# Patient Record
Sex: FEMALE | Race: BLACK OR AFRICAN AMERICAN | NOT HISPANIC OR LATINO | URBAN - METROPOLITAN AREA
[De-identification: names, ages, dates, MRNs, and addresses within clinical notes are randomized per-mention and may not be internally consistent; named-entity substitution may affect disease eponyms.]

---

## 2019-12-05 ENCOUNTER — INPATIENT (INPATIENT)
Facility: HOSPITAL | Age: 69
LOS: 27 days | Discharge: NURSING, REHAB OR LONG-TERM CARE | DRG: 981 | End: 2020-01-02
Attending: HOSPITALIST | Admitting: INTERNAL MEDICINE
Payer: COMMERCIAL

## 2019-12-05 VITALS
RESPIRATION RATE: 26 BRPM | WEIGHT: 139.99 LBS | HEIGHT: 66 IN | HEART RATE: 105 BPM | TEMPERATURE: 98 F | DIASTOLIC BLOOD PRESSURE: 51 MMHG | OXYGEN SATURATION: 94 % | SYSTOLIC BLOOD PRESSURE: 78 MMHG

## 2019-12-05 DIAGNOSIS — J44.9 CHRONIC OBSTRUCTIVE PULMONARY DISEASE, UNSPECIFIED: ICD-10-CM

## 2019-12-05 DIAGNOSIS — Z96.643 PRESENCE OF ARTIFICIAL HIP JOINT, BILATERAL: Chronic | ICD-10-CM

## 2019-12-05 LAB
ALBUMIN SERPL ELPH-MCNC: 2.7 G/DL — LOW (ref 3.3–5.2)
ALP SERPL-CCNC: 92 U/L — SIGNIFICANT CHANGE UP (ref 40–120)
ALT FLD-CCNC: 5 U/L — SIGNIFICANT CHANGE UP
ANION GAP SERPL CALC-SCNC: 14 MMOL/L — SIGNIFICANT CHANGE UP (ref 5–17)
APTT BLD: 30 SEC — SIGNIFICANT CHANGE UP (ref 27.5–36.3)
AST SERPL-CCNC: 12 U/L — SIGNIFICANT CHANGE UP
BASOPHILS # BLD AUTO: 0.04 K/UL — SIGNIFICANT CHANGE UP (ref 0–0.2)
BASOPHILS NFR BLD AUTO: 0.3 % — SIGNIFICANT CHANGE UP (ref 0–2)
BILIRUB SERPL-MCNC: 0.5 MG/DL — SIGNIFICANT CHANGE UP (ref 0.4–2)
BUN SERPL-MCNC: 7 MG/DL — LOW (ref 8–20)
CALCIUM SERPL-MCNC: 8.7 MG/DL — SIGNIFICANT CHANGE UP (ref 8.6–10.2)
CHLORIDE SERPL-SCNC: 93 MMOL/L — LOW (ref 98–107)
CO2 SERPL-SCNC: 28 MMOL/L — SIGNIFICANT CHANGE UP (ref 22–29)
CREAT SERPL-MCNC: 0.49 MG/DL — LOW (ref 0.5–1.3)
EOSINOPHIL # BLD AUTO: 0.02 K/UL — SIGNIFICANT CHANGE UP (ref 0–0.5)
EOSINOPHIL NFR BLD AUTO: 0.2 % — SIGNIFICANT CHANGE UP (ref 0–6)
FERRITIN SERPL-MCNC: 411 NG/ML — HIGH (ref 15–150)
GAS PNL BLDV: SIGNIFICANT CHANGE UP
GLUCOSE SERPL-MCNC: 99 MG/DL — SIGNIFICANT CHANGE UP (ref 70–115)
HCO3 BLDV-SCNC: 28 MMOL/L — SIGNIFICANT CHANGE UP (ref 21–29)
HCT VFR BLD CALC: 33.4 % — LOW (ref 34.5–45)
HGB BLD-MCNC: 10.8 G/DL — LOW (ref 11.5–15.5)
IMM GRANULOCYTES NFR BLD AUTO: 0.4 % — SIGNIFICANT CHANGE UP (ref 0–1.5)
INR BLD: 1.21 RATIO — HIGH (ref 0.88–1.16)
IRON SATN MFR SERPL: 16 UG/DL — LOW (ref 37–145)
LYMPHOCYTES # BLD AUTO: 1.79 K/UL — SIGNIFICANT CHANGE UP (ref 1–3.3)
LYMPHOCYTES # BLD AUTO: 13.7 % — SIGNIFICANT CHANGE UP (ref 13–44)
MCHC RBC-ENTMCNC: 29.6 PG — SIGNIFICANT CHANGE UP (ref 27–34)
MCHC RBC-ENTMCNC: 32.3 GM/DL — SIGNIFICANT CHANGE UP (ref 32–36)
MCV RBC AUTO: 91.5 FL — SIGNIFICANT CHANGE UP (ref 80–100)
MONOCYTES # BLD AUTO: 0.64 K/UL — SIGNIFICANT CHANGE UP (ref 0–0.9)
MONOCYTES NFR BLD AUTO: 4.9 % — SIGNIFICANT CHANGE UP (ref 2–14)
NEUTROPHILS # BLD AUTO: 10.53 K/UL — HIGH (ref 1.8–7.4)
NEUTROPHILS NFR BLD AUTO: 80.5 % — HIGH (ref 43–77)
NT-PROBNP SERPL-SCNC: 4165 PG/ML — HIGH (ref 0–300)
PCO2 BLDV: 59 MMHG — HIGH (ref 35–50)
PH BLDV: 7.34 — SIGNIFICANT CHANGE UP (ref 7.32–7.43)
PLATELET # BLD AUTO: 534 K/UL — HIGH (ref 150–400)
PO2 BLDV: 234 MMHG — HIGH (ref 25–45)
POTASSIUM SERPL-MCNC: 4.4 MMOL/L — SIGNIFICANT CHANGE UP (ref 3.5–5.3)
POTASSIUM SERPL-SCNC: 4.4 MMOL/L — SIGNIFICANT CHANGE UP (ref 3.5–5.3)
PROT SERPL-MCNC: 8 G/DL — SIGNIFICANT CHANGE UP (ref 6.6–8.7)
PROTHROM AB SERPL-ACNC: 14 SEC — HIGH (ref 10–12.9)
RBC # BLD: 3.65 M/UL — LOW (ref 3.8–5.2)
RBC # FLD: 13.6 % — SIGNIFICANT CHANGE UP (ref 10.3–14.5)
SAO2 % BLDV: 99 % — SIGNIFICANT CHANGE UP
SODIUM SERPL-SCNC: 135 MMOL/L — SIGNIFICANT CHANGE UP (ref 135–145)
TROPONIN T SERPL-MCNC: <0.01 NG/ML — SIGNIFICANT CHANGE UP (ref 0–0.06)
TROPONIN T SERPL-MCNC: <0.01 NG/ML — SIGNIFICANT CHANGE UP (ref 0–0.06)
VIT B12 SERPL-MCNC: 451 PG/ML — SIGNIFICANT CHANGE UP (ref 232–1245)
WBC # BLD: 13.07 K/UL — HIGH (ref 3.8–10.5)
WBC # FLD AUTO: 13.07 K/UL — HIGH (ref 3.8–10.5)

## 2019-12-05 PROCEDURE — 71250 CT THORAX DX C-: CPT | Mod: 26

## 2019-12-05 PROCEDURE — 93010 ELECTROCARDIOGRAM REPORT: CPT

## 2019-12-05 PROCEDURE — 99223 1ST HOSP IP/OBS HIGH 75: CPT

## 2019-12-05 PROCEDURE — 99291 CRITICAL CARE FIRST HOUR: CPT

## 2019-12-05 PROCEDURE — 93306 TTE W/DOPPLER COMPLETE: CPT | Mod: 26

## 2019-12-05 PROCEDURE — 71046 X-RAY EXAM CHEST 2 VIEWS: CPT | Mod: 26

## 2019-12-05 RX ORDER — FOLIC ACID 0.8 MG
1 TABLET ORAL DAILY
Refills: 0 | Status: DISCONTINUED | OUTPATIENT
Start: 2019-12-05 | End: 2019-12-12

## 2019-12-05 RX ORDER — IPRATROPIUM BROMIDE 0.2 MG/ML
0 SOLUTION, NON-ORAL INHALATION
Qty: 0 | Refills: 0 | DISCHARGE

## 2019-12-05 RX ORDER — AZITHROMYCIN 500 MG/1
500 TABLET, FILM COATED ORAL ONCE
Refills: 0 | Status: COMPLETED | OUTPATIENT
Start: 2019-12-05 | End: 2019-12-05

## 2019-12-05 RX ORDER — IPRATROPIUM/ALBUTEROL SULFATE 18-103MCG
3 AEROSOL WITH ADAPTER (GRAM) INHALATION ONCE
Refills: 0 | Status: COMPLETED | OUTPATIENT
Start: 2019-12-05 | End: 2019-12-05

## 2019-12-05 RX ORDER — AZITHROMYCIN 500 MG/1
TABLET, FILM COATED ORAL
Refills: 0 | Status: DISCONTINUED | OUTPATIENT
Start: 2019-12-05 | End: 2019-12-08

## 2019-12-05 RX ORDER — ALBUTEROL 90 UG/1
0 AEROSOL, METERED ORAL
Qty: 0 | Refills: 0 | DISCHARGE

## 2019-12-05 RX ORDER — AZITHROMYCIN 500 MG/1
500 TABLET, FILM COATED ORAL EVERY 24 HOURS
Refills: 0 | Status: DISCONTINUED | OUTPATIENT
Start: 2019-12-06 | End: 2019-12-08

## 2019-12-05 RX ORDER — ENOXAPARIN SODIUM 100 MG/ML
40 INJECTION SUBCUTANEOUS DAILY
Refills: 0 | Status: DISCONTINUED | OUTPATIENT
Start: 2019-12-05 | End: 2019-12-06

## 2019-12-05 RX ORDER — IPRATROPIUM/ALBUTEROL SULFATE 18-103MCG
3 AEROSOL WITH ADAPTER (GRAM) INHALATION EVERY 6 HOURS
Refills: 0 | Status: DISCONTINUED | OUTPATIENT
Start: 2019-12-05 | End: 2019-12-07

## 2019-12-05 RX ADMIN — Medication 3 MILLILITER(S): at 11:31

## 2019-12-05 RX ADMIN — Medication 40 MILLIGRAM(S): at 11:30

## 2019-12-05 RX ADMIN — Medication 40 MILLIGRAM(S): at 18:50

## 2019-12-05 RX ADMIN — AZITHROMYCIN 255 MILLIGRAM(S): 500 TABLET, FILM COATED ORAL at 18:51

## 2019-12-05 RX ADMIN — Medication 3 MILLILITER(S): at 20:09

## 2019-12-05 NOTE — ED PROVIDER NOTE - PHYSICAL EXAMINATION
+tachypneic, tripoding, unable to speak in full sentences, lungs diffusely diminished despite good respiratory effort, pt appears cachetic with excessive loose skin

## 2019-12-05 NOTE — ED ADULT TRIAGE NOTE - CHIEF COMPLAINT QUOTE
Patient arrived to ED today with c/o trouble breathing, weakness, SOB, not eating at home.  Patient with hx of COPD.

## 2019-12-05 NOTE — H&P ADULT - HISTORY OF PRESENT ILLNESS
68 yo female with history of COPD who presents with 1 month of shortness of breath worsened over last 2 weeks associated with >30 lb weight loss in the last 6 months, decreased appetite. Denies chest pain, leg pain/swelling. Denies N/V, abdominal pain, diarrhea, constipation. Patient does admit to being depressed in mood but denies any suicidal ideation.     Patient and  at bedside note she has not been taking any of her prescribed medications. Patient was hypoxic in the ED and was given nebulizer and IV steroids. She is now 91% on room air. Patient had CT chest done that showed multiple lung nodules bilaterally. ED ordered abdominal CT.    Her pulmonologist is in [place] as well as her PCP. She states she had a CT done a year prior for an abnormal chest xray and there were no abnormal findings on the CT chest (I called patient's PCP Dr. Mario Rios who read the report as RML infiltrate/atelectasis with emphysematous changes but no mention of nodules or mediastinal lymphadenopathy).

## 2019-12-05 NOTE — ED PROVIDER NOTE - OBJECTIVE STATEMENT
69 y/ F with PMH COPD, active smoker presents to ED for c/o SOB worsening x 1 month. Patient should be using O2 at home as well as CPAP at night but family reports she is  noncompliant. Patient did not take nebulizer or inhalers for dyspnea. Was last seen by medical provider over a year ago as per son. Patient denies fever, chills, chest pain, abdominal pain, nausea, vomiting, diarrhea. Reports over 50 lb weight loss in one year, states some intentional by not eating junk food. Reports decreased appetite.     PCP: Dr Mario Sheppard  Pulmonary: Dr Chandra

## 2019-12-05 NOTE — H&P ADULT - NSHPLABSRESULTS_GEN_ALL_CORE
10.8   13.07 )-----------( 534      ( 05 Dec 2019 11:40 )             33.4     12-05    135  |  93<L>  |  7.0<L>  ----------------------------<  99  4.4   |  28.0  |  0.49<L>    Ca    8.7      05 Dec 2019 11:40    TPro  8.0  /  Alb  2.7<L>  /  TBili  0.5  /  DBili  x   /  AST  12  /  ALT  5   /  AlkPhos  92  12-05    < from: CT Chest No Cont (12.05.19 @ 13:28) >    FINDINGS:    LUNGS AND AIRWAYS: Secretions in the trachea. Moderate emphysematous   changes. Scattered bilateral pulmonary nodules measuring up to 1.4 cm in   the right lower lobe (series 3 image 117).  Opacity at the anterior   inferior aspect of the right middle lobe, likely atelectasis or scarring.   There are small nodules associated with the right middle lobe opacity,   likely infectious or inflammatory.    PLEURA: No pleural effusion.    MEDIASTINUM AND MAYANK: No lymphadenopathy. Small hiatal hernia.    VESSELS: Thoracic aorta normal in caliber with mild calcified plaque.   Coronary artery calcifications.    HEART: Heart size is normal. No pericardial effusion. Blood pool lower in   attenuation than the myocardium consistent with anemia.    CHEST WALL AND LOWER NECK: Within normal limits.    VISUALIZED UPPER ABDOMEN: Patient appears to be status post   cholecystectomy. Calcifications in the eunice hepatis and the medial   aspect of the right hepatic lobe, likely calcified lymph nodes.    BONES: No aggressive osseous lesion.    IMPRESSION:     Scattered bilateral pulmonary nodules measuring up 1.4 cm in the right   lower lobe; metastatic disease cannot be excluded; either a CT   abdomen/pelvis with oral and intravenous contrast or PET/CT is   recommended for further evaluation.    Right middle lobe opacity, likely atelectasis or scarring with small   associated nodules, likely infectious/inflammatory of the small airways.    < end of copied text >

## 2019-12-05 NOTE — H&P ADULT - NSHPREVIEWOFSYSTEMS_GEN_ALL_CORE
REVIEW OF SYSTEMS  General: +weakness, malaise; +weight loss  Skin/Breast: no new rash  Ophthalmologic: no change in vision  ENMT: no dysphagia, throat pain or change in hearing  Respiratory and Thorax: +difficulty breathing; no chest pain  Cardiovascular: no palpitations or PND, orthopnea  Gastrointestinal: no abdominal pain, normal bowel movement, no dark stools; +decreased appetite  Genitourinary: no difficulty urinating, no burning urination  Musculoskeletal: no myalgia/arthrlagia  Neurological: no numbness, change in gait  Psychiatric: +depression,   Hematology/Lymphatics: denies easy bruising or bleeding  Endocrine: no polyuria, polydipsia

## 2019-12-05 NOTE — ED ADULT NURSE NOTE - NSIMPLEMENTINTERV_GEN_ALL_ED
Implemented All Universal Safety Interventions:  [place] to call system. Call bell, personal items and telephone within reach. Instruct patient to call for assistance. Room bathroom lighting operational. Non-slip footwear when patient is off stretcher. Physically safe environment: no spills, clutter or unnecessary equipment. Stretcher in lowest position, wheels locked, appropriate side rails in place.

## 2019-12-05 NOTE — H&P ADULT - ASSESSMENT
68 yo female with history of COPD who presents with 1 month of shortness of breath worsened over last 2 weeks associated with >30 lb weight loss in the last 6 months, decreased appetite. Patient being admitted now with hypoxemic respiratory failure 2/2 COPD exacerbation with possible metastatic lung disease. CT abdomen is pending.    AHRF - 2/2 COPD exacerbation  - patient on anoro-ellipta 62.5/25 dose at home BID; ventolin as needed  - she is taking neither of these  - IV azithromycin 500mg x3 days  - methyprednisolone 40mg BID  - duoneb q6    Lung Nodules  - CT abdomen  - pulmonary consult    anemia with thrombocytosis  - check FOBT and iron studies  - may be GI source    elevated BNP - no clinical signs of HF  - EKG mostly flattened t-waves and inversions in inferior leads and V3/V4  - will add troponin  - will check echo    slightly elevated INR - given poor diet may reflect some degree of malnutrition    protein calorie malnutrition  - supplement meals  - nutrition consult    Depressed mood  - no suicidal ideation  - patient agrees to psychiatry consult - consult placed    DVT - lovenox sq

## 2019-12-05 NOTE — ED ADULT NURSE NOTE - OBJECTIVE STATEMENT
Assumed pt care at 1100.  Pt a&ox4 c/o increasing SOB, as well as a 50lb weight loss over the last year.  Pt is noncompliant, states she is supposed to be on 3L O2 NC at home and supposed to use bipap but does not, pt has not taken meds or seen doctor in over a year, pt states "I felt fine so I didn't use anything or go to doc" denies any c/o pain at this time, will continue to monitor

## 2019-12-05 NOTE — ED PROVIDER NOTE - CLINICAL SUMMARY MEDICAL DECISION MAKING FREE TEXT BOX
68 y/o noncompliant COPDpateint with worsening SOb x 1 month, dneies chest pain, also with excesive weight loss, loss of appetits, concern for pulmonary pathology, plan = will obtain EKG, BNP and troponin to assess for cardiac etiology, CXR and CT chest to evaluate for pneumonia, vs COPD, vs malignancy,

## 2019-12-05 NOTE — H&P ADULT - NSHPPHYSICALEXAM_GEN_ALL_CORE
PHYSICAL EXAM:  General: in no acute distress; slouched in her stretcher to one side; uncomfortable appearing; thin appearing with lots of loose skin  Eyes: PERRLA, EOMI; conjunctiva and sclera clear  Head: Normocephalic; atraumatic  Neck: Supple; non tender; no masses  Respiratory: tight sounding respirations; no audible wheeze; no rhonchi/rales  Cardiovascular: Regular rate and rhythm. S1 and S2 Normal; No murmurs, gallops or rubs  Gastrointestinal: Soft non-tender non-distended; Normal bowel sounds  Extremities: Normal range of motion, No clubbing, cyanosis or edema  Vascular: Peripheral pulses palpable 2+ bilaterally  Neurological: Alert and oriented x4  Skin: Warm and dry. No acute rash; lots of loose skin indicating weight loss  Musculoskeletal: Normal gait, tone, without deformities  Psychiatric: Cooperative and appropriate

## 2019-12-05 NOTE — ED PROVIDER NOTE - PROGRESS NOTE DETAILS
Minimal improvement in lung sounds after neb treatment, lungs remains diffusely diminished but pt does report improvement in breathing, Spo2 9% on 2 L NC. Awating CT AJM: concern for ca on ct. will obtain ct a/p and head to look for primary. signed out to hospitalist for admission. pt stable. pt and family updated

## 2019-12-06 DIAGNOSIS — F06.31 MOOD DISORDER DUE TO KNOWN PHYSIOLOGICAL CONDITION WITH DEPRESSIVE FEATURES: ICD-10-CM

## 2019-12-06 LAB
ALBUMIN SERPL ELPH-MCNC: 2.5 G/DL — LOW (ref 3.3–5.2)
ALP SERPL-CCNC: 88 U/L — SIGNIFICANT CHANGE UP (ref 40–120)
ALT FLD-CCNC: <5 U/L — SIGNIFICANT CHANGE UP
ANION GAP SERPL CALC-SCNC: 13 MMOL/L — SIGNIFICANT CHANGE UP (ref 5–17)
AST SERPL-CCNC: 6 U/L — SIGNIFICANT CHANGE UP
BILIRUB SERPL-MCNC: 0.3 MG/DL — LOW (ref 0.4–2)
BUN SERPL-MCNC: 13 MG/DL — SIGNIFICANT CHANGE UP (ref 8–20)
CALCIUM SERPL-MCNC: 8.9 MG/DL — SIGNIFICANT CHANGE UP (ref 8.6–10.2)
CHLORIDE SERPL-SCNC: 96 MMOL/L — LOW (ref 98–107)
CO2 SERPL-SCNC: 25 MMOL/L — SIGNIFICANT CHANGE UP (ref 22–29)
CREAT SERPL-MCNC: 0.68 MG/DL — SIGNIFICANT CHANGE UP (ref 0.5–1.3)
GLUCOSE SERPL-MCNC: 148 MG/DL — HIGH (ref 70–115)
HCT VFR BLD CALC: 29.1 % — LOW (ref 34.5–45)
HCV AB S/CO SERPL IA: 0.15 S/CO — SIGNIFICANT CHANGE UP (ref 0–0.99)
HCV AB SERPL-IMP: SIGNIFICANT CHANGE UP
HGB BLD-MCNC: 9.2 G/DL — LOW (ref 11.5–15.5)
MCHC RBC-ENTMCNC: 29.4 PG — SIGNIFICANT CHANGE UP (ref 27–34)
MCHC RBC-ENTMCNC: 31.6 GM/DL — LOW (ref 32–36)
MCV RBC AUTO: 93 FL — SIGNIFICANT CHANGE UP (ref 80–100)
PLATELET # BLD AUTO: 428 K/UL — HIGH (ref 150–400)
POTASSIUM SERPL-MCNC: 3.7 MMOL/L — SIGNIFICANT CHANGE UP (ref 3.5–5.3)
POTASSIUM SERPL-SCNC: 3.7 MMOL/L — SIGNIFICANT CHANGE UP (ref 3.5–5.3)
PROT SERPL-MCNC: 7.3 G/DL — SIGNIFICANT CHANGE UP (ref 6.6–8.7)
RBC # BLD: 3.13 M/UL — LOW (ref 3.8–5.2)
RBC # FLD: 13.7 % — SIGNIFICANT CHANGE UP (ref 10.3–14.5)
SODIUM SERPL-SCNC: 134 MMOL/L — LOW (ref 135–145)
WBC # BLD: 7.82 K/UL — SIGNIFICANT CHANGE UP (ref 3.8–10.5)
WBC # FLD AUTO: 7.82 K/UL — SIGNIFICANT CHANGE UP (ref 3.8–10.5)

## 2019-12-06 PROCEDURE — 99233 SBSQ HOSP IP/OBS HIGH 50: CPT

## 2019-12-06 PROCEDURE — 99223 1ST HOSP IP/OBS HIGH 75: CPT

## 2019-12-06 PROCEDURE — 90792 PSYCH DIAG EVAL W/MED SRVCS: CPT

## 2019-12-06 PROCEDURE — 99284 EMERGENCY DEPT VISIT MOD MDM: CPT

## 2019-12-06 PROCEDURE — 74177 CT ABD & PELVIS W/CONTRAST: CPT | Mod: 26

## 2019-12-06 RX ORDER — SODIUM CHLORIDE 9 MG/ML
1000 INJECTION, SOLUTION INTRAVENOUS
Refills: 0 | Status: COMPLETED | OUTPATIENT
Start: 2019-12-06 | End: 2019-12-06

## 2019-12-06 RX ORDER — ENOXAPARIN SODIUM 100 MG/ML
60 INJECTION SUBCUTANEOUS EVERY 12 HOURS
Refills: 0 | Status: DISCONTINUED | OUTPATIENT
Start: 2019-12-06 | End: 2019-12-10

## 2019-12-06 RX ADMIN — Medication 3 MILLILITER(S): at 10:10

## 2019-12-06 RX ADMIN — ENOXAPARIN SODIUM 40 MILLIGRAM(S): 100 INJECTION SUBCUTANEOUS at 08:01

## 2019-12-06 RX ADMIN — Medication 40 MILLIGRAM(S): at 06:01

## 2019-12-06 RX ADMIN — ENOXAPARIN SODIUM 60 MILLIGRAM(S): 100 INJECTION SUBCUTANEOUS at 19:02

## 2019-12-06 RX ADMIN — AZITHROMYCIN 255 MILLIGRAM(S): 500 TABLET, FILM COATED ORAL at 14:02

## 2019-12-06 RX ADMIN — Medication 3 MILLILITER(S): at 21:35

## 2019-12-06 RX ADMIN — Medication 1 MILLIGRAM(S): at 08:01

## 2019-12-06 RX ADMIN — SODIUM CHLORIDE 75 MILLILITER(S): 9 INJECTION, SOLUTION INTRAVENOUS at 19:06

## 2019-12-06 NOTE — CONSULT NOTE ADULT - ASSESSMENT
Assessment:   68 y/o female with PMHx of smoking (20 pack year history), COPD, Anemia who presents with 1 month of shortness of breath worsened over last 2 weeks associated with >30 lb weight loss in the last 6 months associated with decreased appetite. Patient states notices worsening breathing when waking up in the morning and decreased tolerance when walking up stairs in house. Patient states she is on home oxygen at 2L NC but states noncompliance to medications or home oxygen as she "Does not like taking it unless she needs it. "As per patient PCP CT done last year shows emphysematous changes but no mention of nodules or lymphadenopathy. CT chest in ER shows multiple scattered nodules measuring up to 1.4 cm in right lower lobe.      Plan:   Pulmonary Nodules:   -Follow up outpatient in office for possible biopsy by wedge resection   -Please call for re-evaluation if condition changes.        Case discussed with Dr. Cota

## 2019-12-06 NOTE — PROGRESS NOTE ADULT - ASSESSMENT
68 yo female with history of COPD who presents with 1 month of shortness of breath worsened over last 2 weeks associated with >30 lb weight loss in the last 6 months, decreased appetite. Patient being admitted now with hypoxemic respiratory failure 2/2 COPD exacerbation with possible metastatic lung disease. CT abdomen is pending.    AHRF - 2/2 COPD exacerbation  - patient on anoro-ellipta 62.5/25 dose at home BID; ventolin as needed  - she is taking neither of these  - IV azithromycin 500mg x3 days  - methyprednisolone 40mg BID - can change to PO likely today  - duoneb q6    Lung Nodules  - CT abdomen completed  - will consult GI regarding rectal wall changes  - pulmonary consulted - recs appreciated  - will place oncology consult  - CT surgery consulted however radiologist doesn't feel there are any attainable LN in the chest for biopsy - will cancel consult    anemia with thrombocytosis  - check FOBT and iron studies  - likely GI source    elevated BNP - no clinical signs of HF  - EKG mostly flattened t-waves and inversions in inferior leads and V3/V4  - troponins negative  - will check echo    slightly elevated INR - given poor diet may reflect some degree of malnutrition    protein calorie malnutrition  - supplement meals  - nutrition consult    Depressed mood  - no suicidal ideation  - patient agrees to psychiatry consult - consult placed    DVT - lovenox sq 68 yo female with history of COPD who presents with 1 month of shortness of breath worsened over last 2 weeks associated with >30 lb weight loss in the last 6 months, decreased appetite. Patient being admitted now with hypoxemic respiratory failure 2/2 COPD exacerbation with possible metastatic lung disease. CT abdomen is pending.    AHRF - 2/2 COPD exacerbation  - patient on anoro-ellipta 62.5/25 dose at home BID; ventolin as needed  - she is taking neither of these  - IV azithromycin 500mg x3 days  - methyprednisolone 40mg BID - can change to PO likely today  - duoneb q6    Lung Nodules  - CT abdomen completed  - will consult colorectal surgery regarding rectal wall changes  - pulmonary consulted - recs appreciated  - CT surgery consulted however radiologist doesn't feel there are any attainable LN in the chest for biopsy - will cancel consult    anemia with thrombocytosis  - check FOBT and iron studies  - likely GI source    elevated BNP - no clinical signs of HF  - EKG mostly flattened t-waves and inversions in inferior leads and V3/V4  - troponins negative  - will check echo    slightly elevated INR - given poor diet may reflect some degree of malnutrition    protein calorie malnutrition  - supplement meals  - nutrition consult    Depressed mood  - no suicidal ideation  - patient agrees to psychiatry consult - consult placed    DVT - lovenox sq

## 2019-12-06 NOTE — CONSULT NOTE ADULT - SUBJECTIVE AND OBJECTIVE BOX
HPI:  70 yo female with PMHx of smoking (20 pack year history), COPD, Anemia who presents with 1 month of shortness of breath worsened over last 2 weeks associated with >30 lb weight loss in the last 6 months, patient attributes to decreased appetite. Patient states notices worsening breathing when waking up in the morning and decreased tolerance when walking up stairs in house. Patient states she is on home oxygen at 2L NCNC but states noncompliance to medications or home oxygen as she "Does not like taking it unless she needs it" Patient found on CT Chest in ER to have multiple scattered pulmonary nodules largest measuring 1.4cm in right lower lobe.    of note from ED: Her pulmonologist is in [place] as well as her PCP. She states she had a CT done a year prior for an abnormal chest xray and there were no abnormal findings on the CT chest (called patient's PCP Dr. Mario Rios who read the report as RML infiltrate/atelectasis with emphysematous changes but no mention of nodules or mediastinal lymphadenopathy). (05 Dec 2019 14:31)    Patient denies productive cough, hemoptysis, orthopnea, pain with inspiration, recent illness. Patient sates 1 night sweat per week for the last month, denies daytime fevers.       PAST MEDICAL & SURGICAL HISTORY:  Anemia  COPD (chronic obstructive pulmonary disease)  H/O bilateral hip replacements      REVIEW OF SYSTEMS    General:+ >30 pound Weight change, + Fatigue denies HA/Dizzy	    Skin/Breast: No Rashes/ Lesions/ Masses    Respiratory and Thorax: No Cough/ Wheezing/ SOB/ Hemoptysis/ Increased Sputum production  	  Cardiovascular: No Chest pain/ Palpitations/ Diaphoresis	    Gastrointestinal: + Appetite changes (states decreased appetite and eats mostly vegetables) No Nausea/ Vomiting/ Constipation/     Musculoskeletal: No Pain/ Weakness/ Claudication	    Allergic/Immunologic:  Intolerance/ Recent illnesses    MEDICATIONS  (STANDING):  albuterol/ipratropium for Nebulization 3 milliLiter(s) Nebulizer every 6 hours  azithromycin  IVPB      azithromycin  IVPB 500 milliGRAM(s) IV Intermittent every 24 hours  enoxaparin Injectable 60 milliGRAM(s) SubCutaneous every 12 hours  folic acid 1 milliGRAM(s) Oral daily  lactated ringers. 1000 milliLiter(s) (75 mL/Hr) IV Continuous <Continuous>  predniSONE   Tablet 60 milliGRAM(s) Oral daily    Allergies  No Known Allergies    SOCIAL HISTORY:   Lives home with sister and brother, states 8 stairs into house and 8 stairs to second floor  Tobacco: 20 pack year smoking history, quit 18 years ago   Alcohol: 1 beer per month  Denies illicit drug use     FAMILY HISTORY:  Mother diabetes, denies significant cancer history    Vital Signs Last 24 Hrs  T(C): 36.7 (06 Dec 2019 15:53), Max: 36.7 (06 Dec 2019 09:49)  T(F): 98 (06 Dec 2019 15:53), Max: 98.1 (06 Dec 2019 09:49)  HR: 95 (06 Dec 2019 15:53) (75 - 95)  BP: 94/64 (06 Dec 2019 15:53) (94/58 - 103/55)  BP(mean): --  RR: 16 (06 Dec 2019 15:53) (16 - 18)  SpO2: 98% (06 Dec 2019 15:53) (90% - 98%)     General: WN/WD NAD  Neurology: Awake, nonfocal, TREADWELL x 4  Eyes: Scleras clear, PERRLA/ EOMI, Gross vision intact  ENT:Gross hearing intact, grossly patent pharynx, no stridor  Neck: Neck supple, trachea midline, No JVD,   Respiratory: CTA B/L, No wheezing, rales, rhonchi  CV: RRR, S1S2, no murmurs, rubs or gallops  Abdominal: Soft, NT, ND +BS,   Extremities: No edema, + peripheral pulses  Psych: Oriented x 3, normal affect    LABS:                        9.2    7.82  )-----------( 428      ( 06 Dec 2019 06:35 )             29.1     12-06    134<L>  |  96<L>  |  13.0  ----------------------------<  148<H>  3.7   |  25.0  |  0.68    Ca    8.9      06 Dec 2019 06:35    TPro  7.3  /  Alb  2.5<L>  /  TBili  0.3<L>  /  DBili  x   /  AST  6   /  ALT  <5  /  AlkPhos  88  12-06    PT/INR - ( 05 Dec 2019 11:40 )   PT: 14.0 sec;   INR: 1.21 ratio         PTT - ( 05 Dec 2019 11:40 )  PTT:30.0 sec      RADIOLOGY & ADDITIONAL STUDIES:    Chest Xray:   < from: Xray Chest 2 Views PA/Lat (12.05.19 @ 11:42) >    PROCEDURE DATE:  12/05/2019      INTERPRETATION:  XR CHEST PA AND LATERAL    History: Shortness of breath. Weight loss.    Technique: PA and lateral views of the chest were obtained.    Comparison: CT chest of the same day.    Findings:    Lungs/Pleura:  Pulmonary nodule in the left lower lung measuring 1 cm;   most of the pulmonary nodules on the chest CT of the same day are not   well seen on x-ray.    Heart/Mediastinum:  The heart is normal in size.    Bones:  Dextroconvex thoracolumbar scoliosis.    Other: Surgical clips in the upper abdomen on the lateral view.    Impression:    Left lower lobe pulmonary nodule measuring 1 cm; most of the pulmonary   nodules on the chest CT the same day are not well seen on x-ray; see   chest CT report.    ALEXIA COTTO   This document has been electronically signed. Dec  5 2019  2:57PM    < end of copied text >      Chest CT:   < from: CT Chest No Cont (12.05.19 @ 13:28) >  PROCEDURE DATE:  12/05/2019      INTERPRETATION:  CLINICAL INFORMATION: Smoking. Weight loss. Shortness of   breath    COMPARISON: Chest x-ray of the same day    PROCEDURE:   CT of the Chest was performed without intravenous contrast.  Sagittal and coronal reformats were performed.      FINDINGS:    LUNGS AND AIRWAYS: Secretions in the trachea. Moderate emphysematous   changes. Scattered bilateral pulmonary nodules measuring up to 1.4 cm in   the right lower lobe (series 3 image 117).  Opacity at the anterior   inferior aspect of the right middle lobe, likely atelectasis or scarring.   There are small nodules associated with the right middle lobe opacity,   likely infectious or inflammatory.    PLEURA: No pleural effusion.    MEDIASTINUM AND MAYANK: No lymphadenopathy. Small hiatal hernia.    VESSELS: Thoracic aorta normal in caliber with mild calcified plaque.   Coronary artery calcifications.    HEART: Heart size is normal. No pericardial effusion. Blood pool lower in   attenuation than the myocardium consistent with anemia.    CHEST WALL AND LOWER NECK: Within normal limits.    VISUALIZED UPPER ABDOMEN: Patient appears to be status post   cholecystectomy. Calcifications in the eunice hepatis and the medial   aspect of the right hepatic lobe, likely calcified lymph nodes.    BONES: No aggressive osseous lesion.    IMPRESSION:     Scattered bilateral pulmonary nodules measuring up 1.4 cm in the right   lower lobe; metastatic disease cannot be excluded; either a CT   abdomen/pelvis with oral and intravenous contrast or PET/CT is   recommended for further evaluation.    Right middle lobe opacity, likely atelectasis or scarring with small   associated nodules, likely infectious/inflammatory of the small airways.    The findings were discussed with Dr. Freeman on 12/5/2019 2:09 PM    ALEXIA COTTO   This document has been electronically signed. Dec  5 2019  2:11PM    < end of copied text >

## 2019-12-06 NOTE — BEHAVIORAL HEALTH ASSESSMENT NOTE - SUMMARY
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  Pt endorsing depression but denies SI and no anhedonia.  Pt has decreased enery and her self care is diminished.  Discussed starting an SSI but Pt declined saying she was worried about driving on medication and was educated meds would not effect safety.  Pt advised to return to ED if SI or if symptoms worsen and report to PCP if she changes her mind about meds.  Would recommend Lexapro 5 mg if Pt Is agreeable.  Please do not start without her consent.  Signing off.  Call for questions or as needed.

## 2019-12-06 NOTE — BEHAVIORAL HEALTH ASSESSMENT NOTE - SUICIDE PROTECTIVE FACTORS
Has future plans/Responsibility to family and others/Supportive social network of family or friends/Identifies reasons for living

## 2019-12-06 NOTE — CONSULT NOTE ADULT - ASSESSMENT
69yoF c SOB found to have an incidental finding of rectal thickening, currently asymptomatic  - Recommend GI consult for colonoscopy as etiology of CT scan findings are unknown  - No acute surgical interventions at this time  - Care per primary team 69yoF c SOB found to have an incidental finding of rectal thickening, currently asymptomatic  - Recommend GI consult for colonoscopy as etiology of CT scan findings are unknown

## 2019-12-06 NOTE — BEHAVIORAL HEALTH ASSESSMENT NOTE - NSBHCHARTREVIEWINVESTIGATE_PSY_A_CORE FT
Ventricular Rate 94 BPM    Atrial Rate 94 BPM    P-R Interval 146 ms    QRS Duration 88 ms    Q-T Interval 358 ms    QTC Calculation(Bezet) 447 ms    P Axis 72 degrees    R Axis 25 degrees    T Axis -76 degrees    Diagnosis Line *** Poor data quality, interpretation may be adversely affected  Normal sinus rhythm  T wave abnormality, consider inferior ischemia  T wave abnormality, consider anterior ischemia  Abnormal ECG    Confirmed by Rishi Lee ([id number]) on 12/5/2019 4:09:56 PM

## 2019-12-06 NOTE — BEHAVIORAL HEALTH ASSESSMENT NOTE - NSBHCHARTREVIEWVS_PSY_A_CORE FT
ICU Vital Signs Last 24 Hrs  T(C): 36.5 (06 Dec 2019 11:37), Max: 36.7 (06 Dec 2019 09:49)  T(F): 97.7 (06 Dec 2019 11:37), Max: 98.1 (06 Dec 2019 09:49)  HR: 92 (06 Dec 2019 11:37) (75 - 92)  BP: 94/61 (06 Dec 2019 11:37) (94/58 - 109/61)  BP(mean): --  ABP: --  ABP(mean): --  RR: 16 (06 Dec 2019 11:37) (16 - 20)  SpO2: 96% (06 Dec 2019 11:37) (90% - 98%)

## 2019-12-06 NOTE — CONSULT NOTE ADULT - ASSESSMENT
AECOPD cleared   Pulmonary nodules most likely metastatic    Plan:  change to po prednisone  agree with abd Ct  consider t surg consult for subcarinal node BX  Please call pulmonary with any issues or re-eval over the weekend. Will f/u on Monday unless called

## 2019-12-06 NOTE — BEHAVIORAL HEALTH ASSESSMENT NOTE - NSBHREFERDETAILS_PSY_A_CORE_FT
following is copy paste from h/p:  History of Present Illness:  Reason for Admission: acute hypoxic respiratory failure, multiple lung nodules  History of Present Illness:   68 yo female with history of COPD who presents with 1 month of shortness of breath worsened over last 2 weeks associated with >30 lb weight loss in the last 6 months, decreased appetite. Denies chest pain, leg pain/swelling. Denies N/V, abdominal pain, diarrhea, constipation. Patient does admit to being depressed in mood but denies any suicidal ideation.     Patient and  at bedside note she has not been taking any of her prescribed medications. Patient was hypoxic in the ED and was given nebulizer and IV steroids. She is now 91% on room air. Patient had CT chest done that showed multiple lung nodules bilaterally. ED ordered abdominal CT.

## 2019-12-06 NOTE — CONSULT NOTE ADULT - SUBJECTIVE AND OBJECTIVE BOX
69yoF that p/w worsening respiratory symptoms. CRS consulted for incidental finding of thickening of the rectum of unknwon etiology. Pt recently diagnosed with suspicious pulmonary nodules and weight loss. Pt denies changes in bowel habits, blood per rectum or obstruction. Pt currently has no active or previous GI complaints.    PMH: COPD

## 2019-12-06 NOTE — CONSULT NOTE ADULT - SUBJECTIVE AND OBJECTIVE BOX
PULMONARY CONSULT NOTE      KERRY OTOOLE-[id number]    Patient is a 69y old  Female who presents with a chief complaint of acute hypoxic respiratory failure, multiple lung nodules (05 Dec 2019 14:31)      HISTORY OF PRESENT ILLNESS:  68 yo female with history of COPD who presents with 1 month of shortness of breath worsened over last 2 weeks associated with >30 lb weight loss in the last 6 months, decreased appetite. Denies chest pain, leg pain/swelling. Denies N/V, abdominal pain, diarrhea, constipation. Patient does admit to being depressed in mood but denies any suicidal ideation.     Patient and  at bedside noted she has not been taking any of her prescribed medications. Patient was hypoxic in the ED and was given nebulizer and IV steroids. She is now 91% on room air. Patient had CT chest done that showed multiple lung nodules bilaterally. ED ordered abdominal CT.    Her pulmonologist is in [place] as well as her PCP. She states she had a CT done a year prior for an abnormal chest xray and there were no abnormal findings on the CT chest (I called patient's PCP Dr. Mario Rios who read the report as RML infiltrate/atelectasis with emphysematous changes but no mention of nodules or mediastinal lymphadenopathy).    Feeling much better this am with resp status at baseline    MEDICATIONS  (STANDING):  albuterol/ipratropium for Nebulization 3 milliLiter(s) Nebulizer every 6 hours  azithromycin  IVPB      azithromycin  IVPB 500 milliGRAM(s) IV Intermittent every 24 hours  enoxaparin Injectable 40 milliGRAM(s) SubCutaneous daily  folic acid 1 milliGRAM(s) Oral daily  methylPREDNISolone sodium succinate Injectable 40 milliGRAM(s) IV Push every 12 hours      MEDICATIONS  (PRN):      Allergies    No Known Allergies    Intolerances        PAST MEDICAL & SURGICAL HISTORY:  Anemia  COPD (chronic obstructive pulmonary disease)  H/O bilateral hip replacements      FAMILY HISTORY:      SOCIAL HISTORY  Smoking History:     REVIEW OF SYSTEMS:    CONSTITUTIONAL:  No fevers, chills, sweats    HEENT:  Eyes:  No diplopia or blurred vision. ENT:  No earache, sore throat or runny nose. sinus headache or postnasl drip    CARDIOVASCULAR:  No pressure, squeezing, tightness, or heaviness about the chest; no palpitations, leg swelling, orthopnea or PND    RESPIRATORY: above    GASTROINTESTINAL:  No abdominal pain, nausea, vomiting or diarrhea.    GENITOURINARY:  No dysuria, frequency or urgency.    NEUROLOGIC:  No paresthesias, fasciculations, seizures or weakness.    PSYCHIATRIC:  No disorder of thought or mood.    Vital Signs Last 24 Hrs  T(C): 36.7 (06 Dec 2019 09:49), Max: 37 (05 Dec 2019 11:29)  T(F): 98.1 (06 Dec 2019 09:49), Max: 98.6 (05 Dec 2019 11:29)  HR: 84 (06 Dec 2019 10:11) (75 - 86)  BP: 95/59 (06 Dec 2019 09:49) (94/58 - 109/61)  BP(mean): --  RR: 16 (06 Dec 2019 09:49) (16 - 20)  SpO2: 98% (06 Dec 2019 10:11) (90% - 98%)    PHYSICAL EXAMINATION:    GENERAL: The patient is a well-developed, well-nourished _____in no apparent distress.     HEENT: Head is normocephalic and atraumatic. Extraocular muscles are intact. Mucous membranes are moist.     NECK: Supple.     LUNGS: Clear to auscultation without wheezing, rales, or rhonchi. Respirations unlabored    HEART: Regular rate and rhythm without murmur.    ABDOMEN: Soft, nontender, and nondistended.  No hepatosplenomegaly is noted.    EXTREMITIES: Without any cyanosis, clubbing, rash, lesions or edema.    NEUROLOGIC: Grossly intact.      LABS:                        9.2    7.82  )-----------( 428      ( 06 Dec 2019 06:35 )             29.1     12-06    134<L>  |  96<L>  |  13.0  ----------------------------<  148<H>  3.7   |  25.0  |  0.68    Ca    8.9      06 Dec 2019 06:35    TPro  7.3  /  Alb  2.5<L>  /  TBili  0.3<L>  /  DBili  x   /  AST  6   /  ALT  <5  /  AlkPhos  88  12-06    PT/INR - ( 05 Dec 2019 11:40 )   PT: 14.0 sec;   INR: 1.21 ratio         PTT - ( 05 Dec 2019 11:40 )  PTT:30.0 sec      CARDIAC MARKERS ( 05 Dec 2019 19:40 )  x     / <0.01 ng/mL / x     / x     / x      CARDIAC MARKERS ( 05 Dec 2019 11:40 )  x     / <0.01 ng/mL / x     / x     / x            Serum Pro-Brain Natriuretic Peptide: 4165 pg/mL (12-05-19 @ 11:40)          MICROBIOLOGY:    RADIOLOGY & ADDITIONAL STUDIES:  < from: CT Chest No Cont (12.05.19 @ 13:28) >   EXAM:  CT CHEST                          PROCEDURE DATE:  12/05/2019          INTERPRETATION:  CLINICAL INFORMATION: Smoking. Weight loss. Shortness of   breath    COMPARISON: Chest x-ray of the same day    PROCEDURE:   CT of the Chest was performed without intravenous contrast.  Sagittal and coronal reformats were performed.      FINDINGS:    LUNGS AND AIRWAYS: Secretions in the trachea. Moderate emphysematous   changes. Scattered bilateral pulmonary nodules measuring up to 1.4 cm in   the right lower lobe (series 3 image 117).  Opacity at the anterior   inferior aspect of the right middle lobe, likely atelectasis or scarring.   There are small nodules associated with the right middle lobe opacity,   likely infectious or inflammatory.    PLEURA: No pleural effusion.    MEDIASTINUM AND MAYANK: No lymphadenopathy. Small hiatal hernia.    VESSELS: Thoracic aorta normal in caliber with mild calcified plaque.   Coronary artery calcifications.    HEART: Heart size is normal. No pericardial effusion. Blood pool lower in   attenuation than the myocardium consistent with anemia.    CHEST WALL AND LOWER NECK: Within normal limits.    VISUALIZED UPPER ABDOMEN: Patient appears to be status post   cholecystectomy. Calcifications in the eunice hepatis and the medial   aspect of the right hepatic lobe, likely calcified lymph nodes.    BONES: No aggressive osseous lesion.    IMPRESSION:     Scattered bilateral pulmonary nodules measuring up 1.4 cm in the right   lower lobe; metastatic disease cannot be excluded; either a CT   abdomen/pelvis with oral and intravenous contrast or PET/CT is   recommended for further evaluation.    Right middle lobe opacity, likely atelectasis or scarring with small   associated nodules, likely infectious/inflammatory of the small airways.    The findings were discussed with Dr. Freeman on 12/5/2019 2:09 PM                ALEXIA COTTO   This document has been electronically signed. Dec  5 2019  2:11PM    < end of copied text >  All films reviewed on PACS

## 2019-12-06 NOTE — CONSULT NOTE ADULT - NEGATIVE GASTROINTESTINAL SYMPTOMS
no nausea/no change in bowel habits/no vomiting/no diarrhea/no constipation/no abdominal pain/no melena/no hematochezia

## 2019-12-06 NOTE — BEHAVIORAL HEALTH ASSESSMENT NOTE - HPI (INCLUDE ILLNESS QUALITY, SEVERITY, DURATION, TIMING, CONTEXT, MODIFYING FACTORS, ASSOCIATED SIGNS AND SYMPTOMS)
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  Pt reports some depression with a mood of 5 out of 10.  Pt denies sleep changes, but has lost weight due to changing her diet from meat based to vegetarian because meat is "difficult to digest".  Pt denies anhedonia but has decreased energy from past.  Denies SI/HI and no h/o same and states life is "to short" to end it myself.  Collateral, Reddy Mejia reports he thinks she has "given up" secondary to breathing issues, but denies safety concerns and does not think she is suicidal.  Pt is well related with depression, denies SI/HI, good eye contact, smiles spontaneously, no evidence of a thought disorder, psychotic disorder, mood disorder, although Pt has current depressed mood in context of worry/stressors.

## 2019-12-06 NOTE — PROGRESS NOTE ADULT - SUBJECTIVE AND OBJECTIVE BOX
CC: acute hypoxic respiratory failure, multiple lung nodules (06 Dec 2019 11:05)    INTERVAL HPI/OVERNIGHT EVENTS:  no acute events overnight  discussed with pulmonology - obtained CT abdomen - shows some rectal changes including free air  no subcarinal LN seen on CT chest  patient without complaints feels better compared to yesterday asking to go home soon.    Vital Signs Last 24 Hrs  T(C): 36.5 (06 Dec 2019 11:37), Max: 36.7 (06 Dec 2019 09:49)  T(F): 97.7 (06 Dec 2019 11:37), Max: 98.1 (06 Dec 2019 09:49)  HR: 92 (06 Dec 2019 11:37) (75 - 92)  BP: 94/61 (06 Dec 2019 11:37) (94/58 - 109/61)  BP(mean): --  RR: 16 (06 Dec 2019 11:37) (16 - 20)  SpO2: 96% (06 Dec 2019 11:37) (90% - 98%)    PHYSICAL EXAM:  General: in no acute distress  Eyes: PERRLA, EOMI; conjunctiva and sclera clear  Head: Normocephalic; atraumatic  ENMT: No nasal discharge; airway clear  Respiratory: No wheezes, rales or rhonchi  Cardiovascular: Regular rate and rhythm. S1 and S2 Normal; No murmurs, gallops or rubs  Gastrointestinal: Soft non-tender non-distended; Normal bowel sounds  Extremities: Normal range of motion, No clubbing, cyanosis or edema  Neurological: Alert and oriented x4  Psychiatric: Cooperative and appropriate    I&O's Detail    CARDIAC MARKERS ( 05 Dec 2019 19:40 )  x     / <0.01 ng/mL / x     / x     / x      CARDIAC MARKERS ( 05 Dec 2019 11:40 )  x     / <0.01 ng/mL / x     / x     / x                            9.2    7.82  )-----------( 428      ( 06 Dec 2019 06:35 )             29.1     06 Dec 2019 06:35    134    |  96     |  13.0   ----------------------------<  148    3.7     |  25.0   |  0.68     Ca    8.9        06 Dec 2019 06:35    TPro  7.3    /  Alb  2.5    /  TBili  0.3    /  DBili  x      /  AST  6      /  ALT  <5     /  AlkPhos  88     06 Dec 2019 06:35    PT/INR - ( 05 Dec 2019 11:40 )   PT: 14.0 sec;   INR: 1.21 ratio       PTT - ( 05 Dec 2019 11:40 )  PTT:30.0 sec    LIVER FUNCTIONS - ( 06 Dec 2019 06:35 )  Alb: 2.5 g/dL / Pro: 7.3 g/dL / ALK PHOS: 88 U/L / ALT: <5 U/L / AST: 6 U/L / GGT: x           MEDICATIONS  (STANDING):  albuterol/ipratropium for Nebulization 3 milliLiter(s) Nebulizer every 6 hours  azithromycin  IVPB      azithromycin  IVPB 500 milliGRAM(s) IV Intermittent every 24 hours  enoxaparin Injectable 40 milliGRAM(s) SubCutaneous daily  folic acid 1 milliGRAM(s) Oral daily  predniSONE   Tablet 60 milliGRAM(s) Oral daily    MEDICATIONS  (PRN):    RADIOLOGY & ADDITIONAL TESTS:

## 2019-12-06 NOTE — BEHAVIORAL HEALTH ASSESSMENT NOTE - NSBHCHARTREVIEWLAB_PSY_A_CORE FT
Comprehensive Metabolic Panel in AM (12.06.19 @ 06:35)    Sodium, Serum: 134 mmol/L    Potassium, Serum: 3.7 mmol/L    Chloride, Serum: 96 mmol/L    Carbon Dioxide, Serum: 25.0 mmol/L    Anion Gap, Serum: 13 mmol/L    Blood Urea Nitrogen, Serum: 13.0 mg/dL    Creatinine, Serum: 0.68 mg/dL    Glucose, Serum: 148 mg/dL    Calcium, Total Serum: 8.9 mg/dL    Protein Total, Serum: 7.3 g/dL    Albumin, Serum: 2.5 g/dL    Bilirubin Total, Serum: 0.3 mg/dL    Alkaline Phosphatase, Serum: 88 U/L    Aspartate Aminotransferase (AST/SGOT): 6 U/L    Alanine Aminotransferase (ALT/SGPT): <5: TEST REPEATED. U/L    eGFR if Non : 90: Interpretative comment  The units for eGFR are mL/min/1.73M2 (normalized body surface area). The  eGFR is calculated from a serum creatinine using the CKD-EPI equation.  Other variables required for calculation are race, age and sex. Among  patients with chronic kidney disease (CKD), the eGFR is useful in  determining the stage of disease according to KDOQI CKD classification.  All eGFR results are reported numerically with the following  interpretation.          GFR                    With                 Without     (ml/min/1.73 m2)    Kidney Damage       Kidney Damage        >= 90                    Stage 1                     Normal        60-89                    Stage 2                     Decreased GFR        30-59     Stage 3                     Stage 3        15-29                    Stage 4                     Stage 4        < 15                      Stage 5                     Stage 5  Each stage of CKD assumes that the associated GFR level has been in  effect for at least 3 months. Determination of stages one and two (with  eGFR > 59 ml/min/m2) requires estimation of kidney damage for at least 3  months as defined by structural or functional abnormalities.  Limitations: All estimates of GFR will be less accurate for patients at  extremes of muscle mass (including but not limited to frail elderly,  critically ill, or cancer patients), those with unusual diets, and those  with conditions associated with reduced secretion or extrarenal  elimination of creatinine. The eGFR equation is not recommended for use  in patients with unstable creatinine levels. mL/min/1.73M2    eGFR if African American: 104 mL/min/1.73M2

## 2019-12-07 DIAGNOSIS — R91.8 OTHER NONSPECIFIC ABNORMAL FINDING OF LUNG FIELD: ICD-10-CM

## 2019-12-07 PROCEDURE — 99233 SBSQ HOSP IP/OBS HIGH 50: CPT

## 2019-12-07 PROCEDURE — 99223 1ST HOSP IP/OBS HIGH 75: CPT

## 2019-12-07 PROCEDURE — 99232 SBSQ HOSP IP/OBS MODERATE 35: CPT

## 2019-12-07 RX ORDER — TIOTROPIUM BROMIDE 18 UG/1
1 CAPSULE ORAL; RESPIRATORY (INHALATION) DAILY
Refills: 0 | Status: DISCONTINUED | OUTPATIENT
Start: 2019-12-07 | End: 2019-12-09

## 2019-12-07 RX ORDER — IPRATROPIUM/ALBUTEROL SULFATE 18-103MCG
3 AEROSOL WITH ADAPTER (GRAM) INHALATION EVERY 6 HOURS
Refills: 0 | Status: DISCONTINUED | OUTPATIENT
Start: 2019-12-07 | End: 2019-12-12

## 2019-12-07 RX ADMIN — Medication 60 MILLIGRAM(S): at 06:24

## 2019-12-07 RX ADMIN — Medication 1 MILLIGRAM(S): at 16:26

## 2019-12-07 RX ADMIN — Medication 3 MILLILITER(S): at 04:04

## 2019-12-07 RX ADMIN — ENOXAPARIN SODIUM 60 MILLIGRAM(S): 100 INJECTION SUBCUTANEOUS at 06:24

## 2019-12-07 RX ADMIN — ENOXAPARIN SODIUM 60 MILLIGRAM(S): 100 INJECTION SUBCUTANEOUS at 17:50

## 2019-12-07 RX ADMIN — AZITHROMYCIN 255 MILLIGRAM(S): 500 TABLET, FILM COATED ORAL at 16:25

## 2019-12-07 NOTE — CHART NOTE - NSCHARTNOTEFT_GEN_A_CORE
Upon Nutritional Assessment by the Registered Dietitian your patient was determined to meet criteria / has evidence of the following diagnosis/diagnoses:          [x] Severe Protein Calorie Malnutrition    Findings as based on:  •  Comprehensive nutrition assessment and consultation  •  Calorie counts (nutrient intake analysis)  •  Food acceptance and intake status from observations by staff  •  Follow up  •  Patient education  •  Intervention secondary to interdisciplinary rounds  •   concerns    Pt presents at high nutrition risk secondary to malnutrition (severe chronic) related to insufficient protein-energy intake in setting of poss colon CA with mets, persistent lack of appetite as evidenced by unintentional 30lb (17.64%) wt loss x 6 month, severe muscle wasting (temples), meeting <75% EER> 1mo.     Treatment:    The following diet has been recommended:  1) Add low Na to diet rx   2) Consider appetite stimulant  3) Continue Ensure Enlive BID   4) Monitor weights daily  5) Obtain/provide food preferences daily to inc PO     PROVIDER Section:     By signing this assessment you are acknowledging and agree with the diagnosis/diagnoses assigned by the Registered Dietitian    Comments:

## 2019-12-07 NOTE — DIETITIAN INITIAL EVALUATION ADULT. - ETIOLOGY
related to insufficient protein-energy intake in setting of poss colon CA with mets, persistent lack of appetite

## 2019-12-07 NOTE — PROGRESS NOTE ADULT - ASSESSMENT
71yo F with PMh of COPD, anemia, former smoker, found to have lung nodules, now newly found rectal mass. Awaiting further imaging. CTA ordered to r/o PE.       Discussed with Dr. Cota.     Slime MENARD  Thoracic Surgery   #[phone]

## 2019-12-07 NOTE — CONSULT NOTE ADULT - SUBJECTIVE AND OBJECTIVE BOX
HISTORY OF PRESENT ILLNESS:  This is a 69y old Female with a past medical history significant for COPD who presents with 1 month of shortness of breath worsened over last 2 weeks associated with >30 lb weight loss in the last 6 months, decreased appetite. She was admitted with hypoxemic respiratory failure due to COPD exacerbation and found to have metastatic lung disease. CT abdomen was done showed rectal wall thickening with posterior air pocket? perforation.  Colorectal surgery was consulted and pelvic MRI was recommended. She had an echocardiogram with RV strain and there is concern for PE. She is awaiting a CTA.     REVIEW OF SYSTEMS:  Constitutional:  No unintentional weight loss, fevers, chills or night sweats	  Eyes: No eye pain, redness, discharge, or proptosis  ENMT: No sore throat, ear pain, mouth sores, or swollen glands in the neck  Respiratory: No dyspnea, cough or wheezing  Cardiovascular: No chest pain, dyspnea on exertion, or orthopnea  Gastrointestinal:	Please see HPI  Genitourinary: No dysuria or hematuria  Neurological:	 No changes in sleep/wake cycle, convulsions, confusion, dizziness or lightheadedness  Psychiatric: No changes in personality or emotional problems   Hematology: No easy bruising   Endocrine: No hot or cold flashes or deepening of voice	  All other review of systems were completed and were otherwise negative save what is reported in the HPI.    PAST MEDICAL/SURGICAL HISTORY:  Anemia  COPD (chronic obstructive pulmonary disease)  H/O bilateral hip replacements    SOCIAL HISTORY:  - ILLICIT DRUG USE: Denies    FAMILY HISTORY:  No known history of gastrointestinal or liver disease;      HOME MEDICATIONS:  Anoro Ellipta 62.5 mcg-25 mcg/inh inhalation powder: 1 puff(s) inhaled once a day (05 Dec 2019 16:38)  Ventolin HFA 90 mcg/inh inhalation aerosol: 2 puff(s) inhaled 4 times a day (05 Dec 2019 16:38)    INPATIENT MEDICATIONS:  MEDICATIONS  (STANDING):  azithromycin  IVPB      azithromycin  IVPB 500 milliGRAM(s) IV Intermittent every 24 hours  enoxaparin Injectable 60 milliGRAM(s) SubCutaneous every 12 hours  folic acid 1 milliGRAM(s) Oral daily  predniSONE   Tablet 60 milliGRAM(s) Oral daily  tiotropium 18 MICROgram(s) Capsule 1 Capsule(s) Inhalation daily    MEDICATIONS  (PRN):  albuterol/ipratropium for Nebulization 3 milliLiter(s) Nebulizer every 6 hours PRN Shortness of Breath and/or Wheezing    ALLERGIES:  No Known Allergies    VITAL SIGNS LAST 24 HOURS:  T(C): 36.3 (07 Dec 2019 16:30), Max: 36.6 (06 Dec 2019 20:26)  T(F): 97.4 (07 Dec 2019 16:30), Max: 97.8 (06 Dec 2019 20:26)  HR: 92 (07 Dec 2019 16:30) (80 - 96)  BP: 111/67 (07 Dec 2019 16:30) (92/56 - 111/67)  BP(mean): --  RR: 19 (07 Dec 2019 16:30) (16 - 20)  SpO2: 97% (07 Dec 2019 16:30) (95% - 99%)      12-07-19 @ 07:01  -  12-07-19 @ 17:43  --------------------------------------------------------  IN: 724 mL / OUT: 0 mL / NET: 724 mL        12-07-19 @ 07:01  -  12-07-19 @ 17:43  --------------------------------------------------------  IN: 724 mL / OUT: 0 mL / NET: 724 mL      PHYSICAL EXAM:  Constitutional: Well-developed, well-nourished, in no apparent distress  Eyes: Sclerae anicteric, conjunctivae normal  ENMT: Mucus membranes moist, no oropharyngeal thrush noted  Neck: No thyroid nodules appreciated, no significant cervical or supraclavicular lymphadenopathy  Respiratory: Breathing nonlabored; clear to auscultation  Cardiovascular: Regular rate and rhythm  Gastrointestinal: Soft, nontender, nondistended, normoactive bowel sounds; no hepatosplenomegaly appreciated; no rebound tenderness or involuntary guarding  Extremities: No clubbing, cyanosis or edema  Neurological: Alert and oriented to person, place and time; no asterixis  Skin: No jaundice  Lymph Nodes: No significant lymphadenopathy  Musculoskeletal: No significant peripheral atrophy  Psychiatric: Affect and mood appropriate      LABS:                        9.2    7.82  )-----------( 428      ( 06 Dec 2019 06:35 )             29.1       12-06    134<L>  |  96<L>  |  13.0  ----------------------------<  148<H>  3.7   |  25.0  |  0.68    Ca    8.9      06 Dec 2019 06:35    TPro  7.3  /  Alb  2.5<L>  /  TBili  0.3<L>  /  DBili  x   /  AST  6   /  ALT  <5  /  AlkPhos  88  12-06    LIVER FUNCTIONS - ( 06 Dec 2019 06:35 )  Alb: 2.5 g/dL / Pro: 7.3 g/dL / ALK PHOS: 88 U/L / ALT: <5 U/L / AST: 6 U/L / GGT: x                 IMAGING:  I personally reviewed the XXXX, and I agree with the radiologist's interpretation. HISTORY OF PRESENT ILLNESS:  This is a 69y old Female with a past medical history significant for COPD who presents with 1 month of shortness of breath worsened over last 2 weeks associated with >30 lb weight loss in the last 6 months, decreased appetite. She was admitted with hypoxemic respiratory failure due to COPD exacerbation and found to have metastatic lung disease. CT abdomen was done showed rectal wall thickening with posterior air pocket? perforation.  Colorectal surgery was consulted and pelvic MRI was recommended. She had an echocardiogram with RV strain and there is concern for PE. She is awaiting a CTA. She denies abdominal pain, rectal bleeding or pain with defecation. She has a bowel movement every other day. She states over the last 2 years she has lost 100 lbs. She had a colonoscopy in [facility] last year.     REVIEW OF SYSTEMS:  Constitutional:  No unintentional weight loss, fevers, chills or night sweats	  Eyes: No eye pain, redness, discharge, or proptosis  ENMT: No sore throat, ear pain, mouth sores, or swollen glands in the neck  Respiratory: No dyspnea, cough or wheezing  Cardiovascular: No chest pain, dyspnea on exertion, or orthopnea  Gastrointestinal:	Please see HPI  Genitourinary: No dysuria or hematuria  Neurological:	 No changes in sleep/wake cycle, convulsions, confusion, dizziness or lightheadedness  Psychiatric: No changes in personality or emotional problems   Hematology: No easy bruising   Endocrine: No hot or cold flashes or deepening of voice	  All other review of systems were completed and were otherwise negative save what is reported in the HPI.    PAST MEDICAL/SURGICAL HISTORY:  Anemia  COPD (chronic obstructive pulmonary disease)  H/O bilateral hip replacements    SOCIAL HISTORY:  - ILLICIT DRUG USE: Denies    FAMILY HISTORY:  No known history of gastrointestinal or liver disease;      HOME MEDICATIONS:  Anoro Ellipta 62.5 mcg-25 mcg/inh inhalation powder: 1 puff(s) inhaled once a day (05 Dec 2019 16:38)  Ventolin HFA 90 mcg/inh inhalation aerosol: 2 puff(s) inhaled 4 times a day (05 Dec 2019 16:38)    INPATIENT MEDICATIONS:  MEDICATIONS  (STANDING):  azithromycin  IVPB      azithromycin  IVPB 500 milliGRAM(s) IV Intermittent every 24 hours  enoxaparin Injectable 60 milliGRAM(s) SubCutaneous every 12 hours  folic acid 1 milliGRAM(s) Oral daily  predniSONE   Tablet 60 milliGRAM(s) Oral daily  tiotropium 18 MICROgram(s) Capsule 1 Capsule(s) Inhalation daily    MEDICATIONS  (PRN):  albuterol/ipratropium for Nebulization 3 milliLiter(s) Nebulizer every 6 hours PRN Shortness of Breath and/or Wheezing    ALLERGIES:  No Known Allergies    VITAL SIGNS LAST 24 HOURS:  T(C): 36.3 (07 Dec 2019 16:30), Max: 36.6 (06 Dec 2019 20:26)  T(F): 97.4 (07 Dec 2019 16:30), Max: 97.8 (06 Dec 2019 20:26)  HR: 92 (07 Dec 2019 16:30) (80 - 96)  BP: 111/67 (07 Dec 2019 16:30) (92/56 - 111/67)  BP(mean): --  RR: 19 (07 Dec 2019 16:30) (16 - 20)  SpO2: 97% (07 Dec 2019 16:30) (95% - 99%)      12-07-19 @ 07:01  -  12-07-19 @ 17:43  --------------------------------------------------------  IN: 724 mL / OUT: 0 mL / NET: 724 mL        12-07-19 @ 07:01  -  12-07-19 @ 17:43  --------------------------------------------------------  IN: 724 mL / OUT: 0 mL / NET: 724 mL      PHYSICAL EXAM:  Constitutional: Well-developed, well-nourished, in no apparent distress  Eyes: Sclerae anicteric, conjunctivae normal  ENMT: Mucus membranes moist, no oropharyngeal thrush noted  Neck: No thyroid nodules appreciated, no significant cervical or supraclavicular lymphadenopathy  Respiratory: Breathing nonlabored; clear to auscultation  Cardiovascular: Regular rate and rhythm  Gastrointestinal: Soft, nontender, nondistended, normoactive bowel sounds  Extremities: No clubbing, cyanosis or edema  Neurological: Alert and oriented to person, place and time; no asterixis  Skin: No jaundice  Lymph Nodes: No significant lymphadenopathy  Musculoskeletal: No significant peripheral atrophy  Psychiatric: Affect and mood appropriate      LABS:                        9.2    7.82  )-----------( 428      ( 06 Dec 2019 06:35 )             29.1       12-06    134<L>  |  96<L>  |  13.0  ----------------------------<  148<H>  3.7   |  25.0  |  0.68    Ca    8.9      06 Dec 2019 06:35    TPro  7.3  /  Alb  2.5<L>  /  TBili  0.3<L>  /  DBili  x   /  AST  6   /  ALT  <5  /  AlkPhos  88  12-06    LIVER FUNCTIONS - ( 06 Dec 2019 06:35 )  Alb: 2.5 g/dL / Pro: 7.3 g/dL / ALK PHOS: 88 U/L / ALT: <5 U/L / AST: 6 U/L / GGT: x             IMAGING:  < from: CT Abdomen and Pelvis w/ IV Cont (12.06.19 @ 11:43) >  NDINGS:    LOWER CHEST: Bilateral pulmonary nodules again seen.    LIVER: Within normal limits. Calcifications in the eunice hepatis, likely   calcified lymph nodes.  BILE DUCTS: Normal caliber.  GALLBLADDER: Cholecystectomy.  SPLEEN: Within normal limits.  PANCREAS: Within normal limits.  ADRENALS: Within normal limits.  KIDNEYS/URETERS: Within normal limits.    BLADDER: Within normal limits.  REPRODUCTIVE ORGANS: Uterus within normal limits for size.    BOWEL: Distended stomach. Small bowel loops normal in caliber. Moderate   amount retained fecal material in the colon. Appendix is normal. There is   a collection of fluid and air at the posterior and right aspect of the   distal rectum and anal canal measuring 5.0 x 3.5 cm (series 4 image 55).   There is additional collection of air and fluid to the left of the anus   measuring 4.1 x 1.0 cm (series 3 image 133) which likely communicates   with the larger collection. An additional thin amount of fluid with a   droplet of air is seen to the left of the anal canal (series 2 image 134)   and droplet of air anteriorly which is also likely extraluminal and   communicates with the larger collection. Evaluation of the distal rectum   and anal canal is limited secondary to streak artifact from bilateral hip   arthroplasties, however there is wall thickening involving the anal canal.  PERITONEUM: Small amount of free fluid in the pelvis. Presacral edema.  VESSELS: Abdominal aorta normal in caliber with mild calcified plaque.  RETROPERITONEUM/LYMPH NODES: No lymphadenopathy.    ABDOMINAL WALL: Withinnormal limits.  BONES: Bilateral hip arthroplasties. There is lucency in the left iliac   bone consistent with osteolysis with broad areas of cortical disruption.   Lucencies are also seen within the femur, likely related to osteolysis.   There is irregularity and sclerosis at the anterior aspect of the left   acetabulum, likely a stress reaction. Multiple small lucent lesions in   the iliac bones more superiorly (for example series 3 image 97). High   attenuation structure at the anterior aspectof the left thigh, possibly   cement; clinical correlation is recommended.    IMPRESSION:     Collection of fluid and air at the posterior aspect of the distal rectum   and anal canal as described above.    Additional foci of fluid and air adjacent to the anus with the largest on   the left which likely communicate with the larger collection.    Wall thickening involving the anus; differential includes infectious or   inflammatory etiologies or an underlying mass.    Osteolysis in the left hip.    Multiple small lucent lesions in the iliac bones; myeloma cannot be   excluded.    Bilateral indeterminate pulmonary nodules again seen at the lung bases.    Comparison is recommended with a prior study if available for the   pulmonary nodules and lucent lesions in the iliac bones.    The findings were discussed with Dr. Kay on 12/6/2019 12:53 PM      < end of copied text >  .

## 2019-12-07 NOTE — DIETITIAN INITIAL EVALUATION ADULT. - OTHER INFO
70yo female admitted for hypoxemic resp failure 2/2 COPD exacerbation, 70yo female admitted for hypoxemic resp failure 2/2 COPD exacerbation, reported 30lb weight loss x 6 months 2/2 decreased appetite. Aware Pt with multiple lung nodules, CT showed rectal wall thickening-possible colon CA with possible mets to lung. Pt reports that she used to work in  and food was readily available. Pt has not worked for some time and as a result has lost weight. Son at bedside reports Pt will eat small portions if food is provided for her but won't go out of her way to eat. Pt likes Ensure Enlive, completing >75% of supplements. Additional food preferences obtain, briefly discussed option for meal prep to have food available.

## 2019-12-07 NOTE — PATIENT PROFILE ADULT - BILL PAYMENT
Problem: Pain:  Goal: Pain level will decrease  Description  Pain level will decrease  Outcome: Ongoing  Goal: Control of acute pain  Description  Control of acute pain  Outcome: Ongoing  Goal: Control of chronic pain  Description  Control of chronic pain  Outcome: Ongoing     Problem: Falls - Risk of:  Goal: Will remain free from falls  Description  Will remain free from falls  Outcome: Ongoing  Goal: Absence of physical injury  Description  Absence of physical injury  Outcome: Ongoing     Problem: SAFETY  Goal: Free from accidental physical injury  Outcome: Ongoing  Goal: Free from intentional harm  Outcome: Ongoing     Problem: DAILY CARE  Goal: Daily care needs are met  Outcome: Ongoing     Problem: PAIN  Goal: Patient's pain/discomfort is manageable  Outcome: Ongoing     Problem: SKIN INTEGRITY  Goal: Skin integrity is maintained or improved  Outcome: Ongoing     Problem: KNOWLEDGE DEFICIT  Goal: Patient/S.O. demonstrates understanding of disease process, treatment plan, medications, and discharge instructions.   Outcome: Ongoing     Problem: DISCHARGE BARRIERS  Goal: Patient's continuum of care needs are met  Outcome: Ongoing     Problem: Tissue Perfusion - Renal, Altered:  Goal: Electrolytes within specified parameters  Description  Electrolytes within specified parameters  Outcome: Ongoing  Goal: Urine creatinine clearance will be within specified parameters  Description  Urine creatinine clearance will be within specified parameters  Outcome: Ongoing  Goal: Serum creatinine will be within specified parameters  Description  Serum creatinine will be within specified parameters  Outcome: Ongoing  Goal: Ability to achieve a balanced intake and output will improve  Description  Ability to achieve a balanced intake and output will improve  Outcome: Ongoing     Problem: OXYGENATION/RESPIRATORY FUNCTION  Goal: Patient will maintain patent airway  Outcome: Ongoing  Goal: Patient will achieve/maintain normal no

## 2019-12-07 NOTE — PROGRESS NOTE ADULT - ASSESSMENT
A/P - Distal rectal mass, presumed cancer, likely perforated and metastatic    CEA ordered.  GI consult placed, discussed findings with Dr. Sequeira.  MRI pelvis pending for staging purchases.  CTA pending to r/o PE, already receiving therapeutic lovenox.  Findings and workup discussed with patient.

## 2019-12-07 NOTE — PROGRESS NOTE ADULT - ASSESSMENT
70 yo female with history of COPD who presents with 1 month of shortness of breath worsened over last 2 weeks associated with >30 lb weight loss in the last 6 months, decreased appetite. Patient being admitted now with hypoxemic respiratory failure 2/2 COPD exacerbation with possible metastatic lung disease.    Pulmonology consulted and CT abdomen was obtained - CT showed rectal wall thickening with adjacent air-pockets; colorectal surgery was consulted and MRI was recommended. May have colon cancer with mets to the lung. GI consult was recommended by colorectal surgeon.    Echo was ordered for elevated BNP - she was shown to have severe right sided ventricular strain. Started empirically on treatment for PE and CTA was ordered (delayed due to having received contrast with the CT abdomen on the same day).     Lung Nodules; likely metastatic disease  - CT abdomen completed  - will consult colorectal surgery regarding rectal wall changes  - pulmonary consulted - recs appreciated  - CT surgery consulted however radiologist doesn't feel there are any attainable LN in the chest for biopsy - will cancel consult    COPD exacerbation - hypoxia on admission has resolved  - patient on anoro-ellipta 62.5/25 dose at home BID; ventolin as needed at home  - duoneb q6 PRN  - start spiriva  - IV azithromycin 500mg x3 days; day 3/3  - now on prednisone 60mg daily - can taper    anemia with thrombocytosis  - check FOBT and iron studies  - likely GI source    elevated BNP - no clinical signs of HF  - EKG mostly flattened t-waves and inversions in inferior leads and V3/V4  - troponins negative  - will check echo    slightly elevated INR - given poor diet may reflect some degree of malnutrition    protein calorie malnutrition  - supplement meals  - nutrition consult    Depressed mood  - no suicidal ideation  - patient agrees to psychiatry consult - consult placed    DVT - lovenox sq

## 2019-12-07 NOTE — DIETITIAN INITIAL EVALUATION ADULT. - ADD RECOMMEND
Add low Na to diet rx; continue Ensure Enlive BID; Consider appetite stimulant; Monitor weights daily

## 2019-12-07 NOTE — PROGRESS NOTE ADULT - SUBJECTIVE AND OBJECTIVE BOX
CC: acute hypoxic respiratory failure, multiple lung nodules (06 Dec 2019 11:05)    INTERVAL HPI/OVERNIGHT EVENTS:    Vital Signs Last 24 Hrs  T(C): 36.4 (07 Dec 2019 04:51), Max: 36.7 (06 Dec 2019 09:49)  T(F): 97.5 (07 Dec 2019 04:51), Max: 98.1 (06 Dec 2019 09:49)  HR: 93 (07 Dec 2019 04:51) (80 - 96)  BP: 92/56 (07 Dec 2019 04:51) (92/56 - 95/59)  BP(mean): --  RR: 20 (07 Dec 2019 04:51) (16 - 20)  SpO2: 97% (07 Dec 2019 04:51) (96% - 99%)    PHYSICAL EXAM:  General: in no acute distress  Eyes: PERRLA, EOMI; conjunctiva and sclera clear  Head: Normocephalic; atraumatic  ENMT: No nasal discharge; airway clear  Respiratory: No wheezes, rales or rhonchi  Cardiovascular: Regular rate and rhythm. S1 and S2 Normal; No murmurs, gallops or rubs  Gastrointestinal: Soft non-tender non-distended; Normal bowel sounds  Extremities: Normal range of motion, No clubbing, cyanosis or edema  Neurological: Alert and oriented x4  Psychiatric: Cooperative and appropriate    I&O's Detail    CARDIAC MARKERS ( 05 Dec 2019 19:40 )  x     / <0.01 ng/mL / x     / x     / x      CARDIAC MARKERS ( 05 Dec 2019 11:40 )  x     / <0.01 ng/mL / x     / x     / x                            9.2    7.82  )-----------( 428      ( 06 Dec 2019 06:35 )             29.1     06 Dec 2019 06:35    134    |  96     |  13.0   ----------------------------<  148    3.7     |  25.0   |  0.68     Ca    8.9        06 Dec 2019 06:35    TPro  7.3    /  Alb  2.5    /  TBili  0.3    /  DBili  x      /  AST  6      /  ALT  <5     /  AlkPhos  88     06 Dec 2019 06:35    PT/INR - ( 05 Dec 2019 11:40 )   PT: 14.0 sec;   INR: 1.21 ratio       PTT - ( 05 Dec 2019 11:40 )  PTT:30.0 sec    LIVER FUNCTIONS - ( 06 Dec 2019 06:35 )  Alb: 2.5 g/dL / Pro: 7.3 g/dL / ALK PHOS: 88 U/L / ALT: <5 U/L / AST: 6 U/L / GGT: x           MEDICATIONS  (STANDING):  albuterol/ipratropium for Nebulization 3 milliLiter(s) Nebulizer every 6 hours  azithromycin  IVPB      azithromycin  IVPB 500 milliGRAM(s) IV Intermittent every 24 hours  enoxaparin Injectable 40 milliGRAM(s) SubCutaneous daily  folic acid 1 milliGRAM(s) Oral daily  predniSONE   Tablet 60 milliGRAM(s) Oral daily    MEDICATIONS  (PRN):    RADIOLOGY & ADDITIONAL TESTS: CC: acute hypoxic respiratory failure, multiple lung nodules (06 Dec 2019 11:05)    INTERVAL HPI/OVERNIGHT EVENTS:  patient without complaints  wants to go home  scheduled for CTA to rule out PE  started on BID lovenox last night  monitor for GI bleeds  passing stool without blood    Vital Signs Last 24 Hrs  T(C): 36.4 (07 Dec 2019 09:44), Max: 36.7 (06 Dec 2019 15:53)  T(F): 97.5 (07 Dec 2019 09:44), Max: 98 (06 Dec 2019 15:53)  HR: 82 (07 Dec 2019 09:59) (80 - 96)  BP: 106/63 (07 Dec 2019 09:44) (92/56 - 106/63)  BP(mean): --  RR: 18 (07 Dec 2019 09:44) (16 - 20)  SpO2: 96% (07 Dec 2019 09:59) (95% - 99%)    PHYSICAL EXAM:  General: in no acute distress  Eyes: PERRLA, EOMI; conjunctiva and sclera clear  Head: Normocephalic; atraumatic  ENMT: No nasal discharge; airway clear  Respiratory: No wheezes, rales or rhonchi  Cardiovascular: Regular rate and rhythm. S1 and S2 Normal; No murmurs, gallops or rubs  Gastrointestinal: Soft non-tender non-distended; Normal bowel sounds  Extremities: Normal range of motion, No clubbing, cyanosis or edema  Neurological: Alert and oriented x4  Psychiatric: Cooperative and appropriate    I&O's Detail    CARDIAC MARKERS ( 05 Dec 2019 19:40 )  x     / <0.01 ng/mL / x     / x     / x      CARDIAC MARKERS ( 05 Dec 2019 11:40 )  x     / <0.01 ng/mL / x     / x     / x                            9.2    7.82  )-----------( 428      ( 06 Dec 2019 06:35 )             29.1     06 Dec 2019 06:35    134    |  96     |  13.0   ----------------------------<  148    3.7     |  25.0   |  0.68     Ca    8.9        06 Dec 2019 06:35    TPro  7.3    /  Alb  2.5    /  TBili  0.3    /  DBili  x      /  AST  6      /  ALT  <5     /  AlkPhos  88     06 Dec 2019 06:35    PT/INR - ( 05 Dec 2019 11:40 )   PT: 14.0 sec;   INR: 1.21 ratio       PTT - ( 05 Dec 2019 11:40 )  PTT:30.0 sec    LIVER FUNCTIONS - ( 06 Dec 2019 06:35 )  Alb: 2.5 g/dL / Pro: 7.3 g/dL / ALK PHOS: 88 U/L / ALT: <5 U/L / AST: 6 U/L / GGT: x           MEDICATIONS  (STANDING):  albuterol/ipratropium for Nebulization 3 milliLiter(s) Nebulizer every 6 hours  azithromycin  IVPB      azithromycin  IVPB 500 milliGRAM(s) IV Intermittent every 24 hours  enoxaparin Injectable 40 milliGRAM(s) SubCutaneous daily  folic acid 1 milliGRAM(s) Oral daily  predniSONE   Tablet 60 milliGRAM(s) Oral daily    MEDICATIONS  (PRN):    RADIOLOGY & ADDITIONAL TESTS:

## 2019-12-07 NOTE — PROGRESS NOTE ADULT - SUBJECTIVE AND OBJECTIVE BOX
PULMONARY PROGRESS NOTE      KERRY OTOOLE-[id number]    Patient is a 69y old  Female who presents with a chief complaint of acute hypoxic respiratory failure, multiple lung nodules (06 Dec 2019 18:07)      INTERVAL HPI/OVERNIGHT EVENTS:  Feeling much better  cough and wheeze resolved    MEDICATIONS  (STANDING):  albuterol/ipratropium for Nebulization 3 milliLiter(s) Nebulizer every 6 hours  azithromycin  IVPB      azithromycin  IVPB 500 milliGRAM(s) IV Intermittent every 24 hours  enoxaparin Injectable 60 milliGRAM(s) SubCutaneous every 12 hours  folic acid 1 milliGRAM(s) Oral daily  predniSONE   Tablet 60 milliGRAM(s) Oral daily      MEDICATIONS  (PRN):      Allergies    No Known Allergies    Intolerances        PAST MEDICAL & SURGICAL HISTORY:  Anemia  COPD (chronic obstructive pulmonary disease)  H/O bilateral hip replacements      SOCIAL HISTORY  Smoking History:       REVIEW OF SYSTEMS:    CONSTITUTIONAL:  No distress    HEENT:  Eyes:  No diplopia or blurred vision. ENT:  No earache, sore throat or runny nose.    CARDIOVASCULAR:  No pressure, squeezing, tightness, heaviness or aching about the chest; no palpitations.    RESPIRATORY:  above    GASTROINTESTINAL:  No nausea, vomiting or diarrhea.    GENITOURINARY:  No dysuria, frequency or urgency.    NEUROLOGIC:  No paresthesias, fasciculations, seizures or weakness.    Extremities: No cyanosis, clubbing or edema    PSYCHIATRIC:  No disorder of thought or mood.    Vital Signs Last 24 Hrs  T(C): 36.4 (07 Dec 2019 04:51), Max: 36.7 (06 Dec 2019 09:49)  T(F): 97.5 (07 Dec 2019 04:51), Max: 98.1 (06 Dec 2019 09:49)  HR: 93 (07 Dec 2019 04:51) (80 - 96)  BP: 92/56 (07 Dec 2019 04:51) (92/56 - 95/59)  BP(mean): --  RR: 20 (07 Dec 2019 04:51) (16 - 20)  SpO2: 97% (07 Dec 2019 04:51) (96% - 99%)    PHYSICAL EXAMINATION:    GENERAL: The patient is awake and alert in no apparent distress.     HEENT: Head is normocephalic and atraumatic. Extraocular muscles are intact. Mucous membranes are moist.    NECK: Supple.    LUNGS: Clear to auscultation without wheezing, rales or rhonchi; respirations unlabored    HEART: Regular rate and rhythm without murmur.    ABDOMEN: Soft, nontender, and nondistended.      EXTREMITIES: Without any cyanosis, clubbing, rash, lesions or edema.    NEUROLOGIC: Grossly intact.    LABS:                        9.2    7.82  )-----------( 428      ( 06 Dec 2019 06:35 )             29.1     12-06    134<L>  |  96<L>  |  13.0  ----------------------------<  148<H>  3.7   |  25.0  |  0.68    Ca    8.9      06 Dec 2019 06:35    TPro  7.3  /  Alb  2.5<L>  /  TBili  0.3<L>  /  DBili  x   /  AST  6   /  ALT  <5  /  AlkPhos  88  12-06    PT/INR - ( 05 Dec 2019 11:40 )   PT: 14.0 sec;   INR: 1.21 ratio         PTT - ( 05 Dec 2019 11:40 )  PTT:30.0 sec      CARDIAC MARKERS ( 05 Dec 2019 19:40 )  x     / <0.01 ng/mL / x     / x     / x      CARDIAC MARKERS ( 05 Dec 2019 11:40 )  x     / <0.01 ng/mL / x     / x     / x            Serum Pro-Brain Natriuretic Peptide: 4165 pg/mL (12-05-19 @ 11:40)          MICROBIOLOGY:    RADIOLOGY & ADDITIONAL STUDIES:  < from: CT Abdomen and Pelvis w/ IV Cont (12.06.19 @ 11:43) >     EXAM:  CT ABDOMEN AND PELVIS IC                          PROCEDURE DATE:  12/06/2019          INTERPRETATION:  CLINICAL INFORMATION: Pulmonary nodules and weight loss.    COMPARISON: CT chest 12/5/2019    PROCEDURE:   CT of the Abdomen and Pelvis was performed with intravenous contrast.   Intravenous contrast: 92 ml Omnipaque 300.  Oral contrast: positive contrast was administered.  Sagittal and coronal reformats were performed.    FINDINGS:    LOWER CHEST: Bilateral pulmonary nodules again seen.    LIVER: Within normal limits. Calcifications in the eunice hepatis, likely   calcified lymph nodes.  BILE DUCTS: Normal caliber.  GALLBLADDER: Cholecystectomy.  SPLEEN: Within normal limits.  PANCREAS: Within normal limits.  ADRENALS: Within normal limits.  KIDNEYS/URETERS: Within normal limits.    BLADDER: Within normal limits.  REPRODUCTIVE ORGANS: Uterus within normal limits for size.    BOWEL: Distended stomach. Small bowel loops normal in caliber. Moderate   amount retained fecal material in the colon. Appendix is normal. There is   a collection of fluid and air at the posterior and right aspect of the   distal rectum and anal canal measuring 5.0 x 3.5 cm (series 4 image 55).   There is additional collection of air and fluid to the left of the anus   measuring 4.1 x 1.0 cm (series 3 image 133) which likely communicates   with the larger collection. An additional thin amount of fluid with a   droplet of air is seen to the left of the anal canal (series 2 image 134)   and droplet of air anteriorly which is also likely extraluminal and   communicates with the larger collection. Evaluation of the distal rectum   and anal canal is limited secondary to streak artifact from bilateral hip   arthroplasties, however there is wall thickening involving the anal canal.  PERITONEUM: Small amount of free fluid in the pelvis. Presacral edema.  VESSELS: Abdominal aorta normal in caliber with mild calcified plaque.  RETROPERITONEUM/LYMPH NODES: No lymphadenopathy.    ABDOMINAL WALL: Withinnormal limits.  BONES: Bilateral hip arthroplasties. There is lucency in the left iliac   bone consistent with osteolysis with broad areas of cortical disruption.   Lucencies are also seen within the femur, likely related to osteolysis.   There is irregularity and sclerosis at the anterior aspect of the left   acetabulum, likely a stress reaction. Multiple small lucent lesions in   the iliac bones more superiorly (for example series 3 image 97). High   attenuation structure at the anterior aspectof the left thigh, possibly   cement; clinical correlation is recommended.    IMPRESSION:     Collection of fluid and air at the posterior aspect of the distal rectum   and anal canal as described above.    Additional foci of fluid and air adjacent to the anus with the largest on   the left which likely communicate with the larger collection.    Wall thickening involving the anus; differential includes infectious or   inflammatory etiologies or an underlying mass.    Osteolysis in the left hip.    Multiple small lucent lesions in the iliac bones; myeloma cannot be   excluded.    Bilateral indeterminate pulmonary nodules again seen at the lung bases.    Comparison is recommended with a prior study if available for the   pulmonary nodules and lucent lesions in the iliac bones.    The findings were discussed with Dr. Kay on 12/6/2019 12:53 PM                ALEXIA COTTO   This document has been electronically signed. Dec  6 2019  1:09PM    < end of copied text >  < from: CT Chest No Cont (12.05.19 @ 13:28) >   EXAM:  CT CHEST                          PROCEDURE DATE:  12/05/2019          INTERPRETATION:  CLINICAL INFORMATION: Smoking. Weight loss. Shortness of   breath    COMPARISON: Chest x-ray of the same day    PROCEDURE:   CT of the Chest was performed without intravenous contrast.  Sagittal and coronal reformats were performed.      FINDINGS:    LUNGS AND AIRWAYS: Secretions in the trachea. Moderate emphysematous   changes. Scattered bilateral pulmonary nodules measuring up to 1.4 cm in   the right lower lobe (series 3 image 117).  Opacity at the anterior   inferior aspect of the right middle lobe, likely atelectasis or scarring.   There are small nodules associated with the right middle lobe opacity,   likely infectious or inflammatory.    PLEURA: No pleural effusion.    MEDIASTINUM AND MAYANK: No lymphadenopathy. Small hiatal hernia.    VESSELS: Thoracic aorta normal in caliber with mild calcified plaque.   Coronary artery calcifications.    HEART: Heart size is normal. No pericardial effusion. Blood pool lower in   attenuation than the myocardium consistent with anemia.    CHEST WALL AND LOWER NECK: Within normal limits.    VISUALIZED UPPER ABDOMEN: Patient appears to be status post   cholecystectomy. Calcifications in the eunice hepatis and the medial   aspect of the right hepatic lobe, likely calcified lymph nodes.    BONES: No aggressive osseous lesion.    IMPRESSION:     Scattered bilateral pulmonary nodules measuring up 1.4 cm in the right   lower lobe; metastatic disease cannot be excluded; either a CT   abdomen/pelvis with oral and intravenous contrast or PET/CT is   recommended for further evaluation.    Right middle lobe opacity, likely atelectasis or scarring with small   associated nodules, likely infectious/inflammatory of the small airways.    The findings were discussed with Dr. Freeman on 12/5/2019 2:09 PM                ALEXIA COTTO   This document has been electronically signed. Dec  5 2019  2:11PM        < end of copied text >  All films reviewed on PACS

## 2019-12-07 NOTE — PROGRESS NOTE ADULT - SUBJECTIVE AND OBJECTIVE BOX
Palpable distal rectal mass on IRIS this AM.   Noted a  pending CTA to r/o PE given echo ordered for high BNP with findings of right heart strain.    MEDICATIONS  (STANDING):  azithromycin  IVPB      azithromycin  IVPB 500 milliGRAM(s) IV Intermittent every 24 hours  enoxaparin Injectable 60 milliGRAM(s) SubCutaneous every 12 hours  folic acid 1 milliGRAM(s) Oral daily  predniSONE   Tablet 60 milliGRAM(s) Oral daily  tiotropium 18 MICROgram(s) Capsule 1 Capsule(s) Inhalation daily    MEDICATIONS  (PRN):  albuterol/ipratropium for Nebulization 3 milliLiter(s) Nebulizer every 6 hours PRN Shortness of Breath and/or Wheezing    Vital Signs Last 24 Hrs  T(C): 36.4 (07 Dec 2019 09:44), Max: 36.7 (06 Dec 2019 15:53)  T(F): 97.5 (07 Dec 2019 09:44), Max: 98 (06 Dec 2019 15:53)  HR: 82 (07 Dec 2019 09:59) (80 - 96)  BP: 106/63 (07 Dec 2019 09:44) (92/56 - 106/63)  BP(mean): --  RR: 18 (07 Dec 2019 09:44) (16 - 20)  SpO2: 96% (07 Dec 2019 09:59) (95% - 99%)  I&O's Detail    NAD  ABD exam :soft, NTND  IRIS: as above                        9.2    7.82  )-----------( 428      ( 06 Dec 2019 06:35 )             29.1     12-06    134<L>  |  96<L>  |  13.0  ----------------------------<  148<H>  3.7   |  25.0  |  0.68    Ca    8.9      06 Dec 2019 06:35    TPro  7.3  /  Alb  2.5<L>  /  TBili  0.3<L>  /  DBili  x   /  AST  6   /  ALT  <5  /  AlkPhos  88  12-06

## 2019-12-07 NOTE — CONSULT NOTE ADULT - ASSESSMENT
69y old Female with a past medical history significant for COPD found to lung nodules and rectal ulceration ? perforation and weight loss. Concerning for malignancy.   agree with MRI pelvis to further characterize lesion  will follow along  Thanks

## 2019-12-07 NOTE — PROGRESS NOTE ADULT - ASSESSMENT
AECOPD cleared   Pulmonary nodules most likely mets from colon    Plan:  no pulmonary contraindication to colonoscopy  continue rx  will f/u 12/9/ unless called

## 2019-12-07 NOTE — PROGRESS NOTE ADULT - SUBJECTIVE AND OBJECTIVE BOX
Subjective: Patient comfortable in bed. Denies CP, SOB, cough, fever, chills, dizziness, ABD pain, constipation, bloody BMs.     Vital Signs:  Vital Signs Last 24 Hrs  T(C): 36.4 (12-07-19 @ 09:44), Max: 36.6 (12-06-19 @ 20:26)  T(F): 97.5 (12-07-19 @ 09:44), Max: 97.8 (12-06-19 @ 20:26)  HR: 82 (12-07-19 @ 09:59) (80 - 96)  BP: 106/63 (12-07-19 @ 09:44) (92/56 - 106/63)  RR: 18 (12-07-19 @ 09:44) (16 - 20)  SpO2: 96% (12-07-19 @ 09:59) (95% - 99%) on (O2)        General: NAD  Neurology: Awake, nonfocal, TREADWELL x 4  Eyes: Scleras clear, PERRLA/ EOMI, Gross vision intact  ENT: Gross hearing intact, grossly patent pharynx, no stridor  Neck: Neck supple, trachea midline, No JVD  Respiratory: Diminished BS throughout  CV: RRR, S1S2, no murmurs, rubs or gallops  Abdominal: Soft, NT, ND  Extremities: No edema  Psych: Oriented x 3, normal affect      Relevant labs, radiology and Medications reviewed                        9.2    7.82  )-----------( 428      ( 06 Dec 2019 06:35 )             29.1     12-06    134<L>  |  96<L>  |  13.0  ----------------------------<  148<H>  3.7   |  25.0  |  0.68    Ca    8.9      06 Dec 2019 06:35    TPro  7.3  /  Alb  2.5<L>  /  TBili  0.3<L>  /  DBili  x   /  AST  6   /  ALT  <5  /  AlkPhos  88  12-06      MEDICATIONS  (STANDING):  azithromycin  IVPB      azithromycin  IVPB 500 milliGRAM(s) IV Intermittent every 24 hours  enoxaparin Injectable 60 milliGRAM(s) SubCutaneous every 12 hours  folic acid 1 milliGRAM(s) Oral daily  predniSONE   Tablet 60 milliGRAM(s) Oral daily  tiotropium 18 MICROgram(s) Capsule 1 Capsule(s) Inhalation daily    MEDICATIONS  (PRN):  albuterol/ipratropium for Nebulization 3 milliLiter(s) Nebulizer every 6 hours PRN Shortness of Breath and/or Wheezing        Assessment  69y Female  w/ PAST MEDICAL & SURGICAL HISTORY:  Anemia  COPD (chronic obstructive pulmonary disease)  H/O bilateral hip replacements  admitted with complaints of Patient is a 69y old  Female who presents with a chief complaint of acute hypoxic respiratory failure, multiple lung nodules (07 Dec 2019 12:09)

## 2019-12-08 LAB
ANION GAP SERPL CALC-SCNC: 13 MMOL/L — SIGNIFICANT CHANGE UP (ref 5–17)
BUN SERPL-MCNC: 14 MG/DL — SIGNIFICANT CHANGE UP (ref 8–20)
CALCIUM SERPL-MCNC: 8.8 MG/DL — SIGNIFICANT CHANGE UP (ref 8.6–10.2)
CEA SERPL-MCNC: 18.1 NG/ML — HIGH (ref 0–3.8)
CHLORIDE SERPL-SCNC: 96 MMOL/L — LOW (ref 98–107)
CO2 SERPL-SCNC: 30 MMOL/L — HIGH (ref 22–29)
CREAT SERPL-MCNC: 0.64 MG/DL — SIGNIFICANT CHANGE UP (ref 0.5–1.3)
GLUCOSE SERPL-MCNC: 90 MG/DL — SIGNIFICANT CHANGE UP (ref 70–115)
HCT VFR BLD CALC: 29.8 % — LOW (ref 34.5–45)
HGB BLD-MCNC: 9.4 G/DL — LOW (ref 11.5–15.5)
MCHC RBC-ENTMCNC: 29.1 PG — SIGNIFICANT CHANGE UP (ref 27–34)
MCHC RBC-ENTMCNC: 31.5 GM/DL — LOW (ref 32–36)
MCV RBC AUTO: 92.3 FL — SIGNIFICANT CHANGE UP (ref 80–100)
PLATELET # BLD AUTO: 402 K/UL — HIGH (ref 150–400)
POTASSIUM SERPL-MCNC: 4.3 MMOL/L — SIGNIFICANT CHANGE UP (ref 3.5–5.3)
POTASSIUM SERPL-SCNC: 4.3 MMOL/L — SIGNIFICANT CHANGE UP (ref 3.5–5.3)
RBC # BLD: 3.23 M/UL — LOW (ref 3.8–5.2)
RBC # FLD: 13.8 % — SIGNIFICANT CHANGE UP (ref 10.3–14.5)
SODIUM SERPL-SCNC: 139 MMOL/L — SIGNIFICANT CHANGE UP (ref 135–145)
WBC # BLD: 8.95 K/UL — SIGNIFICANT CHANGE UP (ref 3.8–10.5)
WBC # FLD AUTO: 8.95 K/UL — SIGNIFICANT CHANGE UP (ref 3.8–10.5)

## 2019-12-08 PROCEDURE — 99232 SBSQ HOSP IP/OBS MODERATE 35: CPT

## 2019-12-08 PROCEDURE — 99233 SBSQ HOSP IP/OBS HIGH 50: CPT

## 2019-12-08 RX ADMIN — Medication 1 MILLIGRAM(S): at 11:55

## 2019-12-08 RX ADMIN — AZITHROMYCIN 255 MILLIGRAM(S): 500 TABLET, FILM COATED ORAL at 14:36

## 2019-12-08 RX ADMIN — TIOTROPIUM BROMIDE 1 CAPSULE(S): 18 CAPSULE ORAL; RESPIRATORY (INHALATION) at 08:37

## 2019-12-08 RX ADMIN — ENOXAPARIN SODIUM 60 MILLIGRAM(S): 100 INJECTION SUBCUTANEOUS at 17:29

## 2019-12-08 RX ADMIN — ENOXAPARIN SODIUM 60 MILLIGRAM(S): 100 INJECTION SUBCUTANEOUS at 05:18

## 2019-12-08 RX ADMIN — Medication 60 MILLIGRAM(S): at 05:18

## 2019-12-08 RX ADMIN — Medication 3 MILLILITER(S): at 08:37

## 2019-12-08 NOTE — PROGRESS NOTE ADULT - ASSESSMENT
70 yo female with history of COPD who presents with 1 month of shortness of breath worsened over last 2 weeks associated with >30 lb weight loss in the last 6 months, decreased appetite. Patient being admitted now with hypoxemic respiratory failure 2/2 COPD exacerbation with possible metastatic lung disease.    Pulmonology consulted and CT abdomen was obtained - CT showed rectal wall thickening with adjacent air-pockets; colorectal surgery was consulted and MRI was recommended. May have colon cancer with mets to the lung. GI consult was recommended by colorectal surgeon.    Echo was ordered for elevated BNP - she was shown to have severe right sided ventricular strain. Started empirically on treatment for PE and CTA was ordered (delayed due to having received contrast with the CT abdomen on the same day).     Lung Nodules with rectal wall thickening; likely metastatic disease  - CT abdomen completed  - will consult colorectal surgery regarding rectal wall changes  - pulmonary consulted - recs appreciated  - CT surgery consulted  - colorectal surgery consulted  - MRI pending of the abdomen    COPD exacerbation - hypoxia on admission has resolved  - patient on anoro-ellipta 62.5/25 dose at home BID; ventolin as needed at home  - duoneb q6 PRN  - started spiriva  - IV azithromycin 500mg x3 days; day 3/3 - discontinued  - now on prednisone 60mg daily - can taper to 40mg daily    anemia with thrombocytosis  - likely GI source  - stable    elevated BNP - no clinical signs of HF- significant RV strain on the echo  - will check CTA as above  - on lovenox until PE ruled out    slightly elevated INR - given poor diet may reflect some degree of malnutrition    protein calorie malnutrition  - supplement meals  - nutrition consult    Depressed mood  - no suicidal ideation  - psych consulted  - may benefit from medication    DVT - lovenox sq

## 2019-12-08 NOTE — PROGRESS NOTE ADULT - ASSESSMENT
Distal rectal mass, presumed cancer, likely perforated and metastatic. CEA and MRI pending.     -CEA ordered  -GI: MRI to characterize lesion, following   -MRI pelvis pending for staging purchases.  -CTA pending to r/o PE, already receiving therapeutic lovenox.

## 2019-12-08 NOTE — PROGRESS NOTE ADULT - SUBJECTIVE AND OBJECTIVE BOX
CC: acute hypoxic respiratory failure, multiple lung nodules (06 Dec 2019 11:05)    INTERVAL HPI/OVERNIGHT EVENTS:  patient without complaints  agrees to stay to complete workup    Vital Signs Last 24 Hrs  T(C): 36.8 (08 Dec 2019 08:46), Max: 36.8 (07 Dec 2019 23:25)  T(F): 98.2 (08 Dec 2019 08:46), Max: 98.2 (07 Dec 2019 23:25)  HR: 94 (08 Dec 2019 08:46) (83 - 95)  BP: 118/77 (08 Dec 2019 08:46) (111/67 - 119/78)  BP(mean): --  RR: 18 (08 Dec 2019 08:46) (18 - 19)  SpO2: 96% (08 Dec 2019 08:46) (94% - 97%)    PHYSICAL EXAM:  General: in no acute distress  Eyes: PERRLA, EOMI; conjunctiva and sclera clear  Head: Normocephalic; atraumatic  ENMT: No nasal discharge; airway clear  Respiratory: No wheezes, rales or rhonchi  Cardiovascular: Regular rate and rhythm. S1 and S2 Normal; No murmurs, gallops or rubs  Gastrointestinal: Soft non-tender non-distended; Normal bowel sounds  Extremities: Normal range of motion, No clubbing, cyanosis or edema  Neurological: Alert and oriented x4  Psychiatric: Cooperative and appropriate                        9.4    8.95  )-----------( 402      ( 08 Dec 2019 06:56 )             29.8     12-08    139  |  96<L>  |  14.0  ----------------------------<  90  4.3   |  30.0<H>  |  0.64    Ca    8.8      08 Dec 2019 06:56    MEDICATIONS  (STANDING):  azithromycin  IVPB      azithromycin  IVPB 500 milliGRAM(s) IV Intermittent every 24 hours  enoxaparin Injectable 60 milliGRAM(s) SubCutaneous every 12 hours  folic acid 1 milliGRAM(s) Oral daily  predniSONE   Tablet 60 milliGRAM(s) Oral daily  tiotropium 18 MICROgram(s) Capsule 1 Capsule(s) Inhalation daily    MEDICATIONS  (PRN):  albuterol/ipratropium for Nebulization 3 milliLiter(s) Nebulizer every 6 hours PRN Shortness of Breath and/or Wheezing    RADIOLOGY & ADDITIONAL TESTS:

## 2019-12-08 NOTE — PROGRESS NOTE ADULT - ASSESSMENT
69y old Female with a past medical history significant for COPD found to lung nodules and rectal ulceration ? perforation and weight loss. Concerning for malignancy.   MRI pelvis pending to further characterize lesion  will follow along  Thanks

## 2019-12-08 NOTE — PROGRESS NOTE ADULT - SUBJECTIVE AND OBJECTIVE BOX
INTERVAL HPI/OVERNIGHT EVENTS:      ROS wnl     PAST MEDICAL & SURGICAL HISTORY:  Anemia  COPD (chronic obstructive pulmonary disease)  H/O bilateral hip replacements      Home Medications:  Anoro Ellipta 62.5 mcg-25 mcg/inh inhalation powder: 1 puff(s) inhaled once a day (05 Dec 2019 16:38)  Ventolin HFA 90 mcg/inh inhalation aerosol: 2 puff(s) inhaled 4 times a day (05 Dec 2019 16:38)      MEDICATIONS  (STANDING):  enoxaparin Injectable 60 milliGRAM(s) SubCutaneous every 12 hours  folic acid 1 milliGRAM(s) Oral daily  predniSONE   Tablet 40 milliGRAM(s) Oral daily  tiotropium 18 MICROgram(s) Capsule 1 Capsule(s) Inhalation daily    MEDICATIONS  (PRN):  albuterol/ipratropium for Nebulization 3 milliLiter(s) Nebulizer every 6 hours PRN Shortness of Breath and/or Wheezing      Allergies    No Known Allergies    Intolerances          PHYSICAL EXAM:   Vital Signs:  Vital Signs Last 24 Hrs  T(C): 36.8 (08 Dec 2019 08:46), Max: 36.8 (07 Dec 2019 23:25)  T(F): 98.2 (08 Dec 2019 08:46), Max: 98.2 (07 Dec 2019 23:25)  HR: 94 (08 Dec 2019 08:46) (83 - 95)  BP: 118/77 (08 Dec 2019 08:46) (111/67 - 119/78)  BP(mean): --  RR: 18 (08 Dec 2019 08:46) (18 - 19)  SpO2: 96% (08 Dec 2019 08:46) (94% - 97%)  Daily     Daily     GENERAL:  no distress  HEENT:  NC/AT,  anicteric  ABDOMEN:  Soft, non-tender, non-distended, normoactive bowel sounds  EXTREMITIES  no cyanosis      LABS:                        9.4    8.95  )-----------( 402      ( 08 Dec 2019 06:56 )             29.8       Hemoglobin: 9.4 g/dL (12-08-19 @ 06:56)  Hemoglobin: 9.2 g/dL (12-06-19 @ 06:35)      12-08    139  |  96<L>  |  14.0  ----------------------------<  90  4.3   |  30.0<H>  |  0.64    Ca    8.8      08 Dec 2019 06:56            Aspartate Aminotransferase (AST/SGOT): 6 U/L (12-06-19 @ 06:35)  Alkaline Phosphatase, Serum: 88 U/L (12-06-19 @ 06:35)  Alanine Aminotransferase (ALT/SGPT): <5 U/L (12-06-19 @ 06:35)    RADIOLOGY & ADDITIONAL TESTS:  < from: CT Abdomen and Pelvis w/ IV Cont (12.06.19 @ 11:43) >  FINDINGS:    LOWER CHEST: Bilateral pulmonary nodules again seen.    LIVER: Within normal limits. Calcifications in the eunice hepatis, likely   calcified lymph nodes.  BILE DUCTS: Normal caliber.  GALLBLADDER: Cholecystectomy.  SPLEEN: Within normal limits.  PANCREAS: Within normal limits.  ADRENALS: Within normal limits.  KIDNEYS/URETERS: Within normal limits.    BLADDER: Within normal limits.  REPRODUCTIVE ORGANS: Uterus within normal limits for size.    BOWEL: Distended stomach. Small bowel loops normal in caliber. Moderate   amount retained fecal material in the colon. Appendix is normal. There is   a collection of fluid and air at the posterior and right aspect of the   distal rectum and anal canal measuring 5.0 x 3.5 cm (series 4 image 55).   There is additional collection of air and fluid to the left of the anus   measuring 4.1 x 1.0 cm (series 3 image 133) which likely communicates   with the larger collection. An additional thin amount of fluid with a   droplet of air is seen to the left of the anal canal (series 2 image 134)   and droplet of air anteriorly which is also likely extraluminal and   communicates with the larger collection. Evaluation of the distal rectum   and anal canal is limited secondary to streak artifact from bilateral hip   arthroplasties, however there is wall thickening involving the anal canal.  PERITONEUM: Small amount of free fluid in the pelvis. Presacral edema.  VESSELS: Abdominal aorta normal in caliber with mild calcified plaque.  RETROPERITONEUM/LYMPH NODES: No lymphadenopathy.    ABDOMINAL WALL: Withinnormal limits.  BONES: Bilateral hip arthroplasties. There is lucency in the left iliac   bone consistent with osteolysis with broad areas of cortical disruption.   Lucencies are also seen within the femur, likely related to osteolysis.   There is irregularity and sclerosis at the anterior aspect of the left   acetabulum, likely a stress reaction. Multiple small lucent lesions in   the iliac bones more superiorly (for example series 3 image 97). High   attenuation structure at the anterior aspectof the left thigh, possibly   cement; clinical correlation is recommended.    IMPRESSION:     Collection of fluid and air at the posterior aspect of the distal rectum   and anal canal as described above.    Additional foci of fluid and air adjacent to the anus with the largest on   the left which likely communicate with the larger collection.    Wall thickening involving the anus; differential includes infectious or   inflammatory etiologies or an underlying mass.    Osteolysis in the left hip.    Multiple small lucent lesions in the iliac bones; myeloma cannot be   excluded.    Bilateral indeterminate pulmonary nodules again seen at the lung bases.    Comparison is recommended with a prior study if available for the   pulmonary nodules and lucent lesions in the iliac bones.    The findings were discussed with Dr. Kay on 12/6/2019 12:53 PM          < end of copied text >

## 2019-12-08 NOTE — PROGRESS NOTE ADULT - ATTENDING COMMENTS
No c/o. Feels improved.  CEA pending.  CTA chest and MRI pelvis also pending.  AFVSS  Labs reviewed  A/P - Distal rectal mass, likely cancer, possibly perforated  Await results of above studies and labwork.  Will cont to follow.

## 2019-12-08 NOTE — PROGRESS NOTE ADULT - SUBJECTIVE AND OBJECTIVE BOX
HPI/OVERNIGHT EVENTS:  Patient evaluated at bedside. Rectal mass palpated on IRIS 12/7. Patient made aware and sent for MRI to further characterize lesion. Patient endorses pain in rectum. Mild dyspnea at baseline, not on O2. No CP, fever, chills.     MEDICATIONS  (STANDING):  azithromycin  IVPB      azithromycin  IVPB 500 milliGRAM(s) IV Intermittent every 24 hours  enoxaparin Injectable 60 milliGRAM(s) SubCutaneous every 12 hours  folic acid 1 milliGRAM(s) Oral daily  predniSONE   Tablet 60 milliGRAM(s) Oral daily  tiotropium 18 MICROgram(s) Capsule 1 Capsule(s) Inhalation daily    MEDICATIONS  (PRN):  albuterol/ipratropium for Nebulization 3 milliLiter(s) Nebulizer every 6 hours PRN Shortness of Breath and/or Wheezing      Vital Signs Last 24 Hrs  T(C): 36.8 (07 Dec 2019 23:25), Max: 36.8 (07 Dec 2019 23:25)  T(F): 98.2 (07 Dec 2019 23:25), Max: 98.2 (07 Dec 2019 23:25)  HR: 94 (07 Dec 2019 23:25) (82 - 95)  BP: 119/78 (07 Dec 2019 23:25) (92/56 - 119/78)  BP(mean): --  RR: 18 (07 Dec 2019 23:25) (18 - 20)  SpO2: 97% (07 Dec 2019 23:25) (95% - 98%)    Constitutional: patient resting comfortably in bed, in no acute distress  HEENT: EOMI / PERRL b/l, no active drainage or redness  Neck: No JVD, full ROM without pain  Respiratory: respirations are unlabored, no accessory muscle use, no conversational dyspnea  Cardiovascular: regular rate & rhythm  Gastrointestinal: Abdomen soft, non-tender, non-distended, no rebound tenderness / guarding  Rectal: IRIS, mass   Neurological: GCS: 15 (4/5/6). A&O x 3; no gross sensory / motor / coordination deficits  Psychiatric: Normal mood, normal affect  Musculoskeletal: No joint pain, swelling or deformity; no limitation of movement      I&O's Detail    07 Dec 2019 07:01  -  08 Dec 2019 02:46  --------------------------------------------------------  IN:    IV PiggyBack: 250 mL    Oral Fluid: 474 mL  Total IN: 724 mL    OUT:  Total OUT: 0 mL    Total NET: 724 mL          LABS:                        9.2    7.82  )-----------( 428      ( 06 Dec 2019 06:35 )             29.1     12-06    134<L>  |  96<L>  |  13.0  ----------------------------<  148<H>  3.7   |  25.0  |  0.68    Ca    8.9      06 Dec 2019 06:35    TPro  7.3  /  Alb  2.5<L>  /  TBili  0.3<L>  /  DBili  x   /  AST  6   /  ALT  <5  /  AlkPhos  88  12-06

## 2019-12-09 DIAGNOSIS — R93.5 ABNORMAL FINDINGS ON DIAGNOSTIC IMAGING OF OTHER ABDOMINAL REGIONS, INCLUDING RETROPERITONEUM: ICD-10-CM

## 2019-12-09 PROCEDURE — 72196 MRI PELVIS W/DYE: CPT | Mod: 26

## 2019-12-09 PROCEDURE — 99232 SBSQ HOSP IP/OBS MODERATE 35: CPT

## 2019-12-09 PROCEDURE — 99233 SBSQ HOSP IP/OBS HIGH 50: CPT

## 2019-12-09 PROCEDURE — 71275 CT ANGIOGRAPHY CHEST: CPT | Mod: 26

## 2019-12-09 RX ADMIN — ENOXAPARIN SODIUM 60 MILLIGRAM(S): 100 INJECTION SUBCUTANEOUS at 17:57

## 2019-12-09 RX ADMIN — Medication 40 MILLIGRAM(S): at 05:19

## 2019-12-09 RX ADMIN — ENOXAPARIN SODIUM 60 MILLIGRAM(S): 100 INJECTION SUBCUTANEOUS at 05:19

## 2019-12-09 RX ADMIN — TIOTROPIUM BROMIDE 1 CAPSULE(S): 18 CAPSULE ORAL; RESPIRATORY (INHALATION) at 08:08

## 2019-12-09 NOTE — PROGRESS NOTE ADULT - SUBJECTIVE AND OBJECTIVE BOX
Patient is a 69y old  Female who presents with a chief complaint of acute hypoxic respiratory failure, multiple lung nodules (09 Dec 2019 01:53)      HPI:  68 yo female with history of COPD - patient has no abdominal pain, no diarrhea, no rectal bleeding. No fevers. Awaiting rectal MRI    REVIEW OF SYSTEMS:  Constitutional: No fever, weight loss or fatigue  ENMT:  No difficulty hearing, tinnitus, vertigo; No sinus or throat pain  Respiratory: No cough, wheezing, chills or hemoptysis  Cardiovascular: No chest pain, palpitations, dizziness or leg swelling  Gastrointestinal: No abdominal or epigastric pain. No nausea, vomiting or hematemesis; No diarrhea or constipation. No melena or hematochezia.  Skin: No itching, burning, rashes or lesions   Musculoskeletal: No joint pain or swelling; No muscle, back or extremity pain    PAST MEDICAL & SURGICAL HISTORY:  Anemia  COPD (chronic obstructive pulmonary disease)  H/O bilateral hip replacements      FAMILY HISTORY:      SOCIAL HISTORY:  Smoking Status: [ ] Current, [ ] Former, [ ] Never  Pack Years:  [  ] EtOH-no  [  ] IVDA    MEDICATIONS:  MEDICATIONS  (STANDING):  enoxaparin Injectable 60 milliGRAM(s) SubCutaneous every 12 hours  folic acid 1 milliGRAM(s) Oral daily  predniSONE   Tablet 40 milliGRAM(s) Oral daily    MEDICATIONS  (PRN):  albuterol/ipratropium for Nebulization 3 milliLiter(s) Nebulizer every 6 hours PRN Shortness of Breath and/or Wheezing      Allergies    No Known Allergies    Intolerances        Vital Signs Last 24 Hrs  T(C): 36.5 (09 Dec 2019 07:50), Max: 36.5 (08 Dec 2019 15:50)  T(F): 97.7 (09 Dec 2019 07:50), Max: 97.7 (08 Dec 2019 15:50)  HR: 77 (09 Dec 2019 08:09) (77 - 96)  BP: 115/69 (09 Dec 2019 07:50) (102/70 - 115/69)  BP(mean): --  RR: 18 (09 Dec 2019 07:50) (17 - 19)  SpO2: 95% (09 Dec 2019 08:09) (95% - 97%)        PHYSICAL EXAM:    General: Well developed; well nourished; in no acute distress  HEENT: MMM, conjunctiva and sclera clear  H- RRR  L- CTA  Gastrointestinal: Soft, non-tender non-distended; Normal bowel sounds; No rebound or guarding  Extremities: Normal range of motion, No clubbing, cyanosis or edema  Neurological: Alert and oriented x3  Skin: Warm and dry. No obvious rash      LABS:                        9.4    8.95  )-----------( 402      ( 08 Dec 2019 06:56 )             29.8     08 Dec 2019 06:56    139    |  96     |  14.0   ----------------------------<  90     4.3     |  30.0   |  0.64     Ca    8.8        08 Dec 2019 06:56                RADIOLOGY & ADDITIONAL STUDIES:     < from: CT Abdomen and Pelvis w/ IV Cont (12.06.19 @ 11:43) >  MPRESSION:     Collection of fluid and air at the posterior aspect of the distal rectum   and anal canal as described above.    Additional foci of fluid and air adjacent to the anus with the largest on   the left which likely communicate with the larger collection.    Wall thickening involving the anus; differential includes infectious or   inflammatory etiologies or an underlying mass.    Osteolysis in the left hip.    Multiple small lucent lesions in the iliac bones; myeloma cannot be   excluded.    Bilateral indeterminate pulmonary nodules again seen at the lung bases.    Comparison is recommended with a prior study if available for the   pulmonary nodules and lucent lesions in the iliac bones.    The findings were discussed with Dr. Kay on 12/6/2019 12:53 PM      < end of copied text >

## 2019-12-09 NOTE — PROGRESS NOTE ADULT - ASSESSMENT
68 yo female with history of COPD who presents with 1 month of shortness of breath worsened over last 2 weeks associated with >30 lb weight loss in the last 6 months, decreased appetite. Patient being admitted now with hypoxemic respiratory failure 2/2 COPD exacerbation with possible metastatic lung disease.    Pulmonology consulted and CT abdomen was obtained - CT showed rectal wall thickening with adjacent air-pockets; colorectal surgery was consulted and MRI was recommended. May have colon cancer with mets to the lung. GI consult was recommended by colorectal surgeon.    Echo was ordered for elevated BNP - she was shown to have severe right sided ventricular strain. Started empirically on treatment for PE and CTA was ordered (delayed due to having received contrast with the CT abdomen on the same day).     Lung Nodules with rectal wall thickening; likely metastatic disease  - CT abdomen completed  - pulmonary consulted - recs appreciated  - CT surgery consulted  - colorectal surgery following   - MRI pending of the abdomen    COPD exacerbation - hypoxia on admission has resolved  - completed IV azithromycin 500mg x3 days   - patient on anoro-ellipta 62.5/25 dose at home BID; ventolin as needed at home  - continue with duoneb q6 PRN  - d/c spiriva due to episode of sob and tachycardia this am. O2 taper, no need for  since O2 sat 95-97%  - now on prednisone 60mg daily - taper on 40mg daily    anemia with thrombocytosis  - likely GI source  - stable    elevated BNP - no clinical signs of HF- significant RV strain on the echo  - will check CTA as above. ordered urgently   - on lovenox until PE ruled out    slightly elevated INR - given poor diet may reflect some degree of malnutrition    protein calorie malnutrition  - supplement meals  - nutrition consulted     Depressed mood  - no suicidal ideation  - psych consulted, signed off. may f/u outpatient   - may benefit from medication    DVT - lovenox sq

## 2019-12-09 NOTE — PROGRESS NOTE ADULT - ATTENDING COMMENTS
I have personally seen, examined and participated in the care of this patient. I have reviewed all pertinent clinical information, including history, physical exam, plan and agree with the above.   spiriva d/c'ed as pt did not "feel well" after tx, with shakiness, nausea. pt not on spiriva at home, reports only using albuterol inhaler.   On physical:   GENERAL: NAD  HEENT: PERRL, +EOMI  NECK: soft, supple  CHEST/LUNG: Clear to auscultation bilaterally; No wheezing  HEART: S1S2+, Regular rate and rhythm; No murmurs, rubs, or gallops  ABDOMEN: Soft, Nontender, Nondistended; Bowel sounds present  SKIN: warm, dry  NEURO: alert, non focal  PSYCH: cooperative and appropriate

## 2019-12-09 NOTE — PROGRESS NOTE ADULT - ASSESSMENT
-AECOPD  -Lung nodules; concerning for met dz given rectal mass  -SOB, hypoxia; see above; also need to r/o PE given possible malignancy    RECC:  Steroids with taper. Nebs. CTA ordered. O2. GI f/u. Will follow.

## 2019-12-09 NOTE — PROGRESS NOTE ADULT - ASSESSMENT
Distal rectal mass, presumed cancer, likely perforated and metastatic. CEA and MRI pending.     -CEA ordered: 18.1  -GI: MRI to characterize lesion, re-ordered as urgent (MRI pelvis pending for staging purchases.)  -CTA pending to r/o PE, already receiving therapeutic lovenox.  - Rest of management per primary team. Distal rectal mass, presumed cancer, likely perforated and metastatic. CEA and MRI pending.     -CEA ordered: 18.1  -GI: MRI to characterize lesion, re-ordered as urgent (MRI pelvis pending for staging purchases.)  -CTA pending to r/o PE, already receiving therapeutic lovenox.  -Recommend risk stratification for nay future operative intervention  -Recommend Heme/Onc consult  -Rest of management per primary team Distal rectal mass, presumed cancer, likely perforated and metastatic. CEA and MRI pending.     -CEA ordered: 18.1  -GI: MRI to characterize lesion, re-ordered as urgent (MRI pelvis pending for staging purchases.)  -CTA pending to r/o PE, already receiving therapeutic lovenox.  -Recommend risk stratification for any future operative intervention  -Recommend Heme/Onc consult  -Rest of management per primary team

## 2019-12-09 NOTE — PROGRESS NOTE ADULT - SUBJECTIVE AND OBJECTIVE BOX
PULMONARY PROGRESS NOTE      KERRY OTOOLE-[id number]    Patient is a 69y old  Female who presents with a chief complaint of acute hypoxic respiratory failure, multiple lung nodules (09 Dec 2019 14:47)      INTERVAL HPI/OVERNIGHT EVENTS: Episode of hypoxia and SOB this am after taking spiriva. Started on empiric lovenox; CTA ordered.     MEDICATIONS  (STANDING):  enoxaparin Injectable 60 milliGRAM(s) SubCutaneous every 12 hours  folic acid 1 milliGRAM(s) Oral daily  predniSONE   Tablet 40 milliGRAM(s) Oral daily      MEDICATIONS  (PRN):  albuterol/ipratropium for Nebulization 3 milliLiter(s) Nebulizer every 6 hours PRN Shortness of Breath and/or Wheezing      Allergies    No Known Allergies    Intolerances        PAST MEDICAL & SURGICAL HISTORY:  Anemia  COPD (chronic obstructive pulmonary disease)  H/O bilateral hip replacements      SOCIAL HISTORY  Smoking History: former smoker      REVIEW OF SYSTEMS:    CONSTITUTIONAL:  No distress    HEENT:  Eyes:  No diplopia or blurred vision. ENT:  No earache, sore throat or runny nose.    CARDIOVASCULAR:  No pressure, squeezing, tightness, heaviness or aching about the chest; no palpitations.    RESPIRATORY:  per HPI     GASTROINTESTINAL:  No nausea, vomiting or diarrhea.    GENITOURINARY:  No dysuria, frequency or urgency.    NEUROLOGIC:  No paresthesias, fasciculations, seizures or weakness.    PSYCHIATRIC:  No disorder of thought or mood.    Vital Signs Last 24 Hrs  T(C): 36.5 (09 Dec 2019 07:50), Max: 36.5 (08 Dec 2019 23:24)  T(F): 97.7 (09 Dec 2019 07:50), Max: 97.7 (08 Dec 2019 23:24)  HR: 77 (09 Dec 2019 08:09) (77 - 96)  BP: 115/69 (09 Dec 2019 07:50) (113/69 - 115/69)  BP(mean): --  RR: 18 (09 Dec 2019 07:50) (17 - 18)  SpO2: 95% (09 Dec 2019 08:09) (95% - 95%)    PHYSICAL EXAMINATION:    GENERAL: The patient is awake and alert in no apparent distress.     HEENT: Head is normocephalic and atraumatic.  Mucous membranes are moist.    NECK: Supple.    LUNGS: Clear to auscultation without wheezing, rales or rhonchi; respirations unlabored    HEART: Regular rate and rhythm      ABDOMEN: Soft, nontender, and nondistended.      EXTREMITIES: Without any cyanosis, clubbing, rash, lesions or edema.    NEUROLOGIC: Grossly intact.         RADIOLOGY & ADDITIONAL STUDIES:  < from: CT Chest No Cont (12.05.19 @ 13:28) >     EXAM:  CT CHEST                          PROCEDURE DATE:  12/05/2019          INTERPRETATION:  CLINICAL INFORMATION: Smoking. Weight loss. Shortness of   breath    COMPARISON: Chest x-ray of the same day    PROCEDURE:   CT of the Chest was performed without intravenous contrast.  Sagittal and coronal reformats were performed.      FINDINGS:    LUNGS AND AIRWAYS: Secretions in the trachea. Moderate emphysematous   changes. Scattered bilateral pulmonary nodules measuring up to 1.4 cm in   the right lower lobe (series 3 image 117).  Opacity at the anterior   inferior aspect of the right middle lobe, likely atelectasis or scarring.   There are small nodules associated with the right middle lobe opacity,   likely infectious or inflammatory.    PLEURA: No pleural effusion.    MEDIASTINUM AND MAYANK: No lymphadenopathy. Small hiatal hernia.    VESSELS: Thoracic aorta normal in caliber with mild calcified plaque.   Coronary artery calcifications.    HEART: Heart size is normal. No pericardial effusion. Blood pool lower in   attenuation than the myocardium consistent with anemia.    CHEST WALL AND LOWER NECK: Within normal limits.    VISUALIZED UPPER ABDOMEN: Patient appears to be status post   cholecystectomy. Calcifications in the eunice hepatis and the medial   aspect of the right hepatic lobe, likely calcified lymph nodes.    BONES: No aggressive osseous lesion.    IMPRESSION:     Scattered bilateral pulmonary nodules measuring up 1.4 cm in the right   lower lobe; metastatic disease cannot be excluded; either a CT   abdomen/pelvis with oral and intravenous contrast or PET/CT is   recommended for further evaluation.    Right middle lobe opacity, likely atelectasis or scarring with small   associated nodules, likely infectious/inflammatory of the small airways.    The findings were discussed with Dr. Freeman on 12/5/2019 2:09 PM                ALEXIA COTTO     < end of copied text >

## 2019-12-10 DIAGNOSIS — J44.9 CHRONIC OBSTRUCTIVE PULMONARY DISEASE, UNSPECIFIED: ICD-10-CM

## 2019-12-10 PROBLEM — Z00.00 ENCOUNTER FOR PREVENTIVE HEALTH EXAMINATION: Status: ACTIVE | Noted: 2019-12-10

## 2019-12-10 PROCEDURE — 99233 SBSQ HOSP IP/OBS HIGH 50: CPT

## 2019-12-10 PROCEDURE — 99232 SBSQ HOSP IP/OBS MODERATE 35: CPT

## 2019-12-10 PROCEDURE — 99223 1ST HOSP IP/OBS HIGH 75: CPT

## 2019-12-10 RX ORDER — ENOXAPARIN SODIUM 100 MG/ML
40 INJECTION SUBCUTANEOUS DAILY
Refills: 0 | Status: DISCONTINUED | OUTPATIENT
Start: 2019-12-10 | End: 2019-12-10

## 2019-12-10 RX ORDER — SOD SULF/SODIUM/NAHCO3/KCL/PEG
1000 SOLUTION, RECONSTITUTED, ORAL ORAL
Refills: 0 | Status: COMPLETED | OUTPATIENT
Start: 2019-12-10 | End: 2019-12-10

## 2019-12-10 RX ORDER — ENOXAPARIN SODIUM 100 MG/ML
40 INJECTION SUBCUTANEOUS ONCE
Refills: 0 | Status: DISCONTINUED | OUTPATIENT
Start: 2019-12-10 | End: 2019-12-10

## 2019-12-10 RX ADMIN — ENOXAPARIN SODIUM 60 MILLIGRAM(S): 100 INJECTION SUBCUTANEOUS at 05:12

## 2019-12-10 RX ADMIN — Medication 40 MILLIGRAM(S): at 05:12

## 2019-12-10 RX ADMIN — Medication 1 MILLIGRAM(S): at 11:28

## 2019-12-10 RX ADMIN — Medication 3 MILLILITER(S): at 06:39

## 2019-12-10 RX ADMIN — Medication 1000 MILLILITER(S): at 18:06

## 2019-12-10 RX ADMIN — Medication 1000 MILLILITER(S): at 23:04

## 2019-12-10 NOTE — CONSULT NOTE ADULT - ATTENDING COMMENTS
Patient seen and examined by me.  I have discussed my recommendation with the PA which are outlined above.  If BP permits start Coreg 3.125 BID and Lisinopril 2.5 mg daily    Will follow.
Seen and examined.  CT A/P images and report reviewed.  Collection of fluid and air posterior to rectum.  No obvious pain per patient. Colonoscopy most recently completed last year.  No obvious lesions on palpation of distal sacrum  A/P - Incidental CT findings of perirectal collection of fluid and air  Will order MRI to better evaluate.  Further recommendations based on MRI results.

## 2019-12-10 NOTE — CHART NOTE - NSCHARTNOTEFT_GEN_A_CORE
received call from radiology o ignore the preliminary MRI report and that the patient possibly has perirectal abscesses and the official report will be updated

## 2019-12-10 NOTE — CONSULT NOTE ADULT - ASSESSMENT
Pt is a 68 y/o female with medical history of COPD who presents to [facility] for increased SOB. Pt states that she had SOB x1 month, but started to become concerned recently so she came to [facility]. Pt is not good historian d/t impaired cognition. Pt denies associated symptoms such as orthopnea. Pt came to [facility] where multiple imaging studies showed presence of nodules in lungs, and lesions in illiac bone and colorectal lesions. Pt is currently followed by colorectal team-possible colostomy in treatment plan. Cardiology consult requested for surgical stratification.  Pt Denies fever, chills, PND, edema, chest pain, pressure, palpitations, irregular, fast heart beat, nausea, vomiting, melena, rectal bleed, hematuria, lightheadedness, dizziness, syncope, near syncope.  At baseline, pt states she walks frequently on a daily basis.       Plan:    1.Cardiac Risk Stratification  -[id number]  -Echo- EF 35-40%,severely reduced RV systolic function  -RCRI rISK SCORE 2, Class III risk, 6.6% risk of major cardiac event, Amato Perioperative Risk of ELGIN 0.4%  -Pt is considered an elevated risk for colorectal surgery d/t decreased EF  -Stress Test ordered and pending-please obtain consent from HCP pt son   - If stress test is negative for ischemia and wall motion abnormality, No active chest pain, evidence of ischemia, significant valvular abnormality, or unstable arrhythmia; there is  no absolute cardiac contraindication to the planned surgical procedure.       2.COPD  -Continue current medication regimen  -Primary team to treat

## 2019-12-10 NOTE — PROGRESS NOTE ADULT - ASSESSMENT
Patient with likely rectal ca.  CRS also following.  Patient ate today, so cannot perform colonoscopy tomorrow.  Patient should likely have colonoscopy rather than sigmoidoscopy if mass is not obstructing to rule out any metachronous lesions.  Recommend starting clear liquids tomorrow AM and prepping for complete colonoscopy for Thursday.  Patient will need pulmonary clearance for IV sedation.    Thank you for the consult.  Please do not hesitate to call with any questions or concerns.    KEERTHI Hooks MD  [facility]  Division of Gastroenterology  Tel [phone]  Fax [phone]  12-10-19 @ 09:27 Patient with likely rectal ca.  CRS also following.  Patient ate today, so cannot perform colonoscopy tomorrow.  Patient should likely have colonoscopy rather than sigmoidoscopy if mass is not obstructing to rule out any metachronous lesions.  Will need to await final read of MR, because if there is an abscess, performing a full colonoscopy is not advised.  In the event that there is no clear abscess recommend starting clear liquids tomorrow AM and prepping for complete colonoscopy for Thursday.  Patient will need pulmonary clearance for IV sedation.    Thank you for the consult.  Please do not hesitate to call with any questions or concerns.    KEERTHI Hooks MD  [facility]  Division of Gastroenterology  Tel [phone]  Fax [phone]  12-10-19 @ 09:27

## 2019-12-10 NOTE — PROGRESS NOTE ADULT - ASSESSMENT
Known COPD with rectal mass  Will require surgical biopsy, possible colostomy  CTA performed and does not show thromboembolic disease  Likely pulmonary mets seen    Risk of general anesthesia increased in view of underlying COPD but not prohibitive in this patient.  At risk for postoperative respir failure or complication.    Continue bronchodilator regimen thru perioperative period.

## 2019-12-10 NOTE — CONSULT NOTE ADULT - SUBJECTIVE AND OBJECTIVE BOX
[place] [facility]                                                               Office:  [address] [address]                                                              Telephone: [phone]. Fax:[phone]                                                                        CARDIOLOGY CONSULTATION NOTE                                                                                             Consult requested by:  Dr. Herr  Reason for Consultation: Cardiac Risk Stratification  History obtained by: Patient and medical record   obtained: No  Primary Cardiologist:  [facility] (possibly)    Chief complaint:    Patient is a 69y old  Female who presents with a chief complaint of acute hypoxic respiratory failure, multiple lung nodules (10 Dec 2019 13:21)        HPI: Pt is a 70 y/o female with medical history of COPD who presents to [facility] for increased SOB. Pt states that she had SOB x1 month, but started to become concerned recently so she came to [facility]. Pt is not good historian d/t impaired cognition. Pt denies associated symptoms such as orthopnea. Pt came to [facility] where multiple imaging studies showed presence of nodules in lungs, and lesions in illiac bone and colorectal lesions. Pt is currently followed by colorectal team-possible colostomy in treatment plan. Cardiology consult requested for surgical stratification.  Pt Denies fever, chills, PND, edema, chest pain, pressure, palpitations, irregular, fast heart beat, nausea, vomiting, melena, rectal bleed, hematuria, lightheadedness, dizziness, syncope, near syncope.  At baseline, pt states she walks frequently on a daily basis.         REVIEW OF SYMPTOMS:     CONSTITUTIONAL: No fever, weight loss, or fatigue  ENMT:  No difficulty hearing, tinnitus, vertigo; No sinus or throat pain  NECK: No pain or stiffness  CARDIOVASCULAR: No chest pain, dyspnea, syncope, palpitations, dizziness, Orthopnea, Paroxsymal nocturnal dyspnea  RESPIRATORY: See HPI  : No dysuria, no hematuria   GI: No dark color stool, no melena, no diarrhea, no constipation, no abdominal pain   NEURO: No headache, no dizziness, no slurred speech   MUSCULOSKELETAL: No joint pain or swelling; No muscle, back, or extremity pain  PSYCH: No agitation, no anxiety.    ALL OTHER REVIEW OF SYSTEMS ARE NEGATIVE.      PREVIOUS DIAGNOSTIC TESTING  ECHO FINDINGS: < from: TTE Echo Complete w/Doppler (12.05.19 @ 17:17) >  Summary:   1. Left ventricular ejection fraction, by visual estimation, is 35 to   40%.   2. Moderately decreased global left ventricular systolic function.   3. Severely enlarged right ventricle.   4. Severely reduced RV systolic function.   5. Mild-moderate tricuspid regurgitation.   6. Estimated pulmonary artery systolic pressure is 49.0 mmHg assuming a   right atrial pressure of 3 mmHg, which is consistent with mild pulmonary   hypertension.   7. No pericardial effusion.   8. ** No prior echocardiograms available for comparison. Findings   discussed with Dr. Kay      < end of copied text >        STRESS FINDINGS: pending      CATHETERIZATION FINDINGS:       ALLERGIES: Allergies    No Known Allergies    Intolerances      PAST MEDICAL HISTORY  Anemia  COPD (chronic obstructive pulmonary disease)      PAST SURGICAL HISTORY  H/O bilateral hip replacements      FAMILY HISTORY:      SOCIAL HISTORY:  Denies smoking/alcohol/drugs  CIGARETTES:     ALCOHOL:  DRUGS:      CURRENT MEDICATIONS:     polyethylene glycol/electrolyte Solution  folic acid  predniSONE   Tablet        HOME MEDICATIONS:    Anoro Ellipta 62.5 mcg-25 mcg/inh inhalation powder: 1 puff(s) inhaled once a day (05 Dec 2019 16:38)  Ventolin HFA 90 mcg/inh inhalation aerosol: 2 puff(s) inhaled 4 times a day (05 Dec 2019 16:38)      Vital Signs Last 24 Hrs  T(C): 36.8 (10 Dec 2019 16:28), Max: 36.8 (10 Dec 2019 16:28)  T(F): 98.2 (10 Dec 2019 16:28), Max: 98.2 (10 Dec 2019 16:28)  HR: 91 (10 Dec 2019 16:28) (80 - 132)  BP: 96/60 (10 Dec 2019 16:28) (96/60 - 98/66)  RR: 18 (10 Dec 2019 16:28) (18 - 18)  SpO2: 98% (10 Dec 2019 11:28) (96% - 98%)      PHYSICAL EXAM:  Constitutional: Comfortable . No acute distress.   HEENT: Atraumatic and normocephalic , neck is supple . no JVD. No carotid bruit. PEERL   CNS: A&Ox1-2,frequent reorientation required No focal deficits. EOMI.   Lymph Nodes: Cervical : Not palpable.  Respiratory: limited exam- CTAB  Cardiovascular: S1S2 RRR. No murmur/rubs or gallop.  Gastrointestinal: Soft non-tender and non distended . +Bowel sounds. negative Downing's sign.  Extremities: slight lower extremity edema  Psychiatric: Calm . no agitation.  Skin: No skin rash/ulcers visualized to face, hands or feet.    Intake and output:     LABS:            ;p-BNP=      ECG:  SR @ 94, T WAVE abnormalities in anterior and inferior leads    RADIOLOGY & ADDITIONAL STUDIES:    X-ray:  reviewed by me.   CT scan: < from: CT Angio Chest w/ IV Cont (12.09.19 @ 19:02) >  IMPRESSION:    No evidence of pulmonary embolism.  Diffuse pulmonary nodules the largest measuring 1.4 cm within the RIGHT   lower lobe unchanged from prior exam consistent with metastatic lung   disease.  Centrilobular emphysema predominantly involving the upper lobes.    < end of copied text >  < from: CT Abdomen and Pelvis w/ IV Cont (12.06.19 @ 11:43) >  IMPRESSION:     Collection of fluid and air at the posterior aspect of the distal rectum   and anal canal as described above.    Additional foci of fluid and air adjacent to the anus with the largest on   the left which likely communicate with the larger collection.    Wall thickening involving the anus; differential includes infectious or   inflammatory etiologies or an underlying mass.    Osteolysis in the left hip.    Multiple small lucent lesions in the iliac bones; myeloma cannot be   excluded.    Bilateral indeterminate pulmonary nodules again seen at the lung bases.    Comparison is recommended with a prior study if available for the   pulmonary nodules and lucent lesions in the iliac bones.      < end of copied text >      MRI: < from: MR Pelvis w/ IV Cont (12.09.19 @ 22:31) >  IMPRESSION:     Perforation involving the posterior wall of the distal rectum and anus   with a collection of fluid and air posteriorly measuring up to 6.5 cm.    Tract extending from the collection through the puborectalis into the   left ischiorectal fossa and in a collection measuring 3.4 cm.    Intermediate T2 signal lesion in the low rectum/anus measuring up to 5.2   cm, either related wall thickening or a mass; correlation is recommended   with direct visualization.          < end of copied text >

## 2019-12-10 NOTE — CHART NOTE - NSCHARTNOTEFT_GEN_A_CORE
Source: Patient [x]  Family [ ]   other [x] RN    Current Diet:   Diet, Regular:   Supplement Feeding Modality:  Oral  Ensure Enlive Cans or Servings Per Day:  1       Frequency:  Two Times a day (12-05-19 @ 15:24)    PO intake:  < 50% [ ]   50-75%  [ ]   %  [x]  other :    Source for PO intake [x] Patient [ ] family [x] chart [ ] staff [ ] other    Current Weight:   (12/5)   139.9 lbs    % Weight Change: No recent weight documented     Pertinent Medications: MEDICATIONS  (STANDING):  enoxaparin Injectable 60 milliGRAM(s) SubCutaneous every 12 hours  folic acid 1 milliGRAM(s) Oral daily  predniSONE   Tablet 40 milliGRAM(s) Oral daily    MEDICATIONS  (PRN):  albuterol/ipratropium for Nebulization 3 milliLiter(s) Nebulizer every 6 hours PRN Shortness of Breath and/or Wheezing    Pertinent Labs: No recent nutrition-related labs     Skin: IAD    Nutrition focused physical exam conducted - found signs of malnutrition [ ]absent [x] present    Subcutaneous fat loss: [ ] Orbital fat pads region, [ ]Buccal fat region, [ ]Triceps region,  [ ]Ribs region    Muscle wasting: [x]Temples region, [ ]Clavicle region, [ ]Shoulder region, [ ]Scapula region, [ ]Interosseous region,  [ ]thigh region, [ ]Calf region    Estimated Needs:   [x] no change since previous assessment  [ ] recalculated:     Current Nutrition Diagnosis: Pt remains at high nutrition risk secondary to malnutrition (severe chronic) related to insufficient protein-energy intake in setting of poss colon CA with mets, persistent lack of appetite as evidenced by unintentional 30lb (17.64%) wt loss x 6 month, severe muscle wasting (temples), meeting <75% EER> 1mo.   Pt s/p MRI with findings of distal rectal mass suggestive of rectal carcinoma as well as small fistulous tract extending to subcutaneous tissues of the left ischio rectal fossa. Pt with good PO intake completing >75% of meals. Last BM 12/8 per documentation.     Recommendations:   1) Continue with current nutrition plan as tolerated   2) Monitor weights daily   3)  Obtain/provide food preferences daily to inc PO   4) Provide encouragement/assistance as needed during mealtimes to inc PO     Monitoring and Evaluation:   [x] PO intake [x] Tolerance to diet prescription [X] Weights  [X] Follow up per protocol [X] Labs:

## 2019-12-10 NOTE — PROGRESS NOTE ADULT - ASSESSMENT
70 yo female with history of COPD who presents with 1 month of shortness of breath worsened over last 2 weeks associated with >30 lb weight loss in the last 6 months, decreased appetite. Patient admitted for respiratory failure 2/2 COPD exacerbation with possible metastatic lung disease.    Pulmonology consulted and CT abdomen was obtained - CT showed rectal wall thickening with adjacent air-pockets; colorectal surgery was consulted and MRI was recommended. May have colon cancer with mets to the lung. GI consult was recommended by colorectal surgeon and consulted.    TTE was ordered for elevated BNP - she was shown to have severe right sided ventricular strain. Started empirically on treatment for PE and CTA was ordered (delayed due to having received contrast with the CT abdomen on the same day).     Lung Nodules with rectal wall thickening; possibly metastatic disease  - CT abdomen as above w rectal wall thickening and fluid collection  - Pulmonary consulted - recs appreciated  - CT surgery consulted  - colorectal surgery following   - GI following  - MRI of the abdomen with Perforation involving the posterior wall of the distal rectum and anus   with a collection of fluid and air posteriorly measuring up to 6.5 cm.  - Initially GI was planning colonoscopy but now not advising that due to possible abscess  - Colorectal planning for EUA- need pulmonary clearance- pulmonary called    COPD exacerbation - hypoxia on admission has resolved  - completed IV azithromycin 500mg x3 days   - patient on anoro-ellipta 62.5/25 dose at home BID; ventolin as needed at home  - continue with duoneb q6 PRN  - d/c spiriva due to episode of sob and tachycardia this am. O2 taper, no need for  since O2 sat 95-97%  - now on prednisone 40 mg daily - cont po taper  - awaiting pulmonary clearance for possible surgical exploration for rectal mass/fluid collection    anemia with thrombocytosis  - likely GI source  - stable    elevated BNP - no clinical signs of HF- significant RV strain on the echo  - pt had CTA- negative for PE   - cardiology consulted-  called [facility]    slightly elevated INR - poss 2/2 poor diet/malignancy    protein calorie malnutrition  - supplement meals  - nutrition consulted     Depressed mood  - no suicidal ideation  - psych consulted, signed off. may f/u outpatient   - may benefit from medication    DVT - lovenox subcut

## 2019-12-10 NOTE — PROGRESS NOTE ADULT - SUBJECTIVE AND OBJECTIVE BOX
Chief Complaint: This is a 69y old woman patient being seen in follow-up consultation for rectal mass.    HPI / 24H events:  Patient underwent MRI pelvis that demonstrated a likely rectal mass with a fistulous tract.  She has no new complaints this morning.  I attempted to explain the gravity of the situation to her, and she does not seem to fully understand.    ROS: A 14-point review of systems was reviewed and was otherwise negative save what was reported in the HPI.    PAST MEDICAL/SURGICAL HISTORY:  Anemia  COPD (chronic obstructive pulmonary disease)  H/O bilateral hip replacements    MEDICATIONS  (STANDING):  enoxaparin Injectable 60 milliGRAM(s) SubCutaneous every 12 hours  folic acid 1 milliGRAM(s) Oral daily  predniSONE   Tablet 40 milliGRAM(s) Oral daily    MEDICATIONS  (PRN):  albuterol/ipratropium for Nebulization 3 milliLiter(s) Nebulizer every 6 hours PRN Shortness of Breath and/or Wheezing    No Known Allergies    T(C): 36.6 (12-09-19 @ 23:40), Max: 36.9 (12-09-19 @ 16:16)  HR: 130 (12-10-19 @ 06:39) (82 - 132)  BP: 96/66 (12-09-19 @ 23:40) (96/66 - 117/87)  RR: 18 (12-09-19 @ 23:40) (18 - 18)  SpO2: 96% (12-10-19 @ 06:39) (96% - 98%)    I&O's Summary    PHYSICAL EXAM:  Constitutional: Well-developed, well-nourished, in no apparent distress  Eyes: Sclerae anicteric, conjunctivae normal  ENMT: Mucus membranes moist, no oropharyngeal thrush noted  Neck: No thyroid nodules appreciated, no significant cervical or supraclavicular lymphadenopathy  Respiratory: Breathing nonlabored; clear to auscultation  Cardiovascular: Regular rate and rhythm  Gastrointestinal: Soft, nontender, nondistended, normoactive bowel sounds; no hepatosplenomegaly appreciated; no rebound tenderness or involuntary guarding  Extremities: No clubbing, cyanosis or edema  Neurological: No asterixis  Skin: No jaundice  Lymph Nodes: No significant lymphadenopathy  Musculoskeletal: No significant peripheral atrophy  Psychiatric: Affect and mood appropriate    Refusing labs today               9.4    8.95  )-----------( 402      ( 12-08 @ 06:56 )             29.8           IMAGING: I personally reviewed the MR	, and I agree with the radiologist's interpretation as described below:    < from: MR Pelvis w/ IV Cont (12.09.19 @ 22:31) >  ******PRELIMINARY REPORT******          INTERPRETATION:  [facility] RADIOLOGIST PRELIMINARY REPORT    PROCEDURE INFORMATION:   Exam: MR Pelvis Without and With Contrast, Rectum   Exam date and time: 12/9/2019 7:15 PM   Age: 69 years old   Clinical history: Screening exam; Rectal mass     TECHNIQUE:   Imaging protocol: Magnetic resonance images of the pelvis without and   with   intravenous contrast. Includes high resolution, T2-weighted sequences   oriented   to the long axis of the rectum.   Scanner: 3T    COMPARISON:   CT ABDOMEN AND PELVIS WITH IV CONTRAST 12/6/2019 11:27 AM     FINDINGS:     Stomach and bowel: Asymmetric enhancing rectal mass again identified   measuring   up to 4.3 x 3.1 cm. Suggestion of small fistulous tract to extending into   the   subcutaneous tissues of being left ischiorectal fossa.   Intraperitoneal space: Small presacral free fluid..   Bones/joints: Bilateral hip prostheses in place causing hardware are   head.   Soft tissues: Unremarkable.     IMPRESSION:   1. Enhancing asymmetric rectal mass measuring up to 4.3 cm as seen on CT   suggestive of rectal carcinoma.   2. Small fistulous tract extending to the subcutaneous tissues of the   left   ischio rectal fossa as was seen on CT.    < end of copied text >

## 2019-12-10 NOTE — PROGRESS NOTE ADULT - ASSESSMENT
Distal rectal mass, presumed cancer, likely perforated and metastatic. CT also showing pulmonary nodules, concern for metastatic disease.    -CEA ordered: 18.1  - MRI: 1. Enhancing asymmetric rectal mass measuring up to 4.3 cm as seen on CT   suggestive of rectal carcinoma. 2. Small fistulous tract extending to the subcutaneous tissues of the left   ischio rectal fossa as was seen on CT.  - Pending final read  -CTA no PE  -Recommend risk stratification for any future operative intervention  -Recommend Heme/Onc consult  -Rest of management per primary team  - will discuss with attending, patient likely will need GI consult for colonoscopy  - No surgical intervention at this time Distal rectal mass, presumed cancer, likely perforated and metastatic. CT also showing pulmonary nodules, concern for metastatic disease.    -CEA ordered: 18.1  - MRI: 1. Enhancing asymmetric rectal mass measuring up to 4.3 cm as seen on CT   suggestive of rectal carcinoma. 2. Small fistulous tract extending to the subcutaneous tissues of the left   ischio rectal fossa as was seen on CT.  - Pending final read  -CTA no PE  -Recommend risk stratification for any future operative intervention  -Recommend Heme/Onc consult  -Rest of management per primary team  - will discuss with attending, patient needs GI consult for colonoscopy and tissue sampling Distal rectal mass, presumed cancer, likely perforated and metastatic. CT also showing pulmonary nodules, concern for metastatic disease.    -CEA ordered: 18.1  - MRI: 1. Enhancing asymmetric rectal mass measuring up to 4.3 cm as seen on CT   suggestive of rectal carcinoma. 2. Small fistulous tract extending to the subcutaneous tissues of the left   ischio rectal fossa as was seen on CT.  - Pending final read  -CTA no PE  -Recommend risk stratification for any future operative intervention  -Recommend Heme/Onc consult  -Rest of management per primary team  - will discuss with attending, patient needs GI input for colonoscopy and tissue sampling. Distal rectal mass, presumed cancer, likely perforated and metastatic. CT also showing pulmonary nodules, concern for metastatic disease.    -CEA ordered: 18.1  - MRI: 1. Enhancing asymmetric rectal mass measuring up to 4.3 cm as seen on CT   suggestive of rectal carcinoma. 2. Small fistulous tract extending to the subcutaneous tissues of the left   ischio rectal fossa as was seen on CT.  - Pending final read  -CTA no PE  -Recommend risk stratification for any future operative intervention  - Needs clearances for OR  -Recommend Heme/Onc consult  -Rest of management per primary team  - will discuss with attending, patient needs GI input for colonoscopy and tissue sampling. Distal rectal mass, presumed cancer, likely perforated and metastatic. CT also showing pulmonary nodules, concern for metastatic disease.    -CEA ordered: 18.1  - MRI: 1. Enhancing asymmetric rectal mass measuring up to 4.3 cm as seen on CT   suggestive of rectal carcinoma. 2. Small fistulous tract extending to the subcutaneous tissues of the left   ischio rectal fossa as was seen on CT.  - Pending final read  -CTA no PE  -Recommend risk stratification for any future operative intervention  -Recommend Heme/Onc consult  -Rest of management per primary team  - will discuss with attending, patient needs GI input for colonoscopy and tissue sampling vs EUA for tissue diagnosis Distal rectal mass, presumed cancer, likely perforated and metastatic. CT also showing pulmonary nodules, concern for metastatic disease.    -CEA ordered: 18.1  - MRI: 1. Enhancing asymmetric rectal mass measuring up to 4.3 cm as seen on CT   suggestive of rectal carcinoma. 2. Small fistulous tract extending to the subcutaneous tissues of the left   ischio rectal fossa as was seen on CT.  - Pending final read  -CTA no PE  -Recommend risk stratification for any future operative intervention  -Recommend Heme/Onc consult  -Rest of management per primary team  - will discuss with attending, patient needs GI input for colonoscopy and tissue sampling vs EUA for tissue diagnosis    Plan OR for EUA, and seton placement. OR planning for 12/12. Needs clearances

## 2019-12-10 NOTE — PROGRESS NOTE ADULT - ATTENDING COMMENTS
Seen and examined.  Prelim MRI reports with rectal mass and possible fistula.  No reports of fecal or gas incontinence.  GI with plans to scope Thursday.  CTA chest negative for PE.  AFVSS  NAD  A/P - likely metastatic and perforated rectal cancer  No longer requires therapeutic lovenox, only prophylactic.  Discussed with pt plans for EUA, seton placement as well as recommendations for laparoscopic colostomy to facilitate ease with tolerating chemotherapy.  Currently, not completely agreeable to colostomy but agreeable with EUA.  Will schedule tentatively for Friday.   Oncology consult. Seen and examined.  Prelim MRI reports with rectal mass and possible fistula.  No reports of fecal or gas incontinence.  GI with plans to scope Thursday.  CTA chest negative for PE.  AFVSS  NAD  A/P - likely metastatic and perforated rectal cancer  No longer requires therapeutic lovenox, only prophylactic.  Discussed with pt plans for EUA, seton placement as well as recommendations for laparoscopic colostomy to facilitate ease with tolerating chemotherapy.  Currently, not completely agreeable to colostomy but agreeable with EUA.  Will schedule tentatively for Friday.   Oncology consult.    Addendum - Final MRI report reviewed with findings of perforated rectal ca with abscess.  GI is not planning to perform colonsocopy.  Will schedule for EUA, seton placement, possible colostomy for Thursday.

## 2019-12-10 NOTE — PROGRESS NOTE ADULT - SUBJECTIVE AND OBJECTIVE BOX
PULMONARY PROGRESS NOTE      KERRY OTOOLE-[id number]    Patient is a 69y old  Female who presents with a chief complaint of acute hypoxic respiratory failure, multiple lung nodules (10 Dec 2019 09:23)      INTERVAL HPI/OVERNIGHT EVENTS:    MEDICATIONS  (STANDING):  enoxaparin Injectable 60 milliGRAM(s) SubCutaneous every 12 hours  folic acid 1 milliGRAM(s) Oral daily  predniSONE   Tablet 40 milliGRAM(s) Oral daily      MEDICATIONS  (PRN):  albuterol/ipratropium for Nebulization 3 milliLiter(s) Nebulizer every 6 hours PRN Shortness of Breath and/or Wheezing      Allergies    No Known Allergies    Intolerances        PAST MEDICAL & SURGICAL HISTORY:  Anemia  COPD (chronic obstructive pulmonary disease)  H/O bilateral hip replacements      SOCIAL HISTORY  Smoking History:       REVIEW OF SYSTEMS:    CONSTITUTIONAL:  No distress    HEENT:  Eyes:  No diplopia or blurred vision. ENT:  No earache, sore throat or runny nose.    CARDIOVASCULAR:  No pressure, squeezing, tightness, heaviness or aching about the chest; no palpitations.    RESPIRATORY:  No cough, shortness of breath, PND or orthopnea. Mild SOBOE    GASTROINTESTINAL:  No nausea, vomiting or diarrhea.    GENITOURINARY:  No dysuria, frequency or urgency.    MUSCULOSKELETAL:  No joint pain    SKIN:  No new lesions.    NEUROLOGIC:  No paresthesias, fasciculations, seizures or weakness.    PSYCHIATRIC:  No disorder of thought or mood.    ENDOCRINE:  No heat or cold intolerance, polyuria or polydipsia.    HEMATOLOGICAL:  No easy bruising or bleeding.     Vital Signs Last 24 Hrs  T(C): 36.7 (10 Dec 2019 11:28), Max: 36.9 (09 Dec 2019 16:16)  T(F): 98 (10 Dec 2019 11:28), Max: 98.5 (09 Dec 2019 16:16)  HR: 80 (10 Dec 2019 11:28) (80 - 132)  BP: 98/66 (10 Dec 2019 11:28) (96/66 - 117/87)  BP(mean): --  RR: 18 (10 Dec 2019 11:28) (18 - 18)  SpO2: 98% (10 Dec 2019 11:28) (96% - 98%)    PHYSICAL EXAMINATION:    GENERAL: The patient is awake and alert in no apparent distress.     HEENT: Head is normocephalic and atraumatic. Extraocular muscles are intact. Mucous membranes are moist.    NECK: Supple.    LUNGS: Clear to auscultation without wheezing, rales or rhonchi; respirations unlabored    HEART: Regular rate and rhythm without murmur.    ABDOMEN: Soft, nontender, and nondistended.      EXTREMITIES: Without any cyanosis, clubbing, rash, lesions or edema.    NEUROLOGIC: Grossly intact.    SKIN: No ulceration or induration present.      LABS:                              MICROBIOLOGY:    RADIOLOGY & ADDITIONAL STUDIES:< from: CT Angio Chest w/ IV Cont (12.09.19 @ 19:02) >    IMPRESSION:    No evidence of pulmonary embolism.  Diffuse pulmonary nodules the largest measuring 1.4 cm within the RIGHT   lower lobe unchanged from prior exam consistent with metastatic lung   disease.  Centrilobular emphysema predominantly involving the upper lobes.          < end of copied text >

## 2019-12-10 NOTE — PROGRESS NOTE ADULT - SUBJECTIVE AND OBJECTIVE BOX
MARQUITA OTOOLE Female 69y [id number]    Patient is a 69y old  Female who presents with a chief complaint of acute hypoxic respiratory failure, multiple lung nodules (10 Dec 2019 09:23)      Subjective/objective:  Pt seen and examined at bedside, no over night event reported by night staff. Pt is lying on her side and appears comfortable. Pt stating that "someone told me I have cancer." Denies complaints.       PHYSICAL EXAM:    Vital Signs Last 24 Hrs  T(C): 36.7 (10 Dec 2019 11:28), Max: 36.9 (09 Dec 2019 16:16)  T(F): 98 (10 Dec 2019 11:28), Max: 98.5 (09 Dec 2019 16:16)  HR: 80 (10 Dec 2019 11:28) (80 - 132)  BP: 98/66 (10 Dec 2019 11:28) (96/66 - 117/87)  BP(mean): --  RR: 18 (10 Dec 2019 11:28) (18 - 18)  SpO2: 98% (10 Dec 2019 11:28) (96% - 98%)    GENERAL: Pt lying comfortably, NAD.  HEENT: NC/AT.  NECK: soft, Supple, No JVD,   CHEST/LUNG: Diminished, clear to auscultation bilaterally; No wheezing.  HEART: S1S2+, Regular rate and rhythm; No murmurs.  ABDOMEN: Soft, Nontender, Nondistended; Bowel sounds present.  MUSCULOSKELETAL: No obvious deformities.  SKIN: No rashes or lesions.  NEURO: AAOX3, grossly intact.  PSYCH: flat affect.    MEDICATIONS  (STANDING):  enoxaparin Injectable 60 milliGRAM(s) SubCutaneous every 12 hours  folic acid 1 milliGRAM(s) Oral daily  predniSONE   Tablet 40 milliGRAM(s) Oral daily    MEDICATIONS  (PRN):  albuterol/ipratropium for Nebulization 3 milliLiter(s) Nebulizer every 6 hours PRN Shortness of Breath and/or Wheezing        CTA chest 12/9/19  IMPRESSION:    No evidence of pulmonary embolism.  Diffuse pulmonary nodules the largest measuring 1.4 cm within the RIGHT   lower lobe unchanged from prior exam consistent with metastatic lung   disease.  Centrilobular emphysema predominantly involving the upper lobes.      CTA abd/pel 12/6/19  IMPRESSION:     Collection of fluid and air at the posterior aspect of the distal rectum   and anal canal as described above.    Additional foci of fluid and air adjacent to the anus with the largest on   the left which likely communicate with the larger collection.    Wall thickening involving the anus; differential includes infectious or   inflammatory etiologies or an underlying mass.    Osteolysis in the left hip.    Multiple small lucent lesions in the iliac bones; myeloma cannot be   excluded.    Bilateral indeterminate pulmonary nodules again seen at the lung bases.    Comparison is recommended with a prior study if available for the   pulmonary nodules and lucent lesions in the iliac bones.    The findings were discussed with Dr. Cortez on 12/6/2019 12:53 PM          MR pelvis 12/9/19  IMPRESSION:     Perforation involving the posterior wall of the distal rectum and anus   with a collection of fluid and air posteriorly measuring up to 6.5 cm.    Tract extending from the collection through the puborectalis into the   left ischiorectal fossa and in a collection measuring 3.4 cm.    Intermediate T2 signal lesion in the low rectum/anus measuring up to 5.2   cm, either related wall thickening or a mass; correlation is recommended   with direct visualization.      TTE 12/5/19  Summary:   1. Left ventricular ejection fraction, by visual estimation, is 35 to   40%.   2. Moderately decreased global left ventricular systolic function.   3. Severely enlarged right ventricle.   4. Severely reduced RV systolic function.   5. Mild-moderate tricuspid regurgitation.   6. Estimated pulmonary artery systolic pressure is 49.0 mmHg assuming a   right atrial pressure of 3 mmHg, which is consistent with mild pulmonary   hypertension.   7. No pericardial effusion.   8. ** No prior echocardiograms available for comparison. Findings   discussed with Dr. Cortez

## 2019-12-10 NOTE — PROGRESS NOTE ADULT - ATTENDING COMMENTS
scheduled for stress test in AM , oncology input awaited called in AM , discussed with radiology and colorectal surgery

## 2019-12-10 NOTE — PROGRESS NOTE ADULT - SUBJECTIVE AND OBJECTIVE BOX
INTERVAL HPI/OVERNIGHT EVENTS:    Patient with MRI performed overnight. Results reviewed. Patient having some shortness of breath this morning, currently on a breathing treatment. Patient had no other acute issues overnight. Patient tolerating diet, no fevers or chills, denies chest pain      MEDICATIONS  (STANDING):  enoxaparin Injectable 60 milliGRAM(s) SubCutaneous every 12 hours  folic acid 1 milliGRAM(s) Oral daily  predniSONE   Tablet 40 milliGRAM(s) Oral daily    MEDICATIONS  (PRN):  albuterol/ipratropium for Nebulization 3 milliLiter(s) Nebulizer every 6 hours PRN Shortness of Breath and/or Wheezing      Vital Signs Last 24 Hrs  T(C): 36.6 (09 Dec 2019 23:40), Max: 36.9 (09 Dec 2019 16:16)  T(F): 97.8 (09 Dec 2019 23:40), Max: 98.5 (09 Dec 2019 16:16)  HR: 130 (10 Dec 2019 06:39) (77 - 132)  BP: 96/66 (09 Dec 2019 23:40) (96/66 - 117/87)  BP(mean): --  RR: 18 (09 Dec 2019 23:40) (18 - 18)  SpO2: 96% (10 Dec 2019 06:39) (95% - 98%)    Physical Exam:  Constitutional: NAD on breathing treatment  HEENT: EOMI / PERRL b/l, no active drainage or redness  Neck: No JVD, full ROM without pain  Respiratory: respirations are unlabored, no accessory muscle use, no conversational dyspnea  Cardiovascular: regular rate & rhythm  Gastrointestinal: Abdomen soft, non-tender, non-distended, no rebound tenderness / guarding  Rectal: Previous IRIS significant for palpable mass, not performed today  Neurological: GCS: 15 (4/5/6). A&O x 3; no gross sensory / motor / coordination deficits  Psychiatric: Normal mood, normal affect  Musculoskeletal: No joint pain, swelling or deformity; no limitation of movement        I&O's Detail      LABS:                RADIOLOGY & ADDITIONAL STUDIES:

## 2019-12-11 ENCOUNTER — TRANSCRIPTION ENCOUNTER (OUTPATIENT)
Age: 69
End: 2019-12-11

## 2019-12-11 LAB
ABO RH CONFIRMATION: SIGNIFICANT CHANGE UP
ANION GAP SERPL CALC-SCNC: 14 MMOL/L — SIGNIFICANT CHANGE UP (ref 5–17)
BLD GP AB SCN SERPL QL: SIGNIFICANT CHANGE UP
BUN SERPL-MCNC: 10 MG/DL — SIGNIFICANT CHANGE UP (ref 8–20)
CALCIUM SERPL-MCNC: 8.9 MG/DL — SIGNIFICANT CHANGE UP (ref 8.6–10.2)
CHLORIDE SERPL-SCNC: 96 MMOL/L — LOW (ref 98–107)
CO2 SERPL-SCNC: 28 MMOL/L — SIGNIFICANT CHANGE UP (ref 22–29)
CREAT SERPL-MCNC: 0.45 MG/DL — LOW (ref 0.5–1.3)
GLUCOSE SERPL-MCNC: 109 MG/DL — SIGNIFICANT CHANGE UP (ref 70–115)
HCT VFR BLD CALC: 29 % — LOW (ref 34.5–45)
HGB BLD-MCNC: 9.4 G/DL — LOW (ref 11.5–15.5)
MCHC RBC-ENTMCNC: 29.7 PG — SIGNIFICANT CHANGE UP (ref 27–34)
MCHC RBC-ENTMCNC: 32.4 GM/DL — SIGNIFICANT CHANGE UP (ref 32–36)
MCV RBC AUTO: 91.5 FL — SIGNIFICANT CHANGE UP (ref 80–100)
PLATELET # BLD AUTO: 495 K/UL — HIGH (ref 150–400)
POTASSIUM SERPL-MCNC: 4.5 MMOL/L — SIGNIFICANT CHANGE UP (ref 3.5–5.3)
POTASSIUM SERPL-SCNC: 4.5 MMOL/L — SIGNIFICANT CHANGE UP (ref 3.5–5.3)
RBC # BLD: 3.17 M/UL — LOW (ref 3.8–5.2)
RBC # FLD: 14.1 % — SIGNIFICANT CHANGE UP (ref 10.3–14.5)
SODIUM SERPL-SCNC: 138 MMOL/L — SIGNIFICANT CHANGE UP (ref 135–145)
WBC # BLD: 12.41 K/UL — HIGH (ref 3.8–10.5)
WBC # FLD AUTO: 12.41 K/UL — HIGH (ref 3.8–10.5)

## 2019-12-11 PROCEDURE — 99233 SBSQ HOSP IP/OBS HIGH 50: CPT

## 2019-12-11 PROCEDURE — 93018 CV STRESS TEST I&R ONLY: CPT

## 2019-12-11 PROCEDURE — 99232 SBSQ HOSP IP/OBS MODERATE 35: CPT

## 2019-12-11 PROCEDURE — 93016 CV STRESS TEST SUPVJ ONLY: CPT

## 2019-12-11 PROCEDURE — 78452 HT MUSCLE IMAGE SPECT MULT: CPT | Mod: 26

## 2019-12-11 RX ORDER — SOD SULF/SODIUM/NAHCO3/KCL/PEG
1000 SOLUTION, RECONSTITUTED, ORAL ORAL ONCE
Refills: 0 | Status: COMPLETED | OUTPATIENT
Start: 2019-12-11 | End: 2019-12-11

## 2019-12-11 RX ORDER — SODIUM CHLORIDE 9 MG/ML
1000 INJECTION INTRAMUSCULAR; INTRAVENOUS; SUBCUTANEOUS
Refills: 0 | Status: DISCONTINUED | OUTPATIENT
Start: 2019-12-11 | End: 2019-12-12

## 2019-12-11 RX ORDER — NEOMYCIN SULFATE 500 MG/1
1000 TABLET ORAL ONCE
Refills: 0 | Status: COMPLETED | OUTPATIENT
Start: 2019-12-11 | End: 2019-12-11

## 2019-12-11 RX ORDER — SOD SULF/SODIUM/NAHCO3/KCL/PEG
4000 SOLUTION, RECONSTITUTED, ORAL ORAL ONCE
Refills: 0 | Status: DISCONTINUED | OUTPATIENT
Start: 2019-12-11 | End: 2019-12-11

## 2019-12-11 RX ORDER — SOD SULF/SODIUM/NAHCO3/KCL/PEG
1000 SOLUTION, RECONSTITUTED, ORAL ORAL ONCE
Refills: 0 | Status: COMPLETED | OUTPATIENT
Start: 2019-12-12 | End: 2019-12-12

## 2019-12-11 RX ORDER — METRONIDAZOLE 500 MG
500 TABLET ORAL ONCE
Refills: 0 | Status: COMPLETED | OUTPATIENT
Start: 2019-12-11 | End: 2019-12-11

## 2019-12-11 RX ORDER — ENOXAPARIN SODIUM 100 MG/ML
40 INJECTION SUBCUTANEOUS DAILY
Refills: 0 | Status: DISCONTINUED | OUTPATIENT
Start: 2019-12-11 | End: 2019-12-12

## 2019-12-11 RX ADMIN — Medication 500 MILLIGRAM(S): at 14:56

## 2019-12-11 RX ADMIN — NEOMYCIN SULFATE 1000 MILLIGRAM(S): 500 TABLET ORAL at 16:52

## 2019-12-11 RX ADMIN — Medication 1 MILLIGRAM(S): at 14:57

## 2019-12-11 RX ADMIN — Medication 40 MILLIGRAM(S): at 05:41

## 2019-12-11 RX ADMIN — Medication 500 MILLIGRAM(S): at 16:51

## 2019-12-11 RX ADMIN — ENOXAPARIN SODIUM 40 MILLIGRAM(S): 100 INJECTION SUBCUTANEOUS at 15:02

## 2019-12-11 RX ADMIN — NEOMYCIN SULFATE 1000 MILLIGRAM(S): 500 TABLET ORAL at 14:56

## 2019-12-11 NOTE — CONSULT NOTE ADULT - SUBJECTIVE AND OBJECTIVE BOX
68 yo female for diverting colostomy. History and labs reviewed. seen by cardiology and awaiting stress test prior to cardiac clearance. ASA 3

## 2019-12-11 NOTE — PROGRESS NOTE ADULT - SUBJECTIVE AND OBJECTIVE BOX
Patient is a 69y old  Female who presents with a chief complaint of acute hypoxic respiratory failure, multiple lung nodules (11 Dec 2019 16:12)      HPI:  70 yo female with history of COPD who presents with 1 month of shortness of breath worsened over last 2 weeks associated with >30 lb weight loss in the last 6 months, decreased appetite. Denies chest pain, leg pain/swelling.  Found to have rectal perforation/abscess . No abdominal pain, no fevers, no rectal pain.   patient drank movi prep for possible flex sig today.  However, I spoke to Dr Lee and she states pt is going to OP tomorrow for rectal biopsy, possible colostomy    REVIEW OF SYSTEMS:  Constitutional: No fever, weight loss or fatigue  ENMT:  No difficulty hearing, tinnitus, vertigo; No sinus or throat pain  Respiratory: No cough, wheezing, chills or hemoptysis  Cardiovascular: No chest pain, palpitations, dizziness or leg swelling  Gastrointestinal: No abdominal or epigastric pain. No nausea, vomiting or hematemesis; No diarrhea or constipation. No melena or hematochezia.  Skin: No itching, burning, rashes or lesions   Musculoskeletal: No joint pain or swelling; No muscle, back or extremity pain    PAST MEDICAL & SURGICAL HISTORY:  Anemia  COPD (chronic obstructive pulmonary disease)  H/O bilateral hip replacements      FAMILY HISTORY:      SOCIAL HISTORY:  Smoking Status: [ ] Current, [ ] Former, [ ] Never  Pack Years:  [  ] EtOH-no  [  ] IVDA    MEDICATIONS:  MEDICATIONS  (STANDING):  enoxaparin Injectable 40 milliGRAM(s) SubCutaneous daily  folic acid 1 milliGRAM(s) Oral daily  metroNIDAZOLE    Tablet 500 milliGRAM(s) Oral once  metroNIDAZOLE    Tablet 500 milliGRAM(s) Oral once  neomycin 1000 milliGRAM(s) Oral once  neomycin 1000 milliGRAM(s) Oral once  predniSONE   Tablet 40 milliGRAM(s) Oral daily  sodium chloride 0.9%. 1000 milliLiter(s) (75 mL/Hr) IV Continuous <Continuous>    MEDICATIONS  (PRN):  albuterol/ipratropium for Nebulization 3 milliLiter(s) Nebulizer every 6 hours PRN Shortness of Breath and/or Wheezing      Allergies    No Known Allergies    Intolerances        Vital Signs Last 24 Hrs  T(C): 36.4 (11 Dec 2019 15:10), Max: 36.8 (10 Dec 2019 16:28)  T(F): 97.5 (11 Dec 2019 15:10), Max: 98.2 (10 Dec 2019 16:28)  HR: 78 (11 Dec 2019 15:10) (78 - 91)  BP: 102/66 (11 Dec 2019 15:10) (63/56 - 102/66)  BP(mean): --  RR: 18 (11 Dec 2019 15:10) (16 - 19)  SpO2: 97% (11 Dec 2019 15:10) (92% - 97%)        PHYSICAL EXAM:    General: thin  HEENT: MMM, conjunctiva and sclera clear  H- RRR  L- CTA  Gastrointestinal: Soft, non-tender non-distended; Normal bowel sounds; No rebound or guarding  Extremities: Normal range of motion, No clubbing, cyanosis or edema  Neurological: Alert and oriented x3  Skin: Warm and dry. No obvious rash      LABS:                        9.4    12.41 )-----------( 495      ( 11 Dec 2019 13:25 )             29.0     11 Dec 2019 13:25    138    |  96     |  10.0   ----------------------------<  109    4.5     |  28.0   |  0.45     Ca    8.9        11 Dec 2019 13:25                RADIOLOGY & ADDITIONAL STUDIES:     < from: CT Abdomen and Pelvis w/ IV Cont (12.06.19 @ 11:43) >  IMPRESSION:     Collection of fluid and air at the posterior aspect of the distal rectum   and anal canal as described above.    Additional foci of fluid and air adjacent to the anus with the largest on   the left which likely communicate with the larger collection.    Wall thickening involving the anus; differential includes infectious or   inflammatory etiologies or an underlying mass.    Osteolysis in the left hip.    Multiple small lucent lesions in the iliac bones; myeloma cannot be   excluded.    Bilateral indeterminate pulmonary nodules again seen at the lung bases.    Comparison is recommended with a prior study if available for the   pulmonary nodules and lucent lesions in the iliac bones.    The findings were discussed with Dr. Kay on 12/6/2019 12:53 PM      < end of copied text >

## 2019-12-11 NOTE — PROGRESS NOTE ADULT - ASSESSMENT
69yoF with likely metastatic and perforated rectal cancer  - OR on 12/12 for EUA, seton placement and possible colostomy   - pre-op with labs and NPO @[facility]  - pulm cleared, recommend continued management  - cards, recommends stress test but awaiting HCP to consent - pt can consent to her own procedures. will touch base  - f/u onc  - rest of care per primray team

## 2019-12-11 NOTE — PROGRESS NOTE ADULT - ASSESSMENT
Pt is a 68 y/o female with medical history of COPD who presents to [facility] for increased SOB. Pt states that she had SOB x1 month, but started to become concerned recently so she came to [facility]. Pt is not good historian d/t impaired cognition. Pt denies associated symptoms such as orthopnea. Pt came to [facility] where multiple imaging studies showed presence of nodules in lungs, and lesions in illiac bone and colorectal lesions. Pt is currently followed by colorectal team-possible colostomy in treatment plan. Cardiology consult requested for surgical stratification.  Pt Denies fever, chills, PND, edema, chest pain, pressure, palpitations, irregular, fast heart beat, nausea, vomiting, melena, rectal bleed, hematuria, lightheadedness, dizziness, syncope, near syncope.  At baseline, pt states she walks frequently on a daily basis.     Assessment:  1. Nonischemic Cardiomyopathy  2. Preoperative risk assessment    Recommendations:  As Indicated in the previous note, She is elevated risk for perioperative cardiac events.  Nuclear stress test is normal.  No further cardiac workup is necessary  Suggest Starting Coreg 3.125 mg BID  Start Lisinopril 2.5 mg daily    Management of other problems as per hospitalist team    NO ABSOLUTE CONTRAINDICATIONS TO PROCEED WITH ABDOMINAL SURGICAL PROCEDURE    I will sign off.

## 2019-12-11 NOTE — PROGRESS NOTE ADULT - SUBJECTIVE AND OBJECTIVE BOX
HPI/OVERNIGHT EVENTS:  No acute events overnight. Pt was seen by pulmonary and cleared for surgery despite remaining high risk. Cards also saw her and recommend a stress test given her low EF. tolerating a CLD. +flatus. Denies f/c/n/v/cp/sob.    MEDICATIONS  (STANDING):  folic acid 1 milliGRAM(s) Oral daily  predniSONE   Tablet 40 milliGRAM(s) Oral daily    MEDICATIONS  (PRN):  albuterol/ipratropium for Nebulization 3 milliLiter(s) Nebulizer every 6 hours PRN Shortness of Breath and/or Wheezing      Vital Signs Last 24 Hrs  T(C): 36.8 (11 Dec 2019 00:08), Max: 36.8 (10 Dec 2019 16:28)  T(F): 98.2 (11 Dec 2019 00:08), Max: 98.2 (10 Dec 2019 16:28)  HR: 91 (11 Dec 2019 00:08) (80 - 132)  BP: 63/56 (11 Dec 2019 00:08) (63/56 - 98/66)  BP(mean): --  RR: 19 (11 Dec 2019 00:08) (18 - 19)  SpO2: 98% (10 Dec 2019 11:28) (96% - 98%)    gen: nad, a&ox3  cv: rrr  resp: nonlabored breathing  gi: soft, nd, nttp  msk: no c/c/e    I&O's Detail      LABS:

## 2019-12-11 NOTE — PROGRESS NOTE ADULT - ASSESSMENT
68 yo female with history of COPD who presents with 1 month of shortness of breath worsened over last 2 weeks associated with >30 lb weight loss in the last 6 months, decreased appetite. Patient admitted for respiratory failure 2/2 COPD exacerbation with possible metastatic lung disease.    Pulmonology consulted and CT abdomen was obtained - CT showed rectal wall thickening with adjacent air-pockets; colorectal surgery was consulted and MRI was obtained.  May have colon cancer with mets to the lung. GI consult was recommended by colorectal surgeon and consulted. MRI showed perforation of distal rectum and anus w/ collection of fluid/air.  Patient planned for OR on 12/12.    TTE was ordered for elevated BNP - she was shown to have severe right sided ventricular strain. Started empirically on treatment for PE and CTA was ordered (delayed due to having received contrast with the CT abdomen on the same day). CTA negative for PE.     Lung Nodules with rectal wall thickening; possibly metastatic disease  - CT abdomen as above w rectal wall thickening and fluid collection  - Pulmonary consulted - recs appreciated  - CT surgery consulted  - colorectal surgery following   - GI following  - MRI of the abdomen with Perforation involving the posterior wall of the distal rectum and anus with a collection of fluid and air posteriorly measuring up to 6.5 cm.  - Initially GI was planning colonoscopy but now not advising due to possible abscess  - Colorectal planning for EUA, seton placement, and possible colostomy on 12/12  - Stress test 12/11: normal study  - Slight leukocytosis, possibly secondary to steroid use. F/u repeat CBC.    COPD exacerbation - hypoxia on admission has resolved  - completed IV azithromycin 500mg x3 days   - patient on anoro-ellipta 62.5/25 dose at home BID; ventolin as needed at home  - continue with duoneb q6 PRN  - d/c spiriva due to episode of sob and tachycardia this am. O2 taper, no need for  since O2 sat 95-97%  - now on prednisone 40 mg daily - cont po taper  - Cleared by pulm for surgery    anemia with thrombocytosis  - likely GI source  - stable    elevated BNP - no clinical signs of HF- significant RV strain on the echo  - pt had CTA- negative for PE   - cardiology consulted-  called [facility]    slightly elevated INR - poss 2/2 poor diet/malignancy    protein calorie malnutrition  - supplement meals  - nutrition consulted     Depressed mood  - no suicidal ideation  - psych consulted, signed off. may f/u outpatient   - may benefit from medication    DVT - lovenox subcut

## 2019-12-11 NOTE — CONSULT NOTE ADULT - SUBJECTIVE AND OBJECTIVE BOX
This is a 69y old female with a past medical history significant for COPD who presents with 1 month of shortness of breath worsened over last 2 weeks associated with >30 lb weight loss in the last 6 months, decreased appetite. She was admitted with hypoxemic respiratory failure due to COPD exacerbation and found to have metastatic lung disease. CT abdomen was done showed rectal wall thickening with posterior air pocket? perforation.  Colorectal surgery was consulted and pelvic MRI was recommended. She had an echocardiogram with RV strain and there is concern for PE. She is awaiting a CTA. She denies abdominal pain, rectal bleeding or pain with defecation. She has a bowel movement every other day. She states over the last 2 years she has lost 100 lbs. She had a colonoscopy in [facility] last year.   Just returned from nuclear cardiac stress test. Resting without distress.      MEDICATIONS  (STANDING):  folic acid 1 milliGRAM(s) Oral daily  predniSONE   Tablet 40 milliGRAM(s) Oral daily    MEDICATIONS  (PRN):  albuterol/ipratropium for Nebulization 3 milliLiter(s) Nebulizer every 6 hours PRN Shortness of Breath and/or Wheezing      PAST MEDICAL & SURGICAL HISTORY:  Anemia  COPD (chronic obstructive pulmonary disease)  H/O bilateral hip replacements      Vital Signs Last 24 Hrs  T(C): 36.3 (11 Dec 2019 10:30), Max: 36.8 (10 Dec 2019 16:28)  T(F): 97.4 (11 Dec 2019 10:30), Max: 98.2 (10 Dec 2019 16:28)  HR: 86 (11 Dec 2019 10:30) (80 - 91)  BP: 100/62 (11 Dec 2019 10:30) (63/56 - 100/62)  BP(mean): --  RR: 16 (11 Dec 2019 10:30) (16 - 19)  SpO2: 92% (11 Dec 2019 10:30) (92% - 98%)  Gen: nad, resting comfortably  CV: RRR  Abd: soft This is a 69y old female with a past medical history significant for COPD, DJD with hip replacements who was admitted with sob x 1 month duration, weight loss and is being treated for a COPD exacerbation. .  CT abdomen was done on 12/6/2019 that showed rectal wall thickening with posterior air pocket due to possible perforation.  It also showed signs c/w bone mets in the iliac bone as well as scattered bilateral lung masses.   Colorectal surgery was consulted and pelvic MRI was recommended and done on 12/9/2019 and it showed signs c/w a perforation of the posterior wall of the distal rectum and anus with a fluid collection.   She had an echocardiogram with RV strain and there was concern for PE but CT angio was negative for PE but did demonstrate scattered lung nodules.   She had a colonoscopy in [facility] last year that was supposedly negative for malignancy.  Just returned from nuclear cardiac stress test. Resting without distress.       MEDICATIONS  (STANDING):  folic acid 1 milliGRAM(s) Oral daily  predniSONE   Tablet 40 milliGRAM(s) Oral daily    MEDICATIONS  (PRN):  albuterol/ipratropium for Nebulization 3 milliLiter(s) Nebulizer every 6 hours PRN Shortness of Breath and/or Wheezing      PAST MEDICAL & SURGICAL HISTORY:  Anemia  COPD (chronic obstructive pulmonary disease)  H/O bilateral hip replacements      Vital Signs Last 24 Hrs  T(C): 36.3 (11 Dec 2019 10:30), Max: 36.8 (10 Dec 2019 16:28)  T(F): 97.4 (11 Dec 2019 10:30), Max: 98.2 (10 Dec 2019 16:28)  HR: 86 (11 Dec 2019 10:30) (80 - 91)  BP: 100/62 (11 Dec 2019 10:30) (63/56 - 100/62)  BP(mean): --  RR: 16 (11 Dec 2019 10:30) (16 - 19)  SpO2: 92% (11 Dec 2019 10:30) (92% - 98%)  Gen: nad, resting comfortably  CV: RRR  Abd: soft This is a 69y old female with a past medical history significant for COPD, DJD with hip replacements who was admitted with sob x 1 month duration, weight loss and is being treated for a COPD exacerbation. .  CT abdomen was done on 12/6/2019 that showed rectal wall thickening with posterior air pocket due to possible perforation.  It also showed signs c/w bone mets in the iliac bone as well as scattered bilateral lung masses.   Colorectal surgery was consulted and pelvic MRI was recommended and done on 12/9/2019 and it showed signs c/w a perforation of the posterior wall of the distal rectum and anus with a fluid collection.   She had an echocardiogram with RV strain and there was concern for PE but CT angio was negative for PE but did demonstrate scattered lung nodules.   She had a colonoscopy in [facility] last year that was supposedly negative for malignancy.  Just returned from nuclear cardiac stress test. Resting without distress.       MEDICATIONS  (STANDING):  folic acid 1 milliGRAM(s) Oral daily  predniSONE   Tablet 40 milliGRAM(s) Oral daily    MEDICATIONS  (PRN):  albuterol/ipratropium for Nebulization 3 milliLiter(s) Nebulizer every 6 hours PRN Shortness of Breath and/or Wheezing      PAST MEDICAL & SURGICAL HISTORY:  Anemia  COPD (chronic obstructive pulmonary disease)  H/O bilateral hip replacements      Vital Signs Last 24 Hrs  T(C): 36.3 (11 Dec 2019 10:30), Max: 36.8 (10 Dec 2019 16:28)  T(F): 97.4 (11 Dec 2019 10:30), Max: 98.2 (10 Dec 2019 16:28)  HR: 86 (11 Dec 2019 10:30) (80 - 91)  BP: 100/62 (11 Dec 2019 10:30) (63/56 - 100/62)  BP(mean): --  RR: 16 (11 Dec 2019 10:30) (16 - 19)  SpO2: 92% (11 Dec 2019 10:30) (92% - 98%)  Gen: nad, resting comfortably  CV: RRR  Abd: soft                          9.4    12.41 )-----------( 495      ( 11 Dec 2019 13:25 )             29.0     12-11    138  |  96<L>  |  10.0  ----------------------------<  109  4.5   |  28.0  |  0.45<L>    Ca    8.9      11 Dec 2019 13:25

## 2019-12-11 NOTE — PROGRESS NOTE ADULT - SUBJECTIVE AND OBJECTIVE BOX
Subjective/Objective:  Patient seen and examined at bedside.  No overnight events reported by staff.  Patient with no complaints today.  Pt denies chills, dizziness, headache, CP, SOB, abdominal pain, N/V. All other ROS negative.    Vital Signs Last 24 Hrs  T(C): 36.4 (11 Dec 2019 15:10), Max: 36.8 (10 Dec 2019 16:28)  T(F): 97.5 (11 Dec 2019 15:10), Max: 98.2 (10 Dec 2019 16:28)  HR: 78 (11 Dec 2019 15:10) (78 - 91)  BP: 102/66 (11 Dec 2019 15:10) (63/56 - 102/66)  BP(mean): --  RR: 18 (11 Dec 2019 15:10) (16 - 19)  SpO2: 97% (11 Dec 2019 15:10) (92% - 97%)    PHYSICAL EXAM:  GENERAL: Pt lying in bed, NAD  HEAD:  Atraumatic, Normocephalic  EYES: EOMI, PERRLA, conjunctiva and sclera clear  ENT: Moist mucous membranes  NECK: Supple  CHEST/LUNG: Clear to auscultation bilaterally. Unlabored respirations  HEART: S1S2+, Regular rate and rhythm  ABDOMEN: BS+; Soft, Nontender, Nondistended.  EXTREMITIES:  2+ Peripheral Pulse. No LE edema  NERVOUS SYSTEM:  A&OX3. No deficits   SKIN: Warm and dry    LABS:                        9.4    12.41 )-----------( 495      ( 11 Dec 2019 13:25 )             29.0     12-11    138  |  96<L>  |  10.0  ----------------------------<  109  4.5   |  28.0  |  0.45<L>    Ca    8.9      11 Dec 2019 13:25

## 2019-12-11 NOTE — PROGRESS NOTE ADULT - SUBJECTIVE AND OBJECTIVE BOX
[place] [facility]ARDIOLOGY-[facility]                                                        Office: [address] [address]                                                       Telephone: [phone]. Fax:[phone]                                                                             PROGRESS NOTE    Subjective:  Patient is doing fine today, no complaints,      Review of symptoms:   Cardiac:  No chest pain. No dyspnea. No palpitations.  Respiratory:no cough. No dyspnea  Gastrointestinal: No diarrhea. No abdominal pain. No bleeding.   Neuro: No focal neuro complaints.      	  Vitals:  T(C): 36.4 (12-11-19 @ 15:10), Max: 36.8 (12-11-19 @ 00:08)  HR: 78 (12-11-19 @ 15:10) (78 - 91)  BP: 102/66 (12-11-19 @ 15:10) (63/56 - 102/66)  RR: 18 (12-11-19 @ 15:10) (16 - 19)  SpO2: 97% (12-11-19 @ 15:10) (92% - 97%)  Wt(kg): --  I&O's Summary        PHYSICAL EXAM:  Appearance: Comfortable. No acute distress  HEENT:  Head and neck: Atraumatic. Normocephalic. , Neck is supple. No JVD,   Neurologic: Alert and awake,   Lymphatic: No cervical lymphadenopathy  Cardiovascular: Normal S1 S2, No murmurs. No JVD,   Respiratory: Lungs clear to auscultation  Gastrointestinal:  Soft, Non-tender, + BS  Lower Extremities: No edema  Psychiatry: Patient is calm. No agitation.  Skin: No rashes.    CURRENT MEDICATIONS:    MEDICATIONS  (STANDING):  enoxaparin Injectable 40 milliGRAM(s) SubCutaneous daily  folic acid 1 milliGRAM(s) Oral daily  metroNIDAZOLE    Tablet 500 milliGRAM(s) Oral once  neomycin 1000 milliGRAM(s) Oral once  polyethylene glycol/electrolyte Solution 1000 milliLiter(s) Oral once  predniSONE   Tablet 40 milliGRAM(s) Oral daily  sodium chloride 0.9%. 1000 milliLiter(s) (75 mL/Hr) IV Continuous <Continuous>      LABS:	 	                          9.4    12.41 )-----------( 495      ( 11 Dec 2019 13:25 )             29.0     12-11    138  |  96<L>  |  10.0  ----------------------------<  109  4.5   |  28.0  |  0.45<L>    Ca    8.9      11 Dec 2019 13:25      proBNP:   Lipid Profile:           < from: TTE Echo Complete w/Doppler (12.05.19 @ 17:17) >  Summary:   1. Left ventricular ejection fraction, by visual estimation, is 35 to   40%.   2. Moderately decreased global left ventricular systolic function.   3. Severely enlarged right ventricle.   4. Severely reduced RV systolic function.   5. Mild-moderate tricuspid regurgitation.   6. Estimated pulmonary artery systolic pressure is 49.0 mmHg assuming a   right atrial pressure of 3 mmHg, which is consistent with mild pulmonary   hypertension.   7. No pericardial effusion.   8. ** No prior echocardiograms available for comparison. Findings   discussed with Dr. Rahul Man, DO Electronically signed on 12/6/2019 at 5:38:41 PM    < end of copied text >    < from: Nuclear Stress Test-Pharmacologic (12.11.19 @ 08:05) >    IMPRESSIONS:Normal Study  * The left ventricle was normal in size.  * Tracer uptake was homogeneous throughout the left  ventricle.  * Normal study; no evidence for myocardial infarction or  ischemia.  * Gated wall motion analysis was performed, and shows  normal wall motion.  ------------------------------------------------------------------------      ------------------------------------------------------------------------    Confirmed on  12/11/2019 - 12:27:39 by Belle Ruffin    < end of copied text >      ECG:

## 2019-12-12 ENCOUNTER — TRANSCRIPTION ENCOUNTER (OUTPATIENT)
Age: 69
End: 2019-12-12

## 2019-12-12 ENCOUNTER — RESULT REVIEW (OUTPATIENT)
Age: 69
End: 2019-12-12

## 2019-12-12 LAB
ALBUMIN SERPL ELPH-MCNC: 2.4 G/DL — LOW (ref 3.3–5.2)
ALP SERPL-CCNC: 56 U/L — SIGNIFICANT CHANGE UP (ref 40–120)
ALT FLD-CCNC: 15 U/L — SIGNIFICANT CHANGE UP
ANION GAP SERPL CALC-SCNC: 13 MMOL/L — SIGNIFICANT CHANGE UP (ref 5–17)
APTT BLD: 25.6 SEC — LOW (ref 27.5–36.3)
AST SERPL-CCNC: 16 U/L — SIGNIFICANT CHANGE UP
BILIRUB SERPL-MCNC: <0.2 MG/DL — LOW (ref 0.4–2)
BUN SERPL-MCNC: 12 MG/DL — SIGNIFICANT CHANGE UP (ref 8–20)
CALCIUM SERPL-MCNC: 8.4 MG/DL — LOW (ref 8.6–10.2)
CHLORIDE SERPL-SCNC: 102 MMOL/L — SIGNIFICANT CHANGE UP (ref 98–107)
CO2 SERPL-SCNC: 28 MMOL/L — SIGNIFICANT CHANGE UP (ref 22–29)
CREAT SERPL-MCNC: 0.59 MG/DL — SIGNIFICANT CHANGE UP (ref 0.5–1.3)
GLUCOSE SERPL-MCNC: 99 MG/DL — SIGNIFICANT CHANGE UP (ref 70–115)
HCT VFR BLD CALC: 27.8 % — LOW (ref 34.5–45)
HGB BLD-MCNC: 8.9 G/DL — LOW (ref 11.5–15.5)
INR BLD: 0.95 RATIO — SIGNIFICANT CHANGE UP (ref 0.88–1.16)
MCHC RBC-ENTMCNC: 29.4 PG — SIGNIFICANT CHANGE UP (ref 27–34)
MCHC RBC-ENTMCNC: 32 GM/DL — SIGNIFICANT CHANGE UP (ref 32–36)
MCV RBC AUTO: 91.7 FL — SIGNIFICANT CHANGE UP (ref 80–100)
PLATELET # BLD AUTO: 486 K/UL — HIGH (ref 150–400)
POTASSIUM SERPL-MCNC: 3.9 MMOL/L — SIGNIFICANT CHANGE UP (ref 3.5–5.3)
POTASSIUM SERPL-SCNC: 3.9 MMOL/L — SIGNIFICANT CHANGE UP (ref 3.5–5.3)
PROT SERPL-MCNC: 6.1 G/DL — LOW (ref 6.6–8.7)
PROTHROM AB SERPL-ACNC: 10.9 SEC — SIGNIFICANT CHANGE UP (ref 10–12.9)
RBC # BLD: 3.03 M/UL — LOW (ref 3.8–5.2)
RBC # FLD: 14.7 % — HIGH (ref 10.3–14.5)
SODIUM SERPL-SCNC: 143 MMOL/L — SIGNIFICANT CHANGE UP (ref 135–145)
WBC # BLD: 13.07 K/UL — HIGH (ref 3.8–10.5)
WBC # FLD AUTO: 13.07 K/UL — HIGH (ref 3.8–10.5)

## 2019-12-12 PROCEDURE — 99232 SBSQ HOSP IP/OBS MODERATE 35: CPT

## 2019-12-12 PROCEDURE — 88305 TISSUE EXAM BY PATHOLOGIST: CPT | Mod: 26

## 2019-12-12 RX ORDER — FENTANYL CITRATE 50 UG/ML
50 INJECTION INTRAVENOUS
Refills: 0 | Status: DISCONTINUED | OUTPATIENT
Start: 2019-12-12 | End: 2019-12-12

## 2019-12-12 RX ORDER — ENOXAPARIN SODIUM 100 MG/ML
40 INJECTION SUBCUTANEOUS DAILY
Refills: 0 | Status: DISCONTINUED | OUTPATIENT
Start: 2019-12-12 | End: 2020-01-02

## 2019-12-12 RX ORDER — SODIUM CHLORIDE 9 MG/ML
1000 INJECTION, SOLUTION INTRAVENOUS
Refills: 0 | Status: DISCONTINUED | OUTPATIENT
Start: 2019-12-12 | End: 2019-12-12

## 2019-12-12 RX ORDER — BUPIVACAINE 13.3 MG/ML
20 INJECTION, SUSPENSION, LIPOSOMAL INFILTRATION ONCE
Refills: 0 | Status: COMPLETED | OUTPATIENT
Start: 2019-12-12 | End: 2019-12-12

## 2019-12-12 RX ORDER — FOLIC ACID 0.8 MG
1 TABLET ORAL DAILY
Refills: 0 | Status: DISCONTINUED | OUTPATIENT
Start: 2019-12-12 | End: 2019-12-23

## 2019-12-12 RX ORDER — HYDROMORPHONE HYDROCHLORIDE 2 MG/ML
1 INJECTION INTRAMUSCULAR; INTRAVENOUS; SUBCUTANEOUS
Refills: 0 | Status: DISCONTINUED | OUTPATIENT
Start: 2019-12-12 | End: 2019-12-12

## 2019-12-12 RX ORDER — SOD SULF/SODIUM/NAHCO3/KCL/PEG
1000 SOLUTION, RECONSTITUTED, ORAL ORAL ONCE
Refills: 0 | Status: COMPLETED | OUTPATIENT
Start: 2019-12-12 | End: 2019-12-12

## 2019-12-12 RX ORDER — ONDANSETRON 8 MG/1
4 TABLET, FILM COATED ORAL ONCE
Refills: 0 | Status: DISCONTINUED | OUTPATIENT
Start: 2019-12-12 | End: 2019-12-12

## 2019-12-12 RX ADMIN — Medication 40 MILLIGRAM(S): at 17:27

## 2019-12-12 RX ADMIN — Medication 500 MILLIGRAM(S): at 00:47

## 2019-12-12 RX ADMIN — SODIUM CHLORIDE 75 MILLILITER(S): 9 INJECTION INTRAMUSCULAR; INTRAVENOUS; SUBCUTANEOUS at 00:48

## 2019-12-12 RX ADMIN — Medication 1000 MILLILITER(S): at 00:48

## 2019-12-12 RX ADMIN — SODIUM CHLORIDE 75 MILLILITER(S): 9 INJECTION INTRAMUSCULAR; INTRAVENOUS; SUBCUTANEOUS at 11:28

## 2019-12-12 RX ADMIN — ENOXAPARIN SODIUM 40 MILLIGRAM(S): 100 INJECTION SUBCUTANEOUS at 17:27

## 2019-12-12 RX ADMIN — Medication 1 MILLIGRAM(S): at 17:27

## 2019-12-12 RX ADMIN — Medication 40 MILLIGRAM(S): at 06:19

## 2019-12-12 RX ADMIN — NEOMYCIN SULFATE 1000 MILLIGRAM(S): 500 TABLET ORAL at 00:47

## 2019-12-12 NOTE — PROGRESS NOTE ADULT - ASSESSMENT
69yoF with likely metastatic and perforated rectal cancer  - OR on 12/12 for EUA, seton placement and possible colostomy   - pre-op with labs and NPO today for OR  - Continue bowel prep  - pulm cleared, recommend continued management  - cards cleared  - f/u onc who are pending path  - rest of care per primray team 69yoF with likely metastatic and perforated rectal cancer  - Overnight pt expressed that she does not want an ostomy. Interview with the son, who is extremely active in pt care, expressed that pt expressed that she would never want an ostomy or even a trach. Long discussion this AM @0600 with son at bedside, the decision was made to only proceed with EUA, biopsy and possible seton placement. Son, who is an RN, understands the possible risks of not receiving the surgery and he understands and wants to respect the pt wishes.  - As no major surgical intervention will be performed, no transfer or service is required as pt will be ready for DC shortly after procedure  - Care per primary team  - OR today for EUA

## 2019-12-12 NOTE — PROGRESS NOTE ADULT - SUBJECTIVE AND OBJECTIVE BOX
Subjective/Objective:  Patient seen and examined at bedside.  No overnight events reported by staff.  Patient with no complaints today.  Pt denies chills, dizziness, headache, CP, SOB, abdominal pain, N/V. All other ROS negative.    Vital Signs Last 24 Hrs  T(C): 36.6 (12 Dec 2019 08:57), Max: 36.9 (11 Dec 2019 23:26)  T(F): 97.9 (12 Dec 2019 08:57), Max: 98.4 (11 Dec 2019 23:26)  HR: 85 (12 Dec 2019 08:57) (78 - 93)  BP: 100/62 (12 Dec 2019 08:57) (100/62 - 103/62)  BP(mean): --  RR: 18 (12 Dec 2019 08:57) (16 - 18)  SpO2: 93% (12 Dec 2019 08:57) (92% - 97%)    PHYSICAL EXAM:  GENERAL: Pt lying in bed, NAD, frail  HEAD:  Atraumatic, Normocephalic  EYES: EOMI, PERRLA, conjunctiva and sclera clear  ENT: Moist mucous membranes  NECK: Supple  CHEST/LUNG: Clear to auscultation bilaterally. Unlabored respirations  HEART: S1S2+, Regular rate and rhythm  ABDOMEN: BS+; Soft, Nontender, Nondistended.  EXTREMITIES:  2+ Peripheral Pulse. No LE edema  NERVOUS SYSTEM:  A&OX3. No deficits   SKIN: Warm and dry    LABS:             12-12    143  |  102  |  12.0  ----------------------------<  99  3.9   |  28.0  |  0.59    Ca    8.4<L>      12 Dec 2019 07:40    TPro  6.1<L>  /  Alb  2.4<L>  /  TBili  <0.2<L>  /  DBili  x   /  AST  16  /  ALT  15  /  AlkPhos  56  12-12                          8.9    13.07 )-----------( 486      ( 12 Dec 2019 07:40 )             27.8       MEDICATIONS  (STANDING):  enoxaparin Injectable 40 milliGRAM(s) SubCutaneous daily  folic acid 1 milliGRAM(s) Oral daily  predniSONE   Tablet 40 milliGRAM(s) Oral daily  sodium chloride 0.9%. 1000 milliLiter(s) (75 mL/Hr) IV Continuous <Continuous>    MEDICATIONS  (PRN):  albuterol/ipratropium for Nebulization 3 milliLiter(s) Nebulizer every 6 hours PRN Shortness of Breath and/or Wheezing

## 2019-12-12 NOTE — BRIEF OPERATIVE NOTE - OPERATION/FINDINGS
EUA, rectum with rectal mass biopsy. Posterior rectal wall abscess unroofed. B/L seton placement. EUA, rectum with rectal mass biopsy. Posterior rectal wall abscess unroofed. B/L seton placement transrectally.

## 2019-12-12 NOTE — PROGRESS NOTE ADULT - SUBJECTIVE AND OBJECTIVE BOX
This is a 69y old female with a past medical history significant for COPD, DJD with hip replacements who was admitted with sob x 1 month duration, weight loss and is being treated for a COPD exacerbation. .  CT abdomen was done on 12/6/2019 that showed rectal wall thickening with posterior air pocket due to possible perforation.  It also showed signs c/w bone mets in the iliac bone as well as scattered bilateral lung masses.   Colorectal surgery was consulted and pelvic MRI was recommended and done on 12/9/2019 and it showed signs c/w a perforation of the posterior wall of the distal rectum and anus with a fluid collection.   She had an echocardiogram with RV strain and there was concern for PE but CT angio was negative for PE but did demonstrate scattered lung nodules.   She had a colonoscopy in [facility] last year that was supposedly negative for malignancy.  Currently awake, alert, comfortable.  Offers no spec complaints      MEDICATIONS  (STANDING):  folic acid 1 milliGRAM(s) Oral daily  predniSONE   Tablet 40 milliGRAM(s) Oral daily    MEDICATIONS  (PRN):  albuterol/ipratropium for Nebulization 3 milliLiter(s) Nebulizer every 6 hours PRN Shortness of Breath and/or Wheezing      PAST MEDICAL & SURGICAL HISTORY:  Anemia  COPD (chronic obstructive pulmonary disease)  H/O bilateral hip replacements      Vital Signs Last 24 Hrs  T(C): 36.3 (11 Dec 2019 10:30), Max: 36.8 (10 Dec 2019 16:28)  T(F): 97.4 (11 Dec 2019 10:30), Max: 98.2 (10 Dec 2019 16:28)  HR: 86 (11 Dec 2019 10:30) (80 - 91)  BP: 100/62 (11 Dec 2019 10:30) (63/56 - 100/62)  BP(mean): --  RR: 16 (11 Dec 2019 10:30) (16 - 19)  SpO2: 92% (11 Dec 2019 10:30) (92% - 98%)  Gen: nad, resting comfortably  CV: RRR  Abd: soft    12/12/19:  H/H 8.9/27.8, plt ct 486, WBC 13.07                          9.4    12.41 )-----------( 495      ( 11 Dec 2019 13:25 )             29.0     12-11    138  |  96<L>  |  10.0  ----------------------------<  109  4.5   |  28.0  |  0.45<L>    Ca    8.9      11 Dec 2019 13:25

## 2019-12-12 NOTE — PROGRESS NOTE ADULT - ASSESSMENT
68 yo female with history of COPD who presents with 1 month of shortness of breath worsened over last 2 weeks associated with >30 lb weight loss in the last 6 months, decreased appetite. Patient admitted for respiratory failure 2/2 COPD exacerbation with possible metastatic lung disease.    Pulmonology consulted and CT abdomen was obtained - CT showed rectal wall thickening with adjacent air-pockets; colorectal surgery was consulted and MRI was obtained.  May have colon cancer with mets to the lung. GI consult was recommended by colorectal surgeon and consulted. MRI showed perforation of distal rectum and anus w/ collection of fluid/air.  Patient planned for OR on 12/12.    TTE was ordered for elevated BNP - she was shown to have severe right sided ventricular strain. Started empirically on treatment for PE and CTA was ordered (delayed due to having received contrast with the CT abdomen on the same day). CTA negative for PE.     CT surgery had discussion with pt and son.  Pt currently refusing ostomy, but agreeing to EUS/biopsy and possible seton placement - which is scheduled for today.    Lung Nodules with rectal wall thickening; possibly metastatic disease  - CT abdomen as above w rectal wall thickening and fluid collection  - Pulmonary consulted - recs appreciated  - colorectal surgery following -  plan for EUS,possible seton placement, Bx today, pt refusing ostomy   - GI following- GI deferring colonoscopy at this time to Colorectal surgery performing above procedure  - MRI of the abdomen with Perforation involving the posterior wall of the distal rectum and anus with a collection of fluid and air posteriorly measuring up to 6.5 cm.  - Stress test 12/11: normal study, cleared by Cardiology for surgical procedure  - Slight leukocytosis, possibly secondary to steroid use. continue to monitor WBC    COPD exacerbation - hypoxia on admission has resolved  - completed IV azithromycin 500mg x3 days   - patient on anoro-ellipta 62.5/25 dose at home BID; ventolin as needed at home  - continue with duoneb q6 PRN  - d/c spiriva due to episode of sob and tachycardia.  O2 taper, no need for  since O2 sat 95-97%  - now on prednisone 40 mg daily - cont po taper  - Cleared by pulm for surgery    anemia with thrombocytosis  - likely GI source  - stable    elevated BNP - no clinical signs of HF- significant RV strain on the echo  - pt had CTA- negative for PE   - cardiology consulted-  cleared for surgery    slightly elevated INR - poss 2/2 poor diet/malignancy, resolved    protein calorie malnutrition  - supplement meals  - nutrition consulted     Depressed mood  - no suicidal ideation  - psych consulted, signed off. may f/u outpatient   - may benefit from medication    DVT - lovenox subcut 68 yo female with history of COPD who presents with 1 month of shortness of breath worsened over last 2 weeks associated with >30 lb weight loss in the last 6 months, decreased appetite. Patient admitted for respiratory failure 2/2 COPD exacerbation with possible metastatic lung disease.    Pulmonology consulted and CT abdomen was obtained - CT showed rectal wall thickening with adjacent air-pockets; colorectal surgery was consulted and MRI was obtained.  May have colon cancer with mets to the lung. GI consult was recommended by colorectal surgeon and consulted. MRI showed perforation of distal rectum and anus w/ collection of fluid/air.  Patient planned for OR today    TTE was ordered for elevated BNP - she was shown to have severe right sided ventricular strain. Started empirically on treatment for PE and CTA was ordered (delayed due to having received contrast with the CT abdomen on the same day). CTA negative for PE.       Lung Nodules with rectal wall thickening; possibly metastatic disease  - CT abdomen as above w rectal wall thickening and fluid collection  - Pulmonary consulted - recs appreciated  - colorectal surgery following -  plan for EUS,possible seton placement, Bx today, pt refusing ostomy   - GI following- GI deferring colonoscopy at this time to Colorectal surgery performing above procedure  - MRI of the abdomen with Perforation involving the posterior wall of the distal rectum and anus with a collection of fluid and air posteriorly measuring up to 6.5 cm.  - Stress test 12/11: normal study, cleared by Cardiology for surgical procedure  - Slight leukocytosis, possibly secondary to steroid use. continue to monitor WBC    COPD exacerbation - hypoxia on admission has resolved  - completed IV azithromycin 500mg x3 days   - patient on anoro-ellipta 62.5/25 dose at home BID; ventolin as needed at home  - continue with duoneb q6 PRN  - now on prednisone  cont po taper    Anemia with Thrombocytosis-- likely GI source  - stable    Elevated BNP - no clinical signs of HF- significant RV strain on the echo  - pt had CTA- negative for PE   - cardiology consulted-  cleared for surgery    slightly elevated INR - poss 2/2 poor diet/malignancy, resolved    protein calorie malnutrition  - supplement meals  - nutrition consulted     Depressed mood  - no suicidal ideation  - psych consulted, signed off. may f/u outpatient   - may benefit from medication    DVT - lovenox subcut

## 2019-12-12 NOTE — CHART NOTE - NSCHARTNOTEFT_GEN_A_CORE
POST-OPERATIVE NOTE    Subjective:  Patient is s/p EUA of the rectum with rectal biopsy and bilateral seton placement. Pain is well controlled. Passing flatus. No BM's. Dressing and mesh underwear in place. Voiding appropriately. No nausea or vomiting. VSS. Recovering appropriately.     Vital Signs Last 24 Hrs  T(C): 36.7 (12 Dec 2019 16:29), Max: 36.9 (11 Dec 2019 23:26)  T(F): 98 (12 Dec 2019 16:29), Max: 98.5 (12 Dec 2019 12:44)  HR: 80 (12 Dec 2019 16:29) (77 - 95)  BP: 92/56 (12 Dec 2019 16:29) (88/56 - 115/56)  BP(mean): --  RR: 18 (12 Dec 2019 16:29) (14 - 20)  SpO2: 97% (12 Dec 2019 16:29) (93% - 97%)  I&O's Detail    Physical Exam:  General: NAD, resting comfortably in bed  Pulmonary: 4L NC, no dyspnea, rales or wheezes   Cardiovascular: NSR, no heaves or thrills   Abdominal: soft, NT/ND  Extremities: WWP          Assessment:  The patient is a 69y Female who is now several hours post-op from an EUA of rectum with rectal mass biopsy and bilateral seton placement. Recovering appropriately. VSS.      Plan:  - Pain control as needed  - DVT ppx  - OOB and ambulating as tolerated  - f/u AM labs  -Monitor VS  -Wound: gauze can be removed 12/13 and re-placed if draining, mesh underwear for comfort  -f/u rectal mass biopsy  -f/u Heme/Onc recommendations   -f/u Palliative consult

## 2019-12-12 NOTE — PROGRESS NOTE ADULT - SUBJECTIVE AND OBJECTIVE BOX
INTERVAL HPI/OVERNIGHT EVENTS:    Patient overnight was with family. Family unaware that patient was going to surgery and collectively decided against having surgery done. Against ordered diet, patient was given solid foods and refused to continue her bowel prep for surgery. Provider spoke with family and explained the situation of what surgical procedure she was to have. Patient agreeable to starting bowel prep now, but wants her son to be present during the decision making for her operation. Additionally she re-iterates that as long as her son is OK with the procedures indicated she will go to the OR in the morning, but does not want a colostomy bag. She otherwise has no acute complaints at this time. Denies pain. Tolerating diet, no n/v. no CP or SOB. Breathing well on Room air at this time.    MEDICATIONS  (STANDING):  enoxaparin Injectable 40 milliGRAM(s) SubCutaneous daily  folic acid 1 milliGRAM(s) Oral daily  polyethylene glycol/electrolyte Solution 1000 milliLiter(s) Oral once  polyethylene glycol/electrolyte Solution 1000 milliLiter(s) Oral once  predniSONE   Tablet 40 milliGRAM(s) Oral daily  sodium chloride 0.9%. 1000 milliLiter(s) (75 mL/Hr) IV Continuous <Continuous>    MEDICATIONS  (PRN):  albuterol/ipratropium for Nebulization 3 milliLiter(s) Nebulizer every 6 hours PRN Shortness of Breath and/or Wheezing      Vital Signs Last 24 Hrs  T(C): 36.9 (11 Dec 2019 23:26), Max: 36.9 (11 Dec 2019 23:26)  T(F): 98.4 (11 Dec 2019 23:26), Max: 98.4 (11 Dec 2019 23:26)  HR: 93 (11 Dec 2019 23:26) (78 - 93)  BP: 103/62 (11 Dec 2019 23:26) (100/62 - 103/62)  BP(mean): --  RR: 18 (11 Dec 2019 23:26) (16 - 18)  SpO2: 95% (11 Dec 2019 23:26) (92% - 97%)    Physical Exam:  Constitutional: NAD on breathing treatment  Respiratory: respirations are unlabored, no accessory muscle use, no conversational dyspnea  Cardiovascular: regular rate & rhythm  Gastrointestinal: Abdomen soft, non-tender, non-distended, no rebound tenderness / guarding  Rectal: Previous IRIS significant for palpable mass, not performed today      I&O's Detail      LABS:                        9.4    12.41 )-----------( 495      ( 11 Dec 2019 13:25 )             29.0     12-11    138  |  96<L>  |  10.0  ----------------------------<  109  4.5   |  28.0  |  0.45<L>    Ca    8.9      11 Dec 2019 13:25            RADIOLOGY & ADDITIONAL STUDIES:

## 2019-12-12 NOTE — PROGRESS NOTE ADULT - ATTENDING COMMENTS
patient likely has perforated rectal cancer with metastases to lung and bone.  family and patient aware that the potential role of chemotherapy for palliation and not for cure, open to role of palliative care

## 2019-12-12 NOTE — DISCHARGE NOTE NURSING/CASE MANAGEMENT/SOCIAL WORK - PATIENT PORTAL LINK FT
You can access the FollowMyHealth Patient Portal offered by [facility] by registering at the following website: [email or web address]. By joining [facility]’s FollowMyHealth portal, you will also be able to view your health information using other applications (apps) compatible with our system.

## 2019-12-12 NOTE — PROGRESS NOTE ADULT - ASSESSMENT
69 year old female with history noted in [place] admitted with resp failure sec to COPD exacerbation but also found to have a perforated rectal wall and likely metastatic disease.      1) Rectal mass - likely malignant and there is likely metastatic disease in the lungs and bones. There appears to be an abscess right now and she is going to the OR today  Will follow path and treatment options will depend on the final result.  CEA is 18.      2) Normocytic anemia - likely multifactorial, abscess/malignancy. Iron stores appear to be adequate.  Transfuse if hgb<7.0.

## 2019-12-12 NOTE — PROGRESS NOTE ADULT - SUBJECTIVE AND OBJECTIVE BOX
PC to pt's son, Clive, to confirm decision to proceed with EUA, biopsy of rectal mass, drainage of abscess, seton placement.  Also confirmed family and patient desire to not proceed with colostomy.  Pt fairly forgetful and easily confused. Per son, she has been evaluated in the past for dementia and Alzheimers with inconclusive results.  He reiterated knowledge that patient likely has perforated rectal cancer with metastases to lung and bone.   I discussed with him the potential role of chemotherapy for palliation and not for cure.  He stated that family would like her to be comfortable and is open to role of palliative care consult to discussion goals of care and options such as home hospice.  Proceed with surgical procedure as detailed above with son's verbal consent in addition to patient's written consent.

## 2019-12-13 PROCEDURE — 99233 SBSQ HOSP IP/OBS HIGH 50: CPT

## 2019-12-13 RX ADMIN — Medication 1 MILLIGRAM(S): at 11:07

## 2019-12-13 RX ADMIN — ENOXAPARIN SODIUM 40 MILLIGRAM(S): 100 INJECTION SUBCUTANEOUS at 11:08

## 2019-12-13 RX ADMIN — Medication 40 MILLIGRAM(S): at 05:01

## 2019-12-13 NOTE — PROGRESS NOTE ADULT - SUBJECTIVE AND OBJECTIVE BOX
Subjective/Objective:  Patient seen and examined at bedside.  No overnight events reported by staff.  Patient with no complaints today.  Pt denies chills, dizziness, headache, CP, SOB, abdominal pain, N/V. All other ROS negative.    Vital Signs Last 24 Hrs  T(C): 36.6 (12 Dec 2019 08:57), Max: 36.9 (11 Dec 2019 23:26)  T(F): 97.9 (12 Dec 2019 08:57), Max: 98.4 (11 Dec 2019 23:26)  HR: 85 (12 Dec 2019 08:57) (78 - 93)  BP: 100/62 (12 Dec 2019 08:57) (100/62 - 103/62)  BP(mean): --  RR: 18 (12 Dec 2019 08:57) (16 - 18)  SpO2: 93% (12 Dec 2019 08:57) (92% - 97%)    PHYSICAL EXAM:  GENERAL: Pt lying in bed, NAD, frail  HEAD:  Atraumatic, Normocephalic  EYES: EOMI, PERRLA, conjunctiva and sclera clear  ENT: Moist mucous membranes  NECK: Supple  CHEST/LUNG: Clear to auscultation bilaterally. Unlabored respirations  HEART: S1S2+, Regular rate and rhythm  ABDOMEN: BS+; Soft, Nontender, Nondistended.  EXTREMITIES:  2+ Peripheral Pulse. No LE edema  NERVOUS SYSTEM:  A&OX3. No deficits   SKIN: Warm and dry    LABS:             12-12    143  |  102  |  12.0  ----------------------------<  99  3.9   |  28.0  |  0.59    Ca    8.4<L>      12 Dec 2019 07:40    TPro  6.1<L>  /  Alb  2.4<L>  /  TBili  <0.2<L>  /  DBili  x   /  AST  16  /  ALT  15  /  AlkPhos  56  12-12                          8.9    13.07 )-----------( 486      ( 12 Dec 2019 07:40 )             27.8       MEDICATIONS  (STANDING):  enoxaparin Injectable 40 milliGRAM(s) SubCutaneous daily  folic acid 1 milliGRAM(s) Oral daily  predniSONE   Tablet 40 milliGRAM(s) Oral daily  sodium chloride 0.9%. 1000 milliLiter(s) (75 mL/Hr) IV Continuous <Continuous>    MEDICATIONS  (PRN):  albuterol/ipratropium for Nebulization 3 milliLiter(s) Nebulizer every 6 hours PRN Shortness of Breath and/or Wheezing Subjective/Objective:  Patient seen and examined at bedside.  No overnight events reported by staff.  Patient with no complaints today.  Pt denies chills, dizziness, headache, CP, SOB, abdominal pain, N/V. All other ROS negative. + flatus but denies BM although reported to have some stool incont.     Vital Signs Last 24 Hrs  T(C): 36.7 (13 Dec 2019 07:38), Max: 36.7 (12 Dec 2019 16:29)  T(F): 98.1 (13 Dec 2019 07:38), Max: 98.1 (13 Dec 2019 07:38)  HR: 75 (13 Dec 2019 07:38) (75 - 88)  BP: 107/68 (13 Dec 2019 07:38) (92/56 - 107/68)  BP(mean): --  RR: 19 (13 Dec 2019 07:38) (18 - 19)  SpO2: 98% (12 Dec 2019 23:20) (97% - 98%)    PHYSICAL EXAM:  GENERAL: Pt lying in bed, NAD, frail  EYES: EOMI, PERRLA, conjunctiva and sclera clear  ENT: Moist mucous membranes  NECK: Supple  CHEST/LUNG: Clear to auscultation bilaterally. Unlabored respirations  HEART: S1S2+, Regular rate and rhythm  ABDOMEN: BS+; Soft, Nontender, Nondistended.  EXTREMITIES:  2+ Peripheral Pulse. No LE edema, no calf tenderness  NERVOUS SYSTEM:  A&OX3. No deficits   SKIN: Warm and dry  Psych: flat, withdrawn    MEDICATIONS  (STANDING):  enoxaparin Injectable 40 milliGRAM(s) SubCutaneous daily  folic acid 1 milliGRAM(s) Oral daily  Prednisone 40mg daily    MEDICATIONS  (PRN):  duoneb     LABS:                        8.9    13.07 )-----------( 486      ( 12 Dec 2019 07:40 )             27.8     12-12    143  |  102  |  12.0  ----------------------------<  99  3.9   |  28.0  |  0.59    Ca    8.4<L>      12 Dec 2019 07:40    TPro  6.1<L>  /  Alb  2.4<L>  /  TBili  <0.2<L>  /  DBili  x   /  AST  16  /  ALT  15  /  AlkPhos  56  12-12    PT/INR - ( 12 Dec 2019 07:40 )   PT: 10.9 sec;   INR: 0.95 ratio    PTT - ( 12 Dec 2019 07:40 )  PTT:25.6 sec

## 2019-12-13 NOTE — PROGRESS NOTE ADULT - ATTENDING COMMENTS
Patient seen and examined. Reports well controlled pain. Having some stool incontinence. Surgical site with seton and liquid stool. Await pathology report but from a colorectal standpoint, can be discharged with plans to follow up with surgery- Dr. Lee in 2 weeks and oncologist. Sitz baths for comfort.

## 2019-12-13 NOTE — PROGRESS NOTE ADULT - ASSESSMENT
69y Female who is now several hours post-op from an EUA of rectum with rectal mass biopsy and bilateral seton placement. Recovering appropriately. VSS.      Plan:  - Pain control as needed  - DVT ppx  - OOB and ambulating as tolerated  - f/u AM labs  -Monitor VS  -Wound: gauze can be removed and re-placed if draining, mesh underwear for comfort  -f/u rectal mass biopsy  -f/u Heme/Onc recommendations   -f/u Palliative consult.

## 2019-12-13 NOTE — PROGRESS NOTE ADULT - ASSESSMENT
69 year old female with history noted in [facility] admitted with resp failure sec to COPD exacerbation but also found to have a perforated rectal wall and likely metastatic disease.      1) Rectal mass - likely malignant and there is likely metastatic disease in the lungs and bones. She is post-op and recovering well. Biopsy path result pending.   Will follow path and treatment options will depend on the final result.  CEA is 18.  Follow up as outpatient to finalize treatment plan.     2) Normocytic anemia - likely multifactorial, abscess/malignancy. Iron stores appear to be adequate.  Transfuse if hgb<7.0. Hgb 8.9.

## 2019-12-13 NOTE — PROGRESS NOTE ADULT - ASSESSMENT
68 yo female with history of COPD who presents with 1 month of shortness of breath worsened over last 2 weeks associated with >30 lb weight loss in the last 6 months, decreased appetite. Patient admitted for respiratory failure 2/2 COPD exacerbation with possible metastatic lung disease.    Pulmonology consulted and CT abdomen was obtained - CT showed rectal wall thickening with adjacent air-pockets; colorectal surgery was consulted and MRI was obtained.  May have colon cancer with mets to the lung. GI consult was recommended by colorectal surgeon and consulted. MRI showed perforation of distal rectum and anus w/ collection of fluid/air.  Patient planned for OR today    TTE was ordered for elevated BNP - she was shown to have severe right sided ventricular strain. Started empirically on treatment for PE and CTA was ordered (delayed due to having received contrast with the CT abdomen on the same day). CTA negative for PE.       Lung Nodules with rectal wall thickening; possibly metastatic disease  - CT abdomen as above w rectal wall thickening and fluid collection  - Pulmonary consulted - recs appreciated  - colorectal surgery following -  plan for EUS,possible seton placement, Bx today, pt refusing ostomy   - GI following- GI deferring colonoscopy at this time to Colorectal surgery performing above procedure  - MRI of the abdomen with Perforation involving the posterior wall of the distal rectum and anus with a collection of fluid and air posteriorly measuring up to 6.5 cm.  - Stress test 12/11: normal study, cleared by Cardiology for surgical procedure  - Slight leukocytosis, possibly secondary to steroid use. continue to monitor WBC    COPD exacerbation - hypoxia on admission has resolved  - completed IV azithromycin 500mg x3 days   - patient on anoro-ellipta 62.5/25 dose at home BID; ventolin as needed at home  - continue with duoneb q6 PRN  - now on prednisone  cont po taper    Anemia with Thrombocytosis-- likely GI source  - stable    Elevated BNP - no clinical signs of HF- significant RV strain on the echo  - pt had CTA- negative for PE   - cardiology consulted-  cleared for surgery    slightly elevated INR - poss 2/2 poor diet/malignancy, resolved    protein calorie malnutrition  - supplement meals  - nutrition consulted     Depressed mood  - no suicidal ideation  - psych consulted, signed off. may f/u outpatient   - may benefit from medication    DVT - lovenox subcut 70 yo female with history of COPD who presents with 1 month of shortness of breath worsened over last 2 weeks associated with >30 lb weight loss in the last 6 months, decreased appetite. Patient admitted for respiratory failure 2/2 COPD exacerbation with possible metastatic lung disease. Pulmonology consulted and CT abdomen was obtained - CT showed rectal wall thickening with adjacent air-pockets; colorectal surgery was consulted and MRI was obtained.  May have colon cancer with mets to the lung. GI consult was recommended by colorectal surgeon and consulted. MRI showed perforation of distal rectum and anus w/ collection of fluid/air.  12/5 TTE was ordered for elevated BNP - she was shown to have severe right sided ventricular strain. Started empirically on treatment for PE and CTA was ordered (delayed due to having received contrast with the CT abdomen on the same day). 12/9 CTA negative for PE. 12/11 Nuclear Stress Test-Pharmacologic  Normal study; no evidence for myocardial infarction or ischemia. 12/12 colorectal surgery performed. EUA, rectum with rectal mass biopsy. Posterior rectal wall abscess unroofed and draining mucoid material through cutaneous fistulas b/l . B/L seton placement transrectally. Patient refusing colostomy.      Lung Nodules with rectal mass and abcess with cutaneous fistulas; possibly metastatic disease  - MRI of the abdomen with Perforation involving the posterior wall of the distal rectum and anus with a collection of fluid and air posteriorly measuring up to 6.5 cm.  - 12/12 Posterior rectal wall abscess unroofed and draining mucoid material through cutaneous fistulas b/l . B/L seton placement transrectally, rectal mass biopsy obtained.   -  Await pathology report but cleared for discharge from colorectal standpoint with plans to follow up with surgery- Dr. Lee in 2 weeks and oncologist.   - Sitz baths for comfort,  gauze can be removed and re-placed if draining    COPD exacerbation - hypoxia on admission has resolved  - completed IV azithromycin 500mg x3 days   - patient on anoro-ellipta 62.5/25 dose at home BID; ventolin as needed at home  - continue with duoneb q6 PRN  - now on prednisone  cont po taper  - Slight leukocytosis, possibly secondary to steroid use. continue to monitor WBC    Anemia with Thrombocytosis-- likely GI source  - stable    Elevated BNP - no clinical signs of HF- significant RV strain on the echo  - pt had CTA- negative for PE   - nuc med stress test nl    slightly elevated INR - poss 2/2 poor diet/malignancy, resolved    protein calorie malnutrition  - supplement meals  - nutrition consulted     Depressed mood  - no suicidal ideation  - psych consulted, signed off. may f/u outpatient   - may benefit from medication    DVT - lovenox subcut    D/W Dr Manning, patient 68 yo female with history of COPD who presents with 1 month of shortness of breath worsened over last 2 weeks associated with >30 lb weight loss in the last 6 months, decreased appetite. Patient admitted for respiratory failure 2/2 COPD exacerbation with possible metastatic lung disease. Pulmonology consulted and CT abdomen was obtained - CT showed rectal wall thickening with adjacent air-pockets; colorectal surgery was consulted and MRI was obtained.  May have colon cancer with mets to the lung. GI consult was recommended by colorectal surgeon and consulted. MRI showed perforation of distal rectum and anus w/ collection of fluid/air.  12/5 TTE was ordered for elevated BNP - she was shown to have severe right sided ventricular strain. Started empirically on treatment for PE and CTA was ordered (delayed due to having received contrast with the CT abdomen on the same day). 12/9 CTA negative for PE. 12/11 Nuclear Stress Test-Pharmacologic  Normal study; no evidence for myocardial infarction or ischemia. 12/12 colorectal surgery performed. EUA, rectum with rectal mass biopsy. Posterior rectal wall abscess unroofed and draining mucoid material through cutaneous fistulas b/l . B/L seton placement transrectally. Patient refusing colostomy.      Lung Nodules with rectal mass and abcess with cutaneous fistulas; possibly metastatic disease  - MRI of the abdomen with Perforation involving the posterior wall of the distal rectum and anus with a collection of fluid and air posteriorly measuring up to 6.5 cm.  - 12/12 Posterior rectal wall abscess unroofed and draining mucoid material through cutaneous fistulas b/l . B/L seton placement transrectally, rectal mass biopsy obtained.   -  Await pathology report but cleared for discharge from colorectal standpoint with plans to follow up with surgery- Dr. Lee in 2 weeks and oncologist.   - Sitz baths for comfort,  gauze can be removed and re-placed if draining    COPD exacerbation - hypoxia on admission has resolved  - completed IV azithromycin 500mg x3 days   - patient on anoro-ellipta 62.5/25 dose at home BID; ventolin as needed at home  - continue with duoneb q6 PRN  - now on prednisone  cont po taper  - Slight leukocytosis, possibly secondary to steroid use. continue to monitor WBC    Anemia with Thrombocytosis-- likely GI source  - stable    Elevated BNP - no clinical signs of HF- significant RV strain on the echo  - pt had CTA- negative for PE   - nuc med stress test nl    slightly elevated INR - poss 2/2 poor diet/malignancy, resolved    protein calorie malnutrition  - supplement meals  - nutrition consulted     Depressed mood  - no suicidal ideation  - psych consulted, signed off. may f/u outpatient   - may benefit from medication    DVT - lovenox subcut

## 2019-12-13 NOTE — PROGRESS NOTE ADULT - SUBJECTIVE AND OBJECTIVE BOX
HPI/OVERNIGHT EVENTS:  Patient evaluated at bedside. She is POD1 s/p EUA with rectal mass biopsy and bilateral seton placement. Patient is doing well. Denies abdominal pain or rectal pain. Continues to pass flatus. No BM post-op. She is tolerating a diet without nausea or vomiting. No CP, fever, chills.       MEDICATIONS  (STANDING):  enoxaparin Injectable 40 milliGRAM(s) SubCutaneous daily  folic acid 1 milliGRAM(s) Oral daily  predniSONE   Tablet 40 milliGRAM(s) Oral daily    MEDICATIONS  (PRN):      Vital Signs Last 24 Hrs  T(C): 36.7 (12 Dec 2019 23:20), Max: 36.9 (12 Dec 2019 12:44)  T(F): 98 (12 Dec 2019 23:20), Max: 98.5 (12 Dec 2019 12:44)  HR: 88 (12 Dec 2019 23:20) (77 - 95)  BP: 92/56 (12 Dec 2019 16:29) (88/56 - 115/56)  BP(mean): --  RR: 18 (12 Dec 2019 23:20) (14 - 20)  SpO2: 98% (12 Dec 2019 23:20) (93% - 98%)    Physical Exam:  General: NAD, resting comfortably in bed  Pulmonary: 4L NC, no dyspnea, rales or wheezes   Cardiovascular: NSR, no heaves or thrills   Abdominal: soft, NT/ND  Extremities: WWP    I&O's Detail      LABS:                        8.9    13.07 )-----------( 486      ( 12 Dec 2019 07:40 )             27.8     12-12    143  |  102  |  12.0  ----------------------------<  99  3.9   |  28.0  |  0.59    Ca    8.4<L>      12 Dec 2019 07:40    TPro  6.1<L>  /  Alb  2.4<L>  /  TBili  <0.2<L>  /  DBili  x   /  AST  16  /  ALT  15  /  AlkPhos  56  12-12    PT/INR - ( 12 Dec 2019 07:40 )   PT: 10.9 sec;   INR: 0.95 ratio         PTT - ( 12 Dec 2019 07:40 )  PTT:25.6 sec

## 2019-12-13 NOTE — PROGRESS NOTE ADULT - SUBJECTIVE AND OBJECTIVE BOX
This is a 69y old female with a past medical history significant for COPD, DJD with hip replacements who was admitted with sob x 1 month duration, weight loss and is being treated for a COPD exacerbation. .  CT abdomen was done on 12/6/2019 that showed rectal wall thickening with posterior air pocket due to possible perforation.  It also showed signs c/w bone mets in the iliac bone as well as scattered bilateral lung masses.   Colorectal surgery was consulted and pelvic MRI was recommended and done on 12/9/2019 and it showed signs c/w a perforation of the posterior wall of the distal rectum and anus with a fluid collection.   She had an echocardiogram with RV strain and there was concern for PE but CT angio was negative for PE but did demonstrate scattered lung nodules.   [place] [facility] [place] negative for malignancy.    POD1 s/p EUA with rectal mass biopsy and bilateral seton placement.  She is feeling reasonably well. Denies pain. No n/v/d. She does not seem to have insight into her situation.       MEDICATIONS  (STANDING):  enoxaparin Injectable 40 milliGRAM(s) SubCutaneous daily  folic acid 1 milliGRAM(s) Oral daily  predniSONE   Tablet 20 milliGRAM(s) Oral once    MEDICATIONS  (PRN):      PAST MEDICAL & SURGICAL HISTORY:  Anemia  COPD (chronic obstructive pulmonary disease)  H/O bilateral hip replacements    Gen: nad, resting comfortably  CV: RRR  Abd: soft    12/12/19:  H/H 8.9/27.8, plt ct 486, WBC 13.07                                     8.9    13.07 )-----------( 486      ( 12 Dec 2019 07:40 )             27.8     12-12    143  |  102  |  12.0  ----------------------------<  99  3.9   |  28.0  |  0.59    Ca    8.4<L>      12 Dec 2019 07:40    TPro  6.1<L>  /  Alb  2.4<L>  /  TBili  <0.2<L>  /  DBili  x   /  AST  16  /  ALT  15  /  AlkPhos  56  12-12

## 2019-12-14 LAB
ALBUMIN SERPL ELPH-MCNC: 2.6 G/DL — LOW (ref 3.3–5.2)
ALP SERPL-CCNC: 61 U/L — SIGNIFICANT CHANGE UP (ref 40–120)
ALT FLD-CCNC: 15 U/L — SIGNIFICANT CHANGE UP
ANION GAP SERPL CALC-SCNC: 11 MMOL/L — SIGNIFICANT CHANGE UP (ref 5–17)
AST SERPL-CCNC: 17 U/L — SIGNIFICANT CHANGE UP
BILIRUB SERPL-MCNC: <0.2 MG/DL — LOW (ref 0.4–2)
BUN SERPL-MCNC: 14 MG/DL — SIGNIFICANT CHANGE UP (ref 8–20)
CALCIUM SERPL-MCNC: 8.8 MG/DL — SIGNIFICANT CHANGE UP (ref 8.6–10.2)
CHLORIDE SERPL-SCNC: 98 MMOL/L — SIGNIFICANT CHANGE UP (ref 98–107)
CO2 SERPL-SCNC: 28 MMOL/L — SIGNIFICANT CHANGE UP (ref 22–29)
CREAT SERPL-MCNC: 0.99 MG/DL — SIGNIFICANT CHANGE UP (ref 0.5–1.3)
GLUCOSE SERPL-MCNC: 132 MG/DL — HIGH (ref 70–115)
HCT VFR BLD CALC: 31.3 % — LOW (ref 34.5–45)
HGB BLD-MCNC: 10.3 G/DL — LOW (ref 11.5–15.5)
MCHC RBC-ENTMCNC: 30.2 PG — SIGNIFICANT CHANGE UP (ref 27–34)
MCHC RBC-ENTMCNC: 32.9 GM/DL — SIGNIFICANT CHANGE UP (ref 32–36)
MCV RBC AUTO: 91.8 FL — SIGNIFICANT CHANGE UP (ref 80–100)
PLATELET # BLD AUTO: 538 K/UL — HIGH (ref 150–400)
POTASSIUM SERPL-MCNC: 4.5 MMOL/L — SIGNIFICANT CHANGE UP (ref 3.5–5.3)
POTASSIUM SERPL-SCNC: 4.5 MMOL/L — SIGNIFICANT CHANGE UP (ref 3.5–5.3)
PROT SERPL-MCNC: 6.3 G/DL — LOW (ref 6.6–8.7)
RBC # BLD: 3.41 M/UL — LOW (ref 3.8–5.2)
RBC # FLD: 15.3 % — HIGH (ref 10.3–14.5)
SODIUM SERPL-SCNC: 137 MMOL/L — SIGNIFICANT CHANGE UP (ref 135–145)
WBC # BLD: 16.6 K/UL — HIGH (ref 3.8–10.5)
WBC # FLD AUTO: 16.6 K/UL — HIGH (ref 3.8–10.5)

## 2019-12-14 PROCEDURE — 99232 SBSQ HOSP IP/OBS MODERATE 35: CPT

## 2019-12-14 RX ADMIN — Medication 1 MILLIGRAM(S): at 12:11

## 2019-12-14 RX ADMIN — ENOXAPARIN SODIUM 40 MILLIGRAM(S): 100 INJECTION SUBCUTANEOUS at 12:11

## 2019-12-14 RX ADMIN — Medication 20 MILLIGRAM(S): at 05:25

## 2019-12-14 NOTE — PROGRESS NOTE ADULT - ASSESSMENT
69 year old female with history noted in [facility] admitted with resp failure sec to COPD exacerbation but also found to have a perforated rectal wall and likely metastatic disease.      1) Rectal mass - likely malignant and there is likely metastatic disease in the lungs and bones. She is post-op and recovering well. Biopsy path result pending.   Will follow path and treatment options will depend on the final result.  CEA is 18.  Follow up as outpatient to finalize treatment plan. Awaiting bx results.  Disc with family at bedside; son made health care proxy.  Discussed likely malignant dx and need for outpt follow up    2) Normocytic anemia - likely multifactorial, abscess/malignancy. Iron stores appear to be adequate.  Transfuse if hgb<7.0. Hgb 8.9.

## 2019-12-14 NOTE — PROGRESS NOTE ADULT - SUBJECTIVE AND OBJECTIVE BOX
This is a 69y old female with a past medical history significant for COPD, DJD with hip replacements who was admitted with sob x 1 month duration, weight loss and is being treated for a COPD exacerbation. .  CT abdomen was done on 12/6/2019 that showed rectal wall thickening with posterior air pocket due to possible perforation.  It also showed signs c/w bone mets in the iliac bone as well as scattered bilateral lung masses.   Colorectal surgery was consulted and pelvic MRI was recommended and done on 12/9/2019 and it showed signs c/w a perforation of the posterior wall of the distal rectum and anus with a fluid collection.   She had an echocardiogram with RV strain and there was concern for PE but CT angio was negative for PE but did demonstrate scattered lung nodules.   She had a colonoscopy in [facility] [place] last year that was supposedly negative for malignancy.    POD1 s/p EUA with rectal mass biopsy and bilateral seton placement.Post rectal wall abscess unmasked/draining mucoid material thru cut fistula.    She is feeling reasonably well. Denies pain. No n/v/d. She does not seem to have insight into her situation. Refuses fistula      MEDICATIONS  (STANDING):  enoxaparin Injectable 40 milliGRAM(s) SubCutaneous daily  folic acid 1 milliGRAM(s) Oral daily  predniSONE   Tablet 20 milliGRAM(s) Oral once    MEDICATIONS  (PRN):      PAST MEDICAL & SURGICAL HISTORY:  Anemia  COPD (chronic obstructive pulmonary disease)  H/O bilateral hip replacements    Gen: nad, resting comfortably  CV: RRR  Abd: soft    12/12/19:  H/H 8.9/27.8, plt ct 486, WBC 13.07                                     8.9    13.07 )-----------( 486      ( 12 Dec 2019 07:40 )             27.8     12-12    143  |  102  |  12.0  ----------------------------<  99  3.9   |  28.0  |  0.59    Ca    8.4<L>      12 Dec 2019 07:40    TPro  6.1<L>  /  Alb  2.4<L>  /  TBili  <0.2<L>  /  DBili  x   /  AST  16  /  ALT  15  /  AlkPhos  56  12-12

## 2019-12-14 NOTE — PROGRESS NOTE ADULT - ASSESSMENT
68 yo female with history of COPD who presents with 1 month of shortness of breath worsened over last 2 weeks associated with >30 lb weight loss in the last 6 months, decreased appetite. Patient admitted for respiratory failure 2/2 COPD exacerbation with possible metastatic lung disease. Pulmonology consulted and CT abdomen was obtained - CT showed rectal wall thickening with adjacent air-pockets; colorectal surgery was consulted and MRI was obtained.  May have colon cancer with mets to the lung. GI consult was recommended by colorectal surgeon and consulted. MRI showed perforation of distal rectum and anus w/ collection of fluid/air.  12/5 TTE was ordered for elevated BNP - she was shown to have severe right sided ventricular strain. Started empirically on treatment for PE and CTA was ordered (delayed due to having received contrast with the CT abdomen on the same day). 12/9 CTA negative for PE. 12/11 Nuclear Stress Test-Pharmacologic  Normal study; no evidence for myocardial infarction or ischemia. 12/12 colorectal surgery performed. EUA, rectum with rectal mass biopsy. Posterior rectal wall abscess unroofed and draining mucoid material through cutaneous fistulas b/l . B/L seton placement transrectally. Patient refusing colostomy.      Lung Nodules with rectal mass and abscess with cutaneous fistulas; possibly metastatic disease  - MRI of the abdomen with Perforation involving the posterior wall of the distal rectum and anus with a collection of fluid and air posteriorly measuring up to 6.5 cm.  - 12/12 Posterior rectal wall abscess unroofed and draining mucoid material through cutaneous fistulas b/l . B/L seton placement transrectally, rectal mass biopsy obtained.   -  Await pathology report but cleared for discharge from colorectal standpoint with plans to follow up with surgery- Dr. Lee in 2 weeks and oncologist.   - Sitz baths for comfort,  gauze can be removed and re-placed if draining  - Psych consult for capacity determination as it appears unclear if patient understands the gravity of her situation  - Palliative care consult    COPD exacerbation - hypoxia on admission has resolved  - completed IV azithromycin 500mg x3 days   - patient on anoro-ellipta 62.5/25 dose at home BID; ventolin as needed at home  - continue with duoneb q6 PRN  - now on prednisone  cont po taper  - Slight leukocytosis, possibly secondary to steroid use. Continue to monitor WBC    Anemia with Thrombocytosis-- likely GI source  - stable    Elevated BNP - no clinical signs of HF- significant RV strain on the echo  - pt had CTA- negative for PE   - nuc med stress test nl    slightly elevated INR - poss 2/2 poor diet/malignancy, resolved    protein calorie malnutrition  - supplement meals  - nutrition consulted     Depressed mood  - no suicidal ideation  - psych consulted, signed off. may f/u outpatient   - may benefit from medication    DVT - lovenox subcut    Disposition - currently unclear if patient understands her medical condition and options. Psych reconsult pending for capacity. Patient had named son Clive as surrogate - I attempted to call Clive at [phone] in order to assess family understanding and  support - no answer, unable to leave voicemail.

## 2019-12-14 NOTE — PROGRESS NOTE ADULT - SUBJECTIVE AND OBJECTIVE BOX
HOSPITALIST PROGRESS NOTE    MARQUITA OTOOLE  [id number]  69yFemale    Patient is a 69y old  Female who presents with a chief complaint of acute hypoxic respiratory failure, multiple lung nodules (13 Dec 2019 15:29)      SUBJECTIVE:   Chart reviewed since last visit.  Patient seen and examined at bedside for rectal mass, abscess, COPD  Denies any dyspnea, cough, chest pain  Denies any abdominal pain, nausea, vomiting or diarrhea.    Patient was asked what she is in the hospital for 2 days in a row and she stated her breathing. When asked about other condition patient cant seem to recall surgery and rectal mass without being reminded and then dismisses that as matter of fact      OBJECTIVE:  Vital Signs Last 24 Hrs  T(C): 36.7 (14 Dec 2019 15:29), Max: 36.7 (14 Dec 2019 15:29)  T(F): 98 (14 Dec 2019 15:29), Max: 98 (14 Dec 2019 15:29)  HR: 101 (14 Dec 2019 15:29) (78 - 101)  BP: 107/71 (14 Dec 2019 15:29) (99/59 - 122/71)   RR: 18 (14 Dec 2019 15:29) (18 - 18)  SpO2: 93% (14 Dec 2019 15:29) (93% - 94%)    PHYSICAL EXAMINATION  General: Lying in bed, NAD  HEENT:  EOMI  NECK:  Supple  CVS: regular rate and rhythm S1 S2  RESP:  Fair air entry bilaterally  GI:  Soft nondistended nontender BS+. Diaper with liquid brown stool  : no suprapubic tenderness  MSK:  no edema  CNS:  follows commands, moves all extremities  INTEG:  warm skin  PSYCH: Appears to be somewhat withdrawn and lacks insight about her condition    MONITOR:  CAPILLARY BLOOD GLUCOSE            I&O's Summary                          10.3   16.60 )-----------( 538      ( 14 Dec 2019 14:17 )             31.3       12-14    137  |  98  |  14.0  ----------------------------<  132<H>  4.5   |  28.0  |  0.99    Ca    8.8      14 Dec 2019 14:17    TPro  6.3<L>  /  Alb  2.6<L>  /  TBili  <0.2<L>  /  DBili  x   /  AST  17  /  ALT  15  /  AlkPhos  61  12-14            Culture:    TTE:    RADIOLOGY        MEDICATIONS  (STANDING):  enoxaparin Injectable 40 milliGRAM(s) SubCutaneous daily  folic acid 1 milliGRAM(s) Oral daily      MEDICATIONS  (PRN):

## 2019-12-15 LAB
ANION GAP SERPL CALC-SCNC: 11 MMOL/L — SIGNIFICANT CHANGE UP (ref 5–17)
BUN SERPL-MCNC: 10 MG/DL — SIGNIFICANT CHANGE UP (ref 8–20)
CALCIUM SERPL-MCNC: 8.9 MG/DL — SIGNIFICANT CHANGE UP (ref 8.6–10.2)
CHLORIDE SERPL-SCNC: 96 MMOL/L — LOW (ref 98–107)
CO2 SERPL-SCNC: 31 MMOL/L — HIGH (ref 22–29)
CREAT SERPL-MCNC: 0.66 MG/DL — SIGNIFICANT CHANGE UP (ref 0.5–1.3)
GLUCOSE SERPL-MCNC: 78 MG/DL — SIGNIFICANT CHANGE UP (ref 70–115)
HCT VFR BLD CALC: 34.3 % — LOW (ref 34.5–45)
HGB BLD-MCNC: 11.1 G/DL — LOW (ref 11.5–15.5)
MCHC RBC-ENTMCNC: 29.9 PG — SIGNIFICANT CHANGE UP (ref 27–34)
MCHC RBC-ENTMCNC: 32.4 GM/DL — SIGNIFICANT CHANGE UP (ref 32–36)
MCV RBC AUTO: 92.5 FL — SIGNIFICANT CHANGE UP (ref 80–100)
PLATELET # BLD AUTO: 566 K/UL — HIGH (ref 150–400)
POTASSIUM SERPL-MCNC: 4.1 MMOL/L — SIGNIFICANT CHANGE UP (ref 3.5–5.3)
POTASSIUM SERPL-SCNC: 4.1 MMOL/L — SIGNIFICANT CHANGE UP (ref 3.5–5.3)
RBC # BLD: 3.71 M/UL — LOW (ref 3.8–5.2)
RBC # FLD: 15.8 % — HIGH (ref 10.3–14.5)
SODIUM SERPL-SCNC: 138 MMOL/L — SIGNIFICANT CHANGE UP (ref 135–145)
WBC # BLD: 31.98 K/UL — HIGH (ref 3.8–10.5)
WBC # FLD AUTO: 31.98 K/UL — HIGH (ref 3.8–10.5)

## 2019-12-15 PROCEDURE — 99232 SBSQ HOSP IP/OBS MODERATE 35: CPT

## 2019-12-15 RX ORDER — MULTIVIT-MIN/FERROUS GLUCONATE 9 MG/15 ML
1 LIQUID (ML) ORAL DAILY
Refills: 0 | Status: DISCONTINUED | OUTPATIENT
Start: 2019-12-15 | End: 2019-12-23

## 2019-12-15 RX ORDER — PANTOPRAZOLE SODIUM 20 MG/1
40 TABLET, DELAYED RELEASE ORAL
Refills: 0 | Status: DISCONTINUED | OUTPATIENT
Start: 2019-12-15 | End: 2019-12-23

## 2019-12-15 RX ORDER — ALBUTEROL 90 UG/1
2.5 AEROSOL, METERED ORAL EVERY 6 HOURS
Refills: 0 | Status: DISCONTINUED | OUTPATIENT
Start: 2019-12-15 | End: 2020-01-02

## 2019-12-15 RX ORDER — ALBUTEROL 90 UG/1
1 AEROSOL, METERED ORAL EVERY 4 HOURS
Refills: 0 | Status: DISCONTINUED | OUTPATIENT
Start: 2019-12-15 | End: 2020-01-02

## 2019-12-15 RX ORDER — BUDESONIDE, MICRONIZED 100 %
0.5 POWDER (GRAM) MISCELLANEOUS
Refills: 0 | Status: DISCONTINUED | OUTPATIENT
Start: 2019-12-15 | End: 2020-01-02

## 2019-12-15 RX ORDER — ZINC OXIDE 200 MG/G
1 OINTMENT TOPICAL THREE TIMES A DAY
Refills: 0 | Status: DISCONTINUED | OUTPATIENT
Start: 2019-12-15 | End: 2020-01-02

## 2019-12-15 RX ADMIN — ALBUTEROL 2.5 MILLIGRAM(S): 90 AEROSOL, METERED ORAL at 19:57

## 2019-12-15 RX ADMIN — PANTOPRAZOLE SODIUM 40 MILLIGRAM(S): 20 TABLET, DELAYED RELEASE ORAL at 17:24

## 2019-12-15 RX ADMIN — Medication 0.5 MILLIGRAM(S): at 19:56

## 2019-12-15 RX ADMIN — ENOXAPARIN SODIUM 40 MILLIGRAM(S): 100 INJECTION SUBCUTANEOUS at 12:20

## 2019-12-15 RX ADMIN — Medication 1 TABLET(S): at 13:42

## 2019-12-15 RX ADMIN — Medication 1 MILLIGRAM(S): at 12:20

## 2019-12-15 NOTE — PROGRESS NOTE ADULT - ASSESSMENT
69 year old female with history noted in [place] admitted with resp failure sec to COPD exacerbation but also found to have a perforated rectal wall and likely metastatic disease.      1) Rectal mass - likely malignant and there is likely metastatic disease in the lungs and bones. She is post-op and recovering well. Biopsy path result pending.   Will follow path and treatment options will depend on the final result.  CEA is 18.  Follow up as outpatient to finalize treatment plan. Awaiting bx results.  Disc with family at bedside; son made health care proxy.  Discussed likely malignant dx and need for outpt follow up    2) Normocytic anemia - likely multifactorial, abscess/malignancy. Iron stores appear to be adequate.  Transfuse if hgb<7.0. Hgb11.1; reactive leukocytosis

## 2019-12-15 NOTE — PROGRESS NOTE ADULT - SUBJECTIVE AND OBJECTIVE BOX
This is a 69y old female with a past medical history significant for COPD, DJD with hip replacements who was admitted with sob x 1 month duration, weight loss and is being treated for a COPD exacerbation. .  CT abdomen was done on 12/6/2019 that showed rectal wall thickening with posterior air pocket due to possible perforation.  It also showed signs c/w bone mets in the iliac bone as well as scattered bilateral lung masses.   Colorectal surgery was consulted and pelvic MRI was recommended and done on 12/9/2019 and it showed signs c/w a perforation of the posterior wall of the distal rectum and anus with a fluid collection.   She had an echocardiogram with RV strain and there was concern for PE but CT angio was negative for PE but did demonstrate scattered lung nodules.   She had a colonoscopy in [facility] [place] last year that was supposedly negative for malignancy.     s/p EUA with rectal mass biopsy and bilateral seton placement.Post rectal wall abscess unmasked/draining mucoid material thru cut fistula.  Refuses ostomy  She is feeling reasonably well. Denies pain. No n/v/d. Appetite improving.  Family at bedside-Pt is forgetful; does not   have insight into her situation.       MEDICATIONS  (STANDING):  enoxaparin Injectable 40 milliGRAM(s) SubCutaneous daily  folic acid 1 milliGRAM(s) Oral daily  predniSONE   Tablet 20 milliGRAM(s) Oral once    MEDICATIONS  (PRN):      PAST MEDICAL & SURGICAL HISTORY:  Anemia  COPD (chronic obstructive pulmonary disease)  H/O bilateral hip replacements    Gen: nad, resting comfortably  CV: RRR  Abd: soft  Extrem:  no edema    12/15/18  WBC 31.98, H/H 11.1/34.3, plt ct 569,000    12/12/19:  H/H 8.9/27.8, plt ct 486, WBC 13.07                                     8.9    13.07 )-----------( 486      ( 12 Dec 2019 07:40 )             27.8     12-12    143  |  102  |  12.0  ----------------------------<  99  3.9   |  28.0  |  0.59    Ca    8.4<L>      12 Dec 2019 07:40    TPro  6.1<L>  /  Alb  2.4<L>  /  TBili  <0.2<L>  /  DBili  x   /  AST  16  /  ALT  15  /  AlkPhos  56  12-12

## 2019-12-15 NOTE — PROGRESS NOTE ADULT - ASSESSMENT
70 yo female with history of COPD who presents with 1 month of shortness of breath worsened over last 2 weeks associated with >30 lb weight loss in the last 6 months, decreased appetite. Patient admitted for respiratory failure 2/2 COPD exacerbation with possible metastatic lung disease. Pulmonology consulted and CT abdomen was obtained - CT showed rectal wall thickening with adjacent air-pockets; colorectal surgery was consulted and MRI was obtained.  May have colon cancer with mets to the lung. GI consult was recommended by colorectal surgeon and consulted. MRI showed perforation of distal rectum and anus w/ collection of fluid/air.  12/5 TTE was ordered for elevated BNP - she was shown to have severe right sided ventricular strain. Started empirically on treatment for PE and CTA was ordered (delayed due to having received contrast with the CT abdomen on the same day). 12/9 CTA negative for PE. 12/11 Nuclear Stress Test-Pharmacologic  Normal study; no evidence for myocardial infarction or ischemia. 12/12 colorectal surgery performed. EUA, rectum with rectal mass biopsy. Posterior rectal wall abscess unroofed and draining mucoid material through cutaneous fistulas b/l . B/L seton placement transrectally. Patient refusing colostomy.      Lung Nodules with rectal mass and abscess with cutaneous fistulas; possibly metastatic disease  - MRI of the abdomen with Perforation involving the posterior wall of the distal rectum and anus with a collection of fluid and air posteriorly measuring up to 6.5 cm.  - 12/12 Posterior rectal wall abscess unroofed and draining mucoid material through cutaneous fistulas b/l . B/L seton placement transrectally, rectal mass biopsy obtained.   -  Await pathology report but cleared for discharge from colorectal standpoint with plans to follow up with surgery- Dr. Lee in 2 weeks and oncologist.   - Sitz baths for comfort,  gauze can be removed and re-placed if draining  - Psych consult for capacity determination as it appears unclear if patient understands the gravity of her situation  - Palliative care consult    COPD exacerbation - hypoxia on admission has resolved  - completed IV azithromycin 500mg x3 days   - patient on anoro-ellipta 62.5/25 dose at home BID; ventolin as needed at home  - continue with duoneb q6 PRN  - now on prednisone  cont po taper  - Slight leukocytosis, possibly secondary to steroid use. Continue to monitor WBC    Anemia with Thrombocytosis-- likely GI source  - stable    Elevated BNP - no clinical signs of HF- significant RV strain on the echo  - pt had CTA- negative for PE   - nuc med stress test nl    slightly elevated INR - poss 2/2 poor diet/malignancy, resolved    protein calorie malnutrition  - supplement meals  - nutrition consulted     Depressed mood  - no suicidal ideation  - psych consulted, signed off. may f/u outpatient   - may benefit from medication    DVT - lovenox subcut    Disposition - Anticipate discharge in next 24 hours. Will have PT re-evaluate as patient noted to be in bed always.     Advised RN to mobilize patient and Sitz bath also ordered. Zinc oxide for groin rash        Had discussion with patient family yesterday, Spouse Reddy and son Clive. Patient forgetful, know from before. Want to meet with palliative care prior to discharge 68 yo female with history of COPD who presents with 1 month of shortness of breath worsened over last 2 weeks associated with >30 lb weight loss in the last 6 months, decreased appetite. Patient admitted for respiratory failure 2/2 COPD exacerbation with possible metastatic lung disease. Pulmonology consulted and CT abdomen was obtained - CT showed rectal wall thickening with adjacent air-pockets; colorectal surgery was consulted and MRI was obtained.  May have colon cancer with mets to the lung. GI consult was recommended by colorectal surgeon and consulted. MRI showed perforation of distal rectum and anus w/ collection of fluid/air.  12/5 TTE was ordered for elevated BNP - she was shown to have severe right sided ventricular strain. Started empirically on treatment for PE and CTA was ordered (delayed due to having received contrast with the CT abdomen on the same day). 12/9 CTA negative for PE. 12/11 Nuclear Stress Test-Pharmacologic  Normal study; no evidence for myocardial infarction or ischemia. 12/12 colorectal surgery performed. EUA, rectum with rectal mass biopsy. Posterior rectal wall abscess unroofed and draining mucoid material through cutaneous fistulas b/l . B/L seton placement transrectally. Patient refusing colostomy.      Lung Nodules with rectal mass and abscess with cutaneous fistulas; possibly metastatic disease  - MRI of the abdomen with Perforation involving the posterior wall of the distal rectum and anus with a collection of fluid and air posteriorly measuring up to 6.5 cm.  - 12/12 Posterior rectal wall abscess unroofed and draining mucoid material through cutaneous fistulas b/l . B/L seton placement transrectally, rectal mass biopsy obtained.   -  Await pathology report but cleared for discharge from colorectal standpoint with plans to follow up with surgery- Dr. Lee in 2 weeks and oncologist.   - Sitz baths for comfort,  gauze can be removed and re-placed if draining  - Psych consult for capacity determination as it appears unclear if patient understands the gravity of her situation  - Palliative care consult    COPD exacerbation - hypoxia on admission has resolved  - completed IV azithromycin 500mg x3 days   - patient on anoro-ellipta 62.5/25 dose at home BID; ventolin as needed at home  - continue with duoneb q6 PRN  - Completed short course steroids  - Slight leukocytosis, possibly secondary to steroid use. Continue to monitor WBC    Anemia with Thrombocytosis-- likely GI source  - stable    Elevated BNP - no clinical signs of HF- significant RV strain on the echo  - pt had CTA- negative for PE   - nuc med stress test nl    slightly elevated INR - poss 2/2 poor diet/malignancy, resolved    protein calorie malnutrition  - supplement meals  - nutrition consulted     Depressed mood  - no suicidal ideation  - psych consulted, signed off. may f/u outpatient   - may benefit from medication    DVT - lovenox subcut    Disposition - Anticipate discharge in next 24 hours. Will have PT re-evaluate as patient noted to be in bed always.     Advised RN to mobilize patient and Sitz bath also ordered. Zinc oxide for groin rash        Had discussion with patient family yesterday, Spouse Reddy and son Clive. Patient forgetful, know from before. Want to meet with palliative care prior to discharge

## 2019-12-15 NOTE — PROGRESS NOTE ADULT - SUBJECTIVE AND OBJECTIVE BOX
HOSPITALIST PROGRESS NOTE    MARQUITA OTOOLE  [id number]  69yFemale    Patient is a 69y old  Female who presents with a chief complaint of acute hypoxic respiratory failure, multiple lung nodules (14 Dec 2019 20:15)      SUBJECTIVE:   Chart reviewed since last visit.  Patient seen and examined at bedside for rectal mass, fistula, COPD  Denies any dyspnea, cough or chest pain.  Had BM earlier as per RN -       OBJECTIVE:  Vital Signs Last 24 Hrs  T(C): 36.7 (15 Dec 2019 08:40), Max: 36.8 (14 Dec 2019 23:03)  T(F): 98 (15 Dec 2019 08:40), Max: 98.2 (14 Dec 2019 23:03)  HR: 98 (15 Dec 2019 10:08) (98 - 117)  BP: 105/66 (15 Dec 2019 10:08) (101/66 - 107/71)   RR: 18 (15 Dec 2019 08:40) (18 - 18)  SpO2: 90% (15 Dec 2019 08:40) (90% - 94%)    PHYSICAL EXAMINATION  General: Lying in bed, NAD  HEENT:  EOMI  NECK:  Supple  CVS: regular rate and rhythm S1 S2  RESP:  Fair air entry bilaterally  GI:  Soft nondistended nontender BS+. Seton+  : Groin rash   MSK:  no edema  CNS:  follows commands, moves all extremities  INTEG:  warm skin  PSYCH: Appears to be somewhat withdrawn and lacks insight about her condition    MONITOR:  CAPILLARY BLOOD GLUCOSE            I&O's Summary                          10.3   16.60 )-----------( 538      ( 14 Dec 2019 14:17 )             31.3       12-14    137  |  98  |  14.0  ----------------------------<  132<H>  4.5   |  28.0  |  0.99    Ca    8.8      14 Dec 2019 14:17    TPro  6.3<L>  /  Alb  2.6<L>  /  TBili  <0.2<L>  /  DBili  x   /  AST  17  /  ALT  15  /  AlkPhos  61  12-14            Culture:    TTE:    RADIOLOGY        MEDICATIONS  (STANDING):  ALBUTerol    90 MICROgram(s) HFA Inhaler 1 Puff(s) Inhalation every 4 hours  buDESOnide    Inhalation Suspension 0.5 milliGRAM(s) Inhalation two times a day  enoxaparin Injectable 40 milliGRAM(s) SubCutaneous daily  folic acid 1 milliGRAM(s) Oral daily  multivitamin/minerals 1 Tablet(s) Oral daily  pantoprazole    Tablet 40 milliGRAM(s) Oral before breakfast      MEDICATIONS  (PRN):  ALBUTerol    0.083% 2.5 milliGRAM(s) Nebulizer every 6 hours PRN Shortness of Breath and/or Wheezing  zinc oxide 20% Ointment 1 Application(s) Topical three times a day PRN bowel movement

## 2019-12-16 DIAGNOSIS — D72.829 ELEVATED WHITE BLOOD CELL COUNT, UNSPECIFIED: ICD-10-CM

## 2019-12-16 DIAGNOSIS — K62.89 OTHER SPECIFIED DISEASES OF ANUS AND RECTUM: ICD-10-CM

## 2019-12-16 DIAGNOSIS — Z51.5 ENCOUNTER FOR PALLIATIVE CARE: ICD-10-CM

## 2019-12-16 LAB
ANION GAP SERPL CALC-SCNC: 15 MMOL/L — SIGNIFICANT CHANGE UP (ref 5–17)
BUN SERPL-MCNC: 10 MG/DL — SIGNIFICANT CHANGE UP (ref 8–20)
CALCIUM SERPL-MCNC: 8.6 MG/DL — SIGNIFICANT CHANGE UP (ref 8.6–10.2)
CHLORIDE SERPL-SCNC: 98 MMOL/L — SIGNIFICANT CHANGE UP (ref 98–107)
CO2 SERPL-SCNC: 25 MMOL/L — SIGNIFICANT CHANGE UP (ref 22–29)
CREAT SERPL-MCNC: 0.63 MG/DL — SIGNIFICANT CHANGE UP (ref 0.5–1.3)
CRP SERPL-MCNC: 13.63 MG/DL — HIGH (ref 0–0.4)
ERYTHROCYTE [SEDIMENTATION RATE] IN BLOOD: 55 MM/HR — HIGH (ref 0–20)
GLUCOSE SERPL-MCNC: 107 MG/DL — SIGNIFICANT CHANGE UP (ref 70–115)
HCT VFR BLD CALC: 30.1 % — LOW (ref 34.5–45)
HGB BLD-MCNC: 9.7 G/DL — LOW (ref 11.5–15.5)
MCHC RBC-ENTMCNC: 29.6 PG — SIGNIFICANT CHANGE UP (ref 27–34)
MCHC RBC-ENTMCNC: 32.2 GM/DL — SIGNIFICANT CHANGE UP (ref 32–36)
MCV RBC AUTO: 91.8 FL — SIGNIFICANT CHANGE UP (ref 80–100)
PLATELET # BLD AUTO: 368 K/UL — SIGNIFICANT CHANGE UP (ref 150–400)
POTASSIUM SERPL-MCNC: 3.7 MMOL/L — SIGNIFICANT CHANGE UP (ref 3.5–5.3)
POTASSIUM SERPL-SCNC: 3.7 MMOL/L — SIGNIFICANT CHANGE UP (ref 3.5–5.3)
PROCALCITONIN SERPL-MCNC: 0.11 NG/ML — HIGH (ref 0.02–0.1)
RBC # BLD: 3.28 M/UL — LOW (ref 3.8–5.2)
RBC # FLD: 15.8 % — HIGH (ref 10.3–14.5)
SODIUM SERPL-SCNC: 138 MMOL/L — SIGNIFICANT CHANGE UP (ref 135–145)
WBC # BLD: 28.69 K/UL — HIGH (ref 3.8–10.5)
WBC # FLD AUTO: 28.69 K/UL — HIGH (ref 3.8–10.5)

## 2019-12-16 PROCEDURE — 99223 1ST HOSP IP/OBS HIGH 75: CPT

## 2019-12-16 PROCEDURE — 74177 CT ABD & PELVIS W/CONTRAST: CPT | Mod: 26

## 2019-12-16 PROCEDURE — 93970 EXTREMITY STUDY: CPT | Mod: 26

## 2019-12-16 PROCEDURE — 99232 SBSQ HOSP IP/OBS MODERATE 35: CPT

## 2019-12-16 RX ADMIN — Medication 0.5 MILLIGRAM(S): at 21:27

## 2019-12-16 RX ADMIN — ALBUTEROL 2.5 MILLIGRAM(S): 90 AEROSOL, METERED ORAL at 08:29

## 2019-12-16 RX ADMIN — PANTOPRAZOLE SODIUM 40 MILLIGRAM(S): 20 TABLET, DELAYED RELEASE ORAL at 05:24

## 2019-12-16 RX ADMIN — Medication 1 TABLET(S): at 13:29

## 2019-12-16 RX ADMIN — Medication 0.5 MILLIGRAM(S): at 08:29

## 2019-12-16 RX ADMIN — ENOXAPARIN SODIUM 40 MILLIGRAM(S): 100 INJECTION SUBCUTANEOUS at 13:29

## 2019-12-16 RX ADMIN — ALBUTEROL 2.5 MILLIGRAM(S): 90 AEROSOL, METERED ORAL at 21:27

## 2019-12-16 RX ADMIN — Medication 1 MILLIGRAM(S): at 13:29

## 2019-12-16 NOTE — CONSULT NOTE ADULT - PROBLEM SELECTOR RECOMMENDATION 3
seen by Psychiatry - recommended Lexapro. Patient declined ID consulted  completed IV Azithromycin  Ct ordered

## 2019-12-16 NOTE — PHYSICAL THERAPY INITIAL EVALUATION ADULT - IMPAIRMENTS FOUND, PT EVAL
muscle strength/aerobic capacity/endurance/gait, locomotion, and balance/neuromotor development and sensory integration

## 2019-12-16 NOTE — PROGRESS NOTE ADULT - SUBJECTIVE AND OBJECTIVE BOX
This is a 69y old female with a past medical history significant for COPD, DJD with hip replacements who was admitted with sob x 1 month duration, weight loss and is being treated for a COPD exacerbation. .  CT abdomen was done on 12/6/2019 that showed rectal wall thickening with posterior air pocket due to possible perforation.  It also showed signs c/w bone mets in the iliac bone as well as scattered bilateral lung masses.   Colorectal surgery was consulted and pelvic MRI was recommended and done on 12/9/2019 and it showed signs c/w a perforation of the posterior wall of the distal rectum and anus with a fluid collection.   She had an echocardiogram with RV strain and there was concern for PE but CT angio was negative for PE but did demonstrate scattered lung nodules.   [place] [facility] [place] for [place].     s/p EUA with rectal mass biopsy and bilateral seton placement.Post rectal wall abscess unmasked/draining mucoid material thru cut fistula.  Refuses ostomy  She is feeling reasonably well. Denies pain. No n/v/d. Appetite improving.  Pt is forgetful; does not   have insight into her situation.       MEDICATIONS  (STANDING):  ALBUTerol    90 MICROgram(s) HFA Inhaler 1 Puff(s) Inhalation every 4 hours  buDESOnide    Inhalation Suspension 0.5 milliGRAM(s) Inhalation two times a day  enoxaparin Injectable 40 milliGRAM(s) SubCutaneous daily  folic acid 1 milliGRAM(s) Oral daily  multivitamin/minerals 1 Tablet(s) Oral daily  pantoprazole    Tablet 40 milliGRAM(s) Oral before breakfast    MEDICATIONS  (PRN):  ALBUTerol    0.083% 2.5 milliGRAM(s) Nebulizer every 6 hours PRN Shortness of Breath and/or Wheezing  zinc oxide 20% Ointment 1 Application(s) Topical three times a day PRN bowel movement      Vital Signs Last 24 Hrs  T(C): 37.6 (16 Dec 2019 15:58), Max: 37.6 (16 Dec 2019 15:58)  T(F): 99.6 (16 Dec 2019 15:58), Max: 99.6 (16 Dec 2019 15:58)  HR: 105 (16 Dec 2019 21:27) (86 - 112)  BP: 95/58 (16 Dec 2019 15:58) (85/55 - 95/58)  BP(mean): --  RR: 18 (16 Dec 2019 15:58) (18 - 18)  SpO2: 98% (16 Dec 2019 21:27) (91% - 98%)  Gen: nad, resting comfortably  CV: RRR  Abd: soft  Extrem:  no edema    12/15/18  WBC 31.98, H/H 11.1/34.3, plt ct 569,000    12/12/19:  H/H 8.9/27.8, plt ct 486, WBC 13.07               CBC Full  -  ( 16 Dec 2019 11:05 )  WBC Count : 28.69 K/uL  RBC Count : 3.28 M/uL  Hemoglobin : 9.7 g/dL  Hematocrit : 30.1 %  Platelet Count - Automated : 368 K/uL  Mean Cell Volume : 91.8 fl  Mean Cell Hemoglobin : 29.6 pg  Mean Cell Hemoglobin Concentration : 32.2 gm/dL  Auto Neutrophil # : x  Auto Lymphocyte # : x  Auto Monocyte # : x  Auto Eosinophil # : x  Auto Basophil # : x  Auto Neutrophil % : x  Auto Lymphocyte % : x  Auto Monocyte % : x  Auto Eosinophil % : x  Auto Basophil % : x    12-16    138  |  98  |  10.0  ----------------------------<  107  3.7   |  25.0  |  0.63    Ca    8.6      16 Dec 2019 11:05

## 2019-12-16 NOTE — CONSULT NOTE ADULT - PROBLEM SELECTOR RECOMMENDATION 5
Discussed with Dr. Manning and nurse Josey. Patient reported as very forgetful. Nurse reports refusing blood work this morning   Met with patient- AOx2, pleasant, limited when responding. She is aware of the recent biopsy of her rectum.  Spoke with  Paresh.  Memory issues began about 2- 3 years ago. She was not evaluated nor diagnosed with dementia, as she did not like going to the doctor. She has a history of depression, after  her mother and aunts passed also  about 3 years ago. She never sought any professional attention.   states they had discussed the issue if biopsy results are positive for cancer. He states she would want chemotherapy but NOT surgery.  Await biopsy results. CT pending Discussed with Dr. Manning and nurse Josey. Patient reported as very forgetful. Nurse reports refusing blood work this morning   Met with patient- AOx2, pleasant, limited when responding. She is aware of the recent biopsy of her rectum.  Spoke with  Paresh.  Memory issues began about 2- 3 years ago. She was not evaluated nor diagnosed with dementia, as she did not like going to the doctor. She has a history of depression, after  her mother and aunts passed also  about 3 years ago. She never sought any professional attention.   states they had discussed the issue if biopsy results are positive for cancer. He states she would want chemotherapy but NOT surgery.  Await biopsy results. CT pending    will be in hospital tomorrow and will contact me.  Plan for further GOC discussions

## 2019-12-16 NOTE — PHYSICAL THERAPY INITIAL EVALUATION ADULT - CRITERIA FOR SKILLED THERAPEUTIC INTERVENTIONS
impairments found/therapy frequency/predicted duration of therapy intervention/risk reduction/prevention/functional limitations in following categories/rehab potential/anticipated equipment needs at discharge/anticipated discharge recommendation

## 2019-12-16 NOTE — PHYSICAL THERAPY INITIAL EVALUATION ADULT - LIVES WITH, PROFILE
other relative/Pt lives with brother and sister in private home, 8 steps to enter with two rails, 6 steps with rail to bedroom

## 2019-12-16 NOTE — PHYSICAL THERAPY INITIAL EVALUATION ADULT - ADDITIONAL COMMENTS
Pt reports independent PTA, owns no DME. +, shops, cooks etc. Pt lives with brother and sister who are both retired, denies needing any assist.

## 2019-12-16 NOTE — PROGRESS NOTE ADULT - ASSESSMENT
69 year old female with history noted in [place] admitted with resp failure sec to COPD exacerbation but also found to have a perforated rectal wall and likely metastatic disease.      1) Rectal mass - likely malignant and there is likely metastatic disease in the lungs and bones. She is post-op and recovering well. Biopsy path result pending.   Will follow path and treatment options will depend on the final result.  CEA is 18.  Follow up as outpatient to finalize treatment plan. Awaiting bx results.  Disc with family at bedside; son made health care proxy.  Discussed likely malignant dx and need for outpt follow up. She does not have insight into her disease and what it implies. Will need appropriate social support.    2) Normocytic anemia - likely multifactorial, abscess/malignancy. Iron stores appear to be adequate.  Transfuse if hgb<7.0. Hgb11.1; reactive leukocytosis

## 2019-12-16 NOTE — CONSULT NOTE ADULT - PROBLEM SELECTOR RECOMMENDATION 4
Discussed with Dr. Manning and nurse Josey. Patient reported as very forgetful. Nurse reports refusing blood work this morning   Met with patient- AOx2, pleasant, limited when responding. She is aware of the recent biopsy of her rectum.  Spoke with  Paresh.  Memory issues began about 2- 3 years ago. She was not evaluated nor diagnosed with dementia, as she did not like going to the doctor. She has a history of depression, after  her mother and aunts passed also  about 3 years ago. She never sought any professional attention.   states they had discussed the issue if biopsy results are positive for cancer. He states she would want chemotherapy but NOT surgery.  Await biopsy results. seen by Psychiatry - recommended Lexapro. Patient declined

## 2019-12-16 NOTE — CONSULT NOTE ADULT - PROBLEM SELECTOR PROBLEM 4
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

## 2019-12-16 NOTE — CONSULT NOTE ADULT - SUBJECTIVE AND OBJECTIVE BOX
[facility]  INFECTIOUS DISEASES AND INTERNAL MEDICINE at [place]  =======================================================  Hussain Candelario MD  Diplomates American Board of Internal Medicine and Infectious Diseases  Tel: [phone]      Fax: [phone]  =======================================================      [id number]  MARQUITA SYDNIE     CC: Patient is a 69y old  Female who presents with a chief complaint of acute hypoxic respiratory failure, multiple lung nodules (15 Dec 2019 17:47)    70y/o  Female with h/o COPD. Patient initially presented 12/5/19 with SOB. Patient was admitted for COPD exacerbation and treated with steroids (12/5 to 12/14), nebs, azithromycin ( 12/5 to 12/8). Patient was also found to have Distal rectal mass with possible metastatic disease. Patient underwent rectal mass biopsy 12/12. Now noted to have leukocytosis. ID input requested. Patient reports no cough, SOB,, chest pain, headache, fever or chills, diarrhea, abdominal pain, joint pains.        Past Medical & Surgical Hx:  Anemia  COPD (chronic obstructive pulmonary disease)  H/O bilateral hip replacements      Social Hx:  + smoker      FAMILY HISTORY:  Mother - DM type 2      Allergies  No Known Allergies      ANTIBIOTICS:   None       REVIEW OF SYSTEMS:  CONSTITUTIONAL:  No Fever or chills  HEENT:  No diplopia or blurred vision.  No earache, sore throat or runny nose.  CARDIOVASCULAR:  No chest pain   RESPIRATORY:  No cough, shortness of breath  GASTROINTESTINAL:  No nausea, vomiting or diarrhea.  GENITOURINARY:  No dysuria, frequency or urgency.   MUSCULOSKELETAL:  no joint aches, no muscle pain  SKIN:  No change in skin, hair or nails.  NEUROLOGIC:  No Headaches, seizures or weakness.  PSYCHIATRIC:  No disorder of thought or mood.  ENDOCRINE:  No heat or cold intolerance  HEMATOLOGICAL:  No easy bruising or bleeding.       Physical Exam:  Vital Signs Last 24 Hrs  T(C): 36.9 (16 Dec 2019 07:38), Max: 37.1 (15 Dec 2019 16:20)  T(F): 98.5 (16 Dec 2019 07:38), Max: 98.7 (15 Dec 2019 16:20)  HR: 86 (16 Dec 2019 08:29) (86 - 105)  BP: 85/55 (16 Dec 2019 07:38) (85/55 - 105/66)  RR: 18 (16 Dec 2019 07:38) (17 - 18)  SpO2: 93% (16 Dec 2019 08:29) (90% - 93%)      GEN: NAD, pleasant  HEENT: normocephalic and atraumatic. EOMI. PERRL.  Anicteric  NECK: Supple.   LUNGS: Clear to auscultation.  HEART: Regular rate and rhythm  ABDOMEN: Soft, nontender, and nondistended.  Positive bowel sounds.    : No CVA tenderness  EXTREMITIES: Without any edema.  MSK: No joint swelling  NEUROLOGIC: No Focal Deficits  PSYCHIATRIC: Appropriate affect .  SKIN: No Rash      Labs:  12-15    138  |  96<L>  |  10.0  ----------------------------<  78  4.1   |  31.0<H>  |  0.66    Ca    8.9      15 Dec 2019 12:09    TPro  6.3<L>  /  Alb  2.6<L>  /  TBili  <0.2<L>  /  DBili  x   /  AST  17  /  ALT  15  /  AlkPhos  61  12-14                          11.1   31.98 )-----------( 566      ( 15 Dec 2019 12:09 )             34.3       LIVER FUNCTIONS - ( 14 Dec 2019 14:17 )  Alb: 2.6 g/dL / Pro: 6.3 g/dL / ALK PHOS: 61 U/L / ALT: 15 U/L / AST: 17 U/L / GGT: x               < from: MR Pelvis w/ IV Cont (12.09.19 @ 22:31) >  EXAM:  MR PELVIS IC                          PROCEDURE DATE:  12/09/2019      INTERPRETATION:  FINAL REPORT:    PROCEDURE INFORMATION:   Exam: MR Pelvis Without and With Contrast, Rectum   Exam date and time: 12/9/2019 7:15 PM   Age: 69 years old   Clinical history: Screening exam; Rectal mass     TECHNIQUE:   Imaging protocol: Magnetic resonance images of the pelvis without and   with   intravenous contrast. Includes high resolution, T2-weighted sequences   oriented   to the long axis of the rectum.   Scanner: 1.5 Jessica. 5 cc Gadavist was administered.    COMPARISON:   CT abdomen/pelvis 12/6/2019    FINDINGS:     There is a perforation involving the posterior aspect of the low rectum   and anus with a collection of fluid and air posteriorly measuring 6.5 x   2.9 x 4.8 cm. There is a tract extending from the collection through the   puborectalis into the left ischiorectal fossa and an in a collection   measuring 3.4 x 1.2 cm.    There is intermediate T2 signal lesion in the distal rectum/anus   measuring 5.2 x 1.9 cm, either related to wall thickening or a mass.    There are bilateral hip arthroplasties.    There are no pathologically enlarged lymph nodes in the pelvis.    There is a small amount of presacral free fluid.    IMPRESSION:     Perforation involving the posterior wall of the distal rectum and anus   with a collection of fluid and air posteriorly measuring up to 6.5 cm.    Tract extending from the collection through the puborectalis into the   left ischiorectal fossa and in a collection measuring 3.4 cm.    Intermediate T2 signal lesion in the low rectum/anus measuring up to 5.2   cm, either related wall thickening or a mass; correlation is recommended   with direct visualization.    < end of copied text >

## 2019-12-16 NOTE — PROGRESS NOTE ADULT - ASSESSMENT
68 yo female with history of COPD who presents with 1 month of shortness of breath worsened over last 2 weeks associated with >30 lb weight loss in the last 6 months, decreased appetite. Patient admitted for respiratory failure 2/2 COPD exacerbation with possible metastatic lung disease. Pulmonology consulted and CT abdomen was obtained - CT showed rectal wall thickening with adjacent air-pockets; colorectal surgery was consulted and MRI was obtained.  May have colon cancer with mets to the lung. GI consult was recommended by colorectal surgeon and consulted. MRI showed perforation of distal rectum and anus w/ collection of fluid/air.  12/5 TTE was ordered for elevated BNP - she was shown to have severe right sided ventricular strain. Started empirically on treatment for PE and CTA was ordered (delayed due to having received contrast with the CT abdomen on the same day). 12/9 CTA negative for PE. 12/11 Nuclear Stress Test-Pharmacologic  Normal study; no evidence for myocardial infarction or ischemia. 12/12 colorectal surgery performed. EUA, rectum with rectal mass biopsy. Posterior rectal wall abscess unroofed and draining mucoid material through cutaneous fistulas b/l . B/L seton placement transrectally. Patient refusing colostomy.      Lung Nodules with rectal mass and abscess with cutaneous fistulas; possibly metastatic disease  - MRI of the abdomen with Perforation involving the posterior wall of the distal rectum and anus with a collection of fluid and air posteriorly measuring up to 6.5 cm.  - 12/12 Posterior rectal wall abscess unroofed and draining mucoid material through cutaneous fistulas b/l . B/L seton placement transrectally, rectal mass biopsy obtained.   -  Await pathology report but cleared for discharge from colorectal standpoint with plans to follow up with surgery- Dr. Lee in 2 weeks and oncologist.   - Sitz baths for comfort,  gauze can be removed and re-placed if draining  - Psych consult for capacity determination as it appears unclear if patient understands the gravity of her situation  - Palliative care consult  Now with worsening leukocytosis - ID consult, Surgery follow. Discussed with surgery resident - CT ordered    COPD exacerbation - hypoxia on admission has resolved  - completed IV azithromycin 500mg x3 days   - patient on anoro-ellipta 62.5/25 dose at home BID; ventolin as needed at home  - continue with duoneb q6 PRN  - Completed short course steroids  - Slight leukocytosis, possibly secondary to steroid use. Continue to monitor WBC    Anemia with Thrombocytosis-- likely GI source  - stable    Elevated BNP - no clinical signs of HF- significant RV strain on the echo  - pt had CTA- negative for PE   - nuc med stress test nl    slightly elevated INR - poss 2/2 poor diet/malignancy, resolved    protein calorie malnutrition  - supplement meals  - nutrition consulted     Depressed mood  - no suicidal ideation  - psych consulted, signed off. may f/u outpatient   - may benefit from medication    DVT - lovenox subcut    Disposition - PT recommendations for home with home care. pending repeat imaging and blood work

## 2019-12-16 NOTE — CONSULT NOTE ADULT - ASSESSMENT
68y/o  Female with h/o COPD. Patient initially presented 12/5/19 with SOB. Patient was admitted for COPD exacerbation and treated with steroids (12/5 to 12/14), nebs, azithromycin ( 12/5 to 12/8). Patient was also found to have Distal rectal mass with possible metastatic disease. Patient underwent rectal mass biopsy 12/12. Now noted to have leukocytosis.      Leukocytosis  rectal mass s/p biopsy 12/12  possible metastatic disease   COPD      - Will repeat CBC today, ordered  - Check Procalcitonin level today, ordered  - No recorded fever, if spikes fever do Blood cultures   - Check B/L LE Duplex, ordered  - Completed steroids (12/5 to 12/14)   - Monitor off antibiotics  - Trend Fever  - Trend Leukocytosis      Will Follow

## 2019-12-16 NOTE — CONSULT NOTE ADULT - SUBJECTIVE AND OBJECTIVE BOX
Palliative Medicine Initial Consultation Note    HPI:  70 yo female with history of COPD who presents with 1 month of shortness of breath worsened over last 2 weeks associated with >30 lb weight loss in the last 6 months, decreased appetite. Denies chest pain, leg pain/swelling. Denies N/V, abdominal pain, diarrhea, constipation. Patient does admit to being depressed in mood but denies any suicidal ideation.     PERTINENT PMH REVIEWED:  [ ] YES [ ] NO         Anemia     COPD (chronic obstructive pulmonary disease).     PAST SURGICAL HISTORY:  H/O bilateral hip replacements.      SOCIAL HISTORY:  EtOH [ ] Yes  [x ] No                                    Drugs [ ] Yes x[ ] No                                   [ x] smoker [ ] nonsmoker                                    Admitted from: [ x] home [ ] SNF _________ [ ] [facility] ________    Surrogate/HCP/Guardian:     FAMILY HISTORY:      Baseline ADLs (prior to admission):  Independent [ ] moderately [ ] fully   Dependent   [ ] moderately [ ]fully    MEDICATIONS  (STANDING):  ALBUTerol    90 MICROgram(s) HFA Inhaler 1 Puff(s) Inhalation every 4 hours  buDESOnide    Inhalation Suspension 0.5 milliGRAM(s) Inhalation two times a day  enoxaparin Injectable 40 milliGRAM(s) SubCutaneous daily  folic acid 1 milliGRAM(s) Oral daily  multivitamin/minerals 1 Tablet(s) Oral daily  pantoprazole    Tablet 40 milliGRAM(s) Oral before breakfast    MEDICATIONS  (PRN):  ALBUTerol    0.083% 2.5 milliGRAM(s) Nebulizer every 6 hours PRN Shortness of Breath and/or Wheezing  zinc oxide 20% Ointment 1 Application(s) Topical three times a day PRN bowel movement      Allergies    No Known Allergies    Intolerances        REVIEW OF SYSTEMS       [ ] Unable to obtain due to poor mentation     General: no fevers, no fatigue, no loss of appetite    Skin: no rashes, skin changes  	  Ophthalmologic: no eye pain or blurred vision  	  ENMT:	no sore throat, no ear pain    Respiratory and Thorax: no cough,  no  SOB 	    Cardiovascular:	no chest pain, no leg swelling    Gastrointestinal:	no  n/v/d,c    Genitourinary:	no FUD    Musculoskeletal: no muscle pain	    Neurological: no seizures, no dizziness    Hematology/Lymphatics: no petechia or purpura	    Endocrine: no polyuria, no polydipsia	      Karnofsky Performance Score/Palliative Performance Status Version 2:         %    Vital Signs Last 24 Hrs  T(C): 36.9 (16 Dec 2019 07:38), Max: 37.1 (15 Dec 2019 16:20)  T(F): 98.5 (16 Dec 2019 07:38), Max: 98.7 (15 Dec 2019 16:20)  HR: 86 (16 Dec 2019 08:29) (86 - 105)  BP: 85/55 (16 Dec 2019 07:38) (85/55 - 100/62)  BP(mean): --  RR: 18 (16 Dec 2019 07:38) (17 - 18)  SpO2: 93% (16 Dec 2019 08:29) (90% - 93%)    PHYSICAL EXAM:    General: [ ] alert  [ ] oriented x ____ [ ] lethargic [ ] agitated                  [ ] cachexia  [ ] nonverbal  [ ] coma    HEENT: [ ] normal  [ ] dry mouth  [ ] ET tube/trach    Lungs: [ ] comfortable [ ] tachypnea/labored breathing  [ ] excessive secretions    CV: [ ] normal  [ ] tachycardia    GI: [ ] normal  [ ] distended  [ ] tender  [ ] no BS               [ ] PEG/NG/OG tube    : [ ] normal  [ ] incontinent  [ ] oliguria/anuria  [ ] azar    MSK: [ ] normal  [ ] weakness  [ ] edema             [ ] ambulatory  [ ] bedbound/wheelchair bound    Skin: [ ] normal  [ ] pressure ulcers- Stage_____  [ ] no rash    LABS:                        9.7    28.69 )-----------( 368      ( 16 Dec 2019 11:05 )             30.1     12-16    138  |  98  |  10.0  ----------------------------<  107  3.7   |  25.0  |  0.63    Ca    8.6      16 Dec 2019 11:05    TPro  6.3<L>  /  Alb  2.6<L>  /  TBili  <0.2<L>  /  DBili  x   /  AST  17  /  ALT  15  /  AlkPhos  61  12-14        I&O's Summary      RADIOLOGY & ADDITIONAL STUDIES:  < from: CT Angio Chest w/ IV Cont (12.09.19 @ 19:02) >   EXAM:  CT ANGIO CHEST (W)AW IC                          PROCEDURE DATE:  12/09/2019          INTERPRETATION:  CTA chest .  COMPARISON: Noncontrast CT scan 12/5/2019.  CLINICAL INFORMATION: chest pain, dyspnea.  TECHNIQUE: Contiguous axial 1.25 mm slice thickness images of the chest   were obtained after intravenous contrast administration utilizing PE   protocol.  Maximum intensity projection,(MIP),  imaging was created and interpreted.  100 mls of Omnipaque 300 was administered intravenously without   complication and 0 mls were discarded.    FINDINGS:  There are no pulmonary arterial filling defects to suggest pulmonary   embolism.    The mediastinum great vessels are normal.   There is a moderate gastroesophageal hiatal hernia.  There are no mediastinal masses or lymphadenopathy.     There is mild cardiomegaly. No pericardial effusion.  The airway is patent showing normal caliber and contour.  Moderate emphysematous   changes.  Scattered bilateral pulmonary nodules measuring up to 1.4 cm in unchanged   from prior CT scan 12/5/2019. A mild fibrotic scarring deformity in the   RIGHT middle lobe. No large airspace consolidation or evidence of   infectious pneumonia.    There is no pleural effusion or pneumothorax.    Visualized upper abdominal viscera unremarkable.    Osseous thorax intact.    IMPRESSION:    No evidence of pulmonary embolism.  Diffuse pulmonary nodules the largest measuring 1.4 cm within the RIGHT   lower lobe unchanged from prior exam consistent with metastatic lung   disease.  Centrilobular emphysema predominantly involving the upper lobes.      < end of copied text >    < from: CT Abdomen and Pelvis w/ IV Cont (12.06.19 @ 11:43) >  IMPRESSION:     Collection of fluid and air at the posterior aspect of the distal rectum   and anal canal as described above.    Additional foci of fluid and air adjacent to the anus with the largest on   the left which likely communicate with the larger collection.    Wall thickening involving the anus; differential includes infectious or   inflammatory etiologies or an underlying mass.    Osteolysis in the left hip.    Multiple small lucent lesions in the iliac bones; myeloma cannot be   excluded.    Bilateral indeterminate pulmonary nodules again seen at the lung bases.    Comparison is recommended with a prior study if available for the   pulmonary nodules and lucent lesions in the iliac bones.    The findings were discussed with Dr. Kay on 12/6/2019 12:53 PM    < end of copied text >    ADVANCE DIRECTIVES:  [ ] YES [x ] NO   DNR [ ] YES [x ] NO  Completed on:                     MOLST  [ ] YES [x] NO   Completed on:  Living Will  [ ] YES [ x] NO   Completed on:    Thank you for the opportunity to assist with the care of this patient.   [facility] Consult [facility] [phone]. Palliative Medicine Initial Consultation Note    HPI:  History from chart- patient poor historian  68 yo female with history of COPD who presents with 1 month of shortness of breath worsened over last 2 weeks associated with >30 lb weight loss in the last 6 months, decreased appetite. Denies chest pain, leg pain/swelling. Denies N/V, abdominal pain, diarrhea, constipation. Patient does admit to being depressed in mood but denies any suicidal ideation.   Brief hospital course- Patient admitted with exacerbation of COPD. She was to have a rectal mass, s/p biopsy on 12/12/19.     PERTINENT PMH REVIEWED:  [x ] YES [ ] NO         Anemia     COPD (chronic obstructive pulmonary disease).     PAST SURGICAL HISTORY:  H/O bilateral hip replacements.      SOCIAL HISTORY:  EtOH [ ] Yes  [x ] No                                    Drugs [ ] Yes x[ ] No                                   [ x] smoker [ ] nonsmoker                                    Admitted from: [ x] home [ ] SNF _________ [ ] [facility]___    Surrogate/HCP/Guardian:  Reddy Mejia    FAMILY HISTORY:poor historian      Baseline ADLs (prior to admission):  unclear  Independent [ ] moderately [ ] fully   Dependent   [ ] moderately [ ]fully    MEDICATIONS  (STANDING):  ALBUTerol    90 MICROgram(s) HFA Inhaler 1 Puff(s) Inhalation every 4 hours  buDESOnide    Inhalation Suspension 0.5 milliGRAM(s) Inhalation two times a day  enoxaparin Injectable 40 milliGRAM(s) SubCutaneous daily  folic acid 1 milliGRAM(s) Oral daily  multivitamin/minerals 1 Tablet(s) Oral daily  pantoprazole    Tablet 40 milliGRAM(s) Oral before breakfast    MEDICATIONS  (PRN):  ALBUTerol    0.083% 2.5 milliGRAM(s) Nebulizer every 6 hours PRN Shortness of Breath and/or Wheezing  zinc oxide 20% Ointment 1 Application(s) Topical three times a day PRN bowel movement      Allergies    No Known Allergies    Intolerances        REVIEW OF SYSTEMS     see HPI  [x ] Unable to obtain due to poor historian  Karnofsky Performance Score/Palliative Performance Status Version 2:  60 %    Vital Signs Last 24 Hrs  T(C): 36.9 (16 Dec 2019 07:38), Max: 37.1 (15 Dec 2019 16:20)  T(F): 98.5 (16 Dec 2019 07:38), Max: 98.7 (15 Dec 2019 16:20)  HR: 86 (16 Dec 2019 08:29) (86 - 105)  BP: 85/55 (16 Dec 2019 07:38) (85/55 - 100/62)  BP(mean): --  RR: 18 (16 Dec 2019 07:38) (17 - 18)  SpO2: 93% (16 Dec 2019 08:29) (90% - 93%)    PHYSICAL EXAM:    General: Thin woman, AOx2 NAD    HEENT: [ x] normal  [ ] dry mouth  [ ] ET tube/trach    Lungs: [ x] comfortable [ ] tachypnea/labored breathing  [ ] excessive secretions    CV: [x ] normal  [ ] tachycardia    GI: [x ] normal  [ ] distended  [ ] tender  [ ] no BS               [ ] PEG/NG/OG tube    : [ x] normal  [ ] incontinent  [ ] oliguria/anuria  [ ] azar    MSK: [ ] normal  [x ] weakness  [ ] edema             [ ] ambulatory  [ ] bedbound/wheelchair bound    Skin: [ ] normal  [ ] pressure ulcers- Stage_____  [x ] no rash    LABS:                        9.7    28.69 )-----------( 368      ( 16 Dec 2019 11:05 )             30.1     12-16    138  |  98  |  10.0  ----------------------------<  107  3.7   |  25.0  |  0.63    Ca    8.6      16 Dec 2019 11:05    TPro  6.3<L>  /  Alb  2.6<L>  /  TBili  <0.2<L>  /  DBili  x   /  AST  17  /  ALT  15  /  AlkPhos  61  12-14        I&O's Summary      RADIOLOGY & ADDITIONAL STUDIES:  < from: CT Angio Chest w/ IV Cont (12.09.19 @ 19:02) >   EXAM:  CT ANGIO CHEST (W)AW IC                          PROCEDURE DATE:  12/09/2019          INTERPRETATION:  CTA chest .  COMPARISON: Noncontrast CT scan 12/5/2019.  CLINICAL INFORMATION: chest pain, dyspnea.  TECHNIQUE: Contiguous axial 1.25 mm slice thickness images of the chest   were obtained after intravenous contrast administration utilizing PE   protocol.  Maximum intensity projection,(MIP),  imaging was created and interpreted.  100 mls of Omnipaque 300 was administered intravenously without   complication and 0 mls were discarded.    FINDINGS:  There are no pulmonary arterial filling defects to suggest pulmonary   embolism.    The mediastinum great vessels are normal.   There is a moderate gastroesophageal hiatal hernia.  There are no mediastinal masses or lymphadenopathy.     There is mild cardiomegaly. No pericardial effusion.  The airway is patent showing normal caliber and contour.  Moderate emphysematous   changes.  Scattered bilateral pulmonary nodules measuring up to 1.4 cm in unchanged   from prior CT scan 12/5/2019. A mild fibrotic scarring deformity in the   RIGHT middle lobe. No large airspace consolidation or evidence of   infectious pneumonia.    There is no pleural effusion or pneumothorax.    Visualized upper abdominal viscera unremarkable.    Osseous thorax intact.    IMPRESSION:    No evidence of pulmonary embolism.  Diffuse pulmonary nodules the largest measuring 1.4 cm within the RIGHT   lower lobe unchanged from prior exam consistent with metastatic lung   disease.  Centrilobular emphysema predominantly involving the upper lobes.      < end of copied text >    < from: CT Abdomen and Pelvis w/ IV Cont (12.06.19 @ 11:43) >  IMPRESSION:     Collection of fluid and air at the posterior aspect of the distal rectum   and anal canal as described above.    Additional foci of fluid and air adjacent to the anus with the largest on   the left which likely communicate with the larger collection.    Wall thickening involving the anus; differential includes infectious or   inflammatory etiologies or an underlying mass.    Osteolysis in the left hip.    Multiple small lucent lesions in the iliac bones; myeloma cannot be   excluded.    Bilateral indeterminate pulmonary nodules again seen at the lung bases.    Comparison is recommended with a prior study if available for the   pulmonary nodules and lucent lesions in the iliac bones.    The findings were discussed with Dr. Kay on 12/6/2019 12:53 PM    < end of copied text >    ADVANCE DIRECTIVES:  [ ] YES [x ] NO   DNR [ ] YES [x ] NO  Completed on:                     MOLST  [ ] YES [x] NO   Completed on:  Living Will  [ ] YES [ x] NO   Completed on:    Thank you for the opportunity to assist with the care of this patient.   [facility] Consult [facility] [phone]. Palliative Medicine Initial Consultation Note    HPI:  History from chart- patient poor historian  70 yo female with history of COPD who presents with 1 month of shortness of breath worsened over last 2 weeks associated with >30 lb weight loss in the last 6 months, decreased appetite. Denies chest pain, leg pain/swelling. Denies N/V, abdominal pain, diarrhea, constipation. Patient does admit to being depressed in mood but denies any suicidal ideation.   Brief hospital course- Patient admitted with exacerbation of COPD. She was to have a rectal mass, s/p biopsy on 12/12/19.     PERTINENT PMH REVIEWED:  [x ] YES [ ] NO         Anemia     COPD (chronic obstructive pulmonary disease).     PAST SURGICAL HISTORY:  H/O bilateral hip replacements.      SOCIAL HISTORY:  EtOH [ ] Yes  [x ] No                                    Drugs [ ] Yes x[ ] No                                   [ x] smoker [ ] nonsmoker                                    Admitted from: [ x] home [ ] SNF _________ [ ] [facility]    Surrogate/HCP/Guardian:  Reddy Mejia    FAMILY HISTORY:poor historian      Baseline ADLs (prior to admission):  unclear  Independent [ ] moderately [ ] fully   Dependent   [ ] moderately [ ]fully    MEDICATIONS  (STANDING):  ALBUTerol    90 MICROgram(s) HFA Inhaler 1 Puff(s) Inhalation every 4 hours  buDESOnide    Inhalation Suspension 0.5 milliGRAM(s) Inhalation two times a day  enoxaparin Injectable 40 milliGRAM(s) SubCutaneous daily  folic acid 1 milliGRAM(s) Oral daily  multivitamin/minerals 1 Tablet(s) Oral daily  pantoprazole    Tablet 40 milliGRAM(s) Oral before breakfast    MEDICATIONS  (PRN):  ALBUTerol    0.083% 2.5 milliGRAM(s) Nebulizer every 6 hours PRN Shortness of Breath and/or Wheezing  zinc oxide 20% Ointment 1 Application(s) Topical three times a day PRN bowel movement      Allergies    No Known Allergies    Intolerances        REVIEW OF SYSTEMS     see HPI  [x ] Unable to obtain due to poor historian  Karnofsky Performance Score/Palliative Performance Status Version 2:  60 %    Vital Signs Last 24 Hrs  T(C): 36.9 (16 Dec 2019 07:38), Max: 37.1 (15 Dec 2019 16:20)  T(F): 98.5 (16 Dec 2019 07:38), Max: 98.7 (15 Dec 2019 16:20)  HR: 86 (16 Dec 2019 08:29) (86 - 105)  BP: 85/55 (16 Dec 2019 07:38) (85/55 - 100/62)  BP(mean): --  RR: 18 (16 Dec 2019 07:38) (17 - 18)  SpO2: 93% (16 Dec 2019 08:29) (90% - 93%)    PHYSICAL EXAM:    General: Thin woman, AOx2 ( 2018)   NAD    HEENT: [ x] normal  [ ] dry mouth  [ ] ET tube/trach    Lungs: [ x] comfortable [ ] tachypnea/labored breathing  [ ] excessive secretions    CV: [x ] normal  [ ] tachycardia    GI: [x ] normal  [ ] distended  [ ] tender  [ ] no BS               [ ] PEG/NG/OG tube    : [ x] normal  [ ] incontinent  [ ] oliguria/anuria  [ ] azar    MSK: [ ] normal  [x ] weakness  [ ] edema             [ ] ambulatory  [ ] bedbound/wheelchair bound    Skin: [ ] normal  [ ] pressure ulcers- Stage_____  [x ] no rash    LABS:                        9.7    28.69 )-----------( 368      ( 16 Dec 2019 11:05 )             30.1     12-16    138  |  98  |  10.0  ----------------------------<  107  3.7   |  25.0  |  0.63    Ca    8.6      16 Dec 2019 11:05    TPro  6.3<L>  /  Alb  2.6<L>  /  TBili  <0.2<L>  /  DBili  x   /  AST  17  /  ALT  15  /  AlkPhos  61  12-14        I&O's Summary      RADIOLOGY & ADDITIONAL STUDIES:  < from: CT Angio Chest w/ IV Cont (12.09.19 @ 19:02) >   EXAM:  CT ANGIO CHEST (W)AW IC                          PROCEDURE DATE:  12/09/2019          INTERPRETATION:  CTA chest .  COMPARISON: Noncontrast CT scan 12/5/2019.  CLINICAL INFORMATION: chest pain, dyspnea.  TECHNIQUE: Contiguous axial 1.25 mm slice thickness images of the chest   were obtained after intravenous contrast administration utilizing PE   protocol.  Maximum intensity projection,(MIP),  imaging was created and interpreted.  100 mls of Omnipaque 300 was administered intravenously without   complication and 0 mls were discarded.    FINDINGS:  There are no pulmonary arterial filling defects to suggest pulmonary   embolism.    The mediastinum great vessels are normal.   There is a moderate gastroesophageal hiatal hernia.  There are no mediastinal masses or lymphadenopathy.     There is mild cardiomegaly. No pericardial effusion.  The airway is patent showing normal caliber and contour.  Moderate emphysematous   changes.  Scattered bilateral pulmonary nodules measuring up to 1.4 cm in unchanged   from prior CT scan 12/5/2019. A mild fibrotic scarring deformity in the   RIGHT middle lobe. No large airspace consolidation or evidence of   infectious pneumonia.    There is no pleural effusion or pneumothorax.    Visualized upper abdominal viscera unremarkable.    Osseous thorax intact.    IMPRESSION:    No evidence of pulmonary embolism.  Diffuse pulmonary nodules the largest measuring 1.4 cm within the RIGHT   lower lobe unchanged from prior exam consistent with metastatic lung   disease.  Centrilobular emphysema predominantly involving the upper lobes.      < end of copied text >    < from: CT Abdomen and Pelvis w/ IV Cont (12.06.19 @ 11:43) >  IMPRESSION:     Collection of fluid and air at the posterior aspect of the distal rectum   and anal canal as described above.    Additional foci of fluid and air adjacent to the anus with the largest on   the left which likely communicate with the larger collection.    Wall thickening involving the anus; differential includes infectious or   inflammatory etiologies or an underlying mass.    Osteolysis in the left hip.    Multiple small lucent lesions in the iliac bones; myeloma cannot be   excluded.    Bilateral indeterminate pulmonary nodules again seen at the lung bases.    Comparison is recommended with a prior study if available for the   pulmonary nodules and lucent lesions in the iliac bones.    The findings were discussed with Dr. Kay on 12/6/2019 12:53 PM    < end of copied text >    ADVANCE DIRECTIVES:  [ ] YES [x ] NO   DNR [ ] YES [x ] NO  Completed on:                     MOLST  [ ] YES [x] NO   Completed on:  Living Will  [ ] YES [ x] NO   Completed on:    Thank you for the opportunity to assist with the care of this patient.   [facility] Consult Service [phone].

## 2019-12-16 NOTE — PHYSICAL THERAPY INITIAL EVALUATION ADULT - PERTINENT HX OF CURRENT PROBLEM, REHAB EVAL
68 yo female with history of COPD who presents with 1 month of shortness of breath worsened over last 2 weeks associated with >30 lb weight loss in the last 6 months, decreased appetite. Patient admitted for respiratory failure 2/2 COPD exacerbation with possible metastatic lung disease. Now s/p rectal mass biopsy

## 2019-12-16 NOTE — PHYSICAL THERAPY INITIAL EVALUATION ADULT - PLANNED THERAPY INTERVENTIONS, PT EVAL
bed mobility training/gait training/neuromuscular re-education/balance training/strengthening/transfer training/motor coordination training

## 2019-12-16 NOTE — PROGRESS NOTE ADULT - SUBJECTIVE AND OBJECTIVE BOX
HOSPITALIST PROGRESS NOTE    MARQUITA OTOOLE  [id number]  69yFemale    Patient is a 69y old  Female who presents with a chief complaint of acute hypoxic respiratory failure, multiple lung nodules (16 Dec 2019 12:32)      SUBJECTIVE:   Chart reviewed since last visit.  Patient seen and examined at bedside rectal mass, fistula, COPD  Denies any dyspnea, rectal pain or abdominal pain  Denies any fever or chills      OBJECTIVE:  Vital Signs Last 24 Hrs  T(C): 37.6 (16 Dec 2019 15:58), Max: 37.6 (16 Dec 2019 15:58)  T(F): 99.6 (16 Dec 2019 15:58), Max: 99.6 (16 Dec 2019 15:58)  HR: 112 (16 Dec 2019 15:58) (86 - 112)  BP: 95/58 (16 Dec 2019 15:58) (85/55 - 100/62)   RR: 18 (16 Dec 2019 15:58) (18 - 18)  SpO2: 98% (16 Dec 2019 15:58) (91% - 98%)    PHYSICAL EXAMINATION  General: Lying in bed, NAD  HEENT:  EOMI  NECK:  Supple  CVS: regular rate and rhythm S1 S2  RESP:  Fair air entry bilaterally  GI:  Soft nondistended nontender BS+. Seton+  : Groin rash   MSK:  no edema  CNS:  follows commands, moves all extremities  INTEG:  warm skin  PSYCH: Forgetful, appears to lack insight into her disease    MONITOR:  CAPILLARY BLOOD GLUCOSE            I&O's Summary                          9.7    28.69 )-----------( 368      ( 16 Dec 2019 11:05 )             30.1       12-16    138  |  98  |  10.0  ----------------------------<  107  3.7   |  25.0  |  0.63    Ca    8.6      16 Dec 2019 11:05              Culture:    TTE:    RADIOLOGY        MEDICATIONS  (STANDING):  ALBUTerol    90 MICROgram(s) HFA Inhaler 1 Puff(s) Inhalation every 4 hours  buDESOnide    Inhalation Suspension 0.5 milliGRAM(s) Inhalation two times a day  enoxaparin Injectable 40 milliGRAM(s) SubCutaneous daily  folic acid 1 milliGRAM(s) Oral daily  multivitamin/minerals 1 Tablet(s) Oral daily  pantoprazole    Tablet 40 milliGRAM(s) Oral before breakfast      MEDICATIONS  (PRN):  ALBUTerol    0.083% 2.5 milliGRAM(s) Nebulizer every 6 hours PRN Shortness of Breath and/or Wheezing  zinc oxide 20% Ointment 1 Application(s) Topical three times a day PRN bowel movement

## 2019-12-17 LAB
ANION GAP SERPL CALC-SCNC: 15 MMOL/L — SIGNIFICANT CHANGE UP (ref 5–17)
BUN SERPL-MCNC: 11 MG/DL — SIGNIFICANT CHANGE UP (ref 8–20)
CALCIUM SERPL-MCNC: 9.3 MG/DL — SIGNIFICANT CHANGE UP (ref 8.6–10.2)
CHLORIDE SERPL-SCNC: 96 MMOL/L — LOW (ref 98–107)
CO2 SERPL-SCNC: 25 MMOL/L — SIGNIFICANT CHANGE UP (ref 22–29)
CREAT SERPL-MCNC: 0.67 MG/DL — SIGNIFICANT CHANGE UP (ref 0.5–1.3)
GLUCOSE SERPL-MCNC: 126 MG/DL — HIGH (ref 70–115)
POTASSIUM SERPL-MCNC: 3.8 MMOL/L — SIGNIFICANT CHANGE UP (ref 3.5–5.3)
POTASSIUM SERPL-SCNC: 3.8 MMOL/L — SIGNIFICANT CHANGE UP (ref 3.5–5.3)
SODIUM SERPL-SCNC: 136 MMOL/L — SIGNIFICANT CHANGE UP (ref 135–145)

## 2019-12-17 PROCEDURE — 99232 SBSQ HOSP IP/OBS MODERATE 35: CPT

## 2019-12-17 PROCEDURE — 99233 SBSQ HOSP IP/OBS HIGH 50: CPT

## 2019-12-17 RX ORDER — NYSTATIN CREAM 100000 [USP'U]/G
1 CREAM TOPICAL THREE TIMES A DAY
Refills: 0 | Status: DISCONTINUED | OUTPATIENT
Start: 2019-12-17 | End: 2020-01-02

## 2019-12-17 RX ADMIN — Medication 0.5 MILLIGRAM(S): at 20:29

## 2019-12-17 RX ADMIN — ALBUTEROL 2.5 MILLIGRAM(S): 90 AEROSOL, METERED ORAL at 08:18

## 2019-12-17 RX ADMIN — NYSTATIN CREAM 1 APPLICATION(S): 100000 CREAM TOPICAL at 14:44

## 2019-12-17 RX ADMIN — ENOXAPARIN SODIUM 40 MILLIGRAM(S): 100 INJECTION SUBCUTANEOUS at 12:00

## 2019-12-17 RX ADMIN — Medication 0.5 MILLIGRAM(S): at 08:15

## 2019-12-17 RX ADMIN — NYSTATIN CREAM 1 APPLICATION(S): 100000 CREAM TOPICAL at 21:23

## 2019-12-17 RX ADMIN — PANTOPRAZOLE SODIUM 40 MILLIGRAM(S): 20 TABLET, DELAYED RELEASE ORAL at 05:55

## 2019-12-17 RX ADMIN — Medication 1 MILLIGRAM(S): at 12:00

## 2019-12-17 RX ADMIN — Medication 1 TABLET(S): at 12:00

## 2019-12-17 NOTE — PROGRESS NOTE ADULT - SUBJECTIVE AND OBJECTIVE BOX
CC:  follow up GOC symptoms  INTERVAL HPI/OVERNIGHT EVENTS:    PRESENT SYMPTOMS: SOURCE:  Patient/Family/Team    PAIN SCALE:  0 = none  1 = mild   2 = moderate  3 = severe    Pain: denies    Dyspnea:  [ ] YES [ x] NO  Anxiety:  [ ] YES [ x] NO  Fatigue: [ x] YES [ ] NO  Nausea: [ ] YES [ ]x NO  Loss of Appetite: [ ] YES [x ] NO  Other symptoms: __________    MEDICATIONS  (STANDING):  ALBUTerol    90 MICROgram(s) HFA Inhaler 1 Puff(s) Inhalation every 4 hours  buDESOnide    Inhalation Suspension 0.5 milliGRAM(s) Inhalation two times a day  enoxaparin Injectable 40 milliGRAM(s) SubCutaneous daily  folic acid 1 milliGRAM(s) Oral daily  multivitamin/minerals 1 Tablet(s) Oral daily  nystatin Powder 1 Application(s) Topical three times a day  pantoprazole    Tablet 40 milliGRAM(s) Oral before breakfast    MEDICATIONS  (PRN):  ALBUTerol    0.083% 2.5 milliGRAM(s) Nebulizer every 6 hours PRN Shortness of Breath and/or Wheezing  zinc oxide 20% Ointment 1 Application(s) Topical three times a day PRN bowel movement      Allergies    No Known Allergies    Intolerances    Karnofsky Performance Score/Palliative Performance Status Version 2:  50 %    Vital Signs Last 24 Hrs  T(C): 36.8 (17 Dec 2019 08:51), Max: 37.6 (16 Dec 2019 15:58)  T(F): 98.3 (17 Dec 2019 08:51), Max: 99.6 (16 Dec 2019 15:58)  HR: 98 (17 Dec 2019 09:32) (89 - 112)  BP: 92/60 (17 Dec 2019 09:32) (92/60 - 95/58)  BP(mean): --  RR: 18 (17 Dec 2019 08:51) (18 - 18)  SpO2: 93% (17 Dec 2019 08:15) (93% - 98%)    PHYSICAL EXAM:    General: Thin woman alert NAD  HEENT: [x ] normal  [ ] dry mouth  [ ] ET tube/trach    Lungs: [x ] comfortable [ ] tachypnea/labored breathing  [ ] excessive secretions    CV: [x ] normal  [ ] tachycardia    GI: soft NTND  : [x ] normal  [ ] incontinent  [ ] oliguria/anuria  [ ] azar    MSK: [ ] normal  [ x] weakness  [ ] edema             [ ] ambulatory  [ ] bedbound/wheelchair bound    Skin: [ ] normal  [ ] pressure ulcers- Stage_____  [ x] no rash    LABS:                        9.7    28.69 )-----------( 368      ( 16 Dec 2019 11:05 )             30.1     12-17    136  |  96<L>  |  11.0  ----------------------------<  126<H>  3.8   |  25.0  |  0.67    Ca    9.3      17 Dec 2019 10:24      I&O's Summary    RADIOLOGY & ADDITIONAL STUDIES:  < from: CT Abdomen and Pelvis w/ Oral Cont and w/ IV Cont (12.16.19 @ 17:22) >  EXAM:  CT ABDOMEN AND PELVIS OC IC                          PROCEDURE DATE:  12/16/2019          INTERPRETATION:  CLINICAL INFORMATION: Abscess/fistula in the perianal   area.    COMPARISON: CT abdomen pelvis 12/6/2019.    PROCEDURE:   CT of the Abdomen and Pelvis was performed with intravenous contrast.   Intravenous contrast: 90 ml Omnipaque 350. 10 ml discarded.  Oral contrast: positive contrast was administered.  Sagittal and coronal reformats were performed.    FINDINGS:    LOWER CHEST: There are no pleural or pericardial effusions. The heart   size is normal. Again noted are multiple bilateral pulmonary nodules,   compatible with metastatic disease. These measure up to 1.4 cm in   diameter.    LIVER: Within normal limits.  BILE DUCTS: Normal caliber.  GALLBLADDER: Status postcholecystectomy.  SPLEEN: Within normal limits.  PANCREAS: Within normal limits.  ADRENALS: Within normal limits.  KIDNEYS/URETERS: No renal stones or hydronephrosis. Symmetric enhancement.    BLADDER: Distended.  REPRODUCTIVE ORGANS: Uterus and adnexa within normal limits.    BOWEL: No bowel obstruction. Appendix is within normal limits. There is a   large amount of stool throughout the colon, compatible with constipation.   10 noted is a large heterogeneous complex collection in the distal   rectum/anal canal measuring approximately 5.2 x 5.2 cm, similar to the   previous exam. There are now linear sutures noted within this collection   extending to the mL verge. Also noted is a collection with air-fluid   level in the left issue rectal fossa which appears contiguous with the   larger collection superiorly.  PERITONEUM: No ascites.  VESSELS: The aorta and IVC are normal in caliber and patent. There is   atherosclerosis of the aorta.  RETROPERITONEUM/LYMPH NODES: No lymphadenopathy.    ABDOMINAL WALL: Mild soft tissue anasarca.  BONES: Degenerative change of the thoracolumbar spine. Bilateral hip   prosthesis. Marked lucency around the prosthesis compatible with   osteolyses is again noted, similar to the previous exam.    IMPRESSION:     Compared to 12/6/2019:    Unchanged large complex heterogeneous collection, likely an abscess in   the distal rectum/anal canal. An underlying mass cannot be excluded.    Interval placement of suture like material within this collection.    Unchanged collection with air-fluid level in the left ischio rectal   fossa, which appears to communicate with the larger collection.    Other incidental findings are as above.      < end of copied text >      Thank you for the opportunity to assist with the care of this patient.   [facility] [facility] [phone].

## 2019-12-17 NOTE — PROGRESS NOTE ADULT - ASSESSMENT
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 s/p EUA, bx rectal mass, drainage    PC to pt's son Clive who is a RN. Discussed recent CT findings of ongoing pelvic collection, likely representing ongoing tumor necrosis admixed with stool despite operative procedure and setons in place to allow for ongoing drainage. Discussed that without a palliative colostomy, which pt and family are not agreeable to, repeat visits to the OR would be otherwise futile as I cannot clear the pelvic collections well enough in the long term to allow her to proceed safely with palliative chemotherapy. Son once again confirmed mother's wishes in the past to never receive a colostomy, and family wishes to avoid additional surgery. Given their decisions, pt will likely need to be transitioned to hospice care. Son is agreeable and is aware that a meeting with palliative care team is scheduled for today. He will discuss the above with his father as well.  Dr. Manning updated by phone.

## 2019-12-17 NOTE — PROGRESS NOTE ADULT - ASSESSMENT
68y/o  Female with h/o COPD. Patient initially presented 12/5/19 with SOB. Patient was admitted for COPD exacerbation and treated with steroids (12/5 to 12/14), nebs, azithromycin ( 12/5 to 12/8). Patient was also found to have Distal rectal mass with possible metastatic disease. Patient underwent rectal mass biopsy 12/12. Now noted to have leukocytosis.      Leukocytosis  rectal mass s/p biopsy 12/12  possible metastatic disease   COPD      - Persistent leukocytosis, slightly down  - Procalcitonin level 0.11 which is not suggestive of infection   - No recorded fever, if spikes fever do Blood cultures   - B/L LE Duplex with no DVT  - CT A/P repeated and has mass with fluid collection, follow up Colorectal surgery   - Completed steroids (12/5 to 12/14)   - Monitor off antibiotics  - Trend Fever  - Trend Leukocytosis      Will Follow

## 2019-12-17 NOTE — PROGRESS NOTE ADULT - SUBJECTIVE AND OBJECTIVE BOX
[facility]  INFECTIOUS DISEASES AND INTERNAL MEDICINE at [place]  =======================================================  Hussain Candelario MD  Diplomates American Board of Internal Medicine and Infectious Diseases  Tel: [phone]      Fax: [phone]  =======================================================    MARQUITA OTOOLE [id number]    Follow up: Leukocytosis    No fevers or chills  No diarrhea    Allergies:  No Known Allergies      Antibiotics:  None       REVIEW OF SYSTEMS:  CONSTITUTIONAL:  No Fever or chills  HEENT:  No diplopia or blurred vision.  No earache, sore throat or runny nose.  CARDIOVASCULAR:  No chest pain   RESPIRATORY:  No cough, shortness of breath  GASTROINTESTINAL:  No nausea, vomiting or diarrhea.  GENITOURINARY:  No dysuria, frequency or urgency.   MUSCULOSKELETAL:  no joint aches, no muscle pain  SKIN:  No change in skin, hair or nails.  NEUROLOGIC:  No Headaches, seizures or weakness.  PSYCHIATRIC:  No disorder of thought or mood.  ENDOCRINE:  No heat or cold intolerance  HEMATOLOGICAL:  No easy bruising or bleeding.       Physical Exam:  Vital Signs Last 24 Hrs  T(C): 36.8 (17 Dec 2019 08:51), Max: 37.6 (16 Dec 2019 15:58)  T(F): 98.3 (17 Dec 2019 08:51), Max: 99.6 (16 Dec 2019 15:58)  HR: 98 (17 Dec 2019 09:32) (89 - 112)  BP: 92/60 (17 Dec 2019 09:32) (92/60 - 95/58)  RR: 18 (17 Dec 2019 08:51) (18 - 18)  SpO2: 93% (17 Dec 2019 08:15) (93% - 98%)      GEN: NAD, pleasant  HEENT: normocephalic and atraumatic. EOMI. PERRL.  Anicteric  NECK: Supple.   LUNGS: Clear to auscultation.  HEART: Regular rate and rhythm  ABDOMEN: Soft, nontender, and nondistended.  Positive bowel sounds.    : No CVA tenderness  EXTREMITIES: Without any edema.  MSK: No joint swelling  NEUROLOGIC: No Focal Deficits  PSYCHIATRIC: Appropriate affect .  SKIN: + Fungal groin rash       Labs:  12-17    136  |  96<L>  |  11.0  ----------------------------<  126<H>  3.8   |  25.0  |  0.67    Ca    9.3      17 Dec 2019 10:24                 9.7    28.69 )-----------( 368      ( 16 Dec 2019 11:05 )             30.1       Procalcitonin, Serum: 0.11 ng/mL (12-16-19 @ 11:05)      < from: CT Abdomen and Pelvis w/ Oral Cont and w/ IV Cont (12.16.19 @ 17:22) >  EXAM:  CT ABDOMEN AND PELVIS OC IC                          PROCEDURE DATE:  12/16/2019          INTERPRETATION:  CLINICAL INFORMATION: Abscess/fistula in the perianal   area.    COMPARISON: CT abdomen pelvis 12/6/2019.    PROCEDURE:   CT of the Abdomen and Pelvis was performed with intravenous contrast.   Intravenous contrast: 90 ml Omnipaque 350. 10 ml discarded.  Oral contrast: positive contrast was administered.  Sagittal and coronal reformats were performed.    FINDINGS:    LOWER CHEST: There are no pleural or pericardial effusions. The heart   size is normal. Again noted are multiple bilateral pulmonary nodules,   compatible with metastatic disease. These measure up to 1.4 cm in   diameter.    LIVER: Within normal limits.  BILE DUCTS: Normal caliber.  GALLBLADDER: Status postcholecystectomy.  SPLEEN: Within normal limits.  PANCREAS: Within normal limits.  ADRENALS: Within normal limits.  KIDNEYS/URETERS: No renal stones or hydronephrosis. Symmetric enhancement.    BLADDER: Distended.  REPRODUCTIVE ORGANS: Uterus and adnexa within normal limits.    BOWEL: No bowel obstruction. Appendix is within normal limits. There is a   large amount of stool throughout the colon, compatible with constipation.   10 noted is a large heterogeneous complex collection in the distal   rectum/anal canal measuring approximately 5.2 x 5.2 cm, similar to the   previous exam. There are now linear sutures noted within this collection   extending to the mL verge. Also noted is a collection with air-fluid   level in the left issue rectal fossa which appears contiguous with the   larger collection superiorly.  PERITONEUM: No ascites.  VESSELS: The aorta and IVC are normal in caliber and patent. There is   atherosclerosis of the aorta.  RETROPERITONEUM/LYMPH NODES: No lymphadenopathy.    ABDOMINAL WALL: Mild soft tissue anasarca.  BONES: Degenerative change of the thoracolumbar spine. Bilateral hip   prosthesis. Marked lucency around the prosthesis compatible with   osteolyses is again noted, similar to the previous exam.    IMPRESSION:     Compared to 12/6/2019:    Unchanged large complex heterogeneous collection, likely an abscess in   the distal rectum/anal canal. An underlying mass cannot be excluded.    Interval placement of suture like material within this collection.    Unchanged collection with air-fluid level in the left ischio rectal   fossa, which appears to communicate with the larger collection.    Other incidental findings are as above.    < end of copied text >      < from: US Duplex Venous Lower Ext Complete, Bilateral (12.16.19 @ 21:02) >  EXAM:  US DPLX LWR EXT VEINS COMPL BI                          PROCEDURE DATE:  12/16/2019      INTERPRETATION:  CLINICAL INFORMATION: Bilateral leg pain    COMPARISON: None available.    TECHNIQUE: Duplex sonography of the BILATERAL LOWER extremity veins with   color and spectral Doppler, with and without compression.      FINDINGS:    There is normal compressibility of the bilateral common femoral, femoral   and popliteal veins.     Doppler examination shows normal spontaneous and phasicflow.    No calf vein thrombosis is detected.    IMPRESSION:     No evidence of deep venous thrombosis in either lower extremity. LEFT   groin lymph nodes are noted one of which does not contain a fatty hilum   measuring 1.2 x 0.7 x 1.2 cm this is anabnormal finding and evaluation   is needed, biopsy should be considered.    < end of copied text >

## 2019-12-17 NOTE — CHART NOTE - NSCHARTNOTEFT_GEN_A_CORE
Patient was seen, examined on 12/17/19 for COPD, rectal mass.   Discussed with patient, CRS, palliative care and patient family.    Forgetful, unaware of fecal incontinence thus lying/sitting in feces - not conducive to wound healing and likely cause of leukocytosis, thrombocytosis.  Patient to choose between colostomy or Hospice as noted in CRS notes.    Discussed with son Clive, who states will discuss with his mother - doubtful that she will agree to diversion colostomy      PS -  I had written detailed progress note on 12/17 which is no longer visible. Discussed with IT, who are in process of finding out

## 2019-12-17 NOTE — CHART NOTE - NSCHARTNOTEFT_GEN_A_CORE
Source: Patient [ x]  Family [ ]   other [ ]    Current Diet: Diet, Regular:   Supplement Feeding Modality:  Oral  Ensure Clear Cans or Servings Per Day:  1       Frequency:  Three Times a day (12-15-19 @ 09:26)    PO intake:  Pt reports fair po intake. Pt is consuming 25-75% meals per EMR.     Current Weight:   (12/12) 139.9 lbs  (12/5) 139.9 lbs  -Will continue to monitor for weight trends    Pertinent Medications: MEDICATIONS  (STANDING):  ALBUTerol    90 MICROgram(s) HFA Inhaler 1 Puff(s) Inhalation every 4 hours  buDESOnide    Inhalation Suspension 0.5 milliGRAM(s) Inhalation two times a day  enoxaparin Injectable 40 milliGRAM(s) SubCutaneous daily  folic acid 1 milliGRAM(s) Oral daily  multivitamin/minerals 1 Tablet(s) Oral daily  nystatin Powder 1 Application(s) Topical three times a day  pantoprazole    Tablet 40 milliGRAM(s) Oral before breakfast    MEDICATIONS  (PRN):  ALBUTerol    0.083% 2.5 milliGRAM(s) Nebulizer every 6 hours PRN Shortness of Breath and/or Wheezing  zinc oxide 20% Ointment 1 Application(s) Topical three times a day PRN bowel movement    Pertinent Labs: CBC Full  -  ( 16 Dec 2019 11:05 )  WBC Count : 28.69 K/uL  RBC Count : 3.28 M/uL  Hemoglobin : 9.7 g/dL  Hematocrit : 30.1 %  Platelet Count - Automated : 368 K/uL  Mean Cell Volume : 91.8 fl  Mean Cell Hemoglobin : 29.6 pg  Mean Cell Hemoglobin Concentration : 32.2 gm/dL  Auto Neutrophil # : x  Auto Lymphocyte # : x  Auto Monocyte # : x  Auto Eosinophil # : x  Auto Basophil # : x  Auto Neutrophil % : x  Auto Lymphocyte % : x  Auto Monocyte % : x  Auto Eosinophil % : x  Auto Basophil % : x  12-17 Na136 mmol/L Glu 126 mg/dL<H> K+ 3.8 mmol/L Cr  0.67 mg/dL BUN 11.0 mg/dL Phos n/a   Alb n/a   PAB n/a       Skin: Surgical Incision     Nutrition focused physical exam previously conducted (12/17)- found signs of malnutrition [ ]absent [ x]present    Subcutaneous fat loss: [ x] Orbital fat pads region, [x ]Buccal fat region, [x ]Triceps region,  [ ]Ribs region    Muscle wasting: [ x]Temples region, [ x]Clavicle region, [ x]Shoulder region, [ ]Scapula region, [ ]Interosseous region,  [ ]thigh region, [ ]Calf region    Estimated Needs:   [x ] no change since previous assessment  [ ] recalculated:     Current Nutrition Diagnosis: Pt remains at high nutrition risk secondary to malnutrition (severe chronic) related to insufficient protein-energy intake in setting of presumed colon CA with mets, persistent lack of appetite as evidenced by unintentional 30lb (17.64%) wt loss x 6 month, severe muscle wasting anf fat loss, meeting <75% EER> 1mo. Pt s/p MRI with findings of distal rectal mass suggestive of rectal carcinoma as well as small fistulous tract extending to subcutaneous tissues of the left ischio rectal fossa. Pt with fair po intake and reported consuming Ensure TID. Pt encouraged to consume HBV protein.     Recommendations:   1) Continue Ensure Enlive TID to optimize po intake and provide an additional 350kcal, 20g protein per serving   2) Continue with current nutrition plan as tolerated   3) Monitor weights daily   4) Obtain/provide food preferences daily to inc PO   5) Provide encouragement/assistance as needed during mealtimes to inc PO     Monitoring and Evaluation:   [x ] PO intake [ x] Tolerance to diet prescription [X] Weights  [X] Follow up per protocol [X] Labs:

## 2019-12-17 NOTE — PROGRESS NOTE ADULT - SUBJECTIVE AND OBJECTIVE BOX
Noted recent leukocytosis. CT A/P completed last night for continued workup with continued findings of rectal mass and collection with setons now in place.    MEDICATIONS  (STANDING):  ALBUTerol    90 MICROgram(s) HFA Inhaler 1 Puff(s) Inhalation every 4 hours  buDESOnide    Inhalation Suspension 0.5 milliGRAM(s) Inhalation two times a day  enoxaparin Injectable 40 milliGRAM(s) SubCutaneous daily  folic acid 1 milliGRAM(s) Oral daily  multivitamin/minerals 1 Tablet(s) Oral daily  nystatin Powder 1 Application(s) Topical three times a day  pantoprazole    Tablet 40 milliGRAM(s) Oral before breakfast    MEDICATIONS  (PRN):  ALBUTerol    0.083% 2.5 milliGRAM(s) Nebulizer every 6 hours PRN Shortness of Breath and/or Wheezing  zinc oxide 20% Ointment 1 Application(s) Topical three times a day PRN bowel movement    Vital Signs Last 24 Hrs  T(C): 36.8 (17 Dec 2019 08:51), Max: 37.6 (16 Dec 2019 15:58)  T(F): 98.3 (17 Dec 2019 08:51), Max: 99.6 (16 Dec 2019 15:58)  HR: 100 (17 Dec 2019 08:51) (89 - 112)  BP: 95/58 (16 Dec 2019 15:58) (95/58 - 95/58)  BP(mean): --  RR: 18 (17 Dec 2019 08:51) (18 - 18)  SpO2: 93% (17 Dec 2019 08:15) (93% - 98%)  I&O's Detail                           9.7    28.69 )-----------( 368      ( 16 Dec 2019 11:05 )             30.1     12-16    138  |  98  |  10.0  ----------------------------<  107  3.7   |  25.0  |  0.63    Ca    8.6      16 Dec 2019 11:05    < from: CT Abdomen and Pelvis w/ Oral Cont and w/ IV Cont (12.16.19 @ 17:22) >     EXAM:  CT ABDOMEN AND PELVIS OC IC                          PROCEDURE DATE:  12/16/2019          INTERPRETATION:  CLINICAL INFORMATION: Abscess/fistula in the perianal   area.    COMPARISON: CT abdomen pelvis 12/6/2019.    PROCEDURE:   CT of the Abdomen and Pelvis was performed with intravenous contrast.   Intravenous contrast: 90 ml Omnipaque 350. 10 ml discarded.  Oral contrast: positive contrast was administered.  Sagittal and coronal reformats were performed.    FINDINGS:    LOWER CHEST: There are no pleural or pericardial effusions. The heart   size is normal. Again noted are multiple bilateral pulmonary nodules,   compatible with metastatic disease. These measure up to 1.4 cm in   diameter.    LIVER: Within normal limits.  BILE DUCTS: Normal caliber.  GALLBLADDER: Status postcholecystectomy.  SPLEEN: Within normal limits.  PANCREAS: Within normal limits.  ADRENALS: Within normal limits.  KIDNEYS/URETERS: No renal stones or hydronephrosis. Symmetric enhancement.    BLADDER: Distended.  REPRODUCTIVE ORGANS: Uterus and adnexa within normal limits.    BOWEL: No bowel obstruction. Appendix is within normal limits. There is a   large amount of stool throughout the colon, compatible with constipation.   10 noted is a large heterogeneous complex collection in the distal   rectum/anal canal measuring approximately 5.2 x 5.2 cm, similar to the   previous exam. There are now linear sutures noted within this collection   extending to the mL verge. Also noted is a collection with air-fluid   level in the left issue rectal fossa which appears contiguous with the   larger collection superiorly.  PERITONEUM: No ascites.  VESSELS: The aorta and IVC are normal in caliber and patent. There is   atherosclerosis of the aorta.  RETROPERITONEUM/LYMPH NODES: No lymphadenopathy.    ABDOMINAL WALL: Mild soft tissue anasarca.  BONES: Degenerative change of the thoracolumbar spine. Bilateral hip   prosthesis. Marked lucency around the prosthesis compatible with   osteolyses is again noted, similar to the previous exam.    IMPRESSION:     Compared to 12/6/2019:    Unchanged large complex heterogeneous collection, likely an abscess in   the distal rectum/anal canal. An underlying mass cannot be excluded.    Interval placement of suture like material within this collection.    Unchanged collection with air-fluid level in the left ischio rectal   fossa, which appears to communicate with the larger collection.    Other incidental findings are as above.                    LASHAWN STRICKLAND M.D. ATTENDING RADIOLOGIST  This document has been electronically signed. Dec 16 2019  7:47PM                  < end of copied text >

## 2019-12-18 DIAGNOSIS — Z71.89 OTHER SPECIFIED COUNSELING: ICD-10-CM

## 2019-12-18 LAB
ALBUMIN SERPL ELPH-MCNC: 2.8 G/DL — LOW (ref 3.3–5.2)
ALP SERPL-CCNC: 76 U/L — SIGNIFICANT CHANGE UP (ref 40–120)
ALT FLD-CCNC: 12 U/L — SIGNIFICANT CHANGE UP
ANION GAP SERPL CALC-SCNC: 12 MMOL/L — SIGNIFICANT CHANGE UP (ref 5–17)
AST SERPL-CCNC: 11 U/L — SIGNIFICANT CHANGE UP
BILIRUB SERPL-MCNC: 0.5 MG/DL — SIGNIFICANT CHANGE UP (ref 0.4–2)
BUN SERPL-MCNC: 10 MG/DL — SIGNIFICANT CHANGE UP (ref 8–20)
CALCIUM SERPL-MCNC: 8.8 MG/DL — SIGNIFICANT CHANGE UP (ref 8.6–10.2)
CHLORIDE SERPL-SCNC: 96 MMOL/L — LOW (ref 98–107)
CO2 SERPL-SCNC: 28 MMOL/L — SIGNIFICANT CHANGE UP (ref 22–29)
CREAT SERPL-MCNC: 0.65 MG/DL — SIGNIFICANT CHANGE UP (ref 0.5–1.3)
GLUCOSE SERPL-MCNC: 92 MG/DL — SIGNIFICANT CHANGE UP (ref 70–115)
HCT VFR BLD CALC: 27.6 % — LOW (ref 34.5–45)
HGB BLD-MCNC: 9.2 G/DL — LOW (ref 11.5–15.5)
MCHC RBC-ENTMCNC: 30.3 PG — SIGNIFICANT CHANGE UP (ref 27–34)
MCHC RBC-ENTMCNC: 33.3 GM/DL — SIGNIFICANT CHANGE UP (ref 32–36)
MCV RBC AUTO: 90.8 FL — SIGNIFICANT CHANGE UP (ref 80–100)
PLATELET # BLD AUTO: 492 K/UL — HIGH (ref 150–400)
POTASSIUM SERPL-MCNC: 4.6 MMOL/L — SIGNIFICANT CHANGE UP (ref 3.5–5.3)
POTASSIUM SERPL-SCNC: 4.6 MMOL/L — SIGNIFICANT CHANGE UP (ref 3.5–5.3)
PROT SERPL-MCNC: 6.6 G/DL — SIGNIFICANT CHANGE UP (ref 6.6–8.7)
RBC # BLD: 3.04 M/UL — LOW (ref 3.8–5.2)
RBC # FLD: 15.1 % — HIGH (ref 10.3–14.5)
SODIUM SERPL-SCNC: 136 MMOL/L — SIGNIFICANT CHANGE UP (ref 135–145)
WBC # BLD: 14.19 K/UL — HIGH (ref 3.8–10.5)
WBC # FLD AUTO: 14.19 K/UL — HIGH (ref 3.8–10.5)

## 2019-12-18 PROCEDURE — 99232 SBSQ HOSP IP/OBS MODERATE 35: CPT

## 2019-12-18 PROCEDURE — 99497 ADVNCD CARE PLAN 30 MIN: CPT | Mod: 25

## 2019-12-18 RX ADMIN — ENOXAPARIN SODIUM 40 MILLIGRAM(S): 100 INJECTION SUBCUTANEOUS at 12:51

## 2019-12-18 RX ADMIN — ALBUTEROL 2.5 MILLIGRAM(S): 90 AEROSOL, METERED ORAL at 20:32

## 2019-12-18 RX ADMIN — Medication 1 MILLIGRAM(S): at 12:51

## 2019-12-18 RX ADMIN — NYSTATIN CREAM 1 APPLICATION(S): 100000 CREAM TOPICAL at 22:26

## 2019-12-18 RX ADMIN — PANTOPRAZOLE SODIUM 40 MILLIGRAM(S): 20 TABLET, DELAYED RELEASE ORAL at 05:55

## 2019-12-18 RX ADMIN — Medication 0.5 MILLIGRAM(S): at 20:32

## 2019-12-18 RX ADMIN — ALBUTEROL 2.5 MILLIGRAM(S): 90 AEROSOL, METERED ORAL at 08:24

## 2019-12-18 RX ADMIN — NYSTATIN CREAM 1 APPLICATION(S): 100000 CREAM TOPICAL at 17:51

## 2019-12-18 RX ADMIN — Medication 0.5 MILLIGRAM(S): at 08:24

## 2019-12-18 RX ADMIN — Medication 1 TABLET(S): at 17:52

## 2019-12-18 RX ADMIN — NYSTATIN CREAM 1 APPLICATION(S): 100000 CREAM TOPICAL at 05:55

## 2019-12-18 NOTE — PROGRESS NOTE ADULT - ASSESSMENT
68y/o  Female with h/o COPD. Patient initially presented 12/5/19 with SOB. Patient was admitted for COPD exacerbation and treated with steroids (12/5 to 12/14), nebs, azithromycin ( 12/5 to 12/8). Patient was also found to have Distal rectal mass with possible metastatic disease. Patient underwent rectal mass biopsy 12/12. Now noted to have leukocytosis.      Leukocytosis  rectal mass s/p biopsy 12/12  possible metastatic disease   COPD      - Persistent leukocytosis, slightly down  - Procalcitonin level 0.11 which is not suggestive of infection   - No recorded fever, if spikes fever do Blood cultures   - B/L LE Duplex with no DVT  - CT A/P repeated and has mass with fluid collection, follow up Colorectal surgery noted  - Completed steroids (12/5 to 12/14)   - Monitor off antibiotics  - Palliative eval ongoing  - Trend Fever  - Trend Leukocytosis      Will Follow

## 2019-12-18 NOTE — GOALS OF CARE CONVERSATION - ADVANCED CARE PLANNING - CONVERSATION DETAILS
Spoke with patient - she states she understands the risks of not having a colostomy and restates would NOT want it.   Discussed with family. Reexplained poor outlook if not to have a colostomy given area will not heal and recurrent infections, making pursing treatment of  cancer difficult.   Family seemed to have a good understanding. 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   Discussed options for MARYELLEN ad  hospice services. Family wishing to go to [facility] with home hospice services after M[facility]ELLEN.     Discussed with patient  advance directives/CPR  intubation and mechanical ventilation.  She shook her head and stated " What for!"   and  stated that  would " want a natural death".

## 2019-12-18 NOTE — PROGRESS NOTE ADULT - SUBJECTIVE AND OBJECTIVE BOX
[facility]  INFECTIOUS DISEASES AND INTERNAL MEDICINE at [place]  =======================================================  Hussain Candelario MD  Diplomates American Board of Internal Medicine and Infectious Diseases  Tel: [phone]      Fax: [phone]  =======================================================    MARQUITA OTOOLE [id number]    Follow up: Leukocytosis    No fevers or chills  No diarrhea    Wants to go home    Allergies:  No Known Allergies      Antibiotics:  None       REVIEW OF SYSTEMS:  CONSTITUTIONAL:  No Fever or chills  HEENT:  No diplopia or blurred vision.  No earache, sore throat or runny nose.  CARDIOVASCULAR:  No chest pain   RESPIRATORY:  No cough, shortness of breath  GASTROINTESTINAL:  No nausea, vomiting or diarrhea.  GENITOURINARY:  No dysuria, frequency or urgency.   MUSCULOSKELETAL:  no joint aches, no muscle pain  SKIN:  No change in skin, hair or nails.  NEUROLOGIC:  No Headaches, seizures or weakness.  PSYCHIATRIC:  No disorder of thought or mood.  ENDOCRINE:  No heat or cold intolerance  HEMATOLOGICAL:  No easy bruising or bleeding.       Physical Exam:  Vital Signs Last 24 Hrs  T(C): 36.8 (17 Dec 2019 08:51), Max: 37.6 (16 Dec 2019 15:58)  T(F): 98.3 (17 Dec 2019 08:51), Max: 99.6 (16 Dec 2019 15:58)  HR: 98 (17 Dec 2019 09:32) (89 - 112)  BP: 92/60 (17 Dec 2019 09:32) (92/60 - 95/58)  RR: 18 (17 Dec 2019 08:51) (18 - 18)  SpO2: 93% (17 Dec 2019 08:15) (93% - 98%)      GEN: NAD, pleasant  HEENT: normocephalic and atraumatic. EOMI. PERRL.  Anicteric  NECK: Supple.   LUNGS: Clear to auscultation.  HEART: Regular rate and rhythm  ABDOMEN: Soft, nontender, and nondistended.  Positive bowel sounds.    : No CVA tenderness  EXTREMITIES: Without any edema.  MSK: No joint swelling  NEUROLOGIC: No Focal Deficits  PSYCHIATRIC: Appropriate affect .  SKIN: + Fungal groin rash       Labs:  12-17    136  |  96<L>  |  11.0  ----------------------------<  126<H>  3.8   |  25.0  |  0.67    Ca    9.3      17 Dec 2019 10:24                 9.7    28.69 )-----------( 368      ( 16 Dec 2019 11:05 )             30.1       Procalcitonin, Serum: 0.11 ng/mL (12-16-19 @ 11:05)      < from: CT Abdomen and Pelvis w/ Oral Cont and w/ IV Cont (12.16.19 @ 17:22) >  EXAM:  CT ABDOMEN AND PELVIS OC IC                          PROCEDURE DATE:  12/16/2019          INTERPRETATION:  CLINICAL INFORMATION: Abscess/fistula in the perianal   area.    COMPARISON: CT abdomen pelvis 12/6/2019.    PROCEDURE:   CT of the Abdomen and Pelvis was performed with intravenous contrast.   Intravenous contrast: 90 ml Omnipaque 350. 10 ml discarded.  Oral contrast: positive contrast was administered.  Sagittal and coronal reformats were performed.    FINDINGS:    LOWER CHEST: There are no pleural or pericardial effusions. The heart   size is normal. Again noted are multiple bilateral pulmonary nodules,   compatible with metastatic disease. These measure up to 1.4 cm in   diameter.    LIVER: Within normal limits.  BILE DUCTS: Normal caliber.  GALLBLADDER: Status postcholecystectomy.  SPLEEN: Within normal limits.  PANCREAS: Within normal limits.  ADRENALS: Within normal limits.  KIDNEYS/URETERS: No renal stones or hydronephrosis. Symmetric enhancement.    BLADDER: Distended.  REPRODUCTIVE ORGANS: Uterus and adnexa within normal limits.    BOWEL: No bowel obstruction. Appendix is within normal limits. There is a   large amount of stool throughout the colon, compatible with constipation.   10 noted is a large heterogeneous complex collection in the distal   rectum/anal canal measuring approximately 5.2 x 5.2 cm, similar to the   previous exam. There are now linear sutures noted within this collection   extending to the mL verge. Also noted is a collection with air-fluid   level in the left issue rectal fossa which appears contiguous with the   larger collection superiorly.  PERITONEUM: No ascites.  VESSELS: The aorta and IVC are normal in caliber and patent. There is   atherosclerosis of the aorta.  RETROPERITONEUM/LYMPH NODES: No lymphadenopathy.    ABDOMINAL WALL: Mild soft tissue anasarca.  BONES: Degenerative change of the thoracolumbar spine. Bilateral hip   prosthesis. Marked lucency around the prosthesis compatible with   osteolyses is again noted, similar to the previous exam.    IMPRESSION:     Compared to 12/6/2019:    Unchanged large complex heterogeneous collection, likely an abscess in   the distal rectum/anal canal. An underlying mass cannot be excluded.    Interval placement of suture like material within this collection.    Unchanged collection with air-fluid level in the left ischio rectal   fossa, which appears to communicate with the larger collection.    Other incidental findings are as above.    < end of copied text >      < from: US Duplex Venous Lower Ext Complete, Bilateral (12.16.19 @ 21:02) >  EXAM:  US DPLX LWR EXT VEINS COMPL BI                          PROCEDURE DATE:  12/16/2019      INTERPRETATION:  CLINICAL INFORMATION: Bilateral leg pain    COMPARISON: None available.    TECHNIQUE: Duplex sonography of the BILATERAL LOWER extremity veins with   color and spectral Doppler, with and without compression.      FINDINGS:    There is normal compressibility of the bilateral common femoral, femoral   and popliteal veins.     Doppler examination shows normal spontaneous and phasicflow.    No calf vein thrombosis is detected.    IMPRESSION:     No evidence of deep venous thrombosis in either lower extremity. LEFT   groin lymph nodes are noted one of which does not contain a fatty hilum   measuring 1.2 x 0.7 x 1.2 cm this is anabnormal finding and evaluation   is needed, biopsy should be considered.    < end of copied text >

## 2019-12-18 NOTE — PROGRESS NOTE ADULT - SUBJECTIVE AND OBJECTIVE BOX
CC:  follow up GOC  INTERVAL HPI/OVERNIGHT EVENTS:    PRESENT SYMPTOMS: SOURCE:  Patient/Family/Team    PAIN SCALE:  0 = none  1 = mild   2 = moderate  3 = severe    Pain: denies    Dyspnea:  [ ] YES [x ] NO  Anxiety:  [ ] YES [x ] NO  Fatigue: [ x] YES [ ] NO  Nausea: [ ] YES [ x] NO  Loss of Appetite: [ x] YES [ ] NO  Other symptoms: __________    MEDICATIONS  (STANDING):  ALBUTerol    90 MICROgram(s) HFA Inhaler 1 Puff(s) Inhalation every 4 hours  buDESOnide    Inhalation Suspension 0.5 milliGRAM(s) Inhalation two times a day  enoxaparin Injectable 40 milliGRAM(s) SubCutaneous daily  folic acid 1 milliGRAM(s) Oral daily  multivitamin/minerals 1 Tablet(s) Oral daily  nystatin Powder 1 Application(s) Topical three times a day  pantoprazole    Tablet 40 milliGRAM(s) Oral before breakfast    MEDICATIONS  (PRN):  ALBUTerol    0.083% 2.5 milliGRAM(s) Nebulizer every 6 hours PRN Shortness of Breath and/or Wheezing  zinc oxide 20% Ointment 1 Application(s) Topical three times a day PRN bowel movement      Allergies    No Known Allergies    Intolerances  Karnofsky Performance Score/Palliative Performance Status Version 2:   50  %    Vital Signs Last 24 Hrs  T(C): 36.8 (18 Dec 2019 07:22), Max: 37.1 (17 Dec 2019 15:56)  T(F): 98.2 (18 Dec 2019 07:22), Max: 98.8 (17 Dec 2019 23:51)  HR: 73 (18 Dec 2019 08:26) (73 - 109)  BP: 113/67 (18 Dec 2019 07:22) (93/55 - 113/67)  BP(mean): --  RR: 17 (18 Dec 2019 07:22) (17 - 18)  SpO2: 96% (18 Dec 2019 08:26) (91% - 96%)    PHYSICAL EXAM:    General: Alert NAD  HEENT: [ x] normal  [ ] dry mouth  [ ] ET tube/trach    Lungs: [x ] comfortable [ ] tachypnea/labored breathing  [ ] excessive secretions    CV: [x normal  [ ] tachycardia    GI:  soft NTND    : [x ] normal  [ ] incontinent  [ ] oliguria/anuria  [ ] azar    MSK: [ ] normal  [ x] weakness  [ ] edema             [ ] ambulatory  [ ] bedbound/wheelchair bound    Skin: [ ] normal  [ ] pressure ulcers- Stage_____  [ x] no rash    LABS:                        9.2    14.19 )-----------( 492      ( 18 Dec 2019 08:40 )             27.6     12-18    136  |  96<L>  |  10.0  ----------------------------<  92  4.6   |  28.0  |  0.65    Ca    8.8      18 Dec 2019 08:40    TPro  6.6  /  Alb  2.8<L>  /  TBili  0.5  /  DBili  x   /  AST  11  /  ALT  12  /  AlkPhos  76  12-18      More than 50% time spent in counseling and coordinating care. ______ Minutes.     Thank you for the opportunity to assist with the care of this patient.   [facility] [phone].

## 2019-12-18 NOTE — PROGRESS NOTE ADULT - ASSESSMENT
68 yo female with history of COPD who presents with 1 month of shortness of breath worsened over last 2 weeks associated with >30 lb weight loss in the last 6 months, decreased appetite. Patient admitted for respiratory failure 2/2 COPD exacerbation with possible metastatic lung disease. Pulmonology consulted and CT abdomen was obtained - CT showed rectal wall thickening with adjacent air-pockets; colorectal surgery was consulted and MRI was obtained.  May have colon cancer with mets to the lung. GI consult was recommended by colorectal surgeon and consulted. MRI showed perforation of distal rectum and anus w/ collection of fluid/air.  12/5 TTE was ordered for elevated BNP - she was shown to have severe right sided ventricular strain. Started empirically on treatment for PE and CTA was ordered (delayed due to having received contrast with the CT abdomen on the same day). 12/9 CTA negative for PE. 12/11 Nuclear Stress Test-Pharmacologic  Normal study; no evidence for myocardial infarction or ischemia. 12/12 colorectal surgery performed. EUA, rectum with rectal mass biopsy. Posterior rectal wall abscess unroofed and draining mucoid material through cutaneous fistulas b/l . B/L seton placement transrectally. Patient refusing colostomy.      Lung Nodules with rectal mass and abscess with cutaneous fistulas; possibly metastatic disease  - MRI of the abdomen with Perforation involving the posterior wall of the distal rectum and anus with a collection of fluid and air posteriorly measuring up to 6.5 cm.  - 12/12 Posterior rectal wall abscess unroofed and draining mucoid material through cutaneous fistulas b/l . B/L seton placement transrectally, rectal mass biopsy obtained.   -  Await pathology report but cleared for discharge from colorectal standpoint with plans to follow up with surgery- Dr. Lee in 2 weeks and oncologist.   - Sitz baths for comfort,  gauze can be removed and re-placed if draining  - Psych consult for capacity determination as it appears unclear if patient understands the gravity of her situation  - Palliative care consult  Now with worsening leukocytosis - ID consult, Surgery follow. Discussed with surgery resident - CT ordered    COPD exacerbation - hypoxia on admission has resolved  - completed IV azithromycin 500mg x3 days   - patient on anoro-ellipta 62.5/25 dose at home BID; ventolin as needed at home  - continue with duoneb q6 PRN  - Completed short course steroids  - Slight leukocytosis, possibly secondary to steroid use. Continue to monitor WBC    Anemia with Thrombocytosis-- likely GI source  - stable    Elevated BNP - no clinical signs of HF- significant RV strain on the echo  - pt had CTA- negative for PE   - nuc med stress test nl    slightly elevated INR - poss 2/2 poor diet/malignancy, resolved    protein calorie malnutrition  - supplement meals  - nutrition consulted     Depressed mood  - no suicidal ideation  - psych consulted, signed off. may f/u outpatient   - may benefit from medication    DVT - lovenox subcut    Disposition - [facility]

## 2019-12-18 NOTE — PROGRESS NOTE ADULT - PROBLEM SELECTOR PLAN 5
Family meeting  See GOC note for details.  Family wishes M[facility]ELL[facility] at [place] with plan for home hospice thereafter.  BI completed - DNR/I.

## 2019-12-18 NOTE — PROGRESS NOTE ADULT - SUBJECTIVE AND OBJECTIVE BOX
HOSPITALIST PROGRESS NOTE    MARQUITA OTOOLE  [id number]  69yFemale    Patient is a 69y old  Female who presents with a chief complaint of acute hypoxic respiratory failure, multiple lung nodules (18 Dec 2019 12:29)      SUBJECTIVE:   Chart reviewed since last visit.  Patient seen and examined at bedside for rectal fistula, abscess and mass, COPD.  Denies any dyspnea, chest pain, cough  Denies an nausea, vomiting, abdominal pain      OBJECTIVE:  Vital Signs Last 24 Hrs  T(C): 36.8 (18 Dec 2019 07:22), Max: 37.1 (17 Dec 2019 15:56)  T(F): 98.2 (18 Dec 2019 07:22), Max: 98.8 (17 Dec 2019 23:51)  HR: 73 (18 Dec 2019 08:26) (73 - 109)  BP: 113/67 (18 Dec 2019 07:22) (93/55 - 113/67)   RR: 17 (18 Dec 2019 07:22) (17 - 18)  SpO2: 96% (18 Dec 2019 08:26) (91% - 96%)    PHYSICAL EXAMINATION  General: Lying in bed, NAD  HEENT:  EOMI  NECK:  Supple  CVS: regular rate and rhythm S1 S2  RESP:  Fair air entry bilaterally  GI:  Soft nondistended nontender BS+. Seton+  : Groin rash   MSK:  no edema  CNS:  follows commands, moves all extremities  INTEG:  warm skin  PSYCH: Forgetful, appears to lack insight into her disease    MONITOR:  CAPILLARY BLOOD GLUCOSE            I&O's Summary                          9.2    14.19 )-----------( 492      ( 18 Dec 2019 08:40 )             27.6       12-18    136  |  96<L>  |  10.0  ----------------------------<  92  4.6   |  28.0  |  0.65    Ca    8.8      18 Dec 2019 08:40    TPro  6.6  /  Alb  2.8<L>  /  TBili  0.5  /  DBili  x   /  AST  11  /  ALT  12  /  AlkPhos  76  12-18            Culture:    TTE:    RADIOLOGY        MEDICATIONS  (STANDING):  ALBUTerol    90 MICROgram(s) HFA Inhaler 1 Puff(s) Inhalation every 4 hours  buDESOnide    Inhalation Suspension 0.5 milliGRAM(s) Inhalation two times a day  enoxaparin Injectable 40 milliGRAM(s) SubCutaneous daily  folic acid 1 milliGRAM(s) Oral daily  multivitamin/minerals 1 Tablet(s) Oral daily  nystatin Powder 1 Application(s) Topical three times a day  pantoprazole    Tablet 40 milliGRAM(s) Oral before breakfast      MEDICATIONS  (PRN):  ALBUTerol    0.083% 2.5 milliGRAM(s) Nebulizer every 6 hours PRN Shortness of Breath and/or Wheezing  zinc oxide 20% Ointment 1 Application(s) Topical three times a day PRN bowel movement

## 2019-12-19 ENCOUNTER — TRANSCRIPTION ENCOUNTER (OUTPATIENT)
Age: 69
End: 2019-12-19

## 2019-12-19 DIAGNOSIS — J44.9 CHRONIC OBSTRUCTIVE PULMONARY DISEASE, UNSPECIFIED: ICD-10-CM

## 2019-12-19 DIAGNOSIS — K60.4 RECTAL FISTULA: ICD-10-CM

## 2019-12-19 LAB — SURGICAL PATHOLOGY STUDY: SIGNIFICANT CHANGE UP

## 2019-12-19 PROCEDURE — 99232 SBSQ HOSP IP/OBS MODERATE 35: CPT

## 2019-12-19 RX ORDER — NYSTATIN CREAM 100000 [USP'U]/G
1 CREAM TOPICAL
Qty: 0 | Refills: 0 | DISCHARGE
Start: 2019-12-19

## 2019-12-19 RX ORDER — MULTIVIT-MIN/FERROUS GLUCONATE 9 MG/15 ML
1 LIQUID (ML) ORAL
Qty: 0 | Refills: 0 | DISCHARGE
Start: 2019-12-19

## 2019-12-19 RX ORDER — FOLIC ACID 0.8 MG
1 TABLET ORAL
Qty: 0 | Refills: 0 | DISCHARGE
Start: 2019-12-19

## 2019-12-19 RX ORDER — PANTOPRAZOLE SODIUM 20 MG/1
1 TABLET, DELAYED RELEASE ORAL
Qty: 0 | Refills: 0 | DISCHARGE
Start: 2019-12-19

## 2019-12-19 RX ORDER — ZINC OXIDE 200 MG/G
1 OINTMENT TOPICAL
Qty: 0 | Refills: 0 | DISCHARGE
Start: 2019-12-19

## 2019-12-19 RX ADMIN — ALBUTEROL 2.5 MILLIGRAM(S): 90 AEROSOL, METERED ORAL at 20:25

## 2019-12-19 RX ADMIN — NYSTATIN CREAM 1 APPLICATION(S): 100000 CREAM TOPICAL at 06:18

## 2019-12-19 RX ADMIN — Medication 1 TABLET(S): at 12:51

## 2019-12-19 RX ADMIN — NYSTATIN CREAM 1 APPLICATION(S): 100000 CREAM TOPICAL at 21:33

## 2019-12-19 RX ADMIN — Medication 0.5 MILLIGRAM(S): at 20:24

## 2019-12-19 RX ADMIN — NYSTATIN CREAM 1 APPLICATION(S): 100000 CREAM TOPICAL at 16:56

## 2019-12-19 RX ADMIN — ALBUTEROL 2.5 MILLIGRAM(S): 90 AEROSOL, METERED ORAL at 08:31

## 2019-12-19 RX ADMIN — PANTOPRAZOLE SODIUM 40 MILLIGRAM(S): 20 TABLET, DELAYED RELEASE ORAL at 06:18

## 2019-12-19 RX ADMIN — Medication 1 MILLIGRAM(S): at 12:52

## 2019-12-19 RX ADMIN — ENOXAPARIN SODIUM 40 MILLIGRAM(S): 100 INJECTION SUBCUTANEOUS at 12:52

## 2019-12-19 RX ADMIN — Medication 0.5 MILLIGRAM(S): at 08:28

## 2019-12-19 NOTE — DISCHARGE NOTE PROVIDER - NSDCACTIVITY_GEN_ALL_CORE
No heavy lifting/straining/Do not drive or operate machinery/Bathing allowed/Showering allowed/Do not make important decisions/Walking - Indoors allowed/Stairs allowed/Walking - Outdoors allowed

## 2019-12-19 NOTE — DISCHARGE NOTE PROVIDER - NSDCCPCAREPLAN_GEN_ALL_CORE_FT
PRINCIPAL DISCHARGE DIAGNOSIS  Diagnosis: Chronic obstructive pulmonary disease, unspecified COPD type  Assessment and Plan of Treatment: Continue medications as prescribed.      SECONDARY DISCHARGE DIAGNOSES  Diagnosis: Severe protein-energy malnutrition  Assessment and Plan of Treatment: Nutrition supplements    Diagnosis: Rectal fistula  Assessment and Plan of Treatment: Sitz bath  Hospice evaluation    Diagnosis: Rectal mass  Assessment and Plan of Treatment: Hospice evaluation

## 2019-12-19 NOTE — PROGRESS NOTE ADULT - ASSESSMENT
69 year old female with history noted in [place] admitted with resp failure sec to COPD exacerbation but also found to have a perforated rectal wall and likely metastatic disease.      1) Rectal mass - biopsy pos adenocarcinoma; there is likely metastatic disease in the lungs and bones. She is post-op and recovering well. CEA is 18.   Refusing ostomy.   Pt/family opting for Hospice which is appropriate  Will not actively follow-please call as necessary  2) Normocytic anemia - likely multifactorial, abscess/malignancy. Iron stores appear to be adequate.  Transfuse if hgb<7.0. Hgb11.1; reactive leukocytosis

## 2019-12-19 NOTE — DISCHARGE NOTE PROVIDER - HOSPITAL COURSE
70 yo female with history of COPD who presents with 1 month of shortness of breath worsened over last 2 weeks associated with >30 lb weight loss in the last 6 months, decreased appetite. Patient admitted for respiratory failure 2/2 COPD exacerbation with possible metastatic lung disease. Pulmonology consulted and CT abdomen was obtained - CT showed rectal wall thickening with adjacent air-pockets; colorectal surgery was consulted and MRI was obtained, which showed perforation of distal rectum and anus w/ collection of fluid/air.  12/5 TTE was ordered for elevated BNP - she was shown to have severe right sided ventricular strain. Started empirically on treatment for PE and CTA was ordered (delayed due to having received contrast with the CT abdomen on the same day). 12/9 CTA negative for PE. 12/11 Nuclear Stress Test-Pharmacologic  Normal study; no evidence for myocardial infarction or ischemia. 12/12 colorectal surgery performed. EUA, rectum with rectal mass biopsy. Posterior rectal wall abscess unroofed and draining mucoid material through cutaneous fistulas b/l . B/L seton placement transrectally. Patient refusing colostomy. She was continued on Sitz baths and supportive care. Noted to have worsening leukocytosis without any fever, initially suspected secondary to steroids. Repeat CT unchanged. Lengthy discussion by CRS - either colostomy or Hospice; patient still refusing Colostomy, family aware. Patient and family currently want patient to go to [facility].         COPD exacerbation - hypoxia on admission has resolved. Completed IV azithromycin 500mg x3 days. Completed short course steroids with resolution of acute exacerbation         Medically stable for discharge to [facility]    45 minutes 70 yo female with history of COPD who presents with 1 month of shortness of breath worsened over last 2 weeks associated with >30 lb weight loss in the last 6 months, decreased appetite. Patient admitted for respiratory failure 2/2 COPD exacerbation with possible metastatic lung disease. Pulmonology consulted and CT abdomen was obtained - CT showed rectal wall thickening with adjacent air-pockets; colorectal surgery was consulted and MRI was obtained, which showed perforation of distal rectum and anus w/ collection of fluid/air.  12/5 TTE was ordered for elevated BNP - she was shown to have severe right sided ventricular strain. Started empirically on treatment for PE and CTA was ordered (delayed due to having received contrast with the CT abdomen on the same day). 12/9 CTA negative for PE. 12/11 Nuclear Stress Test-Pharmacologic  Normal study; no evidence for myocardial infarction or ischemia. 12/12 colorectal surgery performed. EUA, rectum with rectal mass biopsy. Posterior rectal wall abscess unroofed and draining mucoid material through cutaneous fistulas b/l . B/L seton placement transrectally. Patient refusing colostomy. She was continued on Sitz baths and supportive care. Noted to have worsening leukocytosis without any fever, initially suspected secondary to steroids. Repeat CT unchanged. Lengthy discussion by CRS - either colostomy or Hospice; patient still refusing Colostomy, family aware. Patient and family currently want patient to go to [facility].     COPD exacerbation - hypoxia on admission has resolved. Completed IV azithromycin 500mg x3 days. Completed short course steroids with resolution of acute exacerbation.     Pt initially planned for discharge to [facility], discharge held due to code sepsis 12/20 with fever, , hypotension. Critical care consulted, not transferred to MICU. Met with palliative care team. Agreeable to home with hospice. Made comfort care 12/24, but rescinded 12/27 as family wanted to pursue surgical options. Seen by Colorectal surgery team who said pt is not a candidate for curative surgery given metastatic disease. Also not candidate for palliative APR given overall poor performance status with associated morbidity/mortality. Discussed with pt and pt's family. Now wanting to resume prior plan of discharging to [facility].     45 minutes 70 yo female with history of COPD who presents with 1 month of shortness of breath worsened over last 2 weeks associated with >30 lb weight loss in the last 6 months, decreased appetite. Patient admitted for respiratory failure 2/2 COPD exacerbation with possible metastatic lung disease. Pulmonology consulted and CT abdomen was obtained - CT showed rectal wall thickening with adjacent air-pockets; colorectal surgery was consulted and MRI was obtained, which showed perforation of distal rectum and anus w/ collection of fluid/air.  12/5 TTE was ordered for elevated BNP - she was shown to have severe right sided ventricular strain. Started empirically on treatment for PE and CTA was ordered (delayed due to having received contrast with the CT abdomen on the same day). 12/9 CTA negative for PE. 12/11 Nuclear Stress Test-Pharmacologic  Normal study; no evidence for myocardial infarction or ischemia. 12/12 colorectal surgery performed. EUA, rectum with rectal mass biopsy. Posterior rectal wall abscess unroofed and draining mucoid material through cutaneous fistulas b/l . B/L seton placement transrectally. Patient refusing colostomy. She was continued on Sitz baths and supportive care. Noted to have worsening leukocytosis without any fever, initially suspected secondary to steroids. Repeat CT unchanged. Lengthy discussion by CRS - either colostomy or Hospice; patient still refusing Colostomy, family aware. Patient and family currently want patient to go to [facility].     COPD exacerbation - hypoxia on admission has resolved. Completed IV azithromycin 500mg x3 days. Completed short course steroids with resolution of acute exacerbation.     Pt initially planned for discharge to [facility], discharge held due to code sepsis 12/20 with fever, , hypotension. Critical care consulted, not transferred to MICU. Met with palliative care team. Agreeable to home with hospice. Made comfort care 12/24, but rescinded 12/27 as family wanted to pursue surgical options. Seen by Colorectal surgery team who said pt is not a candidate for curative surgery given metastatic disease. Also not candidate for palliative APR given overall poor performance status with associated morbidity/mortality. Discussed with pt and pt's family. Now wanting to resume prior plan of discharging to [facility]. Pt seen and examined. Stable for discharge to [facility].         On exam:     Vital Signs Last 24 Hrs    T(C): 36.8 (02 Jan 2020 08:13), Max: 37.3 (01 Jan 2020 15:50)    T(F): 98.3 (02 Jan 2020 08:13), Max: 99.2 (01 Jan 2020 15:50)    HR: 86 (02 Jan 2020 08:13) (78 - 92)    BP: 93/58 (02 Jan 2020 08:13) (92/50 - 100/68)    BP(mean): --    RR: 19 (02 Jan 2020 08:13) (18 - 19)    SpO2: 97% (01 Jan 2020 23:39) (92% - 97%)        GENERAL: NAD    HEENT: PERRL, +EOMI    NECK: soft, supple    CHEST/LUNG: Clear to auscultation bilaterally; No wheezing    HEART: S1S2+, Regular rate and rhythm; No murmurs, rubs, or gallops    ABDOMEN: Soft, Nondistended; Bowel sounds present, mildly TTP LLQ    SKIN: warm, dry    NEURO: awake and alert        45 minutes

## 2019-12-19 NOTE — PROGRESS NOTE ADULT - PROBLEM SELECTOR PLAN 5
BI completed  Patient's family including son Clive ( HCP) present during the discussion.  He acknowledged his mother's wish to DNR/I and agreed with her decision. BI completed and witnessed by Clive.

## 2019-12-19 NOTE — PROGRESS NOTE ADULT - ASSESSMENT
68y/o  Female with h/o COPD. Patient initially presented 12/5/19 with SOB. Patient was admitted for COPD exacerbation and treated with steroids (12/5 to 12/14), nebs, azithromycin ( 12/5 to 12/8). Patient was also found to have Distal rectal mass with possible metastatic disease. Patient underwent rectal mass biopsy 12/12. Now noted to have leukocytosis.      Leukocytosis  rectal mass s/p biopsy 12/12  possible metastatic disease   COPD      - Persistent leukocytosis, slightly down  - Procalcitonin level 0.11 which is not suggestive of infection   - No recorded fever, if spikes fever do Blood cultures   - B/L LE Duplex with no DVT  - CT A/P repeated and has mass with fluid collection, follow up Colorectal surgery noted  - Completed steroids (12/5 to 12/14)   - Monitor off antibiotics  - Palliative eval ongoing  - Trend Fever  - Trend Leukocytosis      Will Sign off. Please call PRN.

## 2019-12-19 NOTE — DISCHARGE NOTE PROVIDER - NSDCMRMEDTOKEN_GEN_ALL_CORE_FT
Anoro Ellipta 62.5 mcg-25 mcg/inh inhalation powder: 1 puff(s) inhaled once a day  budesonide 0.5 mg/2 mL inhalation suspension: 2 milliliter(s) inhaled 2 times a day  folic acid 1 mg oral tablet: 1 tab(s) orally once a day  Multiple Vitamins with Minerals oral tablet: 1 tab(s) orally once a day  nystatin 100,000 units/g topical powder: 1 application topically 3 times a day  pantoprazole 40 mg oral delayed release tablet: 1 tab(s) orally once a day (before a meal)  Ventolin HFA 90 mcg/inh inhalation aerosol: 2 puff(s) inhaled 4 times a day  zinc oxide 20% topical ointment: 1 application topically 3 times a day, As needed, bowel movement Anoro Ellipta 62.5 mcg-25 mcg/inh inhalation powder: 1 puff(s) inhaled once a day  budesonide 0.5 mg/2 mL inhalation suspension: 2 milliliter(s) inhaled 2 times a day  midodrine 5 mg oral tablet: 3 tab(s) orally 3 times a day  nystatin 100,000 units/g topical powder: 1 application topically 3 times a day  oxyCODONE 5 mg oral tablet: 1 tab(s) orally every 6 hours, As needed, Severe Pain (7 - 10)  oxyCODONE 5 mg oral tablet: 0.5 tab(s) orally every 6 hours, As Needed moderate pain   Ventolin HFA 90 mcg/inh inhalation aerosol: 2 puff(s) inhaled 4 times a day  zinc oxide 20% topical ointment: 1 application topically 3 times a day, As needed, bowel movement

## 2019-12-19 NOTE — PROGRESS NOTE ADULT - SUBJECTIVE AND OBJECTIVE BOX
CC:  follow up symptoms  INTERVAL HPI/OVERNIGHT EVENTS:  none  PRESENT SYMPTOMS: SOURCE:  Patient/Family/Team    PAIN SCALE:  0 = none  1 = mild   2 = moderate  3 = severe    Pain: denies    Dyspnea:  [ ] YES [x ] NO  Anxiety:  [ ] YES [x ] NO  Fatigue: [x ] YES [ ] NO  Nausea: [ ] YES [x ] NO  Loss of Appetite: [ x] YES [ ] NO  Other symptoms: __________    MEDICATIONS  (STANDING):  ALBUTerol    90 MICROgram(s) HFA Inhaler 1 Puff(s) Inhalation every 4 hours  buDESOnide    Inhalation Suspension 0.5 milliGRAM(s) Inhalation two times a day  enoxaparin Injectable 40 milliGRAM(s) SubCutaneous daily  folic acid 1 milliGRAM(s) Oral daily  multivitamin/minerals 1 Tablet(s) Oral daily  nystatin Powder 1 Application(s) Topical three times a day  pantoprazole    Tablet 40 milliGRAM(s) Oral before breakfast    MEDICATIONS  (PRN):  ALBUTerol    0.083% 2.5 milliGRAM(s) Nebulizer every 6 hours PRN Shortness of Breath and/or Wheezing  zinc oxide 20% Ointment 1 Application(s) Topical three times a day PRN bowel movement      Allergies    No Known Allergies    Intolerances    Karnofsky Performance Score/Palliative Performance Status Version 2:  40       %    Vital Signs Last 24 Hrs  T(C): 37.1 (19 Dec 2019 07:46), Max: 37.5 (18 Dec 2019 23:28)  T(F): 98.7 (19 Dec 2019 07:46), Max: 99.5 (18 Dec 2019 23:28)  HR: 77 (19 Dec 2019 08:30) (74 - 100)  BP: 94/50 (19 Dec 2019 07:46) (94/50 - 116/81)  BP(mean): --  RR: 19 (19 Dec 2019 07:46) (18 - 20)  SpO2: 97% (19 Dec 2019 08:30) (96% - 97%)    PHYSICAL EXAM:    General: Thin woman alert NAD    HEENT: [ x] normal  [ ] dry mouth  [ ] ET tube/trach    Lungs: [ x] comfortable [ ] tachypnea/labored breathing  [ ] excessive secretions    CV: [ x] normal  [ ] tachycardia    GI: [x ] normal  [ ] distended  [ ] tender  [ ] no BS               [ ] PEG/NG/OG tube    : [ ]x normal  [ ] incontinent  [ ] oliguria/anuria  [ ] azar    MSK: [ ] normal  [ x] weakness  [ ] edema             [ ] ambulatory  [ ] bedbound/wheelchair bound    Skin: [ ] normal  [ ] pressure ulcers- Stage_____  [ x] no rash    LABS:                        9.2    14.19 )-----------( 492      ( 18 Dec 2019 08:40 )             27.6     12-18    136  |  96<L>  |  10.0  ----------------------------<  92  4.6   |  28.0  |  0.65    Ca    8.8      18 Dec 2019 08:40    TPro  6.6  /  Alb  2.8<L>  /  TBili  0.5  /  DBili  x   /  AST  11  /  ALT  12  /  AlkPhos  76  12-18        I&O's Summary    18 Dec 2019 07:01  -  19 Dec 2019 07:00  --------------------------------------------------------  IN: 0 mL / OUT: 400 mL / NET: -400 mL        Thank you for the opportunity to assist with the care of this patient.   [facility] [phone].

## 2019-12-19 NOTE — PROGRESS NOTE ADULT - SUBJECTIVE AND OBJECTIVE BOX
This is a 69y old female with a past medical history significant for COPD, DJD with hip replacements who was admitted with sob x 1 month duration, weight loss and is being treated for a COPD exacerbation. .  CT abdomen was done on 12/6/2019 that showed rectal wall thickening with posterior air pocket due to possible perforation.  It also showed signs c/w bone mets in the iliac bone as well as scattered bilateral lung masses.   Colorectal surgery was consulted and pelvic MRI was recommended and done on 12/9/2019 and it showed signs c/w a perforation of the posterior wall of the distal rectum and anus with a fluid collection.   She had an echocardiogram with RV strain and there was concern for PE but CT angio was negative for PE but did demonstrate scattered lung nodules.   She had a colonoscopy in [facility] last year that was supposedly negative for malignancy.     s/p EUA with rectal mass biopsy and bilateral seton placement.Post rectal wall abscess unmasked/draining mucoid material thru cut fistula.  Continues to refuse ostomy  She is feeling reasonably well. Denies pain. No n/v/d. Appetite improving.  Pt is forgetful; does not   have insight into her situation. Palliative care notes appreciated.      MEDICATIONS  (STANDING):  ALBUTerol    90 MICROgram(s) HFA Inhaler 1 Puff(s) Inhalation every 4 hours  buDESOnide    Inhalation Suspension 0.5 milliGRAM(s) Inhalation two times a day  enoxaparin Injectable 40 milliGRAM(s) SubCutaneous daily  folic acid 1 milliGRAM(s) Oral daily  multivitamin/minerals 1 Tablet(s) Oral daily  pantoprazole    Tablet 40 milliGRAM(s) Oral before breakfast    MEDICATIONS  (PRN):  ALBUTerol    0.083% 2.5 milliGRAM(s) Nebulizer every 6 hours PRN Shortness of Breath and/or Wheezing  zinc oxide 20% Ointment 1 Application(s) Topical three times a day PRN bowel movement      Vital Signs Last 24 Hrs  T(C): 37.6 (16 Dec 2019 15:58), Max: 37.6 (16 Dec 2019 15:58)  T(F): 99.6 (16 Dec 2019 15:58), Max: 99.6 (16 Dec 2019 15:58)  HR: 105 (16 Dec 2019 21:27) (86 - 112)  BP: 95/58 (16 Dec 2019 15:58) (85/55 - 95/58)  BP(mean): --  RR: 18 (16 Dec 2019 15:58) (18 - 18)  SpO2: 98% (16 Dec 2019 21:27) (91% - 98%)  Gen: nad, resting comfortably  CV: RRR  Abd: soft  Extrem:  no edema    12/15/18  WBC 31.98, H/H 11.1/34.3, plt ct 569,000    12/12/19:  H/H 8.9/27.8, plt ct 486, WBC 13.07               CBC Full  -  ( 16 Dec 2019 11:05 )  WBC Count : 28.69 K/uL  RBC Count : 3.28 M/uL  Hemoglobin : 9.7 g/dL  Hematocrit : 30.1 %  Platelet Count - Automated : 368 K/uL  Mean Cell Volume : 91.8 fl  Mean Cell Hemoglobin : 29.6 pg  Mean Cell Hemoglobin Concentration : 32.2 gm/dL  Auto Neutrophil # : x  Auto Lymphocyte # : x  Auto Monocyte # : x  Auto Eosinophil # : x  Auto Basophil # : x  Auto Neutrophil % : x  Auto Lymphocyte % : x  Auto Monocyte % : x  Auto Eosinophil % : x  Auto Basophil % : x    12-16    138  |  98  |  10.0  ----------------------------<  107  3.7   |  25.0  |  0.63    Ca    8.6      16 Dec 2019 11:05

## 2019-12-19 NOTE — DISCHARGE NOTE PROVIDER - CARE PROVIDER_API CALL
Deisi Lee)  ColonRectal Surgery; Surgery  [address] [place] [address]  Phone: [phone]  Fax: [phone]  Follow Up Time:

## 2019-12-19 NOTE — PROGRESS NOTE ADULT - SUBJECTIVE AND OBJECTIVE BOX
[facility]  INFECTIOUS DISEASES AND INTERNAL MEDICINE at [place]  =======================================================  Hussain Candelario MD  Diplomates American Board of Internal Medicine and Infectious Diseases  Tel: [phone]      Fax: [phone]  =======================================================    MARQUITA OTOOLE [id number]    Follow up: Leukocytosis    No fevers or chills  No diarrhea    Wants to go home    Allergies:  No Known Allergies      Antibiotics:  None       REVIEW OF SYSTEMS:  CONSTITUTIONAL:  No Fever or chills  HEENT:  No diplopia or blurred vision.  No earache, sore throat or runny nose.  CARDIOVASCULAR:  No chest pain   RESPIRATORY:  No cough, shortness of breath  GASTROINTESTINAL:  No nausea, vomiting or diarrhea.  GENITOURINARY:  No dysuria, frequency or urgency.   MUSCULOSKELETAL:  no joint aches, no muscle pain  SKIN:  No change in skin, hair or nails.  NEUROLOGIC:  No Headaches, seizures or weakness.  PSYCHIATRIC:  No disorder of thought or mood.  ENDOCRINE:  No heat or cold intolerance  HEMATOLOGICAL:  No easy bruising or bleeding.       Physical Exam:  Vital Signs Last 24 Hrs  T(C): 37.1 (19 Dec 2019 07:46), Max: 37.5 (18 Dec 2019 23:28)  T(F): 98.7 (19 Dec 2019 07:46), Max: 99.5 (18 Dec 2019 23:28)  HR: 77 (19 Dec 2019 08:30) (74 - 100)  BP: 94/50 (19 Dec 2019 07:46) (94/50 - 116/81)  RR: 19 (19 Dec 2019 07:46) (18 - 20)  SpO2: 97% (19 Dec 2019 08:30) (96% - 97%)      GEN: NAD, pleasant  HEENT: normocephalic and atraumatic. EOMI. PERRL.  Anicteric  NECK: Supple.   LUNGS: Clear to auscultation.  HEART: Regular rate and rhythm  ABDOMEN: Soft, nontender, and nondistended.  Positive bowel sounds.    : No CVA tenderness  EXTREMITIES: Without any edema.  MSK: No joint swelling  NEUROLOGIC: No Focal Deficits  PSYCHIATRIC: Appropriate affect .  SKIN: + Fungal groin rash       Labs:  12-18    136  |  96<L>  |  10.0  ----------------------------<  92  4.6   |  28.0  |  0.65    Ca    8.8      18 Dec 2019 08:40    TPro  6.6  /  Alb  2.8<L>  /  TBili  0.5  /  DBili  x   /  AST  11  /  ALT  12  /  AlkPhos  76  12-18                          9.2    14.19 )-----------( 492      ( 18 Dec 2019 08:40 )             27.6       LIVER FUNCTIONS - ( 18 Dec 2019 08:40 )  Alb: 2.8 g/dL / Pro: 6.6 g/dL / ALK PHOS: 76 U/L / ALT: 12 U/L / AST: 11 U/L / GGT: x               < from: CT Abdomen and Pelvis w/ Oral Cont and w/ IV Cont (12.16.19 @ 17:22) >  EXAM:  CT ABDOMEN AND PELVIS OC IC                          PROCEDURE DATE:  12/16/2019          INTERPRETATION:  CLINICAL INFORMATION: Abscess/fistula in the perianal   area.    COMPARISON: CT abdomen pelvis 12/6/2019.    PROCEDURE:   CT of the Abdomen and Pelvis was performed with intravenous contrast.   Intravenous contrast: 90 ml Omnipaque 350. 10 ml discarded.  Oral contrast: positive contrast was administered.  Sagittal and coronal reformats were performed.    FINDINGS:    LOWER CHEST: There are no pleural or pericardial effusions. The heart   size is normal. Again noted are multiple bilateral pulmonary nodules,   compatible with metastatic disease. These measure up to 1.4 cm in   diameter.    LIVER: Within normal limits.  BILE DUCTS: Normal caliber.  GALLBLADDER: Status postcholecystectomy.  SPLEEN: Within normal limits.  PANCREAS: Within normal limits.  ADRENALS: Within normal limits.  KIDNEYS/URETERS: No renal stones or hydronephrosis. Symmetric enhancement.    BLADDER: Distended.  REPRODUCTIVE ORGANS: Uterus and adnexa within normal limits.    BOWEL: No bowel obstruction. Appendix is within normal limits. There is a   large amount of stool throughout the colon, compatible with constipation.   10 noted is a large heterogeneous complex collection in the distal   rectum/anal canal measuring approximately 5.2 x 5.2 cm, similar to the   previous exam. There are now linear sutures noted within this collection   extending to the mL verge. Also noted is a collection with air-fluid   level in the left issue rectal fossa which appears contiguous with the   larger collection superiorly.  PERITONEUM: No ascites.  VESSELS: The aorta and IVC are normal in caliber and patent. There is   atherosclerosis of the aorta.  RETROPERITONEUM/LYMPH NODES: No lymphadenopathy.    ABDOMINAL WALL: Mild soft tissue anasarca.  BONES: Degenerative change of the thoracolumbar spine. Bilateral hip   prosthesis. Marked lucency around the prosthesis compatible with   osteolyses is again noted, similar to the previous exam.    IMPRESSION:     Compared to 12/6/2019:    Unchanged large complex heterogeneous collection, likely an abscess in   the distal rectum/anal canal. An underlying mass cannot be excluded.    Interval placement of suture like material within this collection.    Unchanged collection with air-fluid level in the left ischio rectal   fossa, which appears to communicate with the larger collection.    Other incidental findings are as above.    < end of copied text >      < from: US Duplex Venous Lower Ext Complete, Bilateral (12.16.19 @ 21:02) >  EXAM:  US DPLX LWR EXT VEINS COMPL BI                          PROCEDURE DATE:  12/16/2019      INTERPRETATION:  CLINICAL INFORMATION: Bilateral leg pain    COMPARISON: None available.    TECHNIQUE: Duplex sonography of the BILATERAL LOWER extremity veins with   color and spectral Doppler, with and without compression.      FINDINGS:    There is normal compressibility of the bilateral common femoral, femoral   and popliteal veins.     Doppler examination shows normal spontaneous and phasicflow.    No calf vein thrombosis is detected.    IMPRESSION:     No evidence of deep venous thrombosis in either lower extremity. LEFT   groin lymph nodes are noted one of which does not contain a fatty hilum   measuring 1.2 x 0.7 x 1.2 cm this is anabnormal finding and evaluation   is needed, biopsy should be considered.    < end of copied text >

## 2019-12-19 NOTE — PROGRESS NOTE ADULT - SUBJECTIVE AND OBJECTIVE BOX
HOSPITALIST PROGRESS NOTE    MARQUITA OTOOLE  [id number]  69yFemale    Patient is a 69y old  Female who presents with a chief complaint of acute hypoxic respiratory failure, multiple lung nodules (18 Dec 2019 15:54)      SUBJECTIVE:   Chart reviewed since last visit.  Patient seen and examined at bedside for rectal mass/abscess, COPD  Denies any dyspnea, cough.  Denies any fever or chills - unaware of BM      OBJECTIVE:  Vital Signs Last 24 Hrs  T(C): 37.1 (19 Dec 2019 07:46), Max: 37.5 (18 Dec 2019 23:28)  T(F): 98.7 (19 Dec 2019 07:46), Max: 99.5 (18 Dec 2019 23:28)  HR: 77 (19 Dec 2019 08:30) (74 - 100)  BP: 94/50 (19 Dec 2019 07:46) (94/50 - 116/81)   RR: 19 (19 Dec 2019 07:46) (18 - 20)  SpO2: 97% (19 Dec 2019 08:30) (96% - 97%)    PHYSICAL EXAMINATION  General: Lying in bed, NAD  HEENT:  EOMI  NECK:  Supple  CVS: regular rate and rhythm S1 S2  RESP:  Fair air entry bilaterally  GI:  Soft nondistended nontender BS+. Seton+  : Groin rash   MSK:  no edema  CNS:  follows commands, moves all extremities  INTEG:  warm skin  PSYCH: Forgetful, appears to lack insight into her disease    MONITOR:  CAPILLARY BLOOD GLUCOSE            I&O's Summary    18 Dec 2019 07:01  -  19 Dec 2019 07:00  --------------------------------------------------------  IN: 0 mL / OUT: 400 mL / NET: -400 mL                            9.2    14.19 )-----------( 492      ( 18 Dec 2019 08:40 )             27.6       12-18    136  |  96<L>  |  10.0  ----------------------------<  92  4.6   |  28.0  |  0.65    Ca    8.8      18 Dec 2019 08:40    TPro  6.6  /  Alb  2.8<L>  /  TBili  0.5  /  DBili  x   /  AST  11  /  ALT  12  /  AlkPhos  76  12-18            Culture:    TTE:    RADIOLOGY        MEDICATIONS  (STANDING):  ALBUTerol    90 MICROgram(s) HFA Inhaler 1 Puff(s) Inhalation every 4 hours  buDESOnide    Inhalation Suspension 0.5 milliGRAM(s) Inhalation two times a day  enoxaparin Injectable 40 milliGRAM(s) SubCutaneous daily  folic acid 1 milliGRAM(s) Oral daily  multivitamin/minerals 1 Tablet(s) Oral daily  nystatin Powder 1 Application(s) Topical three times a day  pantoprazole    Tablet 40 milliGRAM(s) Oral before breakfast      MEDICATIONS  (PRN):  ALBUTerol    0.083% 2.5 milliGRAM(s) Nebulizer every 6 hours PRN Shortness of Breath and/or Wheezing  zinc oxide 20% Ointment 1 Application(s) Topical three times a day PRN bowel movement

## 2019-12-20 DIAGNOSIS — A41.9 SEPSIS, UNSPECIFIED ORGANISM: ICD-10-CM

## 2019-12-20 DIAGNOSIS — C20 MALIGNANT NEOPLASM OF RECTUM: ICD-10-CM

## 2019-12-20 DIAGNOSIS — I47.1 SUPRAVENTRICULAR TACHYCARDIA: ICD-10-CM

## 2019-12-20 LAB
ALBUMIN SERPL ELPH-MCNC: 2.7 G/DL — LOW (ref 3.3–5.2)
ALP SERPL-CCNC: 75 U/L — SIGNIFICANT CHANGE UP (ref 40–120)
ALT FLD-CCNC: 9 U/L — SIGNIFICANT CHANGE UP
ANION GAP SERPL CALC-SCNC: 11 MMOL/L — SIGNIFICANT CHANGE UP (ref 5–17)
ANION GAP SERPL CALC-SCNC: 12 MMOL/L — SIGNIFICANT CHANGE UP (ref 5–17)
APPEARANCE UR: CLEAR — SIGNIFICANT CHANGE UP
APTT BLD: 30.6 SEC — SIGNIFICANT CHANGE UP (ref 27.5–36.3)
AST SERPL-CCNC: 10 U/L — SIGNIFICANT CHANGE UP
BACTERIA # UR AUTO: NEGATIVE — SIGNIFICANT CHANGE UP
BASOPHILS # BLD AUTO: 0.05 K/UL — SIGNIFICANT CHANGE UP (ref 0–0.2)
BASOPHILS NFR BLD AUTO: 0.3 % — SIGNIFICANT CHANGE UP (ref 0–2)
BILIRUB SERPL-MCNC: 0.3 MG/DL — LOW (ref 0.4–2)
BILIRUB UR-MCNC: NEGATIVE — SIGNIFICANT CHANGE UP
BUN SERPL-MCNC: 11 MG/DL — SIGNIFICANT CHANGE UP (ref 8–20)
BUN SERPL-MCNC: 14 MG/DL — SIGNIFICANT CHANGE UP (ref 8–20)
CALCIUM SERPL-MCNC: 8.6 MG/DL — SIGNIFICANT CHANGE UP (ref 8.6–10.2)
CALCIUM SERPL-MCNC: 8.7 MG/DL — SIGNIFICANT CHANGE UP (ref 8.6–10.2)
CHLORIDE SERPL-SCNC: 100 MMOL/L — SIGNIFICANT CHANGE UP (ref 98–107)
CHLORIDE SERPL-SCNC: 97 MMOL/L — LOW (ref 98–107)
CK SERPL-CCNC: 17 U/L — LOW (ref 25–170)
CO2 SERPL-SCNC: 24 MMOL/L — SIGNIFICANT CHANGE UP (ref 22–29)
CO2 SERPL-SCNC: 26 MMOL/L — SIGNIFICANT CHANGE UP (ref 22–29)
COLOR SPEC: YELLOW — SIGNIFICANT CHANGE UP
CREAT SERPL-MCNC: 0.48 MG/DL — LOW (ref 0.5–1.3)
CREAT SERPL-MCNC: 0.59 MG/DL — SIGNIFICANT CHANGE UP (ref 0.5–1.3)
DIFF PNL FLD: NEGATIVE — SIGNIFICANT CHANGE UP
EOSINOPHIL # BLD AUTO: 0.11 K/UL — SIGNIFICANT CHANGE UP (ref 0–0.5)
EOSINOPHIL NFR BLD AUTO: 0.6 % — SIGNIFICANT CHANGE UP (ref 0–6)
EPI CELLS # UR: NEGATIVE — SIGNIFICANT CHANGE UP
GLUCOSE BLDC GLUCOMTR-MCNC: 103 MG/DL — HIGH (ref 70–99)
GLUCOSE SERPL-MCNC: 103 MG/DL — SIGNIFICANT CHANGE UP (ref 70–115)
GLUCOSE SERPL-MCNC: 106 MG/DL — SIGNIFICANT CHANGE UP (ref 70–115)
GLUCOSE UR QL: NEGATIVE MG/DL — SIGNIFICANT CHANGE UP
HCT VFR BLD CALC: 26.7 % — LOW (ref 34.5–45)
HCT VFR BLD CALC: 26.9 % — LOW (ref 34.5–45)
HGB BLD-MCNC: 8.6 G/DL — LOW (ref 11.5–15.5)
HGB BLD-MCNC: 8.6 G/DL — LOW (ref 11.5–15.5)
IMM GRANULOCYTES NFR BLD AUTO: 0.5 % — SIGNIFICANT CHANGE UP (ref 0–1.5)
INR BLD: 1.12 RATIO — SIGNIFICANT CHANGE UP (ref 0.88–1.16)
KETONES UR-MCNC: NEGATIVE — SIGNIFICANT CHANGE UP
LACTATE BLDV-MCNC: 1 MMOL/L — SIGNIFICANT CHANGE UP (ref 0.5–2)
LEUKOCYTE ESTERASE UR-ACNC: NEGATIVE — SIGNIFICANT CHANGE UP
LYMPHOCYTES # BLD AUTO: 14.9 % — SIGNIFICANT CHANGE UP (ref 13–44)
LYMPHOCYTES # BLD AUTO: 2.78 K/UL — SIGNIFICANT CHANGE UP (ref 1–3.3)
MCHC RBC-ENTMCNC: 29.1 PG — SIGNIFICANT CHANGE UP (ref 27–34)
MCHC RBC-ENTMCNC: 29.8 PG — SIGNIFICANT CHANGE UP (ref 27–34)
MCHC RBC-ENTMCNC: 32 GM/DL — SIGNIFICANT CHANGE UP (ref 32–36)
MCHC RBC-ENTMCNC: 32.2 GM/DL — SIGNIFICANT CHANGE UP (ref 32–36)
MCV RBC AUTO: 90.2 FL — SIGNIFICANT CHANGE UP (ref 80–100)
MCV RBC AUTO: 93.1 FL — SIGNIFICANT CHANGE UP (ref 80–100)
MONOCYTES # BLD AUTO: 1.13 K/UL — HIGH (ref 0–0.9)
MONOCYTES NFR BLD AUTO: 6 % — SIGNIFICANT CHANGE UP (ref 2–14)
NEUTROPHILS # BLD AUTO: 14.55 K/UL — HIGH (ref 1.8–7.4)
NEUTROPHILS NFR BLD AUTO: 77.7 % — HIGH (ref 43–77)
NITRITE UR-MCNC: NEGATIVE — SIGNIFICANT CHANGE UP
PH UR: 6.5 — SIGNIFICANT CHANGE UP (ref 5–8)
PLATELET # BLD AUTO: 419 K/UL — HIGH (ref 150–400)
PLATELET # BLD AUTO: 508 K/UL — HIGH (ref 150–400)
POTASSIUM SERPL-MCNC: 3.6 MMOL/L — SIGNIFICANT CHANGE UP (ref 3.5–5.3)
POTASSIUM SERPL-MCNC: 4 MMOL/L — SIGNIFICANT CHANGE UP (ref 3.5–5.3)
POTASSIUM SERPL-SCNC: 3.6 MMOL/L — SIGNIFICANT CHANGE UP (ref 3.5–5.3)
POTASSIUM SERPL-SCNC: 4 MMOL/L — SIGNIFICANT CHANGE UP (ref 3.5–5.3)
PROCALCITONIN SERPL-MCNC: 0.11 NG/ML — HIGH (ref 0.02–0.1)
PROT SERPL-MCNC: 6.8 G/DL — SIGNIFICANT CHANGE UP (ref 6.6–8.7)
PROT UR-MCNC: 15 MG/DL
PROTHROM AB SERPL-ACNC: 12.9 SEC — SIGNIFICANT CHANGE UP (ref 10–12.9)
RBC # BLD: 2.89 M/UL — LOW (ref 3.8–5.2)
RBC # BLD: 2.96 M/UL — LOW (ref 3.8–5.2)
RBC # FLD: 14.9 % — HIGH (ref 10.3–14.5)
RBC # FLD: 15.5 % — HIGH (ref 10.3–14.5)
RBC CASTS # UR COMP ASSIST: NEGATIVE /HPF — SIGNIFICANT CHANGE UP (ref 0–4)
SODIUM SERPL-SCNC: 133 MMOL/L — LOW (ref 135–145)
SODIUM SERPL-SCNC: 137 MMOL/L — SIGNIFICANT CHANGE UP (ref 135–145)
SP GR SPEC: 1.01 — SIGNIFICANT CHANGE UP (ref 1.01–1.02)
TROPONIN T SERPL-MCNC: <0.01 NG/ML — SIGNIFICANT CHANGE UP (ref 0–0.06)
UROBILINOGEN FLD QL: NEGATIVE MG/DL — SIGNIFICANT CHANGE UP
WBC # BLD: 14.81 K/UL — HIGH (ref 3.8–10.5)
WBC # BLD: 18.72 K/UL — HIGH (ref 3.8–10.5)
WBC # FLD AUTO: 14.81 K/UL — HIGH (ref 3.8–10.5)
WBC # FLD AUTO: 18.72 K/UL — HIGH (ref 3.8–10.5)
WBC UR QL: SIGNIFICANT CHANGE UP

## 2019-12-20 PROCEDURE — 99233 SBSQ HOSP IP/OBS HIGH 50: CPT

## 2019-12-20 PROCEDURE — 71045 X-RAY EXAM CHEST 1 VIEW: CPT | Mod: 26

## 2019-12-20 PROCEDURE — 93010 ELECTROCARDIOGRAM REPORT: CPT | Mod: 76

## 2019-12-20 RX ORDER — SODIUM CHLORIDE 9 MG/ML
500 INJECTION INTRAMUSCULAR; INTRAVENOUS; SUBCUTANEOUS ONCE
Refills: 0 | Status: COMPLETED | OUTPATIENT
Start: 2019-12-20 | End: 2019-12-20

## 2019-12-20 RX ORDER — LISINOPRIL 2.5 MG/1
2.5 TABLET ORAL DAILY
Refills: 0 | Status: DISCONTINUED | OUTPATIENT
Start: 2019-12-20 | End: 2019-12-30

## 2019-12-20 RX ORDER — ACETAMINOPHEN 500 MG
650 TABLET ORAL ONCE
Refills: 0 | Status: COMPLETED | OUTPATIENT
Start: 2019-12-20 | End: 2019-12-20

## 2019-12-20 RX ORDER — SODIUM CHLORIDE 9 MG/ML
1000 INJECTION, SOLUTION INTRAVENOUS ONCE
Refills: 0 | Status: COMPLETED | OUTPATIENT
Start: 2019-12-20 | End: 2019-12-20

## 2019-12-20 RX ORDER — ADENOSINE 3 MG/ML
6 INJECTION INTRAVENOUS ONCE
Refills: 0 | Status: COMPLETED | OUTPATIENT
Start: 2019-12-20 | End: 2019-12-20

## 2019-12-20 RX ORDER — MIDODRINE HYDROCHLORIDE 2.5 MG/1
10 TABLET ORAL EVERY 8 HOURS
Refills: 0 | Status: DISCONTINUED | OUTPATIENT
Start: 2019-12-20 | End: 2019-12-22

## 2019-12-20 RX ORDER — PIPERACILLIN AND TAZOBACTAM 4; .5 G/20ML; G/20ML
3.38 INJECTION, POWDER, LYOPHILIZED, FOR SOLUTION INTRAVENOUS ONCE
Refills: 0 | Status: COMPLETED | OUTPATIENT
Start: 2019-12-20 | End: 2019-12-20

## 2019-12-20 RX ORDER — PIPERACILLIN AND TAZOBACTAM 4; .5 G/20ML; G/20ML
3.38 INJECTION, POWDER, LYOPHILIZED, FOR SOLUTION INTRAVENOUS EVERY 8 HOURS
Refills: 0 | Status: DISCONTINUED | OUTPATIENT
Start: 2019-12-20 | End: 2019-12-25

## 2019-12-20 RX ORDER — SODIUM CHLORIDE 9 MG/ML
1000 INJECTION INTRAMUSCULAR; INTRAVENOUS; SUBCUTANEOUS
Refills: 0 | Status: DISCONTINUED | OUTPATIENT
Start: 2019-12-20 | End: 2019-12-21

## 2019-12-20 RX ORDER — SODIUM CHLORIDE 9 MG/ML
500 INJECTION, SOLUTION INTRAVENOUS
Refills: 0 | Status: DISCONTINUED | OUTPATIENT
Start: 2019-12-20 | End: 2019-12-21

## 2019-12-20 RX ORDER — CARVEDILOL PHOSPHATE 80 MG/1
3.12 CAPSULE, EXTENDED RELEASE ORAL EVERY 12 HOURS
Refills: 0 | Status: DISCONTINUED | OUTPATIENT
Start: 2019-12-20 | End: 2019-12-30

## 2019-12-20 RX ORDER — MIDODRINE HYDROCHLORIDE 2.5 MG/1
10 TABLET ORAL ONCE
Refills: 0 | Status: COMPLETED | OUTPATIENT
Start: 2019-12-20 | End: 2019-12-20

## 2019-12-20 RX ORDER — ACETAMINOPHEN 500 MG
1000 TABLET ORAL ONCE
Refills: 0 | Status: COMPLETED | OUTPATIENT
Start: 2019-12-20 | End: 2019-12-20

## 2019-12-20 RX ADMIN — NYSTATIN CREAM 1 APPLICATION(S): 100000 CREAM TOPICAL at 16:16

## 2019-12-20 RX ADMIN — Medication 0.5 MILLIGRAM(S): at 20:10

## 2019-12-20 RX ADMIN — PIPERACILLIN AND TAZOBACTAM 200 GRAM(S): 4; .5 INJECTION, POWDER, LYOPHILIZED, FOR SOLUTION INTRAVENOUS at 03:23

## 2019-12-20 RX ADMIN — MIDODRINE HYDROCHLORIDE 10 MILLIGRAM(S): 2.5 TABLET ORAL at 04:15

## 2019-12-20 RX ADMIN — Medication 0.5 MILLIGRAM(S): at 09:10

## 2019-12-20 RX ADMIN — ADENOSINE 6 MILLIGRAM(S): 3 INJECTION INTRAVENOUS at 02:20

## 2019-12-20 RX ADMIN — PIPERACILLIN AND TAZOBACTAM 25 GRAM(S): 4; .5 INJECTION, POWDER, LYOPHILIZED, FOR SOLUTION INTRAVENOUS at 11:43

## 2019-12-20 RX ADMIN — NYSTATIN CREAM 1 APPLICATION(S): 100000 CREAM TOPICAL at 05:13

## 2019-12-20 RX ADMIN — SODIUM CHLORIDE 2000 MILLILITER(S): 9 INJECTION INTRAMUSCULAR; INTRAVENOUS; SUBCUTANEOUS at 05:09

## 2019-12-20 RX ADMIN — PIPERACILLIN AND TAZOBACTAM 25 GRAM(S): 4; .5 INJECTION, POWDER, LYOPHILIZED, FOR SOLUTION INTRAVENOUS at 21:42

## 2019-12-20 RX ADMIN — Medication 650 MILLIGRAM(S): at 01:30

## 2019-12-20 RX ADMIN — Medication 1000 MILLIGRAM(S): at 14:19

## 2019-12-20 RX ADMIN — Medication 1 TABLET(S): at 11:46

## 2019-12-20 RX ADMIN — ENOXAPARIN SODIUM 40 MILLIGRAM(S): 100 INJECTION SUBCUTANEOUS at 11:46

## 2019-12-20 RX ADMIN — SODIUM CHLORIDE 100 MILLILITER(S): 9 INJECTION INTRAMUSCULAR; INTRAVENOUS; SUBCUTANEOUS at 11:43

## 2019-12-20 RX ADMIN — SODIUM CHLORIDE 1 MILLILITER(S): 9 INJECTION, SOLUTION INTRAVENOUS at 01:25

## 2019-12-20 RX ADMIN — Medication 1 MILLIGRAM(S): at 11:46

## 2019-12-20 RX ADMIN — PANTOPRAZOLE SODIUM 40 MILLIGRAM(S): 20 TABLET, DELAYED RELEASE ORAL at 05:12

## 2019-12-20 RX ADMIN — MIDODRINE HYDROCHLORIDE 10 MILLIGRAM(S): 2.5 TABLET ORAL at 16:16

## 2019-12-20 RX ADMIN — NYSTATIN CREAM 1 APPLICATION(S): 100000 CREAM TOPICAL at 23:23

## 2019-12-20 RX ADMIN — MIDODRINE HYDROCHLORIDE 10 MILLIGRAM(S): 2.5 TABLET ORAL at 23:23

## 2019-12-20 RX ADMIN — Medication 400 MILLIGRAM(S): at 12:41

## 2019-12-20 RX ADMIN — SODIUM CHLORIDE 1000 MILLILITER(S): 9 INJECTION, SOLUTION INTRAVENOUS at 05:54

## 2019-12-20 RX ADMIN — SODIUM CHLORIDE 500 MILLILITER(S): 9 INJECTION INTRAMUSCULAR; INTRAVENOUS; SUBCUTANEOUS at 04:03

## 2019-12-20 RX ADMIN — ALBUTEROL 2.5 MILLIGRAM(S): 90 AEROSOL, METERED ORAL at 09:09

## 2019-12-20 RX ADMIN — Medication 650 MILLIGRAM(S): at 00:52

## 2019-12-20 NOTE — PROGRESS NOTE ADULT - ASSESSMENT
70 yo female with history of COPD who presents with 1 month of shortness of breath worsened over last 2 weeks associated with >30 lb weight loss in the last 6 months, decreased appetite. Patient admitted for respiratory failure 2/2 COPD exacerbation with possible metastatic lung disease. Pulmonology consulted and CT abdomen was obtained - CT showed rectal wall thickening with adjacent air-pockets; colorectal surgery was consulted and MRI was obtained.  May have colon cancer with mets to the lung. GI consult was recommended by colorectal surgeon and consulted. MRI showed perforation of distal rectum and anus w/ collection of fluid/air.  12/5 TTE was ordered for elevated BNP - she was shown to have severe right sided ventricular strain. Started empirically on treatment for PE and CTA was ordered (delayed due to having received contrast with the CT abdomen on the same day). 12/9 CTA negative for PE. 12/11 Nuclear Stress Test-Pharmacologic  Normal study; no evidence for myocardial infarction or ischemia. 12/12 colorectal surgery performed. EUA, rectum with rectal mass biopsy. Posterior rectal wall abscess unroofed and draining mucoid material through cutaneous fistulas b/l . B/L seton placement transrectally. Patient refusing colostomy. S/P Code sepsis 12/20 for fever and hypotension, started IVF and IV Zosyn. Palliative care following, Hospice consulted at request of family.     Lung Nodules with rectal mass and abscess with cutaneous fistulas; possibly metastatic disease  - MRI of the abdomen with Perforation involving the posterior wall of the distal rectum and anus with a collection of fluid and air posteriorly measuring up to 6.5 cm.  - 12/12 Posterior rectal wall abscess unroofed and draining mucoid material through cutaneous fistulas b/l . B/L seton placement transrectally, rectal mass biopsy obtained.   -  Await pathology report but cleared for discharge from colorectal standpoint with plans to follow up with surgery- Dr. Lee in 2 weeks and oncologist.   - Sitz baths for comfort,  gauze can be removed and re-placed if draining  - Psych consult for capacity determination as it appears unclear if patient understands the gravity of her situation  - Palliative care consult  - CT a/p 12/16 results noted. Heme/onc signed off   -Palliative care following, Hospice consult placed  -Family requesting no strong pain meds. IV tylenol x1 for pain     Sepsis -likely secondary to rectal wall abscess  started on Zosyn  now afebrile, continue to trend leukocytosis  UA negative  Recall ID pending family decision about IV antibiotics. continuing zosyn   f/u blood cultures x2  BP remains low, not symptomatic. c/w maintenance fluids, midodrine     COPD exacerbation - hypoxia on admission has resolved  - completed IV azithromycin 500mg x3 days. Completed short course steroids  - patient on anoro-ellipta 62.5/25 dose at home BID; ventolin as needed at home  - continue with duoneb q6 PRN  -respiratory status stable    Anemia with Thrombocytosis-- likely GI source  - stable    Elevated BNP - no clinical signs of HF- significant RV strain on the echo, EF 35-40% from TTE 12/5. No signs of fluid overload s/p IVF resuscitation   - pt had CTA- negative for PE   - nuc med stress test nl    slightly elevated INR - poss 2/2 poor diet/malignancy, resolved    protein calorie malnutrition  - supplement meals  - nutrition consulted     Depressed mood  - no suicidal ideation  - psych consulted, signed off. may f/u outpatient   - may benefit from medication    DVT - lovenox subcut    Disposition - d/c to [facility] cancelled. Pt family open to Hospice consult, consult placed

## 2019-12-20 NOTE — CONSULT NOTE ADULT - SUBJECTIVE AND OBJECTIVE BOX
Patient is a 69y old  Female who presents with a chief complaint of acute hypoxic respiratory failure, multiple lung nodules (19 Dec 2019 12:18)      BRIEF HOSPITAL COURSE: 70 y/o F with a h/o COPD, admitted on 12/5 with 1 month of shortness of breath and >30 lb weight loss in the last 6 months. Patient admitted for respiratory failure 2/2 COPD exacerbation with possible metastatic lung disease. Pulmonology consulted and CT abdomen was obtained - CT showed rectal wall thickening with adjacent air-pockets; colorectal surgery was consulted and MRI was obtained.  May have colon cancer with mets to the lung and bone. GI consult was recommended by colorectal surgeon and consulted. MRI showed perforation of distal rectum and anus w/ collection of fluid/air.  12/5 TTE was ordered for elevated BNP which revealed biventricular failure. 12/9: CTA negative for PE. 12/11 Nuclear Stress Test-Pharmacologic  Normal study; no evidence for myocardial infarction or ischemia, EF: 57%. 12/12 colorectal surgery performed. EUA, rectum with rectal mass biopsy- adenocarcinoma (+). Posterior rectal wall abscess unroofed and draining mucoid material through cutaneous fistulas b/l . B/L seton placement transrectally. Patient refusing colostomy.      Events last 24 hours: Patient was RRT for SVT (HR up to 190) and hypotension (SBP: 70s/80s). Noted to spike fever to 102'F. SVT broke with 6mg adenosine. Given 1.5L IVF and 10mg midodrine. MICU eval requested for persistent hypotension. Patient without complaints, resting comfortably and able to hold conversation. Lactate: 1.0. SBP: 84.       PAST MEDICAL & SURGICAL HISTORY:  Anemia  COPD (chronic obstructive pulmonary disease)  H/O bilateral hip replacements    Allergies    No Known Allergies    Intolerances      FAMILY HISTORY:      Review of Systems:  CONSTITUTIONAL: No fever, chills, or fatigue  EYES: No eye pain, visual disturbances, or discharge  ENMT:  No difficulty hearing, tinnitus, vertigo; No sinus or throat pain  NECK: No pain or stiffness  RESPIRATORY: No cough, wheezing, chills or hemoptysis; No shortness of breath  CARDIOVASCULAR: No chest pain, palpitations, dizziness, or leg swelling  GASTROINTESTINAL: No abdominal or epigastric pain. No nausea, vomiting, or hematemesis; No diarrhea or constipation. No melena or hematochezia.  GENITOURINARY: No dysuria, frequency, hematuria, or incontinence  NEUROLOGICAL: No headaches, memory loss, loss of strength, numbness, or tremors  SKIN: No itching, burning, rashes, or lesions   MUSCULOSKELETAL: No joint pain or swelling; No muscle, back, or extremity pain  PSYCHIATRIC: No depression, anxiety, mood swings, or difficulty sleeping        Medications:  piperacillin/tazobactam IVPB.. 3.375 Gram(s) IV Intermittent every 8 hours  carvedilol 3.125 milliGRAM(s) Oral every 12 hours  lisinopril 2.5 milliGRAM(s) Oral daily  ALBUTerol    0.083% 2.5 milliGRAM(s) Nebulizer every 6 hours PRN  ALBUTerol    90 MICROgram(s) HFA Inhaler 1 Puff(s) Inhalation every 4 hours  buDESOnide    Inhalation Suspension 0.5 milliGRAM(s) Inhalation two times a day  enoxaparin Injectable 40 milliGRAM(s) SubCutaneous daily  pantoprazole    Tablet 40 milliGRAM(s) Oral before breakfast  folic acid 1 milliGRAM(s) Oral daily  multiple electrolytes Injection Type 1 500 milliLiter(s) IV Continuous <Continuous>  multivitamin/minerals 1 Tablet(s) Oral daily  nystatin Powder 1 Application(s) Topical three times a day  zinc oxide 20% Ointment 1 Application(s) Topical three times a day PRN          ICU Vital Signs Last 24 Hrs  T(C): 36.9 (20 Dec 2019 03:11), Max: 38.8 (20 Dec 2019 00:53)  T(F): 98.4 (20 Dec 2019 03:11), Max: 101.8 (20 Dec 2019 00:53)  HR: 88 (20 Dec 2019 05:08) (76 - 189)  BP: 70/42 (20 Dec 2019 05:08) (70/42 - 95/63)  BP(mean): 49 (20 Dec 2019 05:08) (42 - 68)  ABP: --  ABP(mean): --  RR: 20 (20 Dec 2019 04:17) (18 - 26)  SpO2: 96% (20 Dec 2019 04:17) (91% - 99%)    Vital Signs Last 24 Hrs  T(C): 36.9 (20 Dec 2019 03:11), Max: 38.8 (20 Dec 2019 00:53)  T(F): 98.4 (20 Dec 2019 03:11), Max: 101.8 (20 Dec 2019 00:53)  HR: 88 (20 Dec 2019 05:08) (76 - 189)  BP: 70/42 (20 Dec 2019 05:08) (70/42 - 95/63)  BP(mean): 49 (20 Dec 2019 05:08) (42 - 68)  RR: 20 (20 Dec 2019 04:17) (18 - 26)  SpO2: 96% (20 Dec 2019 04:17) (91% - 99%)        I&O's Detail    18 Dec 2019 07:01  -  19 Dec 2019 07:00  --------------------------------------------------------  IN:  Total IN: 0 mL    OUT:    Voided: 400 mL  Total OUT: 400 mL    Total NET: -400 mL      19 Dec 2019 07:01  -  20 Dec 2019 05:56  --------------------------------------------------------  IN:    Sodium Chloride 0.9% IV Bolus: 500 mL  Total IN: 500 mL    OUT:  Total OUT: 0 mL    Total NET: 500 mL            LABS:                        8.6    18.72 )-----------( 508      ( 20 Dec 2019 01:52 )             26.7     12-20    133<L>  |  97<L>  |  14.0  ----------------------------<  106  4.0   |  24.0  |  0.59    Ca    8.7      20 Dec 2019 01:52    TPro  6.8  /  Alb  2.7<L>  /  TBili  0.3<L>  /  DBili  x   /  AST  10  /  ALT  9   /  AlkPhos  75  12-20      CARDIAC MARKERS ( 20 Dec 2019 01:52 )  x     / <0.01 ng/mL / 17 U/L / x     / x          CAPILLARY BLOOD GLUCOSE      POCT Blood Glucose.: 103 mg/dL (20 Dec 2019 01:18)    PT/INR - ( 20 Dec 2019 01:52 )   PT: 12.9 sec;   INR: 1.12 ratio         PTT - ( 20 Dec 2019 01:52 )  PTT:30.6 sec    CULTURES:        Physical Examination:    General: No acute distress.  Alert, oriented, interactive, nonfocal    HEENT: Pupils equal, reactive to light.  Symmetric.    PULM: Clear to auscultation bilaterally, no significant sputum production    CVS: Regular rate and rhythm, no murmurs, rubs, or gallops    ABD: Soft, nondistended, nontender, normoactive bowel sounds, no masses    EXT: No edema, nontender    SKIN: Warm and well perfused, no rashes noted.    NEURO: A&Ox3, strength 5/5 all extremities, cranial nerves grossly intact, no focal deficits      RADIOLOGY:     < from: CT Abdomen and Pelvis w/ Oral Cont and w/ IV Cont (12.16.19 @ 17:22) >  FINDINGS:    LOWER CHEST: There are no pleural or pericardial effusions. The heart   size is normal. Again noted are multiple bilateral pulmonary nodules,   compatible with metastatic disease. These measure up to 1.4 cm in   diameter.    LIVER: Within normal limits.  BILE DUCTS: Normal caliber.  GALLBLADDER: Status postcholecystectomy.  SPLEEN: Within normal limits.  PANCREAS: Within normal limits.  ADRENALS: Within normal limits.  KIDNEYS/URETERS: No renal stones or hydronephrosis. Symmetric enhancement.    BLADDER: Distended.  REPRODUCTIVE ORGANS: Uterus and adnexa within normal limits.    BOWEL: No bowel obstruction. Appendix is within normal limits. There is a   large amount of stool throughout the colon, compatible with constipation.   10 noted is a large heterogeneous complex collection in the distal   rectum/anal canal measuring approximately 5.2 x 5.2 cm, similar to the   previous exam. There are now linear sutures noted within this collection   extending to the mL verge. Also noted is a collection with air-fluid   level in the left issue rectal fossa which appears contiguous with the   larger collection superiorly.  PERITONEUM: No ascites.  VESSELS: The aorta and IVC are normal in caliber and patent. There is   atherosclerosis of the aorta.  RETROPERITONEUM/LYMPH NODES: No lymphadenopathy.    ABDOMINAL WALL: Mild soft tissue anasarca.  BONES: Degenerative change of the thoracolumbar spine. Bilateral hip   prosthesis. Marked lucency around the prosthesis compatible with   osteolyses is again noted, similar to the previous exam.    IMPRESSION:     Compared to 12/6/2019:    Unchanged large complex heterogeneous collection, likely an abscess in   the distal rectum/anal canal. An underlying mass cannot be excluded.    Interval placement of suture like material within this collection.    Unchanged collection with air-fluid level in the left ischio rectal   fossa, which appears to communicate with the larger collection.

## 2019-12-20 NOTE — CONSULT NOTE ADULT - PROBLEM SELECTOR RECOMMENDATION 2
Resolved with adenosine. Likely precipitated by sepsis/fever. Possible hypovolemia component. Continue treatment as above.
s/p abx and steroids  Continuing nebs

## 2019-12-20 NOTE — PROGRESS NOTE ADULT - ATTENDING COMMENTS
Sepsis overnight - started on Zosyn, culture sent. IVF  Discussed with family at bedside - interested in Hospice consult as expected to have recurrent episodes of infection, patient doesn't want colostomy.

## 2019-12-20 NOTE — PROGRESS NOTE ADULT - SUBJECTIVE AND OBJECTIVE BOX
CC:  follow up GOC  INTERVAL HPI/OVERNIGHT EVENTS:  code sepsis overnight  PRESENT SYMPTOMS: SOURCE:  Patient/Family/Team    PAIN SCALE:  0 = none  1 = mild   2 = moderate  3 = severe    Pain:  reported this am - improved    Dyspnea:  [ ] YES [ x] NO  Anxiety:  [ ] YES [ x] NO  Fatigue: [x ] YES [ ] NO  Nausea: [ ] YES [x ] NO  Loss of Appetite: [x ] YES [ ] NO  Other symptoms: __________    MEDICATIONS  (STANDING):  ALBUTerol    90 MICROgram(s) HFA Inhaler 1 Puff(s) Inhalation every 4 hours  buDESOnide    Inhalation Suspension 0.5 milliGRAM(s) Inhalation two times a day  carvedilol 3.125 milliGRAM(s) Oral every 12 hours  enoxaparin Injectable 40 milliGRAM(s) SubCutaneous daily  folic acid 1 milliGRAM(s) Oral daily  lisinopril 2.5 milliGRAM(s) Oral daily  midodrine 10 milliGRAM(s) Oral every 8 hours  multiple electrolytes Injection Type 1 500 milliLiter(s) (1 mL/Hr) IV Continuous <Continuous>  multivitamin/minerals 1 Tablet(s) Oral daily  nystatin Powder 1 Application(s) Topical three times a day  pantoprazole    Tablet 40 milliGRAM(s) Oral before breakfast  piperacillin/tazobactam IVPB.. 3.375 Gram(s) IV Intermittent every 8 hours  sodium chloride 0.9%. 1000 milliLiter(s) (100 mL/Hr) IV Continuous <Continuous>    MEDICATIONS  (PRN):  ALBUTerol    0.083% 2.5 milliGRAM(s) Nebulizer every 6 hours PRN Shortness of Breath and/or Wheezing  zinc oxide 20% Ointment 1 Application(s) Topical three times a day PRN bowel movement      Allergies    No Known Allergies    Intolerances    Karnofsky Performance Score/Palliative Performance Status Version 2:  50 %    Vital Signs Last 24 Hrs  T(C): 36.6 (20 Dec 2019 08:25), Max: 38.8 (20 Dec 2019 00:53)  T(F): 97.8 (20 Dec 2019 08:25), Max: 101.8 (20 Dec 2019 00:53)  HR: 87 (20 Dec 2019 11:50) (76 - 189)  BP: 113/87 (20 Dec 2019 11:50) (70/42 - 113/87)  BP(mean): 57 (20 Dec 2019 06:00) (42 - 68)  RR: 23 (20 Dec 2019 11:50) (18 - 26)  SpO2: 98% (20 Dec 2019 11:50) (91% - 100%)    PHYSICAL EXAM:    General: awake alert NAD  HEENT: [ x] normal  [ ] dry mouth  [ ] ET tube/trach    Lungs: [x ] comfortable [ ] tachypnea/labored breathing  [ ] excessive secretions    CV: [ x] normal  [ ] tachycardia    GI: [ x] normal  [ ] distended  [ ] tender  [ ] no BS               [ ] PEG/NG/OG tube    : [x ] normal  [ ] incontinent  [ ] oliguria/anuria  [ ] azar    MSK: [ ] normal  [x ] weakness  [ ] edema             [ ] ambulatory  [ ] bedbound/wheelchair bound    Skin: [ ] normal  [ ] pressure ulcers- Stage_____  [x ] no rash    LABS:                        8.6    14.81 )-----------( 419      ( 20 Dec 2019 12:16 )             26.9     12-20    137  |  100  |  11.0  ----------------------------<  103  3.6   |  26.0  |  0.48<L>    Ca    8.6      20 Dec 2019 12:16    TPro  6.8  /  Alb  2.7<L>  /  TBili  0.3<L>  /  DBili  x   /  AST  10  /  ALT  9   /  AlkPhos  75  12-20    PT/INR - ( 20 Dec 2019 01:52 )   PT: 12.9 sec;   INR: 1.12 ratio         PTT - ( 20 Dec 2019 01:52 )  PTT:30.6 sec  Urinalysis Basic - ( 20 Dec 2019 06:21 )    Color: Yellow / Appearance: Clear / S.010 / pH: x  Gluc: x / Ketone: Negative  / Bili: Negative / Urobili: Negative mg/dL   Blood: x / Protein: 15 mg/dL / Nitrite: Negative   Leuk Esterase: Negative / RBC: Negative /HPF / WBC 0-2   Sq Epi: x / Non Sq Epi: Negative / Bacteria: Negative      I&O's Summary    19 Dec 2019 07:01  -  20 Dec 2019 07:00  --------------------------------------------------------  IN: 500 mL / OUT: 0 mL / NET: 500 mL    20 Dec 2019 07:  -  20 Dec 2019 12:51  --------------------------------------------------------  IN: 300 mL / OUT: 0 mL / NET: 300 mL        Thank you for the opportunity to assist with the care of this patient.   [facility] [phone].

## 2019-12-20 NOTE — PROGRESS NOTE ADULT - PROBLEM SELECTOR PLAN 5
Spoke with patient- she wishes me to speak to her son and for him to make decisions.  Spoke with son Clive ( healthcare proxy). Dr. Garza PGY3, Mariya Albert RN also present.    Son has been informed of current condition. He understands that infections will be an endless cycle without a colostomy. He is more inclined for comfort care and hospice services BUT he wishes to speak to his mother tonight when he comes to visit.   He feels she understands and wants her input as well. He did state that he would NOT IV  WANT PRESSORS and ICU transfer if she should become more hypotensive.  Plan for now is to continue current management until he has a chance to speak to his mother about comfort measures. NO ICU transfer for IV pressors Spoke with patient- she wishes me to speak to her son and for him to make decisions.  Spoke with son Clive ( healthcare proxy). Dr. Garza PGY3, Mariya Albert RN also present.    Son has been informed of current condition. He understands that infections will be an endless cycle without a colostomy. He is more inclined for comfort care and hospice services BUT he wishes to speak to his mother tonight when he comes to visit.   He feels she understands and wants her input as well. He did state that he would NOT IV  WANT PRESSORS and ICU transfer if she should become more hypotensive.  Plan for now is to continue current management until he has a chance to speak to his mother about comfort measures. NO ICU transfer for IV pressors.   Plan was to go to [facility] with hospice support thereafter.   If patient to go to [facility] then would recommend updating [facility] with  patient/ son with No Rehospitalization, no Antibiotics

## 2019-12-20 NOTE — SEPSIS NOTE - ADDITIONAL PE
PHYSICAL EXAM-  NECK: Supple, No JVD  ABDOMEN: Soft, Nontender, Nondistended. Bowel sounds present x4 quads  EXTREMITIES:  2+ Peripheral Pulses, No clubbing, cyanosis, or edema.  NERVOUS SYSTEM:  Alert & Oriented x2 with periods of confusion- pt baseline upon admission

## 2019-12-20 NOTE — CHART NOTE - NSCHARTNOTEFT_GEN_A_CORE
CODE SEPSIS/RRT FOLLOW UP NOTE    Earlier this am there was Code Sepsis/RRT for patient with fever of 101.8 and patient also found to be in SVT to 190. She was awake and alert at the time with minimal complaints, no chest pain or palpitations. Blood pressure found to be low at 73/55. Pt given 500 cc bolus, tylenol and started on zosyn. Vagal maneuvers tried to break SVT, but it persisted. Pt given CODE SEPSIS/RRT FOLLOW UP NOTE    Please see previous code sepsis notes for further information, this is brief summary/updates.     70 yo F hx COPD presented with copd exacerbation and weight loss. She was found to have rectal mass with possible abscess, but did not want aggressive treatment of mass or surgery.     Earlier this am there was Code Sepsis/RRT for patient with fever of 101.8 and patient also found to be in SVT to 190. She was awake and alert at the time with minimal complaints, no chest pain or palpitations. Blood pressure found to be low at 73/55. Pt given 500 cc bolus, tylenol and started on zosyn. Vagal maneuvers tried to break SVT, but it persisted. Pt given adenosine 6 mg and broke in to sinus rhythm. Cultures were sent and Pt bp initially improved to 90/60 and pt brought to step down unit. Pt heart rate remained stable, but blood pressure continues to be low at 70's/40's. Gave another 500 cc bolus and midodrine 10 without response. Patient continues to have good mental status despite low bp. Again giving patient bolus 500 cc at a time. Spoke to patient's  Reddy as patient is DNR/DNI and they were discussing possible hospice or comfort care, but he stated that for now would want patient to be started on pressors.     Plan:  will continue to bolus 500 cc at a time to see if response  continue zosyn  icu consult for possible pressors.   prognosis guarded.

## 2019-12-20 NOTE — CONSULT NOTE ADULT - ASSESSMENT
70 y/o F with a h/o COPD, with sepsis, rectal abscess, SVT, hypotension, rectal adenocarcinoma.    Case discussed in detail with MICU attending, Dr. Mendiola.    Patient does not require transfer to the MICU at this point in time. Please reconsult as appropriate if condition deteriorates. 70 y/o F with a h/o COPD, with sepsis, rectal abscess, SVT, hypotension, rectal adenocarcinoma.    Case discussed in detail with MICU attending, Dr. Manning.    Patient does not require transfer to the MICU at this point in time. Please reconsult as appropriate if condition deteriorates.

## 2019-12-20 NOTE — SEPSIS NOTE - NSSUBJFT_GEN_A_CORE
Pt seen @ bedside for Code Sepsis.. Code sepsis called at 01:15 for fever and tachycardia. Patient admitted for COPD exacerbation multiple lung nodules. Patient lying in bed, NAD. Denies CP, chest pressure, chest tightness, palpitations, SOB, lightheadedness, N/V.

## 2019-12-20 NOTE — SEPSIS NOTE - ASSESSMENT
LABS ----------                        8.6    18.72 )-----------( 508      ( 20 Dec 2019 01:52 )             26.7       12-20    133<L>  |  97<L>  |  14.0  ----------------------------<  106  4.0   |  24.0  |  0.59    Ca    8.7      20 Dec 2019 01:52    TPro  6.8  /  Alb  2.7<L>  /  TBili  0.3<L>  /  DBili  x   /  AST  10  /  ALT  9   /  AlkPhos  75  12-20      LIVER FUNCTIONS - ( 20 Dec 2019 01:52 )  Alb: 2.7 g/dL / Pro: 6.8 g/dL / ALK PHOS: 75 U/L / ALT: 9 U/L / AST: 10 U/L / GGT: x                     PT/INR - ( 20 Dec 2019 01:52 )   PT: 12.9 sec;   INR: 1.12 ratio         PTT - ( 20 Dec 2019 01:52 )  PTT:30.6 sec            IMAGING:    Hypotension: Ordered midodrine 10mg PO x1 dose,  an additional 500mL sodium chloride bolus x1 dose. Repeat BP 1 hour.  Sinus tachycardia: Supraventricular tachycardia improved s/p adenosine. Now sinus tach. Continue IVF and reassess.  Lactate 1.0  Pending blood cultures/Urine culture  Continue to monitor and reassess prn   Discussed with Dr. Andre, in agreement with plan and will F/U with pt.   PMD will be notified in AM.

## 2019-12-20 NOTE — CONSULT NOTE ADULT - PROBLEM SELECTOR RECOMMENDATION 9
Likely secondary to rectal abscess. Spiking temp to 102'F. WBC up to 18K, although procalcitonin only 0.11. Concern for progression to septic shock given persistent hypotension, although lactate is within normal range and there is no evidence of end-organ hypoperfusion from a clinical or laboratory standpoint. Will give another 1L IVF bolus now (already received 1.5L and 10mg midodrine). Baseline SBP is in the 90s/low-100s. Biventricular failure noted on TTE, although EF of 57% on recent NST. If BP is not responsive to additional volume + midodrine, will consider transferring to MICU for IV vasopressor therapy, although this seems futile given the fact that she is refusing colostomy and colorectal surgeon is recommending hospice care given the fact that long term source control of the pelvic collections does not seem attainable. As per hospitalist, patient's son (HCP) is amenable to vasopressor therapy at this time. Hold antihypertensives. D/c ACE-I? Unclear role as BP runs soft at baseline and EF is normal. Will continue to follow. Started on empiric Zosyn and blood/urine cultures sent by medicine team.
rectal mass and abscess with cutaneous fistulas - s/p biopsy   awaiting results  Pain managed

## 2019-12-20 NOTE — SEPSIS NOTE - ASSESSMENT
STAT LABS ORDERED:   - cbc w/diff  - cmp  - blood cultures x2  - lactate  - PT/PTT/INR  - UA  - Urine Culture    IMAGING/DIAGNOSTIC STUDIES ORDERED:     ANTIBIOTICS ORDERED:     FLUIDS GIVEN:       PLAN OF CARE/ INTERVENTIONS PLANNED:   - abx + fluids as above  - VS q30min x 1hr, then q1hr x 6hrs  - FU labs + imaging  - 2hr bedside follow up planned    DISPOSITION:  - Remain at current level of care  - Transfer to Telemetry  - Transfer to Stepdown  - Transfer to MICU/ CT ICU/ SICU/ Neuro ICU STAT LABS ORDERED:   - cbc w/diff  - cmp  - blood cultures x2  - lactate  -procalcitonin  - PT/PTT/INR  - UA  - Urine Culture    IMAGING/DIAGNOSTIC STUDIES ORDERED:   CXR- bedside read without acute changes when compared to prior cxr. Pending final report.  EKG:  supraventricular tachycardia.    ANTIBIOTICS ORDERED: Zosyn 3.375mg IVPB    FLUIDS GIVEN: 500 mL Sodium chloride pending lactate result given EF 35-40%.      PLAN OF CARE/ INTERVENTIONS PLANNED:   - SVT: Vagal maneuvers attempted without success. Dr. Andre at pt bedside recommended Adenosine. Ordered Adenosine 6mg IVP x1 dose. Pt HR improved to 114-117 bpm.  - abx + fluids as above  - VS q30min x 1hr, then q1hr x 6hrs  - FU labs + imaging  - 2hr bedside follow up planned    [facility]:  [facility]    -PMD will be notified in AM.

## 2019-12-20 NOTE — CONSULT NOTE ADULT - PROBLEM SELECTOR RECOMMENDATION 3
Supportive care. Not a candidate for palliative chemotherapy, as per colorectal. Heme/Onc has signed off.

## 2019-12-20 NOTE — SEPSIS NOTE - NSSUBJFT_GEN_A_CORE
CODE SEPSIS 2HR FOLLOW UP NOTE:    Patient seen and examined at bedside as follow up of recently called CODE SEPSIS. Clinically improved compared to prior exam. Pt afebrile, HR improved to 117 sinus tach. Pt remains hypotensive. NAD, offering no complaints.

## 2019-12-20 NOTE — SEPSIS NOTE - ADDITIONAL PE
NECK: Supple, No JVD  ABDOMEN: Soft, Nontender, Nondistended. Bowel sounds present x4 quads  EXTREMITIES:  2+ Peripheral Pulses, No clubbing, cyanosis, or edema.  NERVOUS SYSTEM:  Alert & Oriented X2 with periods of confusion noted-same as upon admission

## 2019-12-20 NOTE — PROGRESS NOTE ADULT - SUBJECTIVE AND OBJECTIVE BOX
CC: f/u pos rectal adenocarcinoma and rectal wall abscess s/p unroofing and drainage , with lung mets    INTERVAL HPI/OVERNIGHT EVENTS: Pt seen and examined at bedside this AM by Hospitalist and PA with  Feroz and Son Clive present. S/p Code Sepsis around 1:15 am for fever 101.8F, tachycardia 180s and hypotension with SPB in the 80s. Found to have SVT which was not reversed by vagal maneuvers, received Adenosine 6mg IVP x1 which improved HR. Began fluid resuscitation and started Zosyn. ICU consulted overnight. Not ICU candidate given refusal of colostomy and not a candidate for palliative chemo/radiation. Recommended that if no improvement in BP, would likely need to begin IV pressors. Pt laying in bed this AM, stating discomfort in rectal area. SBP remains 80s to 90s. Denies headaches, dizziness, palpitations, chest pain, difficulty breathing, abdominal pain, nausea or any other complaints. Discussed options for plan of care with Pt and Family, all agree that they would welcome a hospice evaluation to maintain patients comfort.    Allergies  No Known Allergies    HEALTH ISSUES - PROBLEM Dx:  Rectal adenocarcinoma: Rectal adenocarcinoma  SVT (supraventricular tachycardia): SVT (supraventricular tachycardia)  Sepsis: Sepsis  Chronic obstructive pulmonary disease, unspecified COPD type: Chronic obstructive pulmonary disease, unspecified COPD type  Rectal fistula: Rectal fistula  Advance care planning: Advance care planning  Leukocytosis: Leukocytosis  Encounter for palliative care: Encounter for palliative care  Rectal mass: Rectal mass  COPD (chronic obstructive pulmonary disease): COPD (chronic obstructive pulmonary disease)  Abnormal CT of the abdomen: Abnormal CT of the abdomen  Lung nodules: Lung nodules  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    PAST MEDICAL & SURGICAL HISTORY:  Anemia  COPD (chronic obstructive pulmonary disease)  H/O bilateral hip replacements    VITAL SIGNS:  T(C): 36.6 (19 @ 08:25), Max: 38.8 (19 @ 00:53)  HR: 84 (19 @ 09:12) (76 - 189)  BP: 83/57 (19 @ 08:00) (70/42 - 95/63)  RR: 18 (19 @ 08:00) (18 - 26)  SpO2: 95% (19 @ 09:12) (91% - 100%)  Wt(kg): --Vital Signs Last 24 Hrs  T(C): 36.6 (20 Dec 2019 08:25), Max: 38.8 (20 Dec 2019 00:53)  T(F): 97.8 (20 Dec 2019 08:25), Max: 101.8 (20 Dec 2019 00:53)  HR: 84 (20 Dec 2019 09:12) (76 - 189)  BP: 83/57 (20 Dec 2019 08:00) (70/42 - 95/63)  BP(mean): 57 (20 Dec 2019 06:00) (42 - 68)  RR: 18 (20 Dec 2019 08:00) (18 - 26)  SpO2: 95% (20 Dec 2019 09:12) (91% - 100%)    PHYSICAL EXAM:  GENERAL: Pt lying in bed in right lateral decubitus position, appears to be in mild discomfort  HEAD:  Atraumatic, Normocephalic  EYES: EOMI, PERRL, conjunctiva and sclera clear  ENT: Moist mucous membranes  NECK: Supple, No JVD  CHEST/LUNG: Clear to auscultation bilaterally; No rales, rhonchi, wheezing, or rubs. Unlabored respirations  HEART: Regular rate and rhythm; No murmurs, rubs, or gallops  ABDOMEN: Bowel sounds present; Soft, Nontender, Nondistended. No guarding or rigidity   EXTREMITIES:  2+ Peripheral Pulses, brisk capillary refill. No clubbing, cyanosis, or edema  NERVOUS SYSTEM:  Alert & Oriented X2-3, sometimes forgetful, speech clear, FROM x 4 extremities. No deficits   SKIN: No rashes or lesions    LABS:                   8.6    18.72 )-----------( 508      ( 20 Dec 2019 01:52 )             26.7     12-20  133<L>  |  97<L>  |  14.0  ----------------------------<  106  4.0   |  24.0  |  0.59    Ca    8.7      20 Dec 2019 01:52    TPro  6.8  /  Alb  2.7<L>  /  TBili  0.3<L>  /  DBili  x   /  AST  10  /  ALT  9   /  AlkPhos  75  12-20    PT/INR - ( 20 Dec 2019 01:52 )   PT: 12.9 sec;   INR: 1.12 ratio    PTT - ( 20 Dec 2019 01:52 )  PTT:30.6 sec  CAPILLARY BLOOD GLUCOSE  POCT Blood Glucose.: 103 mg/dL (20 Dec 2019 01:18)    Urinalysis Basic - ( 20 Dec 2019 06:21 )  Color: Yellow / Appearance: Clear / S.010 / pH: x  Gluc: x / Ketone: Negative  / Bili: Negative / Urobili: Negative mg/dL   Blood: x / Protein: 15 mg/dL / Nitrite: Negative   Leuk Esterase: Negative / RBC: Negative /HPF / WBC 0-2   Sq Epi: x / Non Sq Epi: Negative / Bacteria: Negative CC: f/u pos rectal adenocarcinoma and rectal wall abscess s/p unroofing and drainage , with lung mets    INTERVAL HPI/OVERNIGHT EVENTS: Pt seen and examined at bedside this AM by Hospitalist and PA with  Feroz and Son Clive present. S/p Code Sepsis around 1:15 am for fever 101.8F, tachycardia 180s and hypotension with SPB in the 80s. Found to have SVT which was not reversed by vagal maneuvers, received Adenosine 6mg IVP x1 which improved HR. Began fluid resuscitation and started Zosyn. ICU consulted overnight. Not ICU candidate given refusal of colostomy and not a candidate for palliative chemo/radiation. Recommended that if no improvement in BP, would likely need to begin IV pressors. Pt laying in bed this AM, stating discomfort in rectal area. SBP remains 80s to 90s. Denies headaches, dizziness, palpitations, chest pain, difficulty breathing, abdominal pain, nausea or any other complaints. Discussed options for plan of care with Pt and Family, all agree that they would welcome a hospice evaluation to maintain patients comfort.    Allergies  No Known Allergies    HEALTH ISSUES - PROBLEM Dx:  Rectal adenocarcinoma: Rectal adenocarcinoma  SVT (supraventricular tachycardia): SVT (supraventricular tachycardia)  Sepsis: Sepsis  Chronic obstructive pulmonary disease, unspecified COPD type: Chronic obstructive pulmonary disease, unspecified COPD type  Rectal fistula: Rectal fistula  Advance care planning: Advance care planning  Leukocytosis: Leukocytosis  Encounter for palliative care: Encounter for palliative care  Rectal mass: Rectal mass  COPD (chronic obstructive pulmonary disease): COPD (chronic obstructive pulmonary disease)  Abnormal CT of the abdomen: Abnormal CT of the abdomen  Lung nodules: Lung nodules  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    PAST MEDICAL & SURGICAL HISTORY:  Anemia  COPD (chronic obstructive pulmonary disease)  H/O bilateral hip replacements    VITAL SIGNS:  T(C): 36.6 (19 @ 08:25), Max: 38.8 (19 @ 00:53)  HR: 84 (19 @ 09:12) (76 - 189)  BP: 83/57 (19 @ 08:00) (70/42 - 95/63)  RR: 18 (19 @ 08:00) (18 - 26)  SpO2: 95% (19 @ 09:12) (91% - 100%)  Wt(kg): --Vital Signs Last 24 Hrs  T(C): 36.6 (20 Dec 2019 08:25), Max: 38.8 (20 Dec 2019 00:53)  T(F): 97.8 (20 Dec 2019 08:25), Max: 101.8 (20 Dec 2019 00:53)  HR: 84 (20 Dec 2019 09:12) (76 - 189)  BP: 83/57 (20 Dec 2019 08:00) (70/42 - 95/63)  BP(mean): 57 (20 Dec 2019 06:00) (42 - 68)  RR: 18 (20 Dec 2019 08:00) (18 - 26)  SpO2: 95% (20 Dec 2019 09:12) (91% - 100%)    PHYSICAL EXAM:  GENERAL: Pt lying in bed in right lateral decubitus position, appears to be in mild discomfort  HEAD:  Atraumatic, Normocephalic  EYES: EOMI, PERRL, conjunctiva and sclera clear  ENT: Moist mucous membranes  NECK: Supple, No JVD  CHEST/LUNG: Clear to auscultation bilaterally; No rales, rhonchi, wheezing, or rubs. Unlabored respirations  HEART: Regular rate and rhythm; No murmurs, rubs, or gallops  ABDOMEN: Bowel sounds present; Soft, Nontender, Nondistended. No guarding or rigidity   EXTREMITIES:  2+ Peripheral Pulses, brisk capillary refill. No clubbing, cyanosis, or edema  NERVOUS SYSTEM:  Alert & Oriented X2-3, sometimes forgetful, speech clear, FROM x 4 extremities. No deficits   SKIN: left sacral wound - appears clean    LABS:                   8.6    18.72 )-----------( 508      ( 20 Dec 2019 01:52 )             26.7     12-20  133<L>  |  97<L>  |  14.0  ----------------------------<  106  4.0   |  24.0  |  0.59    Ca    8.7      20 Dec 2019 01:52    TPro  6.8  /  Alb  2.7<L>  /  TBili  0.3<L>  /  DBili  x   /  AST  10  /  ALT  9   /  AlkPhos  75  12-20    PT/INR - ( 20 Dec 2019 01:52 )   PT: 12.9 sec;   INR: 1.12 ratio    PTT - ( 20 Dec 2019 01:52 )  PTT:30.6 sec  CAPILLARY BLOOD GLUCOSE  POCT Blood Glucose.: 103 mg/dL (20 Dec 2019 01:18)    Urinalysis Basic - ( 20 Dec 2019 06:21 )  Color: Yellow / Appearance: Clear / S.010 / pH: x  Gluc: x / Ketone: Negative  / Bili: Negative / Urobili: Negative mg/dL   Blood: x / Protein: 15 mg/dL / Nitrite: Negative   Leuk Esterase: Negative / RBC: Negative /HPF / WBC 0-2   Sq Epi: x / Non Sq Epi: Negative / Bacteria: Negative

## 2019-12-21 LAB
ALBUMIN SERPL ELPH-MCNC: 2.3 G/DL — LOW (ref 3.3–5.2)
ALP SERPL-CCNC: 70 U/L — SIGNIFICANT CHANGE UP (ref 40–120)
ALT FLD-CCNC: 8 U/L — SIGNIFICANT CHANGE UP
ANION GAP SERPL CALC-SCNC: 12 MMOL/L — SIGNIFICANT CHANGE UP (ref 5–17)
ANION GAP SERPL CALC-SCNC: 16 MMOL/L — SIGNIFICANT CHANGE UP (ref 5–17)
AST SERPL-CCNC: 11 U/L — SIGNIFICANT CHANGE UP
BILIRUB SERPL-MCNC: 0.3 MG/DL — LOW (ref 0.4–2)
BUN SERPL-MCNC: 11 MG/DL — SIGNIFICANT CHANGE UP (ref 8–20)
BUN SERPL-MCNC: 9 MG/DL — SIGNIFICANT CHANGE UP (ref 8–20)
CALCIUM SERPL-MCNC: 8.7 MG/DL — SIGNIFICANT CHANGE UP (ref 8.6–10.2)
CALCIUM SERPL-MCNC: 8.7 MG/DL — SIGNIFICANT CHANGE UP (ref 8.6–10.2)
CHLORIDE SERPL-SCNC: 100 MMOL/L — SIGNIFICANT CHANGE UP (ref 98–107)
CHLORIDE SERPL-SCNC: 97 MMOL/L — LOW (ref 98–107)
CO2 SERPL-SCNC: 24 MMOL/L — SIGNIFICANT CHANGE UP (ref 22–29)
CO2 SERPL-SCNC: 24 MMOL/L — SIGNIFICANT CHANGE UP (ref 22–29)
CREAT SERPL-MCNC: 0.62 MG/DL — SIGNIFICANT CHANGE UP (ref 0.5–1.3)
CREAT SERPL-MCNC: 0.65 MG/DL — SIGNIFICANT CHANGE UP (ref 0.5–1.3)
GLUCOSE SERPL-MCNC: 119 MG/DL — HIGH (ref 70–115)
GLUCOSE SERPL-MCNC: 98 MG/DL — SIGNIFICANT CHANGE UP (ref 70–115)
HCT VFR BLD CALC: 25.7 % — LOW (ref 34.5–45)
HGB BLD-MCNC: 8.5 G/DL — LOW (ref 11.5–15.5)
MAGNESIUM SERPL-MCNC: 2.1 MG/DL — SIGNIFICANT CHANGE UP (ref 1.6–2.6)
MCHC RBC-ENTMCNC: 29.9 PG — SIGNIFICANT CHANGE UP (ref 27–34)
MCHC RBC-ENTMCNC: 33.1 GM/DL — SIGNIFICANT CHANGE UP (ref 32–36)
MCV RBC AUTO: 90.5 FL — SIGNIFICANT CHANGE UP (ref 80–100)
PLATELET # BLD AUTO: 542 K/UL — HIGH (ref 150–400)
POTASSIUM SERPL-MCNC: 4.1 MMOL/L — SIGNIFICANT CHANGE UP (ref 3.5–5.3)
POTASSIUM SERPL-MCNC: 4.2 MMOL/L — SIGNIFICANT CHANGE UP (ref 3.5–5.3)
POTASSIUM SERPL-SCNC: 4.1 MMOL/L — SIGNIFICANT CHANGE UP (ref 3.5–5.3)
POTASSIUM SERPL-SCNC: 4.2 MMOL/L — SIGNIFICANT CHANGE UP (ref 3.5–5.3)
PROT SERPL-MCNC: 6.4 G/DL — LOW (ref 6.6–8.7)
RBC # BLD: 2.84 M/UL — LOW (ref 3.8–5.2)
RBC # FLD: 15 % — HIGH (ref 10.3–14.5)
SODIUM SERPL-SCNC: 136 MMOL/L — SIGNIFICANT CHANGE UP (ref 135–145)
SODIUM SERPL-SCNC: 137 MMOL/L — SIGNIFICANT CHANGE UP (ref 135–145)
WBC # BLD: 13.61 K/UL — HIGH (ref 3.8–10.5)
WBC # FLD AUTO: 13.61 K/UL — HIGH (ref 3.8–10.5)

## 2019-12-21 PROCEDURE — 99233 SBSQ HOSP IP/OBS HIGH 50: CPT

## 2019-12-21 RX ORDER — POTASSIUM CHLORIDE 20 MEQ
40 PACKET (EA) ORAL ONCE
Refills: 0 | Status: COMPLETED | OUTPATIENT
Start: 2019-12-21 | End: 2019-12-21

## 2019-12-21 RX ORDER — MAGNESIUM SULFATE 500 MG/ML
1 VIAL (ML) INJECTION ONCE
Refills: 0 | Status: COMPLETED | OUTPATIENT
Start: 2019-12-21 | End: 2019-12-21

## 2019-12-21 RX ORDER — SODIUM CHLORIDE 9 MG/ML
1000 INJECTION, SOLUTION INTRAVENOUS
Refills: 0 | Status: DISCONTINUED | OUTPATIENT
Start: 2019-12-21 | End: 2019-12-30

## 2019-12-21 RX ADMIN — MIDODRINE HYDROCHLORIDE 10 MILLIGRAM(S): 2.5 TABLET ORAL at 21:42

## 2019-12-21 RX ADMIN — ALBUTEROL 2.5 MILLIGRAM(S): 90 AEROSOL, METERED ORAL at 09:28

## 2019-12-21 RX ADMIN — PANTOPRAZOLE SODIUM 40 MILLIGRAM(S): 20 TABLET, DELAYED RELEASE ORAL at 06:13

## 2019-12-21 RX ADMIN — SODIUM CHLORIDE 100 MILLILITER(S): 9 INJECTION INTRAMUSCULAR; INTRAVENOUS; SUBCUTANEOUS at 03:04

## 2019-12-21 RX ADMIN — Medication 1 TABLET(S): at 11:20

## 2019-12-21 RX ADMIN — ZINC OXIDE 1 APPLICATION(S): 200 OINTMENT TOPICAL at 23:49

## 2019-12-21 RX ADMIN — NYSTATIN CREAM 1 APPLICATION(S): 100000 CREAM TOPICAL at 13:54

## 2019-12-21 RX ADMIN — PIPERACILLIN AND TAZOBACTAM 25 GRAM(S): 4; .5 INJECTION, POWDER, LYOPHILIZED, FOR SOLUTION INTRAVENOUS at 03:04

## 2019-12-21 RX ADMIN — Medication 1 MILLIGRAM(S): at 11:20

## 2019-12-21 RX ADMIN — Medication 100 GRAM(S): at 17:45

## 2019-12-21 RX ADMIN — Medication 40 MILLIEQUIVALENT(S): at 17:45

## 2019-12-21 RX ADMIN — Medication 0.5 MILLIGRAM(S): at 09:28

## 2019-12-21 RX ADMIN — PIPERACILLIN AND TAZOBACTAM 25 GRAM(S): 4; .5 INJECTION, POWDER, LYOPHILIZED, FOR SOLUTION INTRAVENOUS at 18:29

## 2019-12-21 RX ADMIN — SODIUM CHLORIDE 75 MILLILITER(S): 9 INJECTION, SOLUTION INTRAVENOUS at 09:53

## 2019-12-21 RX ADMIN — ENOXAPARIN SODIUM 40 MILLIGRAM(S): 100 INJECTION SUBCUTANEOUS at 11:20

## 2019-12-21 RX ADMIN — PIPERACILLIN AND TAZOBACTAM 25 GRAM(S): 4; .5 INJECTION, POWDER, LYOPHILIZED, FOR SOLUTION INTRAVENOUS at 11:23

## 2019-12-21 RX ADMIN — NYSTATIN CREAM 1 APPLICATION(S): 100000 CREAM TOPICAL at 06:13

## 2019-12-21 RX ADMIN — MIDODRINE HYDROCHLORIDE 10 MILLIGRAM(S): 2.5 TABLET ORAL at 06:13

## 2019-12-21 RX ADMIN — MIDODRINE HYDROCHLORIDE 10 MILLIGRAM(S): 2.5 TABLET ORAL at 13:54

## 2019-12-21 RX ADMIN — NYSTATIN CREAM 1 APPLICATION(S): 100000 CREAM TOPICAL at 21:42

## 2019-12-21 RX ADMIN — CARVEDILOL PHOSPHATE 3.12 MILLIGRAM(S): 80 CAPSULE, EXTENDED RELEASE ORAL at 17:45

## 2019-12-21 NOTE — PROGRESS NOTE ADULT - SUBJECTIVE AND OBJECTIVE BOX
CC: f/u pos rectal adenocarcinoma and rectal wall abscess s/p unroofing and drainage , with lung mets    INTERVAL HPI/OVERNIGHT EVENTS:   seen and examined at bedside  denied complaints.  dehydrated  BP lower normal to hypo    ROS:  deniedf ever/chills/chest pain/palpitation/SOB/dizziness/abd pain/n/v/d/c/dysuria/headaches/limb weakness. all other ROS negative      Allergies  No Known Allergies    HEALTH ISSUES - PROBLEM Dx:  Rectal adenocarcinoma: Rectal adenocarcinoma  SVT (supraventricular tachycardia): SVT (supraventricular tachycardia)  Sepsis: Sepsis  Chronic obstructive pulmonary disease, unspecified COPD type: Chronic obstructive pulmonary disease, unspecified COPD type  Rectal fistula: Rectal fistula  Advance care planning: Advance care planning  Leukocytosis: Leukocytosis  Encounter for palliative care: Encounter for palliative care  Rectal mass: Rectal mass  COPD (chronic obstructive pulmonary disease): COPD (chronic obstructive pulmonary disease)  Abnormal CT of the abdomen: Abnormal CT of the abdomen  Lung nodules: Lung nodules  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    PAST MEDICAL & SURGICAL HISTORY:  Anemia  COPD (chronic obstructive pulmonary disease)  H/O bilateral hip replacements    Vital Signs Last 24 Hrs  T(C): 36.4 (21 Dec 2019 08:38), Max: 37.7 (21 Dec 2019 06:14)  T(F): 97.5 (21 Dec 2019 08:38), Max: 99.9 (21 Dec 2019 06:15)  HR: 115 (21 Dec 2019 06:14) (80 - 116)  BP: 96/78 (21 Dec 2019 06:14) (85/45 - 128/85)  BP(mean): 82 (21 Dec 2019 06:14) (70 - 96)  RR: 14 (21 Dec 2019 04:00) (14 - 23)  SpO2: 90% (21 Dec 2019 04:00) (90% - 100%)      CAPILLARY BLOOD GLUCOSE      I&O's Summary    20 Dec 2019 07:01  -  21 Dec 2019 07:00  --------------------------------------------------------  IN: 1700 mL / OUT: 1200 mL / NET: 500 mL      PHYSICAL EXAM:  GENERAL: Not in distress. Alert    HEENT:  Normocephalic and atraumatic. mild pallor or icterus  NECK: Supple.  No JVD.    CARDIOVASCULAR: RRR S1, S2. No murmur/rubs/gallop  LUNGS: BLAE+, no rales, no wheezing, no rhonchi.    ABDOMEN: ND. Soft,  NT, no guarding / rebound / rigidity. BS normoactive. No CVA tenderness.    BACK: No spine tenderness.  EXTREMITIES: no cyanosis, no clubbing, no edema. no leg or calf TP  SKIN: no rash.  NEUROLOGIC: AAO*2.strength is symmetric, sensation intact, speech fluent.    PSYCHIATRIC: Calm.  No agitation.              LABS:                                8.5    13.61 )-----------( 542      ( 21 Dec 2019 05:12 )             25.7           136  |  100  |  11.0  ----------------------------<  98  4.2   |  24.0  |  0.65    Ca    8.7      21 Dec 2019 05:12    TPro  6.4<L>  /  Alb  2.3<L>  /  TBili  0.3<L>  /  DBili  x   /  AST  11  /  ALT  8   /  AlkPhos  70  12-      Urinalysis Basic - ( 20 Dec 2019 06:21 )  Color: Yellow / Appearance: Clear / S.010 / pH: x  Gluc: x / Ketone: Negative  / Bili: Negative / Urobili: Negative mg/dL   Blood: x / Protein: 15 mg/dL / Nitrite: Negative   Leuk Esterase: Negative / RBC: Negative /HPF / WBC 0-2   Sq Epi: x / Non Sq Epi: Negative / Bacteria: Negative    < from: CT Abdomen and Pelvis w/ Oral Cont and w/ IV Cont (19 @ 17:22) >  BOWEL: No bowel obstruction. Appendix is within normal limits. There is a   large amount of stool throughout the colon, compatible with constipation.   10 noted is a large heterogeneous complex collection in the distal   rectum/anal canal measuring approximately 5.2 x 5.2 cm, similar to the   previous exam. There are now linear sutures noted within this collection   extending to the mL verge. Also noted is a collection with air-fluid   level in the left issue rectal fossa which appears contiguous with the   larger collection superiorly.  PERITONEUM: No ascites.  VESSELS: The aorta and IVC are normal in caliber and patent. There is   atherosclerosis of the aorta.  RETROPERITONEUM/LYMPH NODES: No lymphadenopathy.    ABDOMINAL WALL: Mild soft tissue anasarca.  BONES: Degenerative change of the thoracolumbar spine. Bilateral hip   prosthesis. Marked lucency around the prosthesis compatible with   osteolyses is again noted, similar to the previous exam.    IMPRESSION:     Compared to 2019:    Unchanged large complex heterogeneous collection, likely an abscess in   the distal rectum/anal canal. An underlying mass cannot be excluded.    Interval placement of suture like material within this collection.    Unchanged collection with air-fluid level in the left ischio rectal   fossa, which appears to communicate with the larger collection.    Other incidental findings are as above.    < end of copied text >      < from: TTE Echo Complete w/Doppler (19 @ 17:17) >  Summary:   1. Left ventricular ejection fraction, by visual estimation, is 35 to   40%.   2. Moderately decreased global left ventricular systolic function.   3. Severely enlarged right ventricle.   4. Severely reduced RV systolic function.   5. Mild-moderate tricuspid regurgitation.   6. Estimated pulmonary artery systolic pressure is 49.0 mmHg assuming a   right atrial pressure of 3 mmHg, which is consistent with mild pulmonary   hypertension.   7. No pericardial effusion.   8. ** No prior echocardiograms available for comparison. Findings   discussed with Dr. Rahul Man, DO Electronically signed on 2019 at 5:38:41 PM    < end of copied text >    MEDICATIONS  (STANDING):  ALBUTerol    90 MICROgram(s) HFA Inhaler 1 Puff(s) Inhalation every 4 hours  buDESOnide    Inhalation Suspension 0.5 milliGRAM(s) Inhalation two times a day  carvedilol 3.125 milliGRAM(s) Oral every 12 hours  enoxaparin Injectable 40 milliGRAM(s) SubCutaneous daily  folic acid 1 milliGRAM(s) Oral daily  lisinopril 2.5 milliGRAM(s) Oral daily  midodrine 10 milliGRAM(s) Oral every 8 hours  multiple electrolytes Injection Type 1 1000 milliLiter(s) (75 mL/Hr) IV Continuous <Continuous>  multivitamin/minerals 1 Tablet(s) Oral daily  nystatin Powder 1 Application(s) Topical three times a day  pantoprazole    Tablet 40 milliGRAM(s) Oral before breakfast  piperacillin/tazobactam IVPB.. 3.375 Gram(s) IV Intermittent every 8 hours    MEDICATIONS  (PRN):  ALBUTerol    0.083% 2.5 milliGRAM(s) Nebulizer every 6 hours PRN Shortness of Breath and/or Wheezing  zinc oxide 20% Ointment 1 Application(s) Topical three times a day PRN bowel movement

## 2019-12-21 NOTE — PROGRESS NOTE ADULT - ASSESSMENT
70 yo female with history of COPD who presents with 1 month of shortness of breath worsened over last 2 weeks associated with >30 lb weight loss in the last 6 months, decreased appetite. Patient admitted for respiratory failure 2/2 COPD exacerbation with possible metastatic lung disease. Pulmonology consulted and CT abdomen was obtained - CT showed rectal wall thickening with adjacent air-pockets; colorectal surgery was consulted and MRI was obtained.  May have colon cancer with mets to the lung. GI consult was recommended by colorectal surgeon and consulted. MRI showed perforation of distal rectum and anus w/ collection of fluid/air.  12/5 TTE was ordered for elevated BNP - she was shown to have severe right sided ventricular strain. Started empirically on treatment for PE and CTA was ordered (delayed due to having received contrast with the CT abdomen on the same day). 12/9 CTA negative for PE. 12/11 Nuclear Stress Test-Pharmacologic  Normal study; no evidence for myocardial infarction or ischemia. 12/12 colorectal surgery performed. EUA, rectum with rectal mass biopsy. Posterior rectal wall abscess unroofed and draining mucoid material through cutaneous fistulas b/l . B/L seton placement transrectally. Patient refusing colostomy. S/P Code sepsis 12/20 for fever and hypotension, started IVF and IV Zosyn. Palliative care following, Hospice consulted at request of family.     Lung Nodules with rectal mass and abscess with cutaneous fistulas; possibly metastatic disease  - MRI of the abdomen with Perforation involving the posterior wall of the distal rectum and anus with a collection of fluid and air posteriorly measuring up to 6.5 cm.  - 12/12 Posterior rectal wall abscess unroofed and draining mucoid material through cutaneous fistulas b/l . B/L seton placement transrectally, rectal mass biopsy obtained.   -  Await pathology report but cleared for discharge from colorectal standpoint with plans to follow up with surgery- Dr. Lee in 2 weeks and oncologist.   - Sitz baths for comfort,  gauze can be removed and re-placed if draining  - Psych consult for capacity determination as it appears unclear if patient understands the gravity of her situation   - CT a/p 12/16 results noted. Heme/onc signed off   -Palliative care following, Hospice consult placed  -Family requesting no strong pain meds. IV tylenol x1 for pain     Sepsis -likely secondary to rectal wall abscess  started on Zosyn  now afebrile, continue to trend leukocytosis  UA negative  Recall ID   f/u blood cultures x2  BP remains low, not symptomatic. c/w maintenance fluids, midodrine     COPD exacerbation - hypoxia on admission has resolved  - completed IV azithromycin 500mg x3 days. Completed short course steroids  - patient on anoro-ellipta 62.5/25 dose at home BID; ventolin as needed at home  - continue with duoneb q6 PRN  -respiratory status stable    Anemia with Thrombocytosis-- likely GI source  - stable    Elevated BNP - no clinical signs of HF- significant RV strain on the echo, EF 35-40% from TTE 12/5. No signs of fluid overload s/p IVF resuscitation   - pt had CTA- negative for PE   - nuc med stress test nl    slightly elevated INR - poss 2/2 poor diet/malignancy, resolved    protein calorie malnutrition  - supplement meals  - nutrition consulted     Depressed mood  - no suicidal ideation  - psych consulted, signed off. may f/u outpatient. as per note, psych reconsulted for capacity  - may benefit from medication    DVT - lovenox subcut    Disposition - d/c to [facility] cancelled. Pt family open to Hospice consult, consult placed 70 yo female with history of COPD who presents with 1 month of shortness of breath worsened over last 2 weeks associated with >30 lb weight loss in the last 6 months, decreased appetite. Patient admitted for respiratory failure 2/2 COPD exacerbation with possible metastatic lung disease. Pulmonology consulted and CT abdomen was obtained - CT showed rectal wall thickening with adjacent air-pockets; colorectal surgery was consulted and MRI was obtained.  May have colon cancer with mets to the lung. GI consult was recommended by colorectal surgeon and consulted. MRI showed perforation of distal rectum and anus w/ collection of fluid/air.  12/5 TTE was ordered for elevated BNP - she was shown to have severe right sided ventricular strain. Started empirically on treatment for PE and CTA was ordered (delayed due to having received contrast with the CT abdomen on the same day). 12/9 CTA negative for PE. 12/11 Nuclear Stress Test-Pharmacologic  Normal study; no evidence for myocardial infarction or ischemia. 12/12 colorectal surgery performed. EUA, rectum with rectal mass biopsy. Posterior rectal wall abscess unroofed and draining mucoid material through cutaneous fistulas b/l . B/L seton placement transrectally. Patient refusing colostomy. S/P Code sepsis 12/20 for fever and hypotension, started IVF and IV Zosyn. Palliative care following, Hospice consulted at request of family.     Lung Nodules with rectal mass and abscess with cutaneous fistulas; possibly metastatic disease  - MRI of the abdomen with Perforation involving the posterior wall of the distal rectum and anus with a collection of fluid and air posteriorly measuring up to 6.5 cm.  - 12/12 Posterior rectal wall abscess unroofed and draining mucoid material through cutaneous fistulas b/l . B/L seton placement transrectally, rectal mass biopsy obtained.   -  Await pathology report  - patient and family does not want colon surgery.   - cleared for discharge from colorectal standpoint with plans to follow up with surgery- Dr. Lee in 2 weeks and oncologist.   - Sitz baths for comfort,  gauze can be removed and re-placed if draining  - Psych consult for capacity determination as it appears unclear if patient understands the gravity of her situation   - CT a/p 12/16 results noted.   - Heme/onc signed off encouraged hospice  -Palliative care following, Hospice consult placed  -Family requesting no strong pain meds. IV tylenol x1 for pain     Sepsis -likely secondary to rectal wall abscess  on Zosyn  continue to trend leukocytosis  UA negative  Recall ID   f/u blood cultures x2  BP remains low, not symptomatic. c/w maintenance fluids, midodrine     COPD exacerbation - hypoxia on admission has resolved  - completed IV azithromycin 500mg x3 days. Completed short course steroids  - patient on anoro-ellipta 62.5/25 dose at home BID; ventolin as needed at home  - continue with duoneb q6 PRN  -respiratory status stable    Anemia with Thrombocytosis-- likely GI source  - stable    Elevated BNP - no clinical signs of HF- significant RV strain on the echo, EF 35-40% from TTE 12/5. No signs of fluid overload s/p IVF resuscitation   - pt had CTA- negative for PE   - nuc med stress test nl    elevated INR - poss 2/2 poor diet/malignancy, resolved    protein calorie malnutrition  - supplement meals  - nutrition consulted     Depressed mood  - no suicidal ideation  - psych consulted, signed off. may f/u outpatient. as per note, psych reconsulted for capacity  - may benefit from medication    DVT - lovenox subcut    Disposition - d/c to [facility] cancelled. Pt family open to Hospice consult, consult placed

## 2019-12-22 LAB
ANION GAP SERPL CALC-SCNC: 13 MMOL/L — SIGNIFICANT CHANGE UP (ref 5–17)
BUN SERPL-MCNC: 8 MG/DL — SIGNIFICANT CHANGE UP (ref 8–20)
CALCIUM SERPL-MCNC: 8.7 MG/DL — SIGNIFICANT CHANGE UP (ref 8.6–10.2)
CHLORIDE SERPL-SCNC: 100 MMOL/L — SIGNIFICANT CHANGE UP (ref 98–107)
CO2 SERPL-SCNC: 24 MMOL/L — SIGNIFICANT CHANGE UP (ref 22–29)
CREAT SERPL-MCNC: 0.58 MG/DL — SIGNIFICANT CHANGE UP (ref 0.5–1.3)
GLUCOSE SERPL-MCNC: 88 MG/DL — SIGNIFICANT CHANGE UP (ref 70–115)
HCT VFR BLD CALC: 25.2 % — LOW (ref 34.5–45)
HGB BLD-MCNC: 8.2 G/DL — LOW (ref 11.5–15.5)
MAGNESIUM SERPL-MCNC: 1.9 MG/DL — SIGNIFICANT CHANGE UP (ref 1.6–2.6)
MCHC RBC-ENTMCNC: 29.7 PG — SIGNIFICANT CHANGE UP (ref 27–34)
MCHC RBC-ENTMCNC: 32.5 GM/DL — SIGNIFICANT CHANGE UP (ref 32–36)
MCV RBC AUTO: 91.3 FL — SIGNIFICANT CHANGE UP (ref 80–100)
PLATELET # BLD AUTO: 542 K/UL — HIGH (ref 150–400)
POTASSIUM SERPL-MCNC: 4.4 MMOL/L — SIGNIFICANT CHANGE UP (ref 3.5–5.3)
POTASSIUM SERPL-SCNC: 4.4 MMOL/L — SIGNIFICANT CHANGE UP (ref 3.5–5.3)
RBC # BLD: 2.76 M/UL — LOW (ref 3.8–5.2)
RBC # FLD: 15.5 % — HIGH (ref 10.3–14.5)
SODIUM SERPL-SCNC: 137 MMOL/L — SIGNIFICANT CHANGE UP (ref 135–145)
WBC # BLD: 12.09 K/UL — HIGH (ref 3.8–10.5)
WBC # FLD AUTO: 12.09 K/UL — HIGH (ref 3.8–10.5)

## 2019-12-22 PROCEDURE — 99233 SBSQ HOSP IP/OBS HIGH 50: CPT

## 2019-12-22 RX ORDER — ACETAMINOPHEN 500 MG
650 TABLET ORAL ONCE
Refills: 0 | Status: COMPLETED | OUTPATIENT
Start: 2019-12-22 | End: 2019-12-22

## 2019-12-22 RX ORDER — MIDODRINE HYDROCHLORIDE 2.5 MG/1
15 TABLET ORAL THREE TIMES A DAY
Refills: 0 | Status: DISCONTINUED | OUTPATIENT
Start: 2019-12-22 | End: 2020-01-02

## 2019-12-22 RX ADMIN — ZINC OXIDE 1 APPLICATION(S): 200 OINTMENT TOPICAL at 06:23

## 2019-12-22 RX ADMIN — Medication 1 MILLIGRAM(S): at 11:14

## 2019-12-22 RX ADMIN — PIPERACILLIN AND TAZOBACTAM 25 GRAM(S): 4; .5 INJECTION, POWDER, LYOPHILIZED, FOR SOLUTION INTRAVENOUS at 04:15

## 2019-12-22 RX ADMIN — MIDODRINE HYDROCHLORIDE 15 MILLIGRAM(S): 2.5 TABLET ORAL at 18:18

## 2019-12-22 RX ADMIN — Medication 0.5 MILLIGRAM(S): at 10:08

## 2019-12-22 RX ADMIN — ENOXAPARIN SODIUM 40 MILLIGRAM(S): 100 INJECTION SUBCUTANEOUS at 11:13

## 2019-12-22 RX ADMIN — CARVEDILOL PHOSPHATE 3.12 MILLIGRAM(S): 80 CAPSULE, EXTENDED RELEASE ORAL at 18:17

## 2019-12-22 RX ADMIN — Medication 0.5 MILLIGRAM(S): at 20:33

## 2019-12-22 RX ADMIN — MIDODRINE HYDROCHLORIDE 15 MILLIGRAM(S): 2.5 TABLET ORAL at 13:25

## 2019-12-22 RX ADMIN — ZINC OXIDE 1 APPLICATION(S): 200 OINTMENT TOPICAL at 22:30

## 2019-12-22 RX ADMIN — NYSTATIN CREAM 1 APPLICATION(S): 100000 CREAM TOPICAL at 21:18

## 2019-12-22 RX ADMIN — MIDODRINE HYDROCHLORIDE 10 MILLIGRAM(S): 2.5 TABLET ORAL at 06:22

## 2019-12-22 RX ADMIN — NYSTATIN CREAM 1 APPLICATION(S): 100000 CREAM TOPICAL at 13:30

## 2019-12-22 RX ADMIN — NYSTATIN CREAM 1 APPLICATION(S): 100000 CREAM TOPICAL at 06:22

## 2019-12-22 RX ADMIN — PIPERACILLIN AND TAZOBACTAM 25 GRAM(S): 4; .5 INJECTION, POWDER, LYOPHILIZED, FOR SOLUTION INTRAVENOUS at 11:11

## 2019-12-22 RX ADMIN — Medication 650 MILLIGRAM(S): at 22:50

## 2019-12-22 RX ADMIN — PIPERACILLIN AND TAZOBACTAM 25 GRAM(S): 4; .5 INJECTION, POWDER, LYOPHILIZED, FOR SOLUTION INTRAVENOUS at 18:34

## 2019-12-22 RX ADMIN — PANTOPRAZOLE SODIUM 40 MILLIGRAM(S): 20 TABLET, DELAYED RELEASE ORAL at 06:24

## 2019-12-22 RX ADMIN — ALBUTEROL 2.5 MILLIGRAM(S): 90 AEROSOL, METERED ORAL at 20:33

## 2019-12-22 RX ADMIN — Medication 650 MILLIGRAM(S): at 21:52

## 2019-12-22 RX ADMIN — Medication 1 TABLET(S): at 11:14

## 2019-12-22 NOTE — PROGRESS NOTE ADULT - SUBJECTIVE AND OBJECTIVE BOX
CC: f/u pos rectal adenocarcinoma and rectal wall abscess s/p unroofing and drainage , with lung mets    INTERVAL HPI/OVERNIGHT EVENTS:   seen and examined at bedside  denied complaints.  fever 100.2 last night. BP low  on IV Abx. midodrine dose was increased  BM*1 since am, several times yesterday evening . not concerning for c.diff. as per RN    Tele: PVC. NSR      ROS:  deniedf ever/chills/chest pain/palpitation/SOB/dizziness/abd pain/n/v/d/c/dysuria/headaches/limb weakness. all other ROS negative      Allergies  No Known Allergies    HEALTH ISSUES - PROBLEM Dx:  Rectal adenocarcinoma: Rectal adenocarcinoma  SVT (supraventricular tachycardia): SVT (supraventricular tachycardia)  Sepsis: Sepsis  Chronic obstructive pulmonary disease, unspecified COPD type: Chronic obstructive pulmonary disease, unspecified COPD type  Rectal fistula: Rectal fistula  Advance care planning: Advance care planning  Leukocytosis: Leukocytosis  Encounter for palliative care: Encounter for palliative care  Rectal mass: Rectal mass  COPD (chronic obstructive pulmonary disease): COPD (chronic obstructive pulmonary disease)  Abnormal CT of the abdomen: Abnormal CT of the abdomen  Lung nodules: Lung nodules  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    PAST MEDICAL & SURGICAL HISTORY:  Anemia  COPD (chronic obstructive pulmonary disease)  H/O bilateral hip replacements    Vital Signs Last 24 Hrs  T(C): 36.9 (22 Dec 2019 08:24), Max: 37.9 (21 Dec 2019 23:46)  T(F): 98.5 (22 Dec 2019 08:24), Max: 100.2 (21 Dec 2019 23:46)  HR: 84 (22 Dec 2019 08:09) (84 - 142)  BP: 103/64 (22 Dec 2019 08:09) (80/53 - 118/44)  BP(mean): 62 (22 Dec 2019 06:24) (62 - 69)  RR: 24 (22 Dec 2019 08:09) (16 - 25)  SpO2: 94% (22 Dec 2019 08:09) (92% - 100%)    CAPILLARY BLOOD GLUCOSE      I&O's Summary    21 Dec 2019 07:01  -  22 Dec 2019 07:00  --------------------------------------------------------  IN: 2305 mL / OUT: 300 mL / NET: 2005 mL      PHYSICAL EXAM:  GENERAL: Not in distress. Alert    HEENT:  Normocephalic and atraumatic. mild pallor no icterus  NECK: Supple.  No JVD.    CARDIOVASCULAR: RRR S1, S2. No murmur/rubs/gallop  LUNGS: BLAE+, no rales, no wheezing, no rhonchi.    ABDOMEN: ND. Soft,  NT, no guarding / rebound / rigidity. BS normoactive.   EXTREMITIES: no cyanosis, no clubbing, no edema. no leg or calf TP  SKIN: no rash.  NEUROLOGIC: AAO*2.strength is symmetric, sensation intact, speech fluent.    PSYCHIATRIC: Calm.  No agitation.              LABS:                                8.2    12. )-----------( 542      ( 22 Dec 2019 07:49 )             25.2       12-    137  |  97<L>  |  9.0  ----------------------------<  119<H>  4.1   |  24.0  |  0.62    Ca    8.7      21 Dec 2019 20:45  Mg     2.1     -    TPro  6.4<L>  /  Alb  2.3<L>  /  TBili  0.3<L>  /  DBili  x   /  AST  11  /  ALT  8   /  AlkPhos  70  12-        Urinalysis Basic - ( 20 Dec 2019 06:21 )  Color: Yellow / Appearance: Clear / S.010 / pH: x  Gluc: x / Ketone: Negative  / Bili: Negative / Urobili: Negative mg/dL   Blood: x / Protein: 15 mg/dL / Nitrite: Negative   Leuk Esterase: Negative / RBC: Negative /HPF / WBC 0-2   Sq Epi: x / Non Sq Epi: Negative / Bacteria: Negative        < from: CT Abdomen and Pelvis w/ Oral Cont and w/ IV Cont (19 @ 17:22) >  BOWEL: No bowel obstruction. Appendix is within normal limits. There is a   large amount of stool throughout the colon, compatible with constipation.   10 noted is a large heterogeneous complex collection in the distal   rectum/anal canal measuring approximately 5.2 x 5.2 cm, similar to the   previous exam. There are now linear sutures noted within this collection   extending to the mL verge. Also noted is a collection with air-fluid   level in the left issue rectal fossa which appears contiguous with the   larger collection superiorly.  PERITONEUM: No ascites.  VESSELS: The aorta and IVC are normal in caliber and patent. There is   atherosclerosis of the aorta.  RETROPERITONEUM/LYMPH NODES: No lymphadenopathy.    ABDOMINAL WALL: Mild soft tissue anasarca.  BONES: Degenerative change of the thoracolumbar spine. Bilateral hip   prosthesis. Marked lucency around the prosthesis compatible with   osteolyses is again noted, similar to the previous exam.    IMPRESSION:     Compared to 2019:    Unchanged large complex heterogeneous collection, likely an abscess in   the distal rectum/anal canal. An underlying mass cannot be excluded.    Interval placement of suture like material within this collection.    Unchanged collection with air-fluid level in the left ischio rectal   fossa, which appears to communicate with the larger collection.    Other incidental findings are as above.    < end of copied text >      < from: TTE Echo Complete w/Doppler (19 @ 17:17) >  Summary:   1. Left ventricular ejection fraction, by visual estimation, is 35 to   40%.   2. Moderately decreased global left ventricular systolic function.   3. Severely enlarged right ventricle.   4. Severely reduced RV systolic function.   5. Mild-moderate tricuspid regurgitation.   6. Estimated pulmonary artery systolic pressure is 49.0 mmHg assuming a   right atrial pressure of 3 mmHg, which is consistent with mild pulmonary   hypertension.   7. No pericardial effusion.   8. ** No prior echocardiograms available for comparison. Findings   discussed with Dr. Rahul Man DO Electronically signed on 2019 at 5:38:41 PM    < end of copied text >    MEDICATIONS  (STANDING):  ALBUTerol    90 MICROgram(s) HFA Inhaler 1 Puff(s) Inhalation every 4 hours  buDESOnide    Inhalation Suspension 0.5 milliGRAM(s) Inhalation two times a day  carvedilol 3.125 milliGRAM(s) Oral every 12 hours  enoxaparin Injectable 40 milliGRAM(s) SubCutaneous daily  folic acid 1 milliGRAM(s) Oral daily  lisinopril 2.5 milliGRAM(s) Oral daily  midodrine. 15 milliGRAM(s) Oral three times a day  multiple electrolytes Injection Type 1 1000 milliLiter(s) (75 mL/Hr) IV Continuous <Continuous>  multivitamin/minerals 1 Tablet(s) Oral daily  nystatin Powder 1 Application(s) Topical three times a day  pantoprazole    Tablet 40 milliGRAM(s) Oral before breakfast  piperacillin/tazobactam IVPB.. 3.375 Gram(s) IV Intermittent every 8 hours    MEDICATIONS  (PRN):  ALBUTerol    0.083% 2.5 milliGRAM(s) Nebulizer every 6 hours PRN Shortness of Breath and/or Wheezing  zinc oxide 20% Ointment 1 Application(s) Topical three times a day PRN bowel movement

## 2019-12-22 NOTE — PROGRESS NOTE ADULT - ASSESSMENT
68 yo female with history of COPD who presents with 1 month of shortness of breath worsened over last 2 weeks associated with >30 lb weight loss in the last 6 months, decreased appetite. Patient admitted for respiratory failure 2/2 COPD exacerbation with possible metastatic lung disease. Pulmonology consulted and CT abdomen was obtained - CT showed rectal wall thickening with adjacent air-pockets; colorectal surgery was consulted and MRI was obtained.  May have colon cancer with mets to the lung. GI consult was recommended by colorectal surgeon and consulted. MRI showed perforation of distal rectum and anus w/ collection of fluid/air.  12/5 TTE was ordered for elevated BNP - she was shown to have severe right sided ventricular strain. Started empirically on treatment for PE and CTA was ordered (delayed due to having received contrast with the CT abdomen on the same day). 12/9 CTA negative for PE. 12/11 Nuclear Stress Test-Pharmacologic  Normal study; no evidence for myocardial infarction or ischemia. 12/12 colorectal surgery performed. EUA, rectum with rectal mass biopsy. Posterior rectal wall abscess unroofed and draining mucoid material through cutaneous fistulas b/l . B/L seton placement transrectally. Patient refusing colostomy. S/P Code sepsis 12/20 for fever and hypotension, started IVF and IV Zosyn. Palliative care following, Hospice consulted at request of family.     Lung Nodules with rectal mass and abscess with cutaneous fistulas; possibly metastatic disease  - MRI of the abdomen with Perforation involving the posterior wall of the distal rectum and anus with a collection of fluid and air posteriorly measuring up to 6.5 cm.  - 12/12 Posterior rectal wall abscess unroofed and draining mucoid material through cutaneous fistulas b/l . B/L seton placement transrectally, rectal mass biopsy obtained.   -  Await pathology report  - patient and family does not want colon surgery. cleared for discharge from colorectal standpoint with plans to follow up with surgery- Dr. Lee in 2 weeks and oncologist.   - Sitz baths for comfort,  gauze can be removed and re-placed if draining  - Psych consult for capacity determination as it appears unclear if patient understands the gravity of her situation   - CT a/p 12/16 results noted.   - Heme/onc signed off encouraged hospice  -Palliative care following, Hospice consult placed  -Family requesting no strong pain meds. IV tylenol x1 for pain     Sepsis -likely secondary to rectal wall abscess  on Zosyn  continue to trend leukocytosis  UA negative  Recalled ID   12/28 blood cultures x2 neg   BP remains low, not symptomatic.   increased midodrine  IVF PRN     COPD exacerbation - hypoxia on admission has resolved  - completed IV azithromycin 500mg x3 days. Completed short course steroids  - patient on anoro-ellipta 62.5/25 dose at home BID; ventolin as needed at home  - continue with duoneb q6 PRN  -respiratory status stable    Anemia with Thrombocytosis-- likely GI source  - stable    Elevated BNP - no clinical signs of HF- significant RV strain on the echo, EF 35-40% from TTE 12/5. No signs of fluid overload s/p IVF resuscitation   - pt had CTA- negative for PE   - nuc med stress test nl    elevated INR - poss 2/2 poor diet/malignancy, resolved    protein calorie malnutrition  - supplement meals  - nutrition consulted     Depressed mood  - no suicidal ideation  - psych consulted, signed off. may f/u outpatient. as per note, psych reconsult for capacity  - may benefit from medication    DVT - lovenox subcut    Disposition - d/c to [facility] cancelled. Pt family open to Hospice consult, consult placed. BP low overnight with low grade fever. will c/w SDU for now. will DG if BP better

## 2019-12-23 DIAGNOSIS — I95.9 HYPOTENSION, UNSPECIFIED: ICD-10-CM

## 2019-12-23 DIAGNOSIS — G89.3 NEOPLASM RELATED PAIN (ACUTE) (CHRONIC): ICD-10-CM

## 2019-12-23 LAB
ANION GAP SERPL CALC-SCNC: 12 MMOL/L — SIGNIFICANT CHANGE UP (ref 5–17)
BUN SERPL-MCNC: 9 MG/DL — SIGNIFICANT CHANGE UP (ref 8–20)
CALCIUM SERPL-MCNC: 8.9 MG/DL — SIGNIFICANT CHANGE UP (ref 8.6–10.2)
CHLORIDE SERPL-SCNC: 98 MMOL/L — SIGNIFICANT CHANGE UP (ref 98–107)
CO2 SERPL-SCNC: 26 MMOL/L — SIGNIFICANT CHANGE UP (ref 22–29)
CREAT SERPL-MCNC: 0.65 MG/DL — SIGNIFICANT CHANGE UP (ref 0.5–1.3)
GLUCOSE SERPL-MCNC: 112 MG/DL — SIGNIFICANT CHANGE UP (ref 70–115)
HCT VFR BLD CALC: 25.6 % — LOW (ref 34.5–45)
HGB BLD-MCNC: 8.3 G/DL — LOW (ref 11.5–15.5)
MAGNESIUM SERPL-MCNC: 1.8 MG/DL — SIGNIFICANT CHANGE UP (ref 1.6–2.6)
MCHC RBC-ENTMCNC: 29.5 PG — SIGNIFICANT CHANGE UP (ref 27–34)
MCHC RBC-ENTMCNC: 32.4 GM/DL — SIGNIFICANT CHANGE UP (ref 32–36)
MCV RBC AUTO: 91.1 FL — SIGNIFICANT CHANGE UP (ref 80–100)
PLATELET # BLD AUTO: 567 K/UL — HIGH (ref 150–400)
POTASSIUM SERPL-MCNC: 3.7 MMOL/L — SIGNIFICANT CHANGE UP (ref 3.5–5.3)
POTASSIUM SERPL-SCNC: 3.7 MMOL/L — SIGNIFICANT CHANGE UP (ref 3.5–5.3)
RBC # BLD: 2.81 M/UL — LOW (ref 3.8–5.2)
RBC # FLD: 15.3 % — HIGH (ref 10.3–14.5)
SODIUM SERPL-SCNC: 136 MMOL/L — SIGNIFICANT CHANGE UP (ref 135–145)
WBC # BLD: 12.32 K/UL — HIGH (ref 3.8–10.5)
WBC # FLD AUTO: 12.32 K/UL — HIGH (ref 3.8–10.5)

## 2019-12-23 PROCEDURE — 99233 SBSQ HOSP IP/OBS HIGH 50: CPT

## 2019-12-23 PROCEDURE — 99232 SBSQ HOSP IP/OBS MODERATE 35: CPT

## 2019-12-23 RX ORDER — OXYCODONE HYDROCHLORIDE 5 MG/1
2.5 TABLET ORAL EVERY 4 HOURS
Refills: 0 | Status: DISCONTINUED | OUTPATIENT
Start: 2019-12-23 | End: 2019-12-23

## 2019-12-23 RX ORDER — OXYCODONE HYDROCHLORIDE 5 MG/1
5 TABLET ORAL EVERY 6 HOURS
Refills: 0 | Status: DISCONTINUED | OUTPATIENT
Start: 2019-12-23 | End: 2019-12-29

## 2019-12-23 RX ORDER — MORPHINE SULFATE 50 MG/1
2 CAPSULE, EXTENDED RELEASE ORAL EVERY 6 HOURS
Refills: 0 | Status: DISCONTINUED | OUTPATIENT
Start: 2019-12-23 | End: 2019-12-23

## 2019-12-23 RX ADMIN — MORPHINE SULFATE 2 MILLIGRAM(S): 50 CAPSULE, EXTENDED RELEASE ORAL at 12:32

## 2019-12-23 RX ADMIN — ALBUTEROL 2.5 MILLIGRAM(S): 90 AEROSOL, METERED ORAL at 09:29

## 2019-12-23 RX ADMIN — ENOXAPARIN SODIUM 40 MILLIGRAM(S): 100 INJECTION SUBCUTANEOUS at 10:12

## 2019-12-23 RX ADMIN — Medication 1 TABLET(S): at 10:12

## 2019-12-23 RX ADMIN — MIDODRINE HYDROCHLORIDE 15 MILLIGRAM(S): 2.5 TABLET ORAL at 05:24

## 2019-12-23 RX ADMIN — MIDODRINE HYDROCHLORIDE 15 MILLIGRAM(S): 2.5 TABLET ORAL at 16:39

## 2019-12-23 RX ADMIN — Medication 0.5 MILLIGRAM(S): at 20:43

## 2019-12-23 RX ADMIN — OXYCODONE HYDROCHLORIDE 5 MILLIGRAM(S): 5 TABLET ORAL at 16:44

## 2019-12-23 RX ADMIN — CARVEDILOL PHOSPHATE 3.12 MILLIGRAM(S): 80 CAPSULE, EXTENDED RELEASE ORAL at 16:38

## 2019-12-23 RX ADMIN — ALBUTEROL 2.5 MILLIGRAM(S): 90 AEROSOL, METERED ORAL at 20:42

## 2019-12-23 RX ADMIN — MORPHINE SULFATE 2 MILLIGRAM(S): 50 CAPSULE, EXTENDED RELEASE ORAL at 13:19

## 2019-12-23 RX ADMIN — NYSTATIN CREAM 1 APPLICATION(S): 100000 CREAM TOPICAL at 10:12

## 2019-12-23 RX ADMIN — PIPERACILLIN AND TAZOBACTAM 25 GRAM(S): 4; .5 INJECTION, POWDER, LYOPHILIZED, FOR SOLUTION INTRAVENOUS at 10:11

## 2019-12-23 RX ADMIN — MIDODRINE HYDROCHLORIDE 15 MILLIGRAM(S): 2.5 TABLET ORAL at 10:12

## 2019-12-23 RX ADMIN — NYSTATIN CREAM 1 APPLICATION(S): 100000 CREAM TOPICAL at 21:51

## 2019-12-23 RX ADMIN — PIPERACILLIN AND TAZOBACTAM 25 GRAM(S): 4; .5 INJECTION, POWDER, LYOPHILIZED, FOR SOLUTION INTRAVENOUS at 16:38

## 2019-12-23 RX ADMIN — OXYCODONE HYDROCHLORIDE 5 MILLIGRAM(S): 5 TABLET ORAL at 17:04

## 2019-12-23 RX ADMIN — PIPERACILLIN AND TAZOBACTAM 25 GRAM(S): 4; .5 INJECTION, POWDER, LYOPHILIZED, FOR SOLUTION INTRAVENOUS at 02:10

## 2019-12-23 RX ADMIN — PANTOPRAZOLE SODIUM 40 MILLIGRAM(S): 20 TABLET, DELAYED RELEASE ORAL at 05:23

## 2019-12-23 RX ADMIN — ALBUTEROL 2.5 MILLIGRAM(S): 90 AEROSOL, METERED ORAL at 15:41

## 2019-12-23 RX ADMIN — NYSTATIN CREAM 1 APPLICATION(S): 100000 CREAM TOPICAL at 05:24

## 2019-12-23 RX ADMIN — Medication 1 MILLIGRAM(S): at 10:12

## 2019-12-23 RX ADMIN — Medication 0.5 MILLIGRAM(S): at 09:29

## 2019-12-23 NOTE — PROGRESS NOTE ADULT - ASSESSMENT
68y/o  Female with h/o COPD. Patient initially presented 12/5/19 with SOB. Patient was admitted for COPD exacerbation and treated with steroids (12/5 to 12/14), nebs, azithromycin ( 12/5 to 12/8). Patient was also found to have Distal rectal mass with possible metastatic disease. Patient underwent rectal mass biopsy 12/12. Now noted to have leukocytosis.      Leukocytosis  rectal mass s/p biopsy 12/12  ? rectal abscess   possible metastatic disease   COPD      - Persistent leukocytosis, slightly down  - Procalcitonin level 0.11 which is not suggestive of infection   - No recorded fever, if spikes fever do Blood cultures   - B/L LE Duplex with no DVT  - CT A/P repeated and has mass with fluid collection  - Currently on Zosyn  - Palliative eval noted, d/w Dr Sue, plan for Hospice   - If going to Hospice in the next 48 hours can continue Zosyn until Discharge otherwise there is no role for antibiotics without surgical intervention.   - Trend Fever  - Trend Leukocytosis      Will Sign off. Please call PRN.

## 2019-12-23 NOTE — PROGRESS NOTE ADULT - SUBJECTIVE AND OBJECTIVE BOX
[facility]  INFECTIOUS DISEASES AND INTERNAL MEDICINE at [place]  =======================================================  Hussain Candelario MD  Diplomates American Board of Internal Medicine and Infectious Diseases  Tel: [phone]      Fax: [phone]  =======================================================    [place] [id number]    Follow up: Leukocytosis    No fevers or chills  No diarrhea    Wants to go home    Allergies:  No Known Allergies      Antibiotics:  piperacillin/tazobactam IVPB.. 3.375 Gram(s) IV Intermittent every 8 hours       REVIEW OF SYSTEMS:  CONSTITUTIONAL:  No Fever or chills  HEENT:  No diplopia or blurred vision.  No earache, sore throat or runny nose.  CARDIOVASCULAR:  No chest pain   RESPIRATORY:  No cough, shortness of breath  GASTROINTESTINAL:  No nausea, vomiting or diarrhea.  GENITOURINARY:  No dysuria, frequency or urgency.   MUSCULOSKELETAL:  no joint aches, no muscle pain  SKIN:  No change in skin, hair or nails.  NEUROLOGIC:  No Headaches, seizures  PSYCHIATRIC:  No disorder of thought or mood.  ENDOCRINE:  No heat or cold intolerance  HEMATOLOGICAL:  No easy bruising or bleeding.       Physical Exam:  Vital Signs Last 24 Hrs  T(C): 36.8 (23 Dec 2019 13:22), Max: 37.4 (22 Dec 2019 20:45)  T(F): 98.2 (23 Dec 2019 13:22), Max: 99.4 (22 Dec 2019 20:45)  HR: 78 (23 Dec 2019 15:44) (76 - 91)  BP: 123/63 (23 Dec 2019 12:31) (92/48 - 123/63)  BP(mean): 78 (23 Dec 2019 12:31) (62 - 81)  RR: 28 (23 Dec 2019 12:31) (18 - 28)  SpO2: 92% (23 Dec 2019 15:44) (64% - 96%)      GEN: NAD, pleasant  HEENT: normocephalic and atraumatic. EOMI. PERRL.  Anicteric  NECK: Supple.   LUNGS: Clear to auscultation.  HEART: Regular rate and rhythm  ABDOMEN: Soft, nontender, and nondistended.  Positive bowel sounds.    : No CVA tenderness  EXTREMITIES: Without any edema.  MSK: No joint swelling  NEUROLOGIC: No Focal Deficits  PSYCHIATRIC: Appropriate affect .  SKIN: + Fungal groin rash       Labs:  12-18    136  |  96<L>  |  10.0  ----------------------------<  92  4.6   |  28.0  |  0.65    Ca    8.8      18 Dec 2019 08:40    TPro  6.6  /  Alb  2.8<L>  /  TBili  0.5  /  DBili  x   /  AST  11  /  ALT  12  /  AlkPhos  76  12-18                          9.2    14.19 )-----------( 492      ( 18 Dec 2019 08:40 )             27.6       LIVER FUNCTIONS - ( 18 Dec 2019 08:40 )  Alb: 2.8 g/dL / Pro: 6.6 g/dL / ALK PHOS: 76 U/L / ALT: 12 U/L / AST: 11 U/L / GGT: x             Procalcitonin, Serum: 0.11 ng/mL (12-20-19 @ 01:52)  Procalcitonin, Serum: 0.11 ng/mL (12-16-19 @ 11:05)

## 2019-12-23 NOTE — PROGRESS NOTE ADULT - ATTENDING COMMENTS
seen and examined at bedside with  at bedside, denied complaints. ROS neg. exam none acute. VS stable. patient again reiterated that she does not want any invasive procedure including surgery. she was able tell me about her current conditions , reason for hospitalization and proposed treatment. but unable to tell me the consequence of not having surgery. but she does not want surgery. spoke to son Clive over phone [facility] MD Dr. Rae Duque. current issued and other comorbidities, proposed and current treats was discussed. SON and HCP Clive Janette who is a RN was able to reiterate all informations including consequence of not having surgery. Clive also understands that without removing the site of infection/abscess/colon antibiotics are only temporary measure. as per Dr. Mcbride, she spoke to Dr. Ryan who suggested against ABx at this point. GOC is patient being comfortable and without pain and goes home with home hospice. hospice eval requested. son is ok to DC antibiotics. They don't want [facility]. follow up palliative notes

## 2019-12-23 NOTE — PROGRESS NOTE ADULT - SUBJECTIVE AND OBJECTIVE BOX
CC: follow up GOC  INTERVAL HPI/OVERNIGHT EVENTS:    PRESENT SYMPTOMS: SOURCE:  Patient/Family/Team    PAIN SCALE:  0 = none  1 = mild   2 = moderate  3 = severe    Pain: 2/3 rectum    Dyspnea:  [ ] YES [ x] NO  Anxiety:  [ ] YES [ x] NO  Fatigue: [ x] YES [ ] NO  Nausea: [ ] YES [x ] NO  Loss of Appetite: [x ] YES [ ] NO  Other symptoms: __________    MEDICATIONS  (STANDING):  ALBUTerol    90 MICROgram(s) HFA Inhaler 1 Puff(s) Inhalation every 4 hours  buDESOnide    Inhalation Suspension 0.5 milliGRAM(s) Inhalation two times a day  carvedilol 3.125 milliGRAM(s) Oral every 12 hours  enoxaparin Injectable 40 milliGRAM(s) SubCutaneous daily  folic acid 1 milliGRAM(s) Oral daily  lisinopril 2.5 milliGRAM(s) Oral daily  midodrine. 15 milliGRAM(s) Oral three times a day  multiple electrolytes Injection Type 1 1000 milliLiter(s) (75 mL/Hr) IV Continuous <Continuous>  multivitamin/minerals 1 Tablet(s) Oral daily  nystatin Powder 1 Application(s) Topical three times a day  pantoprazole    Tablet 40 milliGRAM(s) Oral before breakfast  piperacillin/tazobactam IVPB.. 3.375 Gram(s) IV Intermittent every 8 hours    MEDICATIONS  (PRN):  ALBUTerol    0.083% 2.5 milliGRAM(s) Nebulizer every 6 hours PRN Shortness of Breath and/or Wheezing  oxyCODONE    IR 2.5 milliGRAM(s) Oral every 4 hours PRN Moderate Pain (4 - 6)  oxyCODONE    IR 5 milliGRAM(s) Oral every 6 hours PRN Severe Pain (7 - 10)  zinc oxide 20% Ointment 1 Application(s) Topical three times a day PRN bowel movement      Allergies    No Known Allergies    Intolerances    Karnofsky Performance Score/Palliative Performance Status Version 2:  40 %    Vital Signs Last 24 Hrs  T(C): 36.8 (23 Dec 2019 13:22), Max: 37.4 (22 Dec 2019 20:45)  T(F): 98.2 (23 Dec 2019 13:22), Max: 99.4 (22 Dec 2019 20:45)  HR: 85 (23 Dec 2019 12:31) (76 - 91)  BP: 123/63 (23 Dec 2019 12:31) (92/48 - 123/63)  BP(mean): 78 (23 Dec 2019 12:31) (62 - 81)  RR: 28 (23 Dec 2019 12:31) (18 - 28)  SpO2: 92% (23 Dec 2019 12:31) (64% - 96%)    PHYSICAL EXAM:    General: awake alert NAD  HEENT: [x ] normal  [ ] dry mouth  [ ] ET tube/trach    Lungs: [ x] comfortable [ ] tachypnea/labored breathing  [ ] excessive secretions    CV: [x ] normal  [ ] tachycardia    GI: x[ ] normal  [ ] distended  [ ] tender  [ ] no BS               [ ] PEG/NG/OG tube    : [ ]x normal  [ ] incontinent  [ ] oliguria/anuria  [ ] azar    MSK: [ ] normal  [x ] weakness  [ ] edema             [ ] ambulatory  [ x] bedbound/wheelchair bound    Skin: [ ] normal  [ ] pressure ulcers- Stage_____  [x ] no rash    LABS:                        8.3    12.32 )-----------( 567      ( 23 Dec 2019 05:38 )             25.6     12-23    136  |  98  |  9.0  ----------------------------<  112  3.7   |  26.0  |  0.65    Ca    8.9      23 Dec 2019 05:38  Mg     1.8     12-23          I&O's Summary    22 Dec 2019 07:01  -  23 Dec 2019 07:00  --------------------------------------------------------  IN: 920 mL / OUT: 650 mL / NET: 270 mL        Thank you for the opportunity to assist with the care of this patient.   [facility] [phone].

## 2019-12-23 NOTE — PROGRESS NOTE ADULT - ASSESSMENT
69yr woman, admitted with exacerbation of COPD found to have rectal abscess and rectal mass positive for adenocarcinoma. Patient refusing colostomy as a means of source control for  infection.

## 2019-12-23 NOTE — PROGRESS NOTE ADULT - PROBLEM SELECTOR PLAN 6
Spoke with son with Dr. Rowland via telephone.  See [facility] note for detail  1.  Comfort care  2. Home with hospice.  3. D/C abx per medical team

## 2019-12-23 NOTE — PROGRESS NOTE ADULT - SUBJECTIVE AND OBJECTIVE BOX
CC:  Acute hypoxic respiratory failure, multiple lung nodules    Subjective/Objective:  Patient seen and examined at bedside.  No overnight events reported by staff.  Patient without complaints at this time.  Patient denies chills, dizziness, headache, CP, SOB, abdominal pain, N/V. All other ROS negative.     Vital Signs Last 24 Hrs  T(C): 36.7 (23 Dec 2019 08:31), Max: 37.5 (22 Dec 2019 13:32)  T(F): 98.1 (23 Dec 2019 08:31), Max: 99.5 (22 Dec 2019 13:32)  HR: 80 (23 Dec 2019 10:02) (76 - 91)  BP: 109/59 (23 Dec 2019 10:02) (92/48 - 120/67)  BP(mean): 71 (23 Dec 2019 10:02) (62 - 81)  RR: 18 (23 Dec 2019 10:02) (18 - 26)  SpO2: 96% (23 Dec 2019 10:02) (64% - 96%)    PHYSICAL EXAM:  GENERAL: Pt lying in bed, NAD  HEAD: Atraumatic, Normocephalic  EYES: EOMI, PERRLA, conjunctiva and sclera clear  ENT: Moist mucous membranes  NECK: Supple  CHEST/LUNG: Clear to auscultation bilaterally. Unlabored respirations  HEART: S1S2+. Regular rate and rhythm  ABDOMEN: BS+; Soft, Nontender, Nondistended.    EXTREMITIES:  2+ Peripheral Pulses. No LE edema  NERVOUS SYSTEM:  A&OX3   SKIN: Warm and dry      LABS:                        8.3    12.32 )-----------( 567      ( 23 Dec 2019 05:38 )             25.6     12-23    136  |  98  |  9.0  ----------------------------<  112  3.7   |  26.0  |  0.65    Ca    8.9      23 Dec 2019 05:38  Mg     1.8     12-23

## 2019-12-23 NOTE — GOALS OF CARE CONVERSATION - ADVANCED CARE PLANNING - CONVERSATION DETAILS
Spoke with son Clive on phone  Updated on current condition. Shared recommendation by ID, Dr. Chavez - d/c abx, futile at this point given not able to obtain source control as she is refusing colostomy.  He agrees to comfort measures - no further blood draws - he states he does not want her used as " pin cushion".  No Rapid Response.  Agreeable to home with hospice.

## 2019-12-23 NOTE — PROGRESS NOTE ADULT - ASSESSMENT
68 yo female with history of COPD who presents with 1 month of shortness of breath worsened over last 2 weeks associated with >30 lb weight loss in the last 6 months, decreased appetite. Patient admitted for respiratory failure 2/2 COPD exacerbation with possible metastatic lung disease. Pulmonology consulted and CT abdomen was obtained - CT showed rectal wall thickening with adjacent air-pockets; colorectal surgery was consulted and MRI was obtained.  Concerned for colon cancer with mets to the lung. GI consult was recommended by colorectal surgeon and consulted. MRI showed perforation of distal rectum and anus w/ collection of fluid/air.  12/5 TTE was ordered for elevated BNP - she was shown to have severe right sided ventricular strain. Started empirically on treatment for PE and CTA was ordered (delayed due to having received contrast with the CT abdomen on the same day). 12/9 CTA negative for PE. 12/11 Nuclear Stress Test-Pharmacologic showed normal study; no evidence for myocardial infarction or ischemia. 12/12 colorectal surgery performed. EUA, rectum with rectal mass biopsy. Posterior rectal wall abscess unroofed and draining mucoid material through cutaneous fistulas b/l . B/L seton placement transrectally. Patient refusing colostomy. S/P Code sepsis 12/20 for fever and hypotension, started IVF and IV Zosyn. Palliative care following. Hospice consulted at request of family, pending.     1. Lung Nodules with rectal mass and abscess with cutaneous fistulas; possibly metastatic disease  - MRI of the abdomen with Perforation involving the posterior wall of the distal rectum and anus with a collection of fluid and air posteriorly measuring up to 6.5 cm.  - 12/12 Posterior rectal wall abscess unroofed and draining mucoid material through cutaneous fistulas b/l . B/L seton placement transrectally, rectal mass biopsy obtained.   - Surgical pathology results of rectal mass: Adenocarcinoma, moderately differentiated. Cannot exclude lymphovascular invasion  - Patient and family do not want colorectal surgery. Cleared for discharge from colorectal standpoint with plans to follow up with surgery (Dr. Lee) in 2 weeks and oncologist.   - Sitz baths for comfort, gauze can be removed and re-placed if draining  - Psych consulted for capacity determination as it appears unclear if patient understands the gravity of her situation   - CT a/p 12/16 results noted  - Heme/onc signed off, encouraged hospice  - Palliative care following  - Hospice consult pending    2. Sepsis -likely secondary to rectal wall abscess  - On Zosyn  - Continue to trend leukocytosis  - UA negative  - 12/28 blood cultures x2 neg   - Signed off by ID. Will reconsult as needed  - BP improving, patient is asymptomatic  - On midodrine  - IVF PRN     3. COPD exacerbation - hypoxia on admission has resolved  - Completed IV azithromycin 500mg x3 days. Completed short course steroids  - Continue with duoneb q6 PRN  - Respiratory status stable  - Patient on anoro-ellipta 62.5/25 dose at home BID; ventolin as needed at home    4. Anemia with Thrombocytosis-- likely GI source  - Stable    5. Elevated BNP - no clinical signs of HF- significant RV strain on the echo, EF 35-40% from TTE 12/5. No signs of fluid overload s/p IVF resuscitation   - Pt had CTA- negative for PE   - Nuclear med stress test nl    6. Elevated INR - poss 2/2 poor diet/malignancy  - Resolved    7. Protein calorie malnutrition  - Supplement meals  - Nutrition note appreciated    8. Depressed mood  - No suicidal ideation  - Psych consulted, signed off. May f/u outpatient. as per note, psych reconsult for capacity  - May benefit from medication    DVT - lovenox subcut    DISPO - d/c to [facility] cancelled. Pt's family is open to Hospice consult, consult pending. BP improved. Patient to be downgraded.

## 2019-12-24 PROCEDURE — 99233 SBSQ HOSP IP/OBS HIGH 50: CPT

## 2019-12-24 RX ADMIN — PIPERACILLIN AND TAZOBACTAM 25 GRAM(S): 4; .5 INJECTION, POWDER, LYOPHILIZED, FOR SOLUTION INTRAVENOUS at 01:32

## 2019-12-24 RX ADMIN — ENOXAPARIN SODIUM 40 MILLIGRAM(S): 100 INJECTION SUBCUTANEOUS at 10:34

## 2019-12-24 RX ADMIN — OXYCODONE HYDROCHLORIDE 5 MILLIGRAM(S): 5 TABLET ORAL at 16:07

## 2019-12-24 RX ADMIN — MIDODRINE HYDROCHLORIDE 15 MILLIGRAM(S): 2.5 TABLET ORAL at 06:00

## 2019-12-24 RX ADMIN — OXYCODONE HYDROCHLORIDE 5 MILLIGRAM(S): 5 TABLET ORAL at 03:33

## 2019-12-24 RX ADMIN — MIDODRINE HYDROCHLORIDE 15 MILLIGRAM(S): 2.5 TABLET ORAL at 10:34

## 2019-12-24 RX ADMIN — Medication 0.5 MILLIGRAM(S): at 09:06

## 2019-12-24 RX ADMIN — PIPERACILLIN AND TAZOBACTAM 25 GRAM(S): 4; .5 INJECTION, POWDER, LYOPHILIZED, FOR SOLUTION INTRAVENOUS at 08:18

## 2019-12-24 RX ADMIN — Medication 0.5 MILLIGRAM(S): at 20:53

## 2019-12-24 RX ADMIN — NYSTATIN CREAM 1 APPLICATION(S): 100000 CREAM TOPICAL at 23:28

## 2019-12-24 RX ADMIN — PIPERACILLIN AND TAZOBACTAM 25 GRAM(S): 4; .5 INJECTION, POWDER, LYOPHILIZED, FOR SOLUTION INTRAVENOUS at 23:27

## 2019-12-24 RX ADMIN — OXYCODONE HYDROCHLORIDE 5 MILLIGRAM(S): 5 TABLET ORAL at 15:20

## 2019-12-24 RX ADMIN — ALBUTEROL 2.5 MILLIGRAM(S): 90 AEROSOL, METERED ORAL at 09:06

## 2019-12-24 RX ADMIN — PIPERACILLIN AND TAZOBACTAM 25 GRAM(S): 4; .5 INJECTION, POWDER, LYOPHILIZED, FOR SOLUTION INTRAVENOUS at 16:07

## 2019-12-24 RX ADMIN — MIDODRINE HYDROCHLORIDE 15 MILLIGRAM(S): 2.5 TABLET ORAL at 16:07

## 2019-12-24 RX ADMIN — NYSTATIN CREAM 1 APPLICATION(S): 100000 CREAM TOPICAL at 10:33

## 2019-12-24 RX ADMIN — NYSTATIN CREAM 1 APPLICATION(S): 100000 CREAM TOPICAL at 06:02

## 2019-12-24 NOTE — PROGRESS NOTE ADULT - SUBJECTIVE AND OBJECTIVE BOX
CC: f/u pos rectal adenocarcinoma and rectal wall abscess s/p unroofing and drainage , with lung mets    INTERVAL HPI/OVERNIGHT EVENTS:   seen and examined at bedside  denied complaints.  no events overnight  on comfort care  seen by hospice. family meeting arranged on Thursday 11:30 am      ROS:  deniedf ever/chills/chest pain/palpitation/SOB/dizziness/abd pain/n/v/d/c/dysuria/headaches/limb weakness. all other ROS negative      Allergies  No Known Allergies    HEALTH ISSUES - PROBLEM Dx:  Rectal adenocarcinoma: Rectal adenocarcinoma  SVT (supraventricular tachycardia): SVT (supraventricular tachycardia)  Sepsis: Sepsis  Chronic obstructive pulmonary disease, unspecified COPD type: Chronic obstructive pulmonary disease, unspecified COPD type  Rectal fistula: Rectal fistula  Advance care planning: Advance care planning  Leukocytosis: Leukocytosis  Encounter for palliative care: Encounter for palliative care  Rectal mass: Rectal mass  COPD (chronic obstructive pulmonary disease): COPD (chronic obstructive pulmonary disease)  Abnormal CT of the abdomen: Abnormal CT of the abdomen  Lung nodules: Lung nodules  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    PAST MEDICAL & SURGICAL HISTORY:  Anemia  COPD (chronic obstructive pulmonary disease)  H/O bilateral hip replacements    Vital Signs Last 24 Hrs  T(C): --  T(F): --  HR: 91 (24 Dec 2019 20:55) (89 - 98)  BP: 93/61 (24 Dec 2019 05:59) (93/61 - 93/61)  BP(mean): --  RR: --  SpO2: 96% (24 Dec 2019 20:55) (96% - 96%)    CAPILLARY BLOOD GLUCOSE                            8.3    12.32 )-----------( 567      ( 23 Dec 2019 05:38 )             25.6           136  |  98  |  9.0  ----------------------------<  112  3.7   |  26.0  |  0.65    Ca    8.9      23 Dec 2019 05:38  Mg     1.8               Urinalysis Basic - ( 20 Dec 2019 06:21 )  Color: Yellow / Appearance: Clear / S.010 / pH: x  Gluc: x / Ketone: Negative  / Bili: Negative / Urobili: Negative mg/dL   Blood: x / Protein: 15 mg/dL / Nitrite: Negative   Leuk Esterase: Negative / RBC: Negative /HPF / WBC 0-2   Sq Epi: x / Non Sq Epi: Negative / Bacteria: Negative        < from: CT Abdomen and Pelvis w/ Oral Cont and w/ IV Cont (19 @ 17:22) >  BOWEL: No bowel obstruction. Appendix is within normal limits. There is a   large amount of stool throughout the colon, compatible with constipation.   10 noted is a large heterogeneous complex collection in the distal   rectum/anal canal measuring approximately 5.2 x 5.2 cm, similar to the   previous exam. There are now linear sutures noted within this collection   extending to the mL verge. Also noted is a collection with air-fluid   level in the left issue rectal fossa which appears contiguous with the   larger collection superiorly.  PERITONEUM: No ascites.  VESSELS: The aorta and IVC are normal in caliber and patent. There is   atherosclerosis of the aorta.  RETROPERITONEUM/LYMPH NODES: No lymphadenopathy.    ABDOMINAL WALL: Mild soft tissue anasarca.  BONES: Degenerative change of the thoracolumbar spine. Bilateral hip   prosthesis. Marked lucency around the prosthesis compatible with   osteolyses is again noted, similar to the previous exam.    IMPRESSION:     Compared to 2019:    Unchanged large complex heterogeneous collection, likely an abscess in   the distal rectum/anal canal. An underlying mass cannot be excluded.    Interval placement of suture like material within this collection.    Unchanged collection with air-fluid level in the left ischio rectal   fossa, which appears to communicate with the larger collection.    Other incidental findings are as above.    < end of copied text >      < from: TTE Echo Complete w/Doppler (19 @ 17:17) >  Summary:   1. Left ventricular ejection fraction, by visual estimation, is 35 to   40%.   2. Moderately decreased global left ventricular systolic function.   3. Severely enlarged right ventricle.   4. Severely reduced RV systolic function.   5. Mild-moderate tricuspid regurgitation.   6. Estimated pulmonary artery systolic pressure is 49.0 mmHg assuming a   right atrial pressure of 3 mmHg, which is consistent with mild pulmonary   hypertension.   7. No pericardial effusion.   8. ** No prior echocardiograms available for comparison. Findings   discussed with Dr. Rahul Man DO Electronically signed on 2019 at 5:38:41 PM    < end of copied text >    MEDICATIONS  (STANDING):  ALBUTerol    90 MICROgram(s) HFA Inhaler 1 Puff(s) Inhalation every 4 hours  buDESOnide    Inhalation Suspension 0.5 milliGRAM(s) Inhalation two times a day  carvedilol 3.125 milliGRAM(s) Oral every 12 hours  enoxaparin Injectable 40 milliGRAM(s) SubCutaneous daily  lisinopril 2.5 milliGRAM(s) Oral daily  midodrine. 15 milliGRAM(s) Oral three times a day  multiple electrolytes Injection Type 1 1000 milliLiter(s) (75 mL/Hr) IV Continuous <Continuous>  nystatin Powder 1 Application(s) Topical three times a day  piperacillin/tazobactam IVPB.. 3.375 Gram(s) IV Intermittent every 8 hours    MEDICATIONS  (PRN):  ALBUTerol    0.083% 2.5 milliGRAM(s) Nebulizer every 6 hours PRN Shortness of Breath and/or Wheezing  oxyCODONE    IR 2.5 milliGRAM(s) Oral every 4 hours PRN Moderate Pain (4 - 6)  oxyCODONE    IR 5 milliGRAM(s) Oral every 6 hours PRN Severe Pain (7 - 10)  zinc oxide 20% Ointment 1 Application(s) Topical three times a day PRN bowel movement

## 2019-12-24 NOTE — GOALS OF CARE CONVERSATION - ADVANCED CARE PLANNING - CONVERSATION DETAILS
Writer/[facility] RN visited patient - no family at bedside. Writer telephoned patient's son/HCP, Clive, who stated that he is working today (12/24) and will not be able to come to [facility]. He also stated that he IS interested in patient receiving home hospice services. Meeting with Clive scheduled for Thursday at 11:30 a.m. to discuss home hospice and have consent forms signed. Will continue to follow.    Mariya Albert RN  [phone]

## 2019-12-24 NOTE — PROGRESS NOTE ADULT - ASSESSMENT
70 yo female with history of COPD who presents with 1 month of shortness of breath worsened over last 2 weeks associated with >30 lb weight loss in the last 6 months, decreased appetite. Patient admitted for respiratory failure 2/2 COPD exacerbation with possible metastatic lung disease. Pulmonology consulted and CT abdomen was obtained - CT showed rectal wall thickening with adjacent air-pockets; colorectal surgery was consulted and MRI was obtained.  May have colon cancer with mets to the lung. GI consult was recommended by colorectal surgeon and consulted. MRI showed perforation of distal rectum and anus w/ collection of fluid/air.  12/5 TTE was ordered for elevated BNP - she was shown to have severe right sided ventricular strain. Started empirically on treatment for PE and CTA was ordered (delayed due to having received contrast with the CT abdomen on the same day). 12/9 CTA negative for PE. 12/11 Nuclear Stress Test-Pharmacologic  Normal study; no evidence for myocardial infarction or ischemia. 12/12 colorectal surgery performed. EUA, rectum with rectal mass biopsy. Posterior rectal wall abscess unroofed and draining mucoid material through cutaneous fistulas b/l . B/L seton placement transrectally. Patient refusing colostomy. S/P Code sepsis 12/20 for fever and hypotension, started IVF and IV Zosyn. Palliative care following, Hospice consulted at request of family.     Lung Nodules with rectal mass and abscess with cutaneous fistulas; possibly metastatic disease  - MRI of the abdomen with Perforation involving the posterior wall of the distal rectum and anus with a collection of fluid and air posteriorly measuring up to 6.5 cm.  - 12/12 Posterior rectal wall abscess unroofed and draining mucoid material through cutaneous fistulas b/l . B/L seton placement transrectally, rectal mass biopsy obtained.   -  Await pathology report  - patient and family does not want colon surgery. cleared for discharge from colorectal standpoint with plans to follow up with surgery- Dr. Lee in 2 weeks and oncologist.   - Sitz baths for comfort,  gauze can be removed and re-placed if draining  - Psych consult for capacity determination as it appears unclear if patient understands the gravity of her situation   - CT a/p 12/16 results noted.   - Heme/onc signed off encouraged hospice  -Palliative care following --> comfort care  -, Hospice consulted--> home hospice pending family meeting on thursday    Sepsis -likely secondary to rectal wall abscess  on Zosyn until dc  continue to trend leukocytosis  UA negative  ID cx noted  12/28 blood cultures x2 neg   BP remains low, not symptomatic.   increased midodrine  IVF PRN     COPD exacerbation - hypoxia on admission has resolved  - completed IV azithromycin 500mg x3 days. Completed short course steroids  - patient on anoro-ellipta 62.5/25 dose at home BID; ventolin as needed at home  - continue with duoneb q6 PRN  -respiratory status stable    Anemia with Thrombocytosis-- likely GI source  - stable    Elevated BNP - no clinical signs of HF- significant RV strain on the echo, EF 35-40% from TTE 12/5. No signs of fluid overload s/p IVF resuscitation   - pt had CTA- negative for PE   - nuc med stress test nl    elevated INR - poss 2/2 poor diet/malignancy, resolved    protein calorie malnutrition  - supplement meals  - nutrition consulted     Depressed mood  - no suicidal ideation  - psych consulted, signed off.     DVT - lovenox subcut    Disposition : DNR/DNI, on comfort care now. seen by hospice. family meeting on Thursday 11:30 am, dispo pending family meeting

## 2019-12-24 NOTE — CHART NOTE - NSCHARTNOTEFT_GEN_A_CORE
Source: Patient [ ]  Family [ ]   other [x] EMR, staff and ID rounds     Current Diet:   Diet, Regular:   Supplement Feeding Modality:  Oral  Ensure Enlive Cans or Servings Per Day:  1       Frequency:  Three Times a day (12-23-19 @ 12:52)    Patient reports [ ] nausea  [ ] vomiting [ ] diarrhea [ ] constipation  [ ]chewing problems [ ] swallowing issues  [ ] other:     PO intake:  < 50% [ ]   50-75%  [x]   %  [ ]  other :    Source for PO intake [ ] Patient [ ] family [x] chart [x] staff [ ] other    Current Weight:   (12/23)   123.4 lbs  (12/22)   123 lbs  (12/21)   129.9 lbs  (12/12)   139.9 lbs  (12/5)    139.9 lbs    % Weight Change: unclear accuracy of weights; will continue to monitor     Pertinent Medications: MEDICATIONS  (STANDING):  ALBUTerol    90 MICROgram(s) HFA Inhaler 1 Puff(s) Inhalation every 4 hours  buDESOnide    Inhalation Suspension 0.5 milliGRAM(s) Inhalation two times a day  carvedilol 3.125 milliGRAM(s) Oral every 12 hours  enoxaparin Injectable 40 milliGRAM(s) SubCutaneous daily  lisinopril 2.5 milliGRAM(s) Oral daily  midodrine. 15 milliGRAM(s) Oral three times a day  multiple electrolytes Injection Type 1 1000 milliLiter(s) (75 mL/Hr) IV Continuous <Continuous>  nystatin Powder 1 Application(s) Topical three times a day  piperacillin/tazobactam IVPB.. 3.375 Gram(s) IV Intermittent every 8 hours    MEDICATIONS  (PRN):  ALBUTerol    0.083% 2.5 milliGRAM(s) Nebulizer every 6 hours PRN Shortness of Breath and/or Wheezing  oxyCODONE    IR 2.5 milliGRAM(s) Oral every 4 hours PRN Moderate Pain (4 - 6)  oxyCODONE    IR 5 milliGRAM(s) Oral every 6 hours PRN Severe Pain (7 - 10)  zinc oxide 20% Ointment 1 Application(s) Topical three times a day PRN bowel movement    Pertinent Labs: CBC Full  -  ( 23 Dec 2019 05:38 )  WBC Count : 12.32 K/uL  RBC Count : 2.81 M/uL  Hemoglobin : 8.3 g/dL  Hematocrit : 25.6 %  Platelet Count - Automated : 567 K/uL  Mean Cell Volume : 91.1 fl  Mean Cell Hemoglobin : 29.5 pg  Mean Cell Hemoglobin Concentration : 32.4 gm/dL      Skin: IAD  Nutrition focused physical exam previously conducted- found signs of malnutrition [ ]absent [ x]present    Subcutaneous fat loss: [ x] Orbital fat pads region, [x ]Buccal fat region, [x ]Triceps region,  [ ]Ribs region    Muscle wasting: [ x]Temples region, [ x]Clavicle region, [ x]Shoulder region, [ ]Scapula region, [ ]Interosseous region,  [ ]thigh region, [ ]Calf region    Estimated Needs:   [x] no change since previous assessment, will continue to monitor weights   [ ] recalculated:     Current Nutrition Diagnosis: Pt remains at high nutrition risk secondary to malnutrition (severe chronic) related to insufficient protein-energy intake in setting of presumed colon CA with mets, persistent lack of appetite as evidenced by unintentional 30lb (17.64%) wt loss x 6 month, severe muscle wasting anf fat loss, meeting <75% EER> 1mo. Pt s/p MRI with findings of distal rectal mass suggestive of rectal carcinoma as well as small fistulous tract extending to subcutaneous tissues of the left ischio rectal fossa. Pt with recently diminished appetite and Po intake completing ~50% of meals, taking Ensure well. Aware Pt comfort measures, plan for d/c home with hospice.     Recommendations:   Continue with current nutrition plan as tolerated   RD to respect Pt/family wishes regarding GOC and alternate means nutrition/hydration     Monitoring and Evaluation:   [x] PO intake [x] Tolerance to diet prescription [X] Weights  [X] Follow up per protocol [X] Labs:

## 2019-12-25 LAB
CULTURE RESULTS: SIGNIFICANT CHANGE UP
CULTURE RESULTS: SIGNIFICANT CHANGE UP
SPECIMEN SOURCE: SIGNIFICANT CHANGE UP
SPECIMEN SOURCE: SIGNIFICANT CHANGE UP

## 2019-12-25 PROCEDURE — 99232 SBSQ HOSP IP/OBS MODERATE 35: CPT

## 2019-12-25 RX ADMIN — CARVEDILOL PHOSPHATE 3.12 MILLIGRAM(S): 80 CAPSULE, EXTENDED RELEASE ORAL at 17:03

## 2019-12-25 RX ADMIN — Medication 0.5 MILLIGRAM(S): at 09:16

## 2019-12-25 RX ADMIN — MIDODRINE HYDROCHLORIDE 15 MILLIGRAM(S): 2.5 TABLET ORAL at 05:41

## 2019-12-25 RX ADMIN — Medication 1 TABLET(S): at 17:03

## 2019-12-25 RX ADMIN — NYSTATIN CREAM 1 APPLICATION(S): 100000 CREAM TOPICAL at 14:04

## 2019-12-25 RX ADMIN — MIDODRINE HYDROCHLORIDE 15 MILLIGRAM(S): 2.5 TABLET ORAL at 17:05

## 2019-12-25 RX ADMIN — Medication 0.5 MILLIGRAM(S): at 20:42

## 2019-12-25 RX ADMIN — MIDODRINE HYDROCHLORIDE 15 MILLIGRAM(S): 2.5 TABLET ORAL at 14:05

## 2019-12-25 RX ADMIN — NYSTATIN CREAM 1 APPLICATION(S): 100000 CREAM TOPICAL at 05:40

## 2019-12-25 RX ADMIN — OXYCODONE HYDROCHLORIDE 5 MILLIGRAM(S): 5 TABLET ORAL at 17:03

## 2019-12-25 RX ADMIN — ALBUTEROL 2.5 MILLIGRAM(S): 90 AEROSOL, METERED ORAL at 20:42

## 2019-12-25 RX ADMIN — ENOXAPARIN SODIUM 40 MILLIGRAM(S): 100 INJECTION SUBCUTANEOUS at 14:05

## 2019-12-25 RX ADMIN — ALBUTEROL 2.5 MILLIGRAM(S): 90 AEROSOL, METERED ORAL at 09:16

## 2019-12-25 RX ADMIN — NYSTATIN CREAM 1 APPLICATION(S): 100000 CREAM TOPICAL at 23:06

## 2019-12-25 NOTE — PROGRESS NOTE ADULT - SUBJECTIVE AND OBJECTIVE BOX
CC: f/u pos rectal adenocarcinoma and rectal wall abscess s/p unroofing and drainage , with lung mets    INTERVAL HPI/OVERNIGHT EVENTS:   seen and examined at bedside  denied complaints.  no events overnight  on comfort care  seen by hospice. family meeting arranged on Thursday 11:30 am  IV displaced. failed attempt. Abx changed to PO      ROS:  deniedf ever/chills/chest pain/palpitation/SOB/dizziness/abd pain/n/v/d/c/dysuria/headaches/limb weakness. all other ROS negative      Allergies  No Known Allergies    HEALTH ISSUES - PROBLEM Dx:  Rectal adenocarcinoma: Rectal adenocarcinoma  SVT (supraventricular tachycardia): SVT (supraventricular tachycardia)  Sepsis: Sepsis  Chronic obstructive pulmonary disease, unspecified COPD type: Chronic obstructive pulmonary disease, unspecified COPD type  Rectal fistula: Rectal fistula  Advance care planning: Advance care planning  Leukocytosis: Leukocytosis  Encounter for palliative care: Encounter for palliative care  Rectal mass: Rectal mass  COPD (chronic obstructive pulmonary disease): COPD (chronic obstructive pulmonary disease)  Abnormal CT of the abdomen: Abnormal CT of the abdomen  Lung nodules: Lung nodules  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    PAST MEDICAL & SURGICAL HISTORY:  Anemia  COPD (chronic obstructive pulmonary disease)  H/O bilateral hip replacements    Vital Signs Last 24 Hrs  T(C): 36.7 (25 Dec 2019 08:30), Max: 36.7 (25 Dec 2019 08:30)  T(F): 98 (25 Dec 2019 08:30), Max: 98 (25 Dec 2019 08:30)  HR: 93 (25 Dec 2019 09:16) (91 - 97)  BP: 102/66 (25 Dec 2019 08:30) (100/62 - 102/66)  BP(mean): --  RR: 19 (25 Dec 2019 08:30) (19 - 19)  SpO2: 95% (25 Dec 2019 09:16) (95% - 96%)      CAPILLARY BLOOD GLUCOSE        Urinalysis Basic - ( 20 Dec 2019 06:21 )  Color: Yellow / Appearance: Clear / S.010 / pH: x  Gluc: x / Ketone: Negative  / Bili: Negative / Urobili: Negative mg/dL   Blood: x / Protein: 15 mg/dL / Nitrite: Negative   Leuk Esterase: Negative / RBC: Negative /HPF / WBC 0-2   Sq Epi: x / Non Sq Epi: Negative / Bacteria: Negative        < from: CT Abdomen and Pelvis w/ Oral Cont and w/ IV Cont (19 @ 17:22) >  BOWEL: No bowel obstruction. Appendix is within normal limits. There is a   large amount of stool throughout the colon, compatible with constipation.   10 noted is a large heterogeneous complex collection in the distal   rectum/anal canal measuring approximately 5.2 x 5.2 cm, similar to the   previous exam. There are now linear sutures noted within this collection   extending to the mL verge. Also noted is a collection with air-fluid   level in the left issue rectal fossa which appears contiguous with the   larger collection superiorly.  PERITONEUM: No ascites.  VESSELS: The aorta and IVC are normal in caliber and patent. There is   atherosclerosis of the aorta.  RETROPERITONEUM/LYMPH NODES: No lymphadenopathy.    ABDOMINAL WALL: Mild soft tissue anasarca.  BONES: Degenerative change of the thoracolumbar spine. Bilateral hip   prosthesis. Marked lucency around the prosthesis compatible with   osteolyses is again noted, similar to the previous exam.    IMPRESSION:     Compared to 2019:    Unchanged large complex heterogeneous collection, likely an abscess in   the distal rectum/anal canal. An underlying mass cannot be excluded.    Interval placement of suture like material within this collection.    Unchanged collection with air-fluid level in the left ischio rectal   fossa, which appears to communicate with the larger collection.    Other incidental findings are as above.    < end of copied text >      < from: TTE Echo Complete w/Doppler (19 @ 17:17) >  Summary:   1. Left ventricular ejection fraction, by visual estimation, is 35 to   40%.   2. Moderately decreased global left ventricular systolic function.   3. Severely enlarged right ventricle.   4. Severely reduced RV systolic function.   5. Mild-moderate tricuspid regurgitation.   6. Estimated pulmonary artery systolic pressure is 49.0 mmHg assuming a   right atrial pressure of 3 mmHg, which is consistent with mild pulmonary   hypertension.   7. No pericardial effusion.   8. ** No prior echocardiograms available for comparison. Findings   discussed with Dr. Rahul Man DO Electronically signed on 2019 at 5:38:41 PM    < end of copied text >    MEDICATIONS  (STANDING):  ALBUTerol    90 MICROgram(s) HFA Inhaler 1 Puff(s) Inhalation every 4 hours  amoxicillin  875 milliGRAM(s)/clavulanate 1 Tablet(s) Oral every 12 hours  buDESOnide    Inhalation Suspension 0.5 milliGRAM(s) Inhalation two times a day  carvedilol 3.125 milliGRAM(s) Oral every 12 hours  enoxaparin Injectable 40 milliGRAM(s) SubCutaneous daily  lisinopril 2.5 milliGRAM(s) Oral daily  midodrine. 15 milliGRAM(s) Oral three times a day  multiple electrolytes Injection Type 1 1000 milliLiter(s) (75 mL/Hr) IV Continuous <Continuous>  nystatin Powder 1 Application(s) Topical three times a day    MEDICATIONS  (PRN):  ALBUTerol    0.083% 2.5 milliGRAM(s) Nebulizer every 6 hours PRN Shortness of Breath and/or Wheezing  oxyCODONE    IR 2.5 milliGRAM(s) Oral every 4 hours PRN Moderate Pain (4 - 6)  oxyCODONE    IR 5 milliGRAM(s) Oral every 6 hours PRN Severe Pain (7 - 10)  zinc oxide 20% Ointment 1 Application(s) Topical three times a day PRN bowel movement

## 2019-12-26 PROCEDURE — 99232 SBSQ HOSP IP/OBS MODERATE 35: CPT

## 2019-12-26 RX ORDER — SENNA PLUS 8.6 MG/1
2 TABLET ORAL AT BEDTIME
Refills: 0 | Status: DISCONTINUED | OUTPATIENT
Start: 2019-12-26 | End: 2019-12-27

## 2019-12-26 RX ADMIN — NYSTATIN CREAM 1 APPLICATION(S): 100000 CREAM TOPICAL at 22:34

## 2019-12-26 RX ADMIN — Medication 0.5 MILLIGRAM(S): at 20:27

## 2019-12-26 RX ADMIN — Medication 0.5 MILLIGRAM(S): at 09:14

## 2019-12-26 RX ADMIN — MIDODRINE HYDROCHLORIDE 15 MILLIGRAM(S): 2.5 TABLET ORAL at 12:21

## 2019-12-26 RX ADMIN — ENOXAPARIN SODIUM 40 MILLIGRAM(S): 100 INJECTION SUBCUTANEOUS at 12:21

## 2019-12-26 RX ADMIN — ALBUTEROL 2.5 MILLIGRAM(S): 90 AEROSOL, METERED ORAL at 20:27

## 2019-12-26 RX ADMIN — ALBUTEROL 2.5 MILLIGRAM(S): 90 AEROSOL, METERED ORAL at 09:14

## 2019-12-26 RX ADMIN — SENNA PLUS 2 TABLET(S): 8.6 TABLET ORAL at 22:34

## 2019-12-26 RX ADMIN — MIDODRINE HYDROCHLORIDE 15 MILLIGRAM(S): 2.5 TABLET ORAL at 06:14

## 2019-12-26 RX ADMIN — CARVEDILOL PHOSPHATE 3.12 MILLIGRAM(S): 80 CAPSULE, EXTENDED RELEASE ORAL at 06:14

## 2019-12-26 RX ADMIN — NYSTATIN CREAM 1 APPLICATION(S): 100000 CREAM TOPICAL at 13:44

## 2019-12-26 RX ADMIN — MIDODRINE HYDROCHLORIDE 15 MILLIGRAM(S): 2.5 TABLET ORAL at 17:23

## 2019-12-26 RX ADMIN — NYSTATIN CREAM 1 APPLICATION(S): 100000 CREAM TOPICAL at 06:14

## 2019-12-26 RX ADMIN — Medication 1 TABLET(S): at 17:23

## 2019-12-26 RX ADMIN — Medication 1 TABLET(S): at 06:14

## 2019-12-26 RX ADMIN — LISINOPRIL 2.5 MILLIGRAM(S): 2.5 TABLET ORAL at 06:14

## 2019-12-26 NOTE — CONSULT NOTE ADULT - ASSESSMENT
69 year old female with likely metastatic distal rectal CA POD#14 s/p EUA, rectal mass biopsy and roz placement for drainage reconsiderating palliative colostomy. CRS team aware of patient and will see in the AM. Son's number Clive Savage [phone].

## 2019-12-26 NOTE — CONSULT NOTE ADULT - SUBJECTIVE AND OBJECTIVE BOX
HPI:  68 yo female with history of COPD who presents with 1 month of shortness of breath worsened over last 2 weeks associated with >30 lb weight loss in the last 6 months, decreased appetite. Denies chest pain, leg pain/swelling. Denies N/V, abdominal pain, diarrhea, constipation. Patient does admit to being depressed in mood but denies any suicidal ideation.     Patient and  at bedside note she has not been taking any of her prescribed medications. Patient was hypoxic in the ED and was given nebulizer and IV steroids. She is now 91% on room air. Patient had CT chest done that showed multiple lung nodules bilaterally. ED ordered abdominal CT.    Her pulmonologist is in [place] as well as her PCP. She states she had a CT done a year prior for an abnormal chest xray and there were no abnormal findings on the CT chest (I called patient's PCP Dr. Mario Rios who read the report as RML infiltrate/atelectasis with emphysematous changes but no mention of nodules or mediastinal lymphadenopathy). (05 Dec 2019 14:31)      PAST MEDICAL HISTORY:  Anemia  COPD (chronic obstructive pulmonary disease)      PAST SURGICAL HISTORY:  H/O bilateral hip replacements      ALLERGIES:  No Known Allergies      MEDICATIONS  (STANDING):  ALBUTerol    90 MICROgram(s) HFA Inhaler 1 Puff(s) Inhalation every 4 hours  amoxicillin  875 milliGRAM(s)/clavulanate 1 Tablet(s) Oral every 12 hours  buDESOnide    Inhalation Suspension 0.5 milliGRAM(s) Inhalation two times a day  carvedilol 3.125 milliGRAM(s) Oral every 12 hours  enoxaparin Injectable 40 milliGRAM(s) SubCutaneous daily  lisinopril 2.5 milliGRAM(s) Oral daily  midodrine. 15 milliGRAM(s) Oral three times a day  multiple electrolytes Injection Type 1 1000 milliLiter(s) (75 mL/Hr) IV Continuous <Continuous>  nystatin Powder 1 Application(s) Topical three times a day  senna 2 Tablet(s) Oral at bedtime    MEDICATIONS  (PRN):  ALBUTerol    0.083% 2.5 milliGRAM(s) Nebulizer every 6 hours PRN Shortness of Breath and/or Wheezing  oxyCODONE    IR 2.5 milliGRAM(s) Oral every 4 hours PRN Moderate Pain (4 - 6)  oxyCODONE    IR 5 milliGRAM(s) Oral every 6 hours PRN Severe Pain (7 - 10)  zinc oxide 20% Ointment 1 Application(s) Topical three times a day PRN bowel movement      VITALS & I/Os:  Vital Signs Last 24 Hrs  T(C): 37.7 (26 Dec 2019 16:48), Max: 38.1 (26 Dec 2019 15:30)  T(F): 99.8 (26 Dec 2019 16:48), Max: 100.6 (26 Dec 2019 15:30)  HR: 82 (26 Dec 2019 20:29) (81 - 98)  BP: 94/61 (26 Dec 2019 15:30) (92/58 - 94/61)  BP(mean): --  RR: 19 (26 Dec 2019 08:15) (19 - 19)  SpO2: 94% (26 Dec 2019 09:16) (93% - 94%)  CAPILLARY BLOOD GLUCOSE          I&O's Summary        GEN: NAD, alert and oriented x 3  HEENT: WNL  CHEST: Symmetrical chest rise, breath sounds CTAB  HEART: RRR, non-muffled heart sounds  ABD: Soft, non-tender, non-distended  EXT/VASC:         LABS:          Lactate: ALBUTerol    0.083% 2.5 milliGRAM(s) Nebulizer every 6 hours PRN  ALBUTerol    90 MICROgram(s) HFA Inhaler 1 Puff(s) Inhalation every 4 hours  amoxicillin  875 milliGRAM(s)/clavulanate 1 Tablet(s) Oral every 12 hours  buDESOnide    Inhalation Suspension 0.5 milliGRAM(s) Inhalation two times a day  carvedilol 3.125 milliGRAM(s) Oral every 12 hours  enoxaparin Injectable 40 milliGRAM(s) SubCutaneous daily  lisinopril 2.5 milliGRAM(s) Oral daily  midodrine. 15 milliGRAM(s) Oral three times a day  multiple electrolytes Injection Type 1 1000 milliLiter(s) IV Continuous <Continuous>  nystatin Powder 1 Application(s) Topical three times a day  oxyCODONE    IR 2.5 milliGRAM(s) Oral every 4 hours PRN  oxyCODONE    IR 5 milliGRAM(s) Oral every 6 hours PRN  senna 2 Tablet(s) Oral at bedtime  zinc oxide 20% Ointment 1 Application(s) Topical three times a day PRN     IMAGING: PAST MEDICAL HISTORY:  Anemia  COPD (chronic obstructive pulmonary disease)      PAST SURGICAL HISTORY:  H/O bilateral hip replacements      ALLERGIES:  No Known Allergies      MEDICATIONS  (STANDING):  ALBUTerol    90 MICROgram(s) HFA Inhaler 1 Puff(s) Inhalation every 4 hours  amoxicillin  875 milliGRAM(s)/clavulanate 1 Tablet(s) Oral every 12 hours  buDESOnide    Inhalation Suspension 0.5 milliGRAM(s) Inhalation two times a day  carvedilol 3.125 milliGRAM(s) Oral every 12 hours  enoxaparin Injectable 40 milliGRAM(s) SubCutaneous daily  lisinopril 2.5 milliGRAM(s) Oral daily  midodrine. 15 milliGRAM(s) Oral three times a day  multiple electrolytes Injection Type 1 1000 milliLiter(s) (75 mL/Hr) IV Continuous <Continuous>  nystatin Powder 1 Application(s) Topical three times a day  senna 2 Tablet(s) Oral at bedtime    MEDICATIONS  (PRN):  ALBUTerol    0.083% 2.5 milliGRAM(s) Nebulizer every 6 hours PRN Shortness of Breath and/or Wheezing  oxyCODONE    IR 2.5 milliGRAM(s) Oral every 4 hours PRN Moderate Pain (4 - 6)  oxyCODONE    IR 5 milliGRAM(s) Oral every 6 hours PRN Severe Pain (7 - 10)  zinc oxide 20% Ointment 1 Application(s) Topical three times a day PRN bowel movement      VITALS & I/Os:  Vital Signs Last 24 Hrs  T(C): 37.7 (26 Dec 2019 16:48), Max: 38.1 (26 Dec 2019 15:30)  T(F): 99.8 (26 Dec 2019 16:48), Max: 100.6 (26 Dec 2019 15:30)  HR: 82 (26 Dec 2019 20:29) (81 - 98)  BP: 94/61 (26 Dec 2019 15:30) (92/58 - 94/61)  BP(mean): --  RR: 19 (26 Dec 2019 08:15) (19 - 19)  SpO2: 94% (26 Dec 2019 09:16) (93% - 94%)  CAPILLARY BLOOD GLUCOSE          I&O's Summary        GEN: NAD, alert and oriented x 3  HEENT: WNL  CHEST: Symmetrical chest rise, breath sounds CTAB  HEART: RRR, non-muffled heart sounds  ABD: Soft, non-tender, non-distended  EXT/VASC:         LABS:          Lactate: ALBUTerol    0.083% 2.5 milliGRAM(s) Nebulizer every 6 hours PRN  ALBUTerol    90 MICROgram(s) HFA Inhaler 1 Puff(s) Inhalation every 4 hours  amoxicillin  875 milliGRAM(s)/clavulanate 1 Tablet(s) Oral every 12 hours  buDESOnide    Inhalation Suspension 0.5 milliGRAM(s) Inhalation two times a day  carvedilol 3.125 milliGRAM(s) Oral every 12 hours  enoxaparin Injectable 40 milliGRAM(s) SubCutaneous daily  lisinopril 2.5 milliGRAM(s) Oral daily  midodrine. 15 milliGRAM(s) Oral three times a day  multiple electrolytes Injection Type 1 1000 milliLiter(s) IV Continuous <Continuous>  nystatin Powder 1 Application(s) Topical three times a day  oxyCODONE    IR 2.5 milliGRAM(s) Oral every 4 hours PRN  oxyCODONE    IR 5 milliGRAM(s) Oral every 6 hours PRN  senna 2 Tablet(s) Oral at bedtime  zinc oxide 20% Ointment 1 Application(s) Topical three times a day PRN     IMAGING: 69 year old female with likely metastatic distal rectal CA POD#14 s/p EUA, rectal mass biopsy and roz placement for drainage previously offered palliative colostomy by CRS team earlier this month. CRS team reconsulted given patient declined to sign hospice consent today and expressed that she would like to reconsider surgical options prior to signing. Patient seen and examined at bedside - noted to be an unreliable historian given confusion when seen this evening No family at bedside to provide corroborating information. Unclear response about decision regarding surgery versus hospice care upon interview.     PAST MEDICAL HISTORY:  Anemia  COPD (chronic obstructive pulmonary disease)    PAST SURGICAL HISTORY:  H/O bilateral hip replacements    ALLERGIES:  No Known Allergies    MEDICATIONS  (STANDING):  ALBUTerol    90 MICROgram(s) HFA Inhaler 1 Puff(s) Inhalation every 4 hours  amoxicillin  875 milliGRAM(s)/clavulanate 1 Tablet(s) Oral every 12 hours  buDESOnide    Inhalation Suspension 0.5 milliGRAM(s) Inhalation two times a day  carvedilol 3.125 milliGRAM(s) Oral every 12 hours  enoxaparin Injectable 40 milliGRAM(s) SubCutaneous daily  lisinopril 2.5 milliGRAM(s) Oral daily  midodrine. 15 milliGRAM(s) Oral three times a day  multiple electrolytes Injection Type 1 1000 milliLiter(s) (75 mL/Hr) IV Continuous <Continuous>  nystatin Powder 1 Application(s) Topical three times a day  senna 2 Tablet(s) Oral at bedtime    MEDICATIONS  (PRN):  ALBUTerol    0.083% 2.5 milliGRAM(s) Nebulizer every 6 hours PRN Shortness of Breath and/or Wheezing  oxyCODONE    IR 2.5 milliGRAM(s) Oral every 4 hours PRN Moderate Pain (4 - 6)  oxyCODONE    IR 5 milliGRAM(s) Oral every 6 hours PRN Severe Pain (7 - 10)  zinc oxide 20% Ointment 1 Application(s) Topical three times a day PRN bowel movement      VITALS & I/Os:  Vital Signs Last 24 Hrs  T(C): 37.7 (26 Dec 2019 16:48), Max: 38.1 (26 Dec 2019 15:30)  T(F): 99.8 (26 Dec 2019 16:48), Max: 100.6 (26 Dec 2019 15:30)  HR: 82 (26 Dec 2019 20:29) (81 - 98)  BP: 94/61 (26 Dec 2019 15:30) (92/58 - 94/61)  BP(mean): --  RR: 19 (26 Dec 2019 08:15) (19 - 19)  SpO2: 94% (26 Dec 2019 09:16) (93% - 94%)  CAPILLARY BLOOD GLUCOSE    I&O's Summary    Constitutional: Elderly patient resting comfortably in bed in no acute distress   HEENT: EOMI/PERRL b/l  Neck: No JVD, full ROM w/o pain  Respiratory: CTAB with unlabored respirations, no accessory muscle use or conversational dyspnea   Cardiovascular: Regular rate and rhythm with no arrythmias or murmurs  Gastrointestinal: Abdomen soft, non-tender, non-distended with no rebound tenderness or guarding   Rectal: Deferred due to patient declining   Neurological: GCS 15 ([id number]) with no gross sensory, motor or coordination deficits   Psychiatric: Normal mood and affect   Musculoskeletal: No joint pain, swelling or deformity with no limitation of movement     LABS:    Lactate: ALBUTerol    0.083% 2.5 milliGRAM(s) Nebulizer every 6 hours PRN  ALBUTerol    90 MICROgram(s) HFA Inhaler 1 Puff(s) Inhalation every 4 hours  amoxicillin  875 milliGRAM(s)/clavulanate 1 Tablet(s) Oral every 12 hours  buDESOnide    Inhalation Suspension 0.5 milliGRAM(s) Inhalation two times a day  carvedilol 3.125 milliGRAM(s) Oral every 12 hours  enoxaparin Injectable 40 milliGRAM(s) SubCutaneous daily  lisinopril 2.5 milliGRAM(s) Oral daily  midodrine. 15 milliGRAM(s) Oral three times a day  multiple electrolytes Injection Type 1 1000 milliLiter(s) IV Continuous <Continuous>  nystatin Powder 1 Application(s) Topical three times a day  oxyCODONE    IR 2.5 milliGRAM(s) Oral every 4 hours PRN  oxyCODONE    IR 5 milliGRAM(s) Oral every 6 hours PRN  senna 2 Tablet(s) Oral at bedtime  zinc oxide 20% Ointment 1 Application(s) Topical three times a day PRN     IMAGING: 69 year old female with likely metastatic distal rectal CA POD#14 s/p EUA, rectal mass biopsy and roz placement for drainage previously offered palliative colostomy by CRS team earlier this month. CRS team reconsulted given patient declined to sign hospice consent today and expressed that she would like to reconsider surgical options prior to signing. Patient seen and examined at bedside - noted to be an unreliable historian given confusion when seen this evening No family at bedside to provide corroborating information. Unclear response about decision regarding surgery versus hospice care upon interview.     PAST MEDICAL HISTORY:  Anemia  COPD (chronic obstructive pulmonary disease)    PAST SURGICAL HISTORY:  H/O bilateral hip replacements    ALLERGIES:  No Known Allergies    MEDICATIONS  (STANDING):  ALBUTerol    90 MICROgram(s) HFA Inhaler 1 Puff(s) Inhalation every 4 hours  amoxicillin  875 milliGRAM(s)/clavulanate 1 Tablet(s) Oral every 12 hours  buDESOnide    Inhalation Suspension 0.5 milliGRAM(s) Inhalation two times a day  carvedilol 3.125 milliGRAM(s) Oral every 12 hours  enoxaparin Injectable 40 milliGRAM(s) SubCutaneous daily  lisinopril 2.5 milliGRAM(s) Oral daily  midodrine. 15 milliGRAM(s) Oral three times a day  multiple electrolytes Injection Type 1 1000 milliLiter(s) (75 mL/Hr) IV Continuous <Continuous>  nystatin Powder 1 Application(s) Topical three times a day  senna 2 Tablet(s) Oral at bedtime    MEDICATIONS  (PRN):  ALBUTerol    0.083% 2.5 milliGRAM(s) Nebulizer every 6 hours PRN Shortness of Breath and/or Wheezing  oxyCODONE    IR 2.5 milliGRAM(s) Oral every 4 hours PRN Moderate Pain (4 - 6)  oxyCODONE    IR 5 milliGRAM(s) Oral every 6 hours PRN Severe Pain (7 - 10)  zinc oxide 20% Ointment 1 Application(s) Topical three times a day PRN bowel movement      VITALS & I/Os:  Vital Signs Last 24 Hrs  T(C): 37.7 (26 Dec 2019 16:48), Max: 38.1 (26 Dec 2019 15:30)  T(F): 99.8 (26 Dec 2019 16:48), Max: 100.6 (26 Dec 2019 15:30)  HR: 82 (26 Dec 2019 20:29) (81 - 98)  BP: 94/61 (26 Dec 2019 15:30) (92/58 - 94/61)  BP(mean): --  RR: 19 (26 Dec 2019 08:15) (19 - 19)  SpO2: 94% (26 Dec 2019 09:16) (93% - 94%)  CAPILLARY BLOOD GLUCOSE    I&O's Summary    Constitutional: Elderly patient resting comfortably in bed in no acute distress   HEENT: EOMI/PERRL b/l  Neck: No JVD, full ROM w/o pain  Respiratory: CTAB with unlabored respirations, no accessory muscle use or conversational dyspnea   Cardiovascular: Regular rate and rhythm with no arrythmias or murmurs  Gastrointestinal: Abdomen soft, non-tender, non-distended with no rebound tenderness or guarding   Rectal: Deferred due to patient declining   Psychiatric: Depressed mood and flat affect   Musculoskeletal: No joint pain, swelling or deformity with no limitation of movement     LABS:    Lactate: ALBUTerol    0.083% 2.5 milliGRAM(s) Nebulizer every 6 hours PRN  ALBUTerol    90 MICROgram(s) HFA Inhaler 1 Puff(s) Inhalation every 4 hours  amoxicillin  875 milliGRAM(s)/clavulanate 1 Tablet(s) Oral every 12 hours  buDESOnide    Inhalation Suspension 0.5 milliGRAM(s) Inhalation two times a day  carvedilol 3.125 milliGRAM(s) Oral every 12 hours  enoxaparin Injectable 40 milliGRAM(s) SubCutaneous daily  lisinopril 2.5 milliGRAM(s) Oral daily  midodrine. 15 milliGRAM(s) Oral three times a day  multiple electrolytes Injection Type 1 1000 milliLiter(s) IV Continuous <Continuous>  nystatin Powder 1 Application(s) Topical three times a day  oxyCODONE    IR 2.5 milliGRAM(s) Oral every 4 hours PRN  oxyCODONE    IR 5 milliGRAM(s) Oral every 6 hours PRN  senna 2 Tablet(s) Oral at bedtime  zinc oxide 20% Ointment 1 Application(s) Topical three times a day PRN     IMAGING:

## 2019-12-26 NOTE — PROGRESS NOTE ADULT - ASSESSMENT
70 yo female with history of COPD who presents with 1 month of shortness of breath worsened over last 2 weeks associated with >30 lb weight loss in the last 6 months, decreased appetite. Patient admitted for respiratory failure 2/2 COPD exacerbation with possible metastatic lung disease. Pulmonology consulted and CT abdomen was obtained - CT showed rectal wall thickening with adjacent air-pockets; colorectal surgery was consulted and MRI was obtained.  May have colon cancer with mets to the lung. GI consult was recommended by colorectal surgeon and consulted. MRI showed perforation of distal rectum and anus w/ collection of fluid/air.  12/5 TTE was ordered for elevated BNP - she was shown to have severe right sided ventricular strain. Started empirically on treatment for PE and CTA was ordered (delayed due to having received contrast with the CT abdomen on the same day). 12/9 CTA negative for PE. 12/11 Nuclear Stress Test-Pharmacologic  Normal study; no evidence for myocardial infarction or ischemia. 12/12 colorectal surgery performed. EUA, rectum with rectal mass biopsy. Posterior rectal wall abscess unroofed and draining mucoid material through cutaneous fistulas b/l . B/L seton placement transrectally. Patient refusing colostomy. S/P Code sepsis 12/20 for fever and hypotension, started IVF and IV Zosyn. Palliative care following, Hospice consulted at request of family.     Lung Nodules with rectal mass and abscess with cutaneous fistulas; possibly metastatic disease  - MRI of the abdomen with Perforation involving the posterior wall of the distal rectum and anus with a collection of fluid and air posteriorly measuring up to 6.5 cm.  - 12/12 Posterior rectal wall abscess unroofed and draining mucoid material through cutaneous fistulas b/l . B/L seton placement transrectally, rectal mass biopsy obtained.   -  Await pathology report  - patient and family did not want colon surgery. cleared for discharge from colorectal standpoint with plans to follow up with surgery- Dr. Lee in 2 weeks and oncologist as patient was refusing suegery  - Sitz baths for comfort,    - now after family meeting, patient wants to learn more about colon surgery and further management before she decides for hospice. Son and HCP luis goes with the patient.   - colorectal surgery follow up called today  - CT a/p 12/16 results noted.   - Heme/onc signed off encouraged hospice  -Palliative care following --> comfort care  -, Hospice consulted--> home hospice pending family/patient decision    Sepsis -likely secondary to rectal wall abscess  s/p aozsyn. now stable on augmentin  UA negative  ID cx noted  12/28 blood cultures x2 neg     hypotension; BP maintained on midodrin    COPD exacerbation - hypoxia on admission has resolved  - completed IV azithromycin 500mg x3 days. Completed short course steroids  - patient on anoro-ellipta 62.5/25 dose at home BID; ventolin as needed at home  - continue with duoneb q6 PRN  -respiratory status stable    Anemia with Thrombocytosis-- likely GI source  - stable    Elevated BNP - no clinical signs of HF- significant RV strain on the echo, EF 35-40% from TTE 12/5. No signs of fluid overload s/p IVF resuscitation   - pt had CTA- negative for PE   - nuc med stress test nl    elevated INR - poss 2/2 poor diet/malignancy, resolved    protein calorie malnutrition  - supplement meals  - nutrition consulted     Depressed mood  - no suicidal ideation  - psych consulted, signed off.     DVT - lovenox subcut    Disposition : DNR/DNI, on comfort care now. seen by hospice. dispo pending family/patient discussion with colorectal surgery and decision for surgery and hospice

## 2019-12-26 NOTE — CONSULT NOTE ADULT - CONSULT REQUESTED BY NAME
Dana
Dr Manning
Dr. Cortez
Dr. Herr
Dr. Yuki Keith
Frank Art
Jes
Primary Service
Primary Team
elijah

## 2019-12-26 NOTE — PROGRESS NOTE ADULT - ASSESSMENT
69yr woman, admitted with exacerbation of COPD found to have rectal abscess and rectal mass positive for adenocarcinoma. Patient refusing colostomy as a means of source control for  infection

## 2019-12-26 NOTE — GOALS OF CARE CONVERSATION - ADVANCED CARE PLANNING - CONVERSATION DETAILS
HCN RN met with pt, spouse, and son reviewed hospice care with return understanding. Pt would like to explore surgical intervention before signing consents for hospice services. CM made aware.

## 2019-12-26 NOTE — PROGRESS NOTE ADULT - SUBJECTIVE AND OBJECTIVE BOX
CC: f/u pos rectal adenocarcinoma and rectal wall abscess s/p unroofing and drainage , with lung mets    INTERVAL HPI/OVERNIGHT EVENTS:   seen and examined at bedside  denied complaints.  no events overnight  on comfort care   seen by hospice  family meeting today--> patient wanted to learn about surgery before deciding about hospice.      ROS:  deniedf ever/chills/chest pain/palpitation/SOB/dizziness/abd pain/n/v/d/c/dysuria/headaches/limb weakness. all other ROS negative      Allergies  No Known Allergies    HEALTH ISSUES - PROBLEM Dx:  Rectal adenocarcinoma: Rectal adenocarcinoma  SVT (supraventricular tachycardia): SVT (supraventricular tachycardia)  Sepsis: Sepsis  Chronic obstructive pulmonary disease, unspecified COPD type: Chronic obstructive pulmonary disease, unspecified COPD type  Rectal fistula: Rectal fistula  Advance care planning: Advance care planning  Leukocytosis: Leukocytosis  Encounter for palliative care: Encounter for palliative care  Rectal mass: Rectal mass  COPD (chronic obstructive pulmonary disease): COPD (chronic obstructive pulmonary disease)  Abnormal CT of the abdomen: Abnormal CT of the abdomen  Lung nodules: Lung nodules  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    PAST MEDICAL & SURGICAL HISTORY:  Anemia  COPD (chronic obstructive pulmonary disease)  H/O bilateral hip replacements    Vital Signs Last 24 Hrs  T(C): 37.7 (26 Dec 2019 16:48), Max: 38.1 (26 Dec 2019 15:30)  T(F): 99.8 (26 Dec 2019 16:48), Max: 100.6 (26 Dec 2019 15:30)  HR: 93 (26 Dec 2019 15:30) (81 - 98)  BP: 94/61 (26 Dec 2019 15:30) (92/58 - 94/61)  BP(mean): --  RR: 19 (26 Dec 2019 08:15) (19 - 19)  SpO2: 94% (26 Dec 2019 09:16) (93% - 95%)    CAPILLARY BLOOD GLUCOSE      I&O's Summary        Urinalysis Basic - ( 20 Dec 2019 06:21 )  Color: Yellow / Appearance: Clear / S.010 / pH: x  Gluc: x / Ketone: Negative  / Bili: Negative / Urobili: Negative mg/dL   Blood: x / Protein: 15 mg/dL / Nitrite: Negative   Leuk Esterase: Negative / RBC: Negative /HPF / WBC 0-2   Sq Epi: x / Non Sq Epi: Negative / Bacteria: Negative        < from: CT Abdomen and Pelvis w/ Oral Cont and w/ IV Cont (19 @ 17:22) >  BOWEL: No bowel obstruction. Appendix is within normal limits. There is a   large amount of stool throughout the colon, compatible with constipation.   10 noted is a large heterogeneous complex collection in the distal   rectum/anal canal measuring approximately 5.2 x 5.2 cm, similar to the   previous exam. There are now linear sutures noted within this collection   extending to the mL verge. Also noted is a collection with air-fluid   level in the left issue rectal fossa which appears contiguous with the   larger collection superiorly.  PERITONEUM: No ascites.  VESSELS: The aorta and IVC are normal in caliber and patent. There is   atherosclerosis of the aorta.  RETROPERITONEUM/LYMPH NODES: No lymphadenopathy.    ABDOMINAL WALL: Mild soft tissue anasarca.  BONES: Degenerative change of the thoracolumbar spine. Bilateral hip   prosthesis. Marked lucency around the prosthesis compatible with   osteolyses is again noted, similar to the previous exam.    IMPRESSION:     Compared to 2019:    Unchanged large complex heterogeneous collection, likely an abscess in   the distal rectum/anal canal. An underlying mass cannot be excluded.    Interval placement of suture like material within this collection.    Unchanged collection with air-fluid level in the left ischio rectal   fossa, which appears to communicate with the larger collection.    Other incidental findings are as above.    < end of copied text >      < from: TTE Echo Complete w/Doppler (19 @ 17:17) >  Summary:   1. Left ventricular ejection fraction, by visual estimation, is 35 to   40%.   2. Moderately decreased global left ventricular systolic function.   3. Severely enlarged right ventricle.   4. Severely reduced RV systolic function.   5. Mild-moderate tricuspid regurgitation.   6. Estimated pulmonary artery systolic pressure is 49.0 mmHg assuming a   right atrial pressure of 3 mmHg, which is consistent with mild pulmonary   hypertension.   7. No pericardial effusion.   8. ** No prior echocardiograms available for comparison. Findings   discussed with Dr. Rahul Man DO Electronically signed on 2019 at 5:38:41 PM    < end of copied text >    MEDICATIONS  (STANDING):  ALBUTerol    90 MICROgram(s) HFA Inhaler 1 Puff(s) Inhalation every 4 hours  amoxicillin  875 milliGRAM(s)/clavulanate 1 Tablet(s) Oral every 12 hours  buDESOnide    Inhalation Suspension 0.5 milliGRAM(s) Inhalation two times a day  carvedilol 3.125 milliGRAM(s) Oral every 12 hours  enoxaparin Injectable 40 milliGRAM(s) SubCutaneous daily  lisinopril 2.5 milliGRAM(s) Oral daily  midodrine. 15 milliGRAM(s) Oral three times a day  multiple electrolytes Injection Type 1 1000 milliLiter(s) (75 mL/Hr) IV Continuous <Continuous>  nystatin Powder 1 Application(s) Topical three times a day  senna 2 Tablet(s) Oral at bedtime    MEDICATIONS  (PRN):  ALBUTerol    0.083% 2.5 milliGRAM(s) Nebulizer every 6 hours PRN Shortness of Breath and/or Wheezing  oxyCODONE    IR 2.5 milliGRAM(s) Oral every 4 hours PRN Moderate Pain (4 - 6)  oxyCODONE    IR 5 milliGRAM(s) Oral every 6 hours PRN Severe Pain (7 - 10)  zinc oxide 20% Ointment 1 Application(s) Topical three times a day PRN bowel movement

## 2019-12-26 NOTE — CONSULT NOTE ADULT - PROVIDER SPECIALTY LIST ADULT
Gastroenterology
Anesthesia
Colorectal Surgery
Heme/Onc
Palliative Care
Cardiology
Thoracic Surgery
Infectious Disease
Pulmonology
Critical Care
Colorectal Surgery

## 2019-12-26 NOTE — CONSULT NOTE ADULT - CONSULT REASON
rectal mass
preop note
Cardiac Risk Stratification
Incidental finding on CT scan of thickened wall of the rectum
Leukocytosis
Pulmonary Nodules
SOB and Lung nodules
Sepsis, hypotension
Surgery reconsideration by patient and family
rectal mass
rectal mass   Goals of Care

## 2019-12-26 NOTE — CONSULT NOTE ADULT - CONSULT REQUESTED DATE/TIME
11-Dec-2019
06-Dec-2019 11:05
06-Dec-2019 15:30
06-Dec-2019 17:30
10-Dec-2019 19:12
11-Dec-2019 10:47
16-Dec-2019 09:59
16-Dec-2019 12:32
20-Dec-2019 05:20
26-Dec-2019
07-Dec-2019 18:41

## 2019-12-26 NOTE — CONSULT NOTE ADULT - REASON FOR ADMISSION
acute hypoxic respiratory failure, multiple lung nodules Acute hypoxic respiratory failure and multiple lung nodules

## 2019-12-26 NOTE — PROGRESS NOTE ADULT - SUBJECTIVE AND OBJECTIVE BOX
CC:  follow up symptoms GOC  INTERVAL HPI/OVERNIGHT EVENTS:    PRESENT SYMPTOMS: SOURCE:  Patient/Family/Team    PAIN SCALE:  0 = none  1 = mild   2 = moderate  3 = severe    Pain: 3 managed on current regimen    Dyspnea:  [ ] YES [ ] NO  Anxiety:  [ ] YES [ ] NO  Fatigue: [ ] YES [ ] NO  Nausea: [ ] YES [ ] NO  Loss of Appetite: [ ] YES [ ] NO  Other symptoms: __________    MEDICATIONS  (STANDING):  ALBUTerol    90 MICROgram(s) HFA Inhaler 1 Puff(s) Inhalation every 4 hours  amoxicillin  875 milliGRAM(s)/clavulanate 1 Tablet(s) Oral every 12 hours  buDESOnide    Inhalation Suspension 0.5 milliGRAM(s) Inhalation two times a day  carvedilol 3.125 milliGRAM(s) Oral every 12 hours  enoxaparin Injectable 40 milliGRAM(s) SubCutaneous daily  lisinopril 2.5 milliGRAM(s) Oral daily  midodrine. 15 milliGRAM(s) Oral three times a day  multiple electrolytes Injection Type 1 1000 milliLiter(s) (75 mL/Hr) IV Continuous <Continuous>  nystatin Powder 1 Application(s) Topical three times a day    MEDICATIONS  (PRN):  ALBUTerol    0.083% 2.5 milliGRAM(s) Nebulizer every 6 hours PRN Shortness of Breath and/or Wheezing  oxyCODONE    IR 2.5 milliGRAM(s) Oral every 4 hours PRN Moderate Pain (4 - 6)  oxyCODONE    IR 5 milliGRAM(s) Oral every 6 hours PRN Severe Pain (7 - 10)  zinc oxide 20% Ointment 1 Application(s) Topical three times a day PRN bowel movement      Allergies    No Known Allergies    Intolerances    Karnofsky Performance Score/Palliative Performance Status Version 2:   40  %    Vital Signs Last 24 Hrs  T(C): 37.3 (26 Dec 2019 08:15), Max: 37.4 (25 Dec 2019 16:48)  T(F): 99.1 (26 Dec 2019 08:15), Max: 99.4 (25 Dec 2019 16:48)  HR: 98 (26 Dec 2019 09:16) (81 - 108)  BP: 92/58 (26 Dec 2019 08:15) (92/58 - 108/64)  BP(mean): --  RR: 19 (26 Dec 2019 08:15) (19 - 19)  SpO2: 94% (26 Dec 2019 09:16) (93% - 95%)    PHYSICAL EXAM:    General: awake alert NAD  HEENT: [x ] normal  [ ] dry mouth  [ ] ET tube/trach    Lungs: [x ] comfortable [ ] tachypnea/labored breathing  [ ] excessive secretions    CV: [x ] normal  [ ] tachycardia    GI: + BS soft NTND   : [x ] normal  [ ] incontinent  [ ] oliguria/anuria  [ ] azar    MSK: [ ] normal  [x ] weakness  [ ] edema             [ ] ambulatory  [ ] bedbound/wheelchair bound    Skin: [ ] normal  [ ] pressure ulcers- Stage_____  [x ] no rash      I&O's Summary        Thank you for the opportunity to assist with the care of this patient.   [facility] [phone].

## 2019-12-27 PROCEDURE — 99233 SBSQ HOSP IP/OBS HIGH 50: CPT

## 2019-12-27 PROCEDURE — 99232 SBSQ HOSP IP/OBS MODERATE 35: CPT

## 2019-12-27 PROCEDURE — 99358 PROLONG SERVICE W/O CONTACT: CPT

## 2019-12-27 PROCEDURE — 99497 ADVNCD CARE PLAN 30 MIN: CPT

## 2019-12-27 RX ORDER — ACETAMINOPHEN 500 MG
650 TABLET ORAL ONCE
Refills: 0 | Status: COMPLETED | OUTPATIENT
Start: 2019-12-27 | End: 2019-12-27

## 2019-12-27 RX ADMIN — MIDODRINE HYDROCHLORIDE 15 MILLIGRAM(S): 2.5 TABLET ORAL at 06:25

## 2019-12-27 RX ADMIN — Medication 1 TABLET(S): at 17:05

## 2019-12-27 RX ADMIN — Medication 1 TABLET(S): at 06:25

## 2019-12-27 RX ADMIN — NYSTATIN CREAM 1 APPLICATION(S): 100000 CREAM TOPICAL at 17:03

## 2019-12-27 RX ADMIN — Medication 650 MILLIGRAM(S): at 19:52

## 2019-12-27 RX ADMIN — MIDODRINE HYDROCHLORIDE 15 MILLIGRAM(S): 2.5 TABLET ORAL at 17:06

## 2019-12-27 RX ADMIN — OXYCODONE HYDROCHLORIDE 5 MILLIGRAM(S): 5 TABLET ORAL at 11:42

## 2019-12-27 RX ADMIN — Medication 0.5 MILLIGRAM(S): at 20:17

## 2019-12-27 RX ADMIN — ENOXAPARIN SODIUM 40 MILLIGRAM(S): 100 INJECTION SUBCUTANEOUS at 11:43

## 2019-12-27 RX ADMIN — OXYCODONE HYDROCHLORIDE 5 MILLIGRAM(S): 5 TABLET ORAL at 12:40

## 2019-12-27 RX ADMIN — LISINOPRIL 2.5 MILLIGRAM(S): 2.5 TABLET ORAL at 06:25

## 2019-12-27 RX ADMIN — NYSTATIN CREAM 1 APPLICATION(S): 100000 CREAM TOPICAL at 21:24

## 2019-12-27 RX ADMIN — ALBUTEROL 2.5 MILLIGRAM(S): 90 AEROSOL, METERED ORAL at 20:18

## 2019-12-27 RX ADMIN — MIDODRINE HYDROCHLORIDE 15 MILLIGRAM(S): 2.5 TABLET ORAL at 11:44

## 2019-12-27 RX ADMIN — Medication 0.5 MILLIGRAM(S): at 10:04

## 2019-12-27 RX ADMIN — CARVEDILOL PHOSPHATE 3.12 MILLIGRAM(S): 80 CAPSULE, EXTENDED RELEASE ORAL at 06:25

## 2019-12-27 RX ADMIN — NYSTATIN CREAM 1 APPLICATION(S): 100000 CREAM TOPICAL at 06:25

## 2019-12-27 NOTE — GOALS OF CARE CONVERSATION - ADVANCED CARE PLANNING - CONVERSATION/DISCUSSION
Hospice Referral
Hospice Referral
Diagnosis/Prognosis/Treatment Options
Prognosis/Treatment Options/Diagnosis
Diagnosis/Prognosis/MOLST Discussed/Treatment Options
Prognosis/Treatment Options/MOLST Discussed
Prognosis/Diagnosis/MOLST Discussed/Treatment Options

## 2019-12-27 NOTE — PROGRESS NOTE ADULT - PROBLEM SELECTOR PLAN 4
BI completed - DNR/I  Patient verbalized would not want CPR or intubation/mech ventilation  Family present including Clive, son  who is the HCP
HCP form in chart naming son Clive as HCP.  I made arrangement with Clive to meet at 1:30pm today  Family members at bedside- Partner Reddy, Cousin Vanita, brother, cousin with  Family Medical Resident. Clive not present - called and discussed case.  He gave permission to speak with other family members.  He seemed to have a  good understanding about his mother's condition. He was able to explain that without a colostomy, rectal area will not heal, leading to a cycle of infections, hospitalizations and interventions.  He was discussed consideration of hospice by surgeon.  He agreed to a family meeting tomorrow at 10am.     Spoke to rest of family and patient.  Patient more verbal with encouragement from family. She verbalized that she knew that she had a rectal " tumor" and that she " has cancer".  It was clarified that biopsy pending for official confirmation.  Her clinical situation was explained and she verbalized again that she did not want a colostomy.  Hospice care was discussed.   She verbalized back that she knew she would  then have " less than 6 months".  Cousin Vanita interested in [facility].  She states patient Cannot go home as there is no heat.  Patient and Reddy have been staying in a hotel. Informed Case Management.    It seems patient currently  with some understanding of her medical condition. She continues to refuse a colostomy and seems to have a fair understanding that her time would then be limited.    Plan for family meeting tomorrow at 10am with son.
Oxycodone 2.5/5 mg for mod/severe pain  Tylenol 650mg q4hr PRN for mild pain
See GO note for details.  In summary-  1. Son wishes to honor his mother's wish for surgery. Informed son, surgery mainly palliative and reviewed  risks/benefits given patient very debilitated and underlying advanced cancer.   2.  D/C Comfort Care  3.  Son still agreeable to DNR/I.
cont Midodrine
stable  no treatments for now
Cont nebs PRN   O2 support

## 2019-12-27 NOTE — PROGRESS NOTE ADULT - SUBJECTIVE AND OBJECTIVE BOX
INTERVAL HPI/OVERNIGHT EVENTS:    No family at bedside at this time to further discuss patient's goals of care in setting of rectal adenocarcinoma. Patient wants to discuss hospice and possible operative management, but does not have a clear understanding on her own. Will require family at bedside to further discuss options.       MEDICATIONS  (STANDING):  ALBUTerol    90 MICROgram(s) HFA Inhaler 1 Puff(s) Inhalation every 4 hours  amoxicillin  875 milliGRAM(s)/clavulanate 1 Tablet(s) Oral every 12 hours  buDESOnide    Inhalation Suspension 0.5 milliGRAM(s) Inhalation two times a day  carvedilol 3.125 milliGRAM(s) Oral every 12 hours  enoxaparin Injectable 40 milliGRAM(s) SubCutaneous daily  lisinopril 2.5 milliGRAM(s) Oral daily  midodrine. 15 milliGRAM(s) Oral three times a day  multiple electrolytes Injection Type 1 1000 milliLiter(s) (75 mL/Hr) IV Continuous <Continuous>  nystatin Powder 1 Application(s) Topical three times a day  senna 2 Tablet(s) Oral at bedtime    MEDICATIONS  (PRN):  ALBUTerol    0.083% 2.5 milliGRAM(s) Nebulizer every 6 hours PRN Shortness of Breath and/or Wheezing  oxyCODONE    IR 2.5 milliGRAM(s) Oral every 4 hours PRN Moderate Pain (4 - 6)  oxyCODONE    IR 5 milliGRAM(s) Oral every 6 hours PRN Severe Pain (7 - 10)  zinc oxide 20% Ointment 1 Application(s) Topical three times a day PRN bowel movement      Vital Signs Last 24 Hrs  T(C): 37.6 (27 Dec 2019 00:06), Max: 38.1 (26 Dec 2019 15:30)  T(F): 99.6 (27 Dec 2019 00:06), Max: 100.6 (26 Dec 2019 15:30)  HR: 111 (27 Dec 2019 00:06) (81 - 111)  BP: 104/71 (27 Dec 2019 00:06) (92/58 - 104/71)  BP(mean): --  RR: 18 (27 Dec 2019 00:06) (18 - 19)  SpO2: 89% (27 Dec 2019 00:06) (89% - 94%)    Constitutional: Elderly patient resting comfortably in bed in no acute distress   Respiratory: CTAB with unlabored respirations, no accessory muscle use or conversational dyspnea   Cardiovascular: Regular rate and rhythm with no arrythmias or murmurs  Gastrointestinal: Abdomen soft, non-tender, non-distended with no rebound tenderness or guarding   Rectal: Deferred at this time  Psychiatric: Depressed mood and flat affect   Musculoskeletal: No joint pain, swelling or deformity with no limitation of movement     I&O's Detail      LABS:                RADIOLOGY & ADDITIONAL STUDIES:

## 2019-12-27 NOTE — PROGRESS NOTE ADULT - ASSESSMENT
69 year old female with likely metastatic distal rectal CA, rectal mass biopsy showing adenocarcinoma. Patient reconsiderating palliative colostomy. Son's number Clive Savage [phone].     - At prior surgical intervention, patient and family were adamant about not having an ostomy, will re-discuss with patient and family   - Plan discussed with attending who is aware of patient's reconsiderations

## 2019-12-27 NOTE — PROGRESS NOTE ADULT - PROBLEM SELECTOR PLAN 2
Abscess   patient does not want colostomy  Patient and family aware that this would be an endless cycle of reinfections.  Discussed with ID - recommending d/c antibiotics 2/2 futility given area will be chronically reinfected.
as above
cont Oxycodone 2.5/5mg PRN  Nurse reports + BMs
continue Oxycodone 2.5/5mg PRN for mod/severe pain  Bowel regimen - Senna
continuing to monitor off antibiotics
trending down  off abx
patient does not want colostomy  Patient and family aware that this would be an endless cycle of reinfections

## 2019-12-27 NOTE — PROGRESS NOTE ADULT - ASSESSMENT
A/P - Metastatic, locally invasive and perforated distal rectal cancer.    No a candidate for curative surgery given metastatic disease.   I have offered twice in past, a diverting loop colostomy for palliative purposes and discussed this with son, Clive, by phone.  Per surgical team, pt and family are now requesting definitive treatment with removal of primary tumor.  She is not a candidate for palliative APR given her overall poor performance status with associated morbidity/mortality.  Should family seek more aggeressive surgery, would recommend seeking an alternative surgical opinion.   Attempted to contact son by phone twice and unable to leave voicemail.  Thanks.

## 2019-12-27 NOTE — CHART NOTE - NSCHARTNOTEFT_GEN_A_CORE
Colorectal Surgery reconsulted to re-discuss operative options. There have been multiple, lengthy discussions with the son, Clive who is a nurse with both the residents and Dr. Lee. Clive, a RN is well aware of the situation at hand and understands the only palliative surgical option is to provide an ostomy. The son has reiterated, multiple times, that an ostomy is something the pt would have NEVER wanted. The pt, at baseline, is confused. She confuses the term "ostomy" and thinks it is a colonoscopy. There have been multiple attempts to ask the pt whether she knows what the situation is, and she continues to be confused. There are no other surgical options for this patient.     CRS will sign off

## 2019-12-27 NOTE — PROGRESS NOTE ADULT - PROBLEM SELECTOR PLAN 3
On Midodrine.   Would continue for now.   D/C if becomes a pill burden  Son does NOT WANT IV PRESSORS.
Patient and son now stating wanting surgery   Discussed risks benefits   see GOC meeting note
cont nebs
cont nebs.
s/p abx and steroids  Continuing nebs.
started on Augmentin for now
Poor prognosis given patient does not want colostomy   Family seeking eventual hospice services

## 2019-12-27 NOTE — PROGRESS NOTE ADULT - ASSESSMENT
70 yo female with history of COPD who presents with 1 month of shortness of breath worsened over last 2 weeks associated with >30 lb weight loss in the last 6 months, decreased appetite. Patient admitted for respiratory failure 2/2 COPD exacerbation with possible metastatic lung disease. Pulmonology consulted and CT abdomen was obtained - CT showed rectal wall thickening with adjacent air-pockets; colorectal surgery was consulted and MRI was obtained.  May have colon cancer with mets to the lung. GI consult was recommended by colorectal surgeon and consulted. MRI showed perforation of distal rectum and anus w/ collection of fluid/air.  12/5 TTE was ordered for elevated BNP - she was shown to have severe right sided ventricular strain. Started empirically on treatment for PE and CTA was ordered (delayed due to having received contrast with the CT abdomen on the same day). 12/9 CTA negative for PE. 12/11 Nuclear Stress Test-Pharmacologic  Normal study; no evidence for myocardial infarction or ischemia. 12/12 colorectal surgery performed. EUA, rectum with rectal mass biopsy. Posterior rectal wall abscess unroofed and draining mucoid material through cutaneous fistulas b/l . B/L seton placement transrectally. Patient refusing colostomy. S/P Code sepsis 12/20 for fever and hypotension, started IVF and IV Zosyn. Palliative care following, Hospice consulted at request of family.     Lung Nodules with rectal mass and abscess with cutaneous fistulas; possibly metastatic disease  - MRI of the abdomen with Perforation involving the posterior wall of the distal rectum and anus with a collection of fluid and air posteriorly measuring up to 6.5 cm.  - 12/12 Posterior rectal wall abscess unroofed and draining mucoid material through cutaneous fistulas b/l . B/L seton placement transrectally, rectal mass biopsy obtained.   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 cleared for discharge from colorectal standpoint with plans to follow up with surgery- Dr. Lee in 2 weeks and oncologist. patent and family decided for comfort care on 12/23.  after family meeting hospice,, patient and son wanted to pursue any surgery including APR and colostomy. seen by CRS today suggested patient is not a candidate for any surgical procedure including palliative colostomy. surgery recommended second opinion if patient and son argue against their decision.  mitple attempt was failed by me and CRS team to update Clive about CRS recommendations against surgery  - CT a/p 12/16 results noted.   - Heme/onc signed off encouraged hospice  - c/w sitz bath  - patient was on IV zosyn now Augmentin PO. ID on board    Sepsis -likely secondary to rectal wall abscess  s/p zosyn. now stable on Augmentin  UA negative  ID f/u noted  12/28 blood cultures x2 neg     hypotension; BP maintained on midodrin    COPD exacerbation - hypoxia on admission has resolved  - completed IV azithromycin 500mg x3 days. Completed short course steroids  - patient on anoro-ellipta 62.5/25 dose at home BID; ventolin as needed at home  - continue with duoneb q6 PRN  -respiratory status stable    Anemia with Thrombocytosis-- likely GI source  - stable    Elevated BNP - no clinical signs of HF- significant RV strain on the echo, EF 35-40% from TTE 12/5. No signs of fluid overload s/p IVF resuscitation   - pt had CTA- negative for PE   - nuc med stress test nl    elevated INR - poss 2/2 poor diet/malignancy, resolved    protein calorie malnutrition  - supplement meals  - nutrition consulted     Depressed mood  - no suicidal ideation  - psych consulted, signed off.     DVT - lovenox subcut    Disposition : DNR/DNI, patient/family decided for comfort care/hospice on 12/23 with no lab draw. once daily VS. currently wants to pursue all treatment including surgery, labs, regular VS. please see GOC notes. comfort measures was dced. palliaitve and hospice on board. PT shital called for safe DC. unable to update son about surgery final recommendation as he was not picking up phone and message box was not set up. need to retry tomorrow am. dispo pending PT shital. possible MARYELLEN as patient is very weak ad debilitated

## 2019-12-27 NOTE — GOALS OF CARE CONVERSATION - ADVANCED CARE PLANNING - TREATMENT GUIDELINE COMMENT
Comfort measures- No further labs, No Rapid Response.  NO IV Pressors.  Home with hospice.
d/c comfort care and hospice plan
BI completed   MARYELLEN with home hospice thereafter. Number to [facility] given per family preference.

## 2019-12-27 NOTE — GOALS OF CARE CONVERSATION - ADVANCED CARE PLANNING - WHAT MATTERS MOST
Quality of life.
Son wishes for mother to be comfortable. He feels the colostomy would help with infections and thus make her more comfortable.
Patient comfort

## 2019-12-27 NOTE — GOALS OF CARE CONVERSATION - ADVANCED CARE PLANNING - CONVERSATION DETAILS
Son Clive states mother verbalized that she now wanted surgery and agreed to a colostomy. Son agreeing with this as he feels that the infection is weakening her and hopes with surgery, the infection can be cleared and she can get a little better.  Procedure discussed and  son informed that it was mainly palliative and would not significantly change her overall prognosis. Son was informed patient with advanced cancer . Explained the risks of surgery and negative sequale thereafter - infections, PNA, UTI, DVT.  Son states he understands as he is a nurse and also knows the care a colostomy entails.   Informed son that given now goal has changed- will need to d/c comfort measures and continue medical treatment plan. He was agreeable. He wishes for DNR/I  to remain.  Son also reminded mother with limited capacity to understand complex decision making. He states he knows this but feels mother at times does understand and ultimately wants to honor her wishes. Son Clive states mother verbalized that she now wanted surgery and agreed to a colostomy. Son agreeing with this as he feels that the infection is weakening her and hopes with surgery, the infection can be cleared and she can get a little better.  Dr. Art explained procedure of colostomy and tumor resection. Son informed that it was mainly palliative and would not significantly change her overall prognosis since she has  advanced cancer . Explained the risks of surgery and negative sequale thereafter - infections, PNA, UTI, DVT.  Son states he understands as he is a nurse and also knows the care a colostomy entails.   Informed son that given now goal has changed- will   d/c comfort measures and continue medical treatment plan. He was agreeable. He wishes for DNR/I  to remain.  Son also reminded mother with limited capacity to understand complex decision making. He states he knows this but feels mother at times does understand and ultimately wants to honor her wishes.

## 2019-12-27 NOTE — PROGRESS NOTE ADULT - SUBJECTIVE AND OBJECTIVE BOX
Interim events. Made comfort care and was awaiting hospice placement. Pt and family reversed decision and per surgical team, are seeking curative procedure.    MEDICATIONS  (STANDING):  ALBUTerol    90 MICROgram(s) HFA Inhaler 1 Puff(s) Inhalation every 4 hours  amoxicillin  875 milliGRAM(s)/clavulanate 1 Tablet(s) Oral every 12 hours  buDESOnide    Inhalation Suspension 0.5 milliGRAM(s) Inhalation two times a day  carvedilol 3.125 milliGRAM(s) Oral every 12 hours  enoxaparin Injectable 40 milliGRAM(s) SubCutaneous daily  lisinopril 2.5 milliGRAM(s) Oral daily  midodrine. 15 milliGRAM(s) Oral three times a day  multiple electrolytes Injection Type 1 1000 milliLiter(s) (75 mL/Hr) IV Continuous <Continuous>  nystatin Powder 1 Application(s) Topical three times a day  senna 2 Tablet(s) Oral at bedtime    MEDICATIONS  (PRN):  ALBUTerol    0.083% 2.5 milliGRAM(s) Nebulizer every 6 hours PRN Shortness of Breath and/or Wheezing  oxyCODONE    IR 2.5 milliGRAM(s) Oral every 4 hours PRN Moderate Pain (4 - 6)  oxyCODONE    IR 5 milliGRAM(s) Oral every 6 hours PRN Severe Pain (7 - 10)  zinc oxide 20% Ointment 1 Application(s) Topical three times a day PRN bowel movement    Vital Signs Last 24 Hrs  T(C): 36.9 (27 Dec 2019 07:43), Max: 38.1 (26 Dec 2019 15:30)  T(F): 98.4 (27 Dec 2019 07:43), Max: 100.6 (26 Dec 2019 15:30)  HR: 88 (27 Dec 2019 10:04) (82 - 111)  BP: 84/43 (27 Dec 2019 07:43) (84/43 - 104/71)  BP(mean): --  RR: 20 (27 Dec 2019 07:43) (18 - 20)  SpO2: 90% (27 Dec 2019 10:04) (89% - 90%)  I&O's Detail

## 2019-12-27 NOTE — PROGRESS NOTE ADULT - PROBLEM SELECTOR PROBLEM 1
Abnormal CT of the abdomen
Abnormal CT of the abdomen
COPD (chronic obstructive pulmonary disease)
Lung nodules
Rectal adenocarcinoma
Rectal adenocarcinoma
Rectal mass
Sepsis

## 2019-12-27 NOTE — PROGRESS NOTE ADULT - SUBJECTIVE AND OBJECTIVE BOX
CC: f/u pos rectal adenocarcinoma and rectal wall abscess s/p unroofing and drainage , with lung mets    INTERVAL HPI/OVERNIGHT EVENTS:   seen and examined at bedside  denied complaints.  no events overnight  patient and son resined comfort care. wants all treatment including labs/vitals/surgery        ROS:  deniedf ever/chills/chest pain/palpitation/SOB/dizziness/abd pain/n/v/d/c/dysuria/headaches/limb weakness. all other ROS negative      Allergies  No Known Allergies    HEALTH ISSUES - PROBLEM Dx:  Rectal adenocarcinoma: Rectal adenocarcinoma  SVT (supraventricular tachycardia): SVT (supraventricular tachycardia)  Sepsis: Sepsis  Chronic obstructive pulmonary disease, unspecified COPD type: Chronic obstructive pulmonary disease, unspecified COPD type  Rectal fistula: Rectal fistula  Advance care planning: Advance care planning  Leukocytosis: Leukocytosis  Encounter for palliative care: Encounter for palliative care  Rectal mass: Rectal mass  COPD (chronic obstructive pulmonary disease): COPD (chronic obstructive pulmonary disease)  Abnormal CT of the abdomen: Abnormal CT of the abdomen  Lung nodules: Lung nodules  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    PAST MEDICAL & SURGICAL HISTORY:  Anemia  COPD (chronic obstructive pulmonary disease)  H/O bilateral hip replacements        Vital Signs Last 24 Hrs  T(C): 37.9 (27 Dec 2019 19:30), Max: 38 (27 Dec 2019 16:54)  I&O's Summary  T(F): 100.2 (27 Dec 2019 19:30), Max: 100.4 (27 Dec 2019 16:54)  HR: 85 (27 Dec 2019 20:20) (85 - 111)  BP: 90/54 (27 Dec 2019 16:54) (84/43 - 104/71)  BP(mean): --  RR: 18 (27 Dec 2019 16:54) (18 - 20)  SpO2: 93% (27 Dec 2019 20:20) (89% - 97%)    CAPILLARY BLOOD GLUCOSE            Urinalysis Basic - ( 20 Dec 2019 06:21 )  Color: Yellow / Appearance: Clear / S.010 / pH: x  Gluc: x / Ketone: Negative  / Bili: Negative / Urobili: Negative mg/dL   Blood: x / Protein: 15 mg/dL / Nitrite: Negative   Leuk Esterase: Negative / RBC: Negative /HPF / WBC 0-2   Sq Epi: x / Non Sq Epi: Negative / Bacteria: Negative        < from: CT Abdomen and Pelvis w/ Oral Cont and w/ IV Cont (19 @ 17:22) >  BOWEL: No bowel obstruction. Appendix is within normal limits. There is a   large amount of stool throughout the colon, compatible with constipation.   10 noted is a large heterogeneous complex collection in the distal   rectum/anal canal measuring approximately 5.2 x 5.2 cm, similar to the   previous exam. There are now linear sutures noted within this collection   extending to the mL verge. Also noted is a collection with air-fluid   level in the left issue rectal fossa which appears contiguous with the   larger collection superiorly.  PERITONEUM: No ascites.  VESSELS: The aorta and IVC are normal in caliber and patent. There is   atherosclerosis of the aorta.  RETROPERITONEUM/LYMPH NODES: No lymphadenopathy.    ABDOMINAL WALL: Mild soft tissue anasarca.  BONES: Degenerative change of the thoracolumbar spine. Bilateral hip   prosthesis. Marked lucency around the prosthesis compatible with   osteolyses is again noted, similar to the previous exam.    IMPRESSION:     Compared to 2019:    Unchanged large complex heterogeneous collection, likely an abscess in   the distal rectum/anal canal. An underlying mass cannot be excluded.    Interval placement of suture like material within this collection.    Unchanged collection with air-fluid level in the left ischio rectal   fossa, which appears to communicate with the larger collection.    Other incidental findings are as above.    < end of copied text >      < from: TTE Echo Complete w/Doppler (19 @ 17:17) >  Summary:   1. Left ventricular ejection fraction, by visual estimation, is 35 to   40%.   2. Moderately decreased global left ventricular systolic function.   3. Severely enlarged right ventricle.   4. Severely reduced RV systolic function.   5. Mild-moderate tricuspid regurgitation.   6. Estimated pulmonary artery systolic pressure is 49.0 mmHg assuming a   right atrial pressure of 3 mmHg, which is consistent with mild pulmonary   hypertension.   7. No pericardial effusion.   8. ** No prior echocardiograms available for comparison. Findings   discussed with Dr. Rahul Man DO Electronically signed on 2019 at 5:38:41 PM    < end of copied text >    MEDICATIONS  (STANDING):  ALBUTerol    90 MICROgram(s) HFA Inhaler 1 Puff(s) Inhalation every 4 hours  amoxicillin  875 milliGRAM(s)/clavulanate 1 Tablet(s) Oral every 12 hours  buDESOnide    Inhalation Suspension 0.5 milliGRAM(s) Inhalation two times a day  carvedilol 3.125 milliGRAM(s) Oral every 12 hours  enoxaparin Injectable 40 milliGRAM(s) SubCutaneous daily  lisinopril 2.5 milliGRAM(s) Oral daily  midodrine. 15 milliGRAM(s) Oral three times a day  multiple electrolytes Injection Type 1 1000 milliLiter(s) (75 mL/Hr) IV Continuous <Continuous>  nystatin Powder 1 Application(s) Topical three times a day  senna 2 Tablet(s) Oral at bedtime    MEDICATIONS  (PRN):  ALBUTerol    0.083% 2.5 milliGRAM(s) Nebulizer every 6 hours PRN Shortness of Breath and/or Wheezing  oxyCODONE    IR 2.5 milliGRAM(s) Oral every 4 hours PRN Moderate Pain (4 - 6)  oxyCODONE    IR 5 milliGRAM(s) Oral every 6 hours PRN Severe Pain (7 - 10)  zinc oxide 20% Ointment 1 Application(s) Topical three times a day PRN bowel movement

## 2019-12-27 NOTE — PROGRESS NOTE ADULT - PROBLEM SELECTOR PROBLEM 2
Lung nodules
Rectal fistula
Cancer related pain
Cancer related pain
Leukocytosis
Leukocytosis
Rectal fistula
Rectal fistula

## 2019-12-27 NOTE — PROGRESS NOTE ADULT - NSHPATTENDINGPLANDISCUSS_GEN_ALL_CORE
Dr Manning
Dr Sue
patient, RN,  at bedside, Palliative team, colorectal surgery cx line PA/resident
patient, RN, called Son Mr. Savage. could not leave .
patient, RN,  at bedside, Palliative team, colorectal surgery cx resident, son Clive
patient, RN, Clive Savage [ son and HCP]
sx, cardio, gi , PA
surgery and PA

## 2019-12-27 NOTE — PROGRESS NOTE ADULT - SUBJECTIVE AND OBJECTIVE BOX
CC:  follow up GOC, symptoms  INTERVAL HPI/OVERNIGHT EVENTS:  patient now stating wishes to have colostomy    PRESENT SYMPTOMS: SOURCE:  Patient/Family/Team    PAIN SCALE:  0 = none  1 = mild   2 = moderate  3 = severe    Pain: 1-2    Dyspnea:  [ ] YES [ x] NO  Anxiety:  [ ] YES x[ ] NO  Fatigue: [ x] YES [ ] NO  Nausea: [ ] YES [x ] NO  Loss of Appetite: [x ] YES [ ] NO  Other symptoms: __________    MEDICATIONS  (STANDING):  ALBUTerol    90 MICROgram(s) HFA Inhaler 1 Puff(s) Inhalation every 4 hours  amoxicillin  875 milliGRAM(s)/clavulanate 1 Tablet(s) Oral every 12 hours  buDESOnide    Inhalation Suspension 0.5 milliGRAM(s) Inhalation two times a day  carvedilol 3.125 milliGRAM(s) Oral every 12 hours  enoxaparin Injectable 40 milliGRAM(s) SubCutaneous daily  lisinopril 2.5 milliGRAM(s) Oral daily  midodrine. 15 milliGRAM(s) Oral three times a day  multiple electrolytes Injection Type 1 1000 milliLiter(s) (75 mL/Hr) IV Continuous <Continuous>  nystatin Powder 1 Application(s) Topical three times a day    MEDICATIONS  (PRN):  ALBUTerol    0.083% 2.5 milliGRAM(s) Nebulizer every 6 hours PRN Shortness of Breath and/or Wheezing  oxyCODONE    IR 2.5 milliGRAM(s) Oral every 4 hours PRN Moderate Pain (4 - 6)  oxyCODONE    IR 5 milliGRAM(s) Oral every 6 hours PRN Severe Pain (7 - 10)  zinc oxide 20% Ointment 1 Application(s) Topical three times a day PRN bowel movement      Allergies    No Known Allergies    Intolerances    Karnofsky Performance Score/Palliative Performance Status Version 2: 40 %    Vital Signs Last 24 Hrs  T(C): 36.9 (27 Dec 2019 07:43), Max: 38.1 (26 Dec 2019 15:30)  T(F): 98.4 (27 Dec 2019 07:43), Max: 100.6 (26 Dec 2019 15:30)  HR: 93 (27 Dec 2019 11:51) (82 - 111)  BP: 92/54 (27 Dec 2019 11:51) (84/43 - 104/71)  BP(mean): --  RR: 20 (27 Dec 2019 07:43) (18 - 20)  SpO2: 90% (27 Dec 2019 10:04) (89% - 90%)    PHYSICAL EXAM:    General: Awake alert NAD  HEENT: [x ] normal  [ ] dry mouth  [ ] ET tube/trach    Lungs: [x ] comfortable [ ] tachypnea/labored breathing  [ ] excessive secretions    CV: [x ] normal  [ ] tachycardia    GI: [ x] normal  [ ] distended  [ ] tender  [ ] no BS               [ ] PEG/NG/OG tube    : [x] normal  [ ] incontinent  [ ] oliguria/anuria  [ ] azar    MSK: [ ] normal  [x ] weakness  [ ] edema             [ ] ambulatory  [ ] bedbound/wheelchair bound    Skin: [ ] normal  [ ] pressure ulcers- Stage_____  [ x] no rash        Thank you for the opportunity to assist with the care of this patient.   [facility] [phone].

## 2019-12-27 NOTE — GOALS OF CARE CONVERSATION - ADVANCED CARE PLANNING - CONVERSATION DETAILS
Family meeting with son (Clive) ,Dr. Sue (Palliative)  and Dr. Be (Hospitalist)    Son informed team that his mother is now willing to undergo surgery. Early in the patients hospital course,  a diverting loop colostomy for palliative purposes was offered and discussed this with son, Clive, by phone, however both patient and family and declined surgery at that time. At this time patient is a poor surgical candidate for  palliative APR given her overall nutritional status and associated morbidity/mortality.  Attending (Dr. Lee) made multiple unsuccessful attempts to contact the son. Dr. Encarnacion met with the patient and further explained that no surgical intervention will be performed by the colorectal surgery team. Should the family seek more aggressive surgery, our team would recommend seeking an alternative surgical opinion. Family meeting with son (Clive) ,Dr. Sue (Palliative)  and Dr. Be (Hospitalist)    Son informed team that his mother is now willing to undergo surgery. Early in the patients hospital course,  a diverting loop colostomy for palliative purposes was offered and discussed with son, Clive, by phone, however both patient and family declined surgery at that time. At this time patient is a poor surgical candidate for  palliative APR given her overall nutritional status and associated morbidity/mortality.  Attending (Dr. Lee) made multiple unsuccessful attempts to contact the son. Dr. Encarnacion met with the patient and further explained that no surgical intervention will be performed by the colorectal surgery team. Should the family seek more aggressive surgery, our team would recommend seeking an alternative surgical opinion. Family meeting with son (Clive) ,Dr. Sue (Palliative)  and Dr. Be (Hospitalist)    Son informed team that his mother is now willing to undergo surgery. Early in the patients hospital course,  a diverting loop colostomy for palliative purposes was offered and discussed with son, Clive, by phone, however both patient and family declined surgery at that time. At this time patient is a poor surgical candidate given her overall nutritional status and associated morbidity/mortality.   Attending (Dr. Lee) made multiple unsuccessful attempts to contact the son. Dr. Encarnacion met with the patient and further explained our decision for no surgical intervention at this time. If family disagrees with above, we would suggest seeking a second opinion.

## 2019-12-27 NOTE — PROGRESS NOTE ADULT - SUBJECTIVE AND OBJECTIVE BOX
[facility]  INFECTIOUS DISEASES AND INTERNAL MEDICINE at [place]  =======================================================  Hussain Candelario MD  Diplomates American Board of Internal Medicine and Infectious Diseases  Tel: [phone]      Fax: [phone]  =======================================================    [place]OO[place] [id number]    Follow up: Leukocytosis    Low grade fever 100.4  No diarrhea  No abdominal pain,  No rectal bleeding or constipation.     Allergies:  No Known Allergies    Antibiotics:  Augmentin    REVIEW OF SYSTEMS:  CONSTITUTIONAL:  No Fever or chills  HEENT:  No diplopia or blurred vision.  No earache, sore throat or runny nose.  CARDIOVASCULAR:  No chest pain   RESPIRATORY:  No cough, shortness of breath  GASTROINTESTINAL:  No nausea, vomiting or diarrhea.  GENITOURINARY:  No dysuria, frequency or urgency.   MUSCULOSKELETAL:  no joint aches, no muscle pain  SKIN:  No change in skin, hair or nails.  NEUROLOGIC:  No Headaches, seizures  PSYCHIATRIC:  No disorder of thought or mood.  ENDOCRINE:  No heat or cold intolerance  HEMATOLOGICAL:  No easy bruising or bleeding.     Physical Exam:  Vital Signs Last 24 Hrs  T(C): 38 (27 Dec 2019 16:54), Max: 38 (27 Dec 2019 16:54)  T(F): 100.4 (27 Dec 2019 16:54), Max: 100.4 (27 Dec 2019 16:54)  HR: 92 (27 Dec 2019 16:54) (82 - 111)  BP: 90/54 (27 Dec 2019 16:54) (84/43 - 104/71)  RR: 18 (27 Dec 2019 16:54) (18 - 20)  SpO2: 97% (27 Dec 2019 16:54) (89% - 97%)  GEN: NAD, pleasant  HEENT: normocephalic and atraumatic. EOMI. PERRL.  Anicteric  NECK: Supple.   LUNGS: Clear to auscultation.  HEART: Regular rate and rhythm  ABDOMEN: Soft, nontender, and nondistended.  Positive bowel sounds.    : No CVA tenderness  EXTREMITIES: Without any edema.  MSK: No joint swelling  NEUROLOGIC: No Focal Deficits  PSYCHIATRIC: Appropriate affect .  SKIN: + Fungal groin rash     Labs:  RECENT CULTURES:  12-20 @ 02:26 .Blood     No growth at 5 days.    12-20 @ 01:54 .Blood     No growth at 5 days.    Procalcitonin, Serum: 0.11 ng/mL (12-20-19 @ 01:52)  Procalcitonin, Serum: 0.11 ng/mL (12-16-19 @ 11:05)      Assessment and Plan:   68y/o  Female with h/o COPD. Patient initially presented 12/5/19 with SOB. Patient was admitted for COPD exacerbation and treated with steroids (12/5 to 12/14), nebs, azithromycin ( 12/5 to 12/8). Patient was also found to have Distal rectal mass with possible metastatic disease. Patient underwent rectal mass biopsy 12/12. Now noted to have leukocytosis.  Initially refused colostomy but now agreeable.     Leukocytosis  rectal mass s/p biopsy 12/12  ? rectal abscess   possible metastatic disease   COPD    - Negative blood cultures on 12/20  - Persistent leukocytosis, now going down  - Procalcitonin level 0.11 which is not suggestive of infection   - Low grade fever on and off   - B/L LE Duplex with no DVT  - CT A/P repeated and has mass with fluid collection, unclear if it is a neoplasm with necrosis or abscess.   - Was on zosyn, now on oral Augmentin that covers GI gisela(GNR and Anaerobes), will continue it.   - If gets high fever or unstable hemodynamically will switch to IV and broaden the spectrum.   - Surgery on board for possible colostomy  - Trend Fever  - Trend Leukocytosis  - Low grade fever and leukocytosis can happen with cancer.     Will follow.

## 2019-12-27 NOTE — PROGRESS NOTE ADULT - PROBLEM SELECTOR PLAN 1
? rectal mass/ vs abscess  - MRI pending
rectal perforation with abscess- possible mass  pt to go to OR tomorrow
+ adenocarcinoma  Poor prognosis given patient does not want colostomy   Family seeking eventual hospice services.
Family seeking  hospice services.
Noted Colorectal input.  Patient verbalizes she would NOT want colostomy
Noted Colorectal input.  Patient verbalizes she would NOT want colostomy.
Noted Colorectal input.  Patient verbalizes she would NOT want colostomy.
Patient and son wishing colostomy and curative treatments
Will follow rectal mass workup and CTA  If PE found, would recommend tissue diagnosis on this admission if feasible because more difficult to obtain as outpatient on AC and may delay diagnosis  Lung nodules likely metastatic disease   Thoracic to follow on this hospitalization
transferred to [facility]  Cont IV abx for now  Son to discuss with mother later today regarding comfort measures

## 2019-12-27 NOTE — PROGRESS NOTE ADULT - PROBLEM SELECTOR PROBLEM 3
Abnormal CT of the abdomen
Rectal adenocarcinoma
COPD (chronic obstructive pulmonary disease)
COPD (chronic obstructive pulmonary disease)
Chronic obstructive pulmonary disease, unspecified COPD type
Hypotension
Rectal fistula
Rectal fistula

## 2019-12-27 NOTE — GOALS OF CARE CONVERSATION - ADVANCED CARE PLANNING - CONVERSATION DETAILS
met with son Clive with Dr. Art and Dr. Mcbride. Dr. Art described the surgical options and prognosis. Dr. Mcbride and I described medical perspective. Surgery team offered palliative colostomy almost 2 weeks ago however patient and son declined any  procedure and decided comfort measures. Dr. Art today in meeting offered again palliative colostomy and Clive agreed for it. primary preference for Clive is to make his mother comfortable in  terms of not suffering from infection and pain and he expressed  hope that diseased part of colon would be removed.  he and patient resined comfort measures and does not want to pursue hospice at this time and opened to any treatment offered including surgery. Clive acknowledge that patient has metastatic rectal adenocarcinoma and poor prognosis. however later in the evening, CRS team suggested that patient is not a candidate for any surgery including colostomy. CRS team and I was unable to reach Clive about the final recommendation about surgery. I spoke to patient also, she wants surgerry. however in y opinion patient is unable to make medical decision about surgery as she is unable understand the procedure and risk with it. patient and Clive confirmed DNR/DNI status.

## 2019-12-27 NOTE — PROGRESS NOTE ADULT - PROBLEM SELECTOR PROBLEM 4
COPD (chronic obstructive pulmonary disease)
Advance care planning
Cancer related pain
Encounter for palliative care
Encounter for palliative care
Hypotension
Leukocytosis

## 2019-12-28 LAB
ALBUMIN SERPL ELPH-MCNC: 2.1 G/DL — LOW (ref 3.3–5.2)
ALP SERPL-CCNC: 77 U/L — SIGNIFICANT CHANGE UP (ref 40–120)
ALT FLD-CCNC: 5 U/L — SIGNIFICANT CHANGE UP
ANION GAP SERPL CALC-SCNC: 15 MMOL/L — SIGNIFICANT CHANGE UP (ref 5–17)
APPEARANCE UR: CLEAR — SIGNIFICANT CHANGE UP
AST SERPL-CCNC: 8 U/L — SIGNIFICANT CHANGE UP
BACTERIA # UR AUTO: NEGATIVE — SIGNIFICANT CHANGE UP
BILIRUB SERPL-MCNC: 0.4 MG/DL — SIGNIFICANT CHANGE UP (ref 0.4–2)
BILIRUB UR-MCNC: NEGATIVE — SIGNIFICANT CHANGE UP
BUN SERPL-MCNC: 19 MG/DL — SIGNIFICANT CHANGE UP (ref 8–20)
CALCIUM SERPL-MCNC: 9 MG/DL — SIGNIFICANT CHANGE UP (ref 8.6–10.2)
CHLORIDE SERPL-SCNC: 95 MMOL/L — LOW (ref 98–107)
CO2 SERPL-SCNC: 24 MMOL/L — SIGNIFICANT CHANGE UP (ref 22–29)
COLOR SPEC: YELLOW — SIGNIFICANT CHANGE UP
CREAT SERPL-MCNC: 0.78 MG/DL — SIGNIFICANT CHANGE UP (ref 0.5–1.3)
DIFF PNL FLD: NEGATIVE — SIGNIFICANT CHANGE UP
EPI CELLS # UR: SIGNIFICANT CHANGE UP
GLUCOSE SERPL-MCNC: 104 MG/DL — SIGNIFICANT CHANGE UP (ref 70–115)
GLUCOSE UR QL: NEGATIVE MG/DL — SIGNIFICANT CHANGE UP
HCT VFR BLD CALC: 25.5 % — LOW (ref 34.5–45)
HGB BLD-MCNC: 8.4 G/DL — LOW (ref 11.5–15.5)
KETONES UR-MCNC: ABNORMAL
LEUKOCYTE ESTERASE UR-ACNC: ABNORMAL
MAGNESIUM SERPL-MCNC: 1.8 MG/DL — SIGNIFICANT CHANGE UP (ref 1.8–2.6)
MCHC RBC-ENTMCNC: 29.9 PG — SIGNIFICANT CHANGE UP (ref 27–34)
MCHC RBC-ENTMCNC: 32.9 GM/DL — SIGNIFICANT CHANGE UP (ref 32–36)
MCV RBC AUTO: 90.7 FL — SIGNIFICANT CHANGE UP (ref 80–100)
NITRITE UR-MCNC: NEGATIVE — SIGNIFICANT CHANGE UP
PH UR: 6.5 — SIGNIFICANT CHANGE UP (ref 5–8)
PLATELET # BLD AUTO: 605 K/UL — HIGH (ref 150–400)
POTASSIUM SERPL-MCNC: 3.9 MMOL/L — SIGNIFICANT CHANGE UP (ref 3.5–5.3)
POTASSIUM SERPL-SCNC: 3.9 MMOL/L — SIGNIFICANT CHANGE UP (ref 3.5–5.3)
PROCALCITONIN SERPL-MCNC: 0.3 NG/ML — HIGH (ref 0.02–0.1)
PROT SERPL-MCNC: 6.6 G/DL — SIGNIFICANT CHANGE UP (ref 6.6–8.7)
PROT UR-MCNC: 15 MG/DL
RBC # BLD: 2.81 M/UL — LOW (ref 3.8–5.2)
RBC # FLD: 15.5 % — HIGH (ref 10.3–14.5)
RBC CASTS # UR COMP ASSIST: NEGATIVE /HPF — SIGNIFICANT CHANGE UP (ref 0–4)
SODIUM SERPL-SCNC: 134 MMOL/L — LOW (ref 135–145)
SP GR SPEC: 1.01 — SIGNIFICANT CHANGE UP (ref 1.01–1.02)
UROBILINOGEN FLD QL: 1 MG/DL
WBC # BLD: 17.6 K/UL — HIGH (ref 3.8–10.5)
WBC # FLD AUTO: 17.6 K/UL — HIGH (ref 3.8–10.5)
WBC UR QL: SIGNIFICANT CHANGE UP

## 2019-12-28 PROCEDURE — 99232 SBSQ HOSP IP/OBS MODERATE 35: CPT

## 2019-12-28 RX ORDER — SODIUM CHLORIDE 9 MG/ML
1000 INJECTION, SOLUTION INTRAVENOUS
Refills: 0 | Status: COMPLETED | OUTPATIENT
Start: 2019-12-28 | End: 2019-12-29

## 2019-12-28 RX ADMIN — NYSTATIN CREAM 1 APPLICATION(S): 100000 CREAM TOPICAL at 14:11

## 2019-12-28 RX ADMIN — ENOXAPARIN SODIUM 40 MILLIGRAM(S): 100 INJECTION SUBCUTANEOUS at 14:11

## 2019-12-28 RX ADMIN — Medication 1 TABLET(S): at 18:19

## 2019-12-28 RX ADMIN — Medication 0.5 MILLIGRAM(S): at 08:29

## 2019-12-28 RX ADMIN — NYSTATIN CREAM 1 APPLICATION(S): 100000 CREAM TOPICAL at 05:18

## 2019-12-28 RX ADMIN — ALBUTEROL 2.5 MILLIGRAM(S): 90 AEROSOL, METERED ORAL at 20:08

## 2019-12-28 RX ADMIN — SODIUM CHLORIDE 75 MILLILITER(S): 9 INJECTION, SOLUTION INTRAVENOUS at 19:47

## 2019-12-28 RX ADMIN — Medication 0.5 MILLIGRAM(S): at 20:09

## 2019-12-28 RX ADMIN — Medication 1 TABLET(S): at 05:18

## 2019-12-28 RX ADMIN — MIDODRINE HYDROCHLORIDE 15 MILLIGRAM(S): 2.5 TABLET ORAL at 15:10

## 2019-12-28 RX ADMIN — NYSTATIN CREAM 1 APPLICATION(S): 100000 CREAM TOPICAL at 19:47

## 2019-12-28 RX ADMIN — MIDODRINE HYDROCHLORIDE 15 MILLIGRAM(S): 2.5 TABLET ORAL at 05:18

## 2019-12-28 RX ADMIN — MIDODRINE HYDROCHLORIDE 15 MILLIGRAM(S): 2.5 TABLET ORAL at 19:47

## 2019-12-28 NOTE — PROGRESS NOTE ADULT - ASSESSMENT
70 yo female with history of COPD who presents with 1 month of shortness of breath worsened over last 2 weeks associated with >30 lb weight loss in the last 6 months, decreased appetite. Patient admitted for respiratory failure 2/2 COPD exacerbation with possible metastatic lung disease. Pulmonology consulted and CT abdomen was obtained - CT showed rectal wall thickening with adjacent air-pockets; colorectal surgery was consulted and MRI was obtained.  May have colon cancer with mets to the lung. GI consult was recommended by colorectal surgeon and consulted. MRI showed perforation of distal rectum and anus w/ collection of fluid/air.  12/5 TTE was ordered for elevated BNP - she was shown to have severe right sided ventricular strain. Started empirically on treatment for PE and CTA was ordered (delayed due to having received contrast with the CT abdomen on the same day). 12/9 CTA negative for PE. 12/11 Nuclear Stress Test-Pharmacologic  Normal study; no evidence for myocardial infarction or ischemia. 12/12 colorectal surgery performed. EUA, rectum with rectal mass biopsy. Posterior rectal wall abscess unroofed and draining mucoid material through cutaneous fistulas b/l . B/L seton placement transrectally. Patient refusing colostomy. S/P Code sepsis 12/20 for fever and hypotension, started IVF and IV Zosyn. Palliative care following, Hospice consulted at request of family.     Lung Nodules with rectal mass and abscess with cutaneous fistulas; possibly metastatic disease  - MRI of the abdomen with Perforation involving the posterior wall of the distal rectum and anus with a collection of fluid and air posteriorly measuring up to 6.5 cm.  - 12/12 Posterior rectal wall abscess unroofed and draining mucoid material through cutaneous fistulas b/l . B/L seton placement transrectally, rectal mass biopsy obtained.   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 cleared for discharge from colorectal standpoint with plans to follow up with surgery- Dr. Lee in 2 weeks and oncologist. patent and family decided for comfort care on 12/23.  after family meeting hospice,, patient and son wanted to pursue any surgery including APR and colostomy. seen by CRS today suggested patient is not a candidate for any surgical procedure including palliative colostomy. surgery recommended second opinion if patient and son argue against their decision.  mitple attempt was failed by me and CRS team to update Clive about CRS recommendations against surgery  - CT a/p 12/16 results noted.   - Heme/onc signed off encouraged hospice  - c/w sitz bath  - patient was on IV zosyn now Augmentin PO. ID on board    Sepsis -likely secondary to rectal wall abscess  s/p zosyn. now stable on Augmentin  UA negative  ID f/u noted  12/20 blood cultures x2 neg   repeat blood cx ordered 12/27 with low grade temp. repeat labs today increased leukocytosis. will cont augmentin. suspect temps, leukocytosis related to ongoing malignancy. do not suspect new infection or worsening of abscess    hypotension; BP maintained on midodrine     COPD exacerbation - hypoxia on admission has resolved  - completed IV azithromycin 500mg x3 days. Completed short course steroids  - patient on anoro-ellipta 62.5/25 dose at home BID; ventolin as needed at home  - continue with duoneb q6 PRN  -respiratory status stable    Anemia with Thrombocytosis-- likely GI source  - stable    Elevated BNP - no clinical signs of HF- significant RV strain on the echo, EF 35-40% from TTE 12/5. No signs of fluid overload s/p IVF resuscitation   - pt had CTA- negative for PE   - nuc med stress test nl    elevated INR - poss 2/2 poor diet/malignancy, resolved    protein calorie malnutrition  - supplement meals  - nutrition consulted     Depressed mood  - no suicidal ideation  - psych consulted, signed off.     DVT - lovenox subcut    Disposition : DNR/DNI, patient/family decided for comfort care/hospice on 12/23 with no lab draw. once daily VS. Now wanting to pursue all treatment including surgery, labs, regular VS. please see [facility] notes 12/27. comfort measures dc'ed. palliaitve and hospice on board. PT eval called for safe DC. Updated son about surgery final recommendation, wanting pt to go to [facility] at this time. dispo pending PT eval. possible [facility] as patient is very weak and debilitated

## 2019-12-28 NOTE — PROGRESS NOTE ADULT - SUBJECTIVE AND OBJECTIVE BOX
MARQUITA SYDNIE    [id number]    69y      Female    CC: Acute hypoxic respiratory failure, multiple lung nodules    INTERVAL HPI/OVERNIGHT EVENTS: Pt seen and examined. Reports feeling "ok." Overnight pt and son rescinded comfort care, wanting all treatment including labs/vitals/sx. Attempts made overnight to discuss with pt and son that pt is not candidate for any surgery at this time. Pt reports understanding, asking to go home.     REVIEW OF SYSTEMS:    CONSTITUTIONAL: No fever, weight loss, or fatigue  RESPIRATORY: No cough, wheezing, hemoptysis; No shortness of breath  CARDIOVASCULAR: No chest pain, palpitations  GASTROINTESTINAL: No abdominal or epigastric pain. No nausea, vomiting  NEUROLOGICAL: No headaches, memory loss, loss of strength.    Vital Signs Last 24 Hrs  T(C): 37.8 (28 Dec 2019 15:21), Max: 37.8 (28 Dec 2019 15:21)  T(F): 100.1 (28 Dec 2019 15:21), Max: 100.1 (28 Dec 2019 15:21)  HR: 66 (28 Dec 2019 20:12) (64 - 112)  BP: 107/63 (28 Dec 2019 15:21) (83/57 - 107/63)  BP(mean): --  RR: 20 (28 Dec 2019 18:47) (18 - 20)  SpO2: 94% (28 Dec 2019 20:12) (90% - 94%)    PHYSICAL EXAM:    GENERAL: NAD  HEENT: PERRL, +EOMI  NECK: soft, supple  CHEST/LUNG: Clear to auscultation bilaterally; No wheezing  HEART: S1S2+, Regular rate and rhythm; No murmurs, rubs, or gallops  ABDOMEN: Soft, Nontender, Nondistended; Bowel sounds present  SKIN: warm, dry  NEURO: awake and alert, no focal deficits  PSYCH: normal mood    LABS:                        8.4    17.60 )-----------( 605      ( 28 Dec 2019 08:24 )             25.5         134<L>  |  95<L>  |  19.0  ----------------------------<  104  3.9   |  24.0  |  0.78    Ca    9.0      28 Dec 2019 08:20  Mg     1.8         TPro  6.6  /  Alb  2.1<L>  /  TBili  0.4  /  DBili  x   /  AST  8   /  ALT  5   /  AlkPhos  77  12-      Urinalysis Basic - ( 28 Dec 2019 15:15 )    Color: Yellow / Appearance: Clear / S.010 / pH: x  Gluc: x / Ketone: Trace  / Bili: Negative / Urobili: 1 mg/dL   Blood: x / Protein: 15 mg/dL / Nitrite: Negative   Leuk Esterase: Trace / RBC: Negative /HPF / WBC 3-5   Sq Epi: x / Non Sq Epi: Occasional / Bacteria: Negative      MEDICATIONS  (STANDING):  ALBUTerol    90 MICROgram(s) HFA Inhaler 1 Puff(s) Inhalation every 4 hours  amoxicillin  875 milliGRAM(s)/clavulanate 1 Tablet(s) Oral every 12 hours  buDESOnide    Inhalation Suspension 0.5 milliGRAM(s) Inhalation two times a day  carvedilol 3.125 milliGRAM(s) Oral every 12 hours  enoxaparin Injectable 40 milliGRAM(s) SubCutaneous daily  lactated ringers. 1000 milliLiter(s) (75 mL/Hr) IV Continuous <Continuous>  lisinopril 2.5 milliGRAM(s) Oral daily  midodrine. 15 milliGRAM(s) Oral three times a day  multiple electrolytes Injection Type 1 1000 milliLiter(s) (75 mL/Hr) IV Continuous <Continuous>  nystatin Powder 1 Application(s) Topical three times a day    MEDICATIONS  (PRN):  ALBUTerol    0.083% 2.5 milliGRAM(s) Nebulizer every 6 hours PRN Shortness of Breath and/or Wheezing  oxyCODONE    IR 2.5 milliGRAM(s) Oral every 4 hours PRN Moderate Pain (4 - 6)  oxyCODONE    IR 5 milliGRAM(s) Oral every 6 hours PRN Severe Pain (7 - 10)  zinc oxide 20% Ointment 1 Application(s) Topical three times a day PRN bowel movement      RADIOLOGY & ADDITIONAL TESTS:

## 2019-12-29 LAB
ALBUMIN SERPL ELPH-MCNC: 2.8 G/DL — LOW (ref 3.3–5.2)
ALP SERPL-CCNC: 100 U/L — SIGNIFICANT CHANGE UP (ref 40–120)
ALT FLD-CCNC: 6 U/L — SIGNIFICANT CHANGE UP
ANION GAP SERPL CALC-SCNC: 16 MMOL/L — SIGNIFICANT CHANGE UP (ref 5–17)
AST SERPL-CCNC: 11 U/L — SIGNIFICANT CHANGE UP
BILIRUB SERPL-MCNC: 0.6 MG/DL — SIGNIFICANT CHANGE UP (ref 0.4–2)
BUN SERPL-MCNC: 13 MG/DL — SIGNIFICANT CHANGE UP (ref 8–20)
CALCIUM SERPL-MCNC: 9.6 MG/DL — SIGNIFICANT CHANGE UP (ref 8.6–10.2)
CHLORIDE SERPL-SCNC: 95 MMOL/L — LOW (ref 98–107)
CO2 SERPL-SCNC: 23 MMOL/L — SIGNIFICANT CHANGE UP (ref 22–29)
CREAT SERPL-MCNC: 0.67 MG/DL — SIGNIFICANT CHANGE UP (ref 0.5–1.3)
CULTURE RESULTS: NO GROWTH — SIGNIFICANT CHANGE UP
GLUCOSE SERPL-MCNC: 94 MG/DL — SIGNIFICANT CHANGE UP (ref 70–115)
HCT VFR BLD CALC: 29.1 % — LOW (ref 34.5–45)
HGB BLD-MCNC: 9.2 G/DL — LOW (ref 11.5–15.5)
MCHC RBC-ENTMCNC: 28.9 PG — SIGNIFICANT CHANGE UP (ref 27–34)
MCHC RBC-ENTMCNC: 31.6 GM/DL — LOW (ref 32–36)
MCV RBC AUTO: 91.5 FL — SIGNIFICANT CHANGE UP (ref 80–100)
PLATELET # BLD AUTO: 650 K/UL — HIGH (ref 150–400)
POTASSIUM SERPL-MCNC: 4.2 MMOL/L — SIGNIFICANT CHANGE UP (ref 3.5–5.3)
POTASSIUM SERPL-SCNC: 4.2 MMOL/L — SIGNIFICANT CHANGE UP (ref 3.5–5.3)
PROT SERPL-MCNC: 7.6 G/DL — SIGNIFICANT CHANGE UP (ref 6.6–8.7)
RBC # BLD: 3.18 M/UL — LOW (ref 3.8–5.2)
RBC # FLD: 15.2 % — HIGH (ref 10.3–14.5)
SODIUM SERPL-SCNC: 134 MMOL/L — LOW (ref 135–145)
SPECIMEN SOURCE: SIGNIFICANT CHANGE UP
WBC # BLD: 19.81 K/UL — HIGH (ref 3.8–10.5)
WBC # FLD AUTO: 19.81 K/UL — HIGH (ref 3.8–10.5)

## 2019-12-29 PROCEDURE — 99232 SBSQ HOSP IP/OBS MODERATE 35: CPT

## 2019-12-29 RX ADMIN — MIDODRINE HYDROCHLORIDE 15 MILLIGRAM(S): 2.5 TABLET ORAL at 20:53

## 2019-12-29 RX ADMIN — Medication 1 TABLET(S): at 05:47

## 2019-12-29 RX ADMIN — NYSTATIN CREAM 1 APPLICATION(S): 100000 CREAM TOPICAL at 20:56

## 2019-12-29 RX ADMIN — NYSTATIN CREAM 1 APPLICATION(S): 100000 CREAM TOPICAL at 05:47

## 2019-12-29 RX ADMIN — MIDODRINE HYDROCHLORIDE 15 MILLIGRAM(S): 2.5 TABLET ORAL at 14:52

## 2019-12-29 RX ADMIN — CARVEDILOL PHOSPHATE 3.12 MILLIGRAM(S): 80 CAPSULE, EXTENDED RELEASE ORAL at 17:56

## 2019-12-29 RX ADMIN — OXYCODONE HYDROCHLORIDE 5 MILLIGRAM(S): 5 TABLET ORAL at 09:37

## 2019-12-29 RX ADMIN — NYSTATIN CREAM 1 APPLICATION(S): 100000 CREAM TOPICAL at 14:51

## 2019-12-29 RX ADMIN — MIDODRINE HYDROCHLORIDE 15 MILLIGRAM(S): 2.5 TABLET ORAL at 05:47

## 2019-12-29 RX ADMIN — Medication 1 TABLET(S): at 17:56

## 2019-12-29 RX ADMIN — OXYCODONE HYDROCHLORIDE 5 MILLIGRAM(S): 5 TABLET ORAL at 08:37

## 2019-12-29 RX ADMIN — ENOXAPARIN SODIUM 40 MILLIGRAM(S): 100 INJECTION SUBCUTANEOUS at 12:22

## 2019-12-29 RX ADMIN — SODIUM CHLORIDE 75 MILLILITER(S): 9 INJECTION, SOLUTION INTRAVENOUS at 12:22

## 2019-12-29 RX ADMIN — Medication 0.5 MILLIGRAM(S): at 20:17

## 2019-12-29 RX ADMIN — Medication 0.5 MILLIGRAM(S): at 08:35

## 2019-12-29 RX ADMIN — OXYCODONE HYDROCHLORIDE 5 MILLIGRAM(S): 5 TABLET ORAL at 19:20

## 2019-12-29 NOTE — PROGRESS NOTE ADULT - ASSESSMENT
68 yo female with history of COPD who presents with 1 month of shortness of breath worsened over last 2 weeks associated with >30 lb weight loss in the last 6 months, decreased appetite. Patient admitted for respiratory failure 2/2 COPD exacerbation with possible metastatic lung disease. Pulmonology consulted and CT abdomen was obtained - CT showed rectal wall thickening with adjacent air-pockets; colorectal surgery was consulted and MRI was obtained.  May have colon cancer with mets to the lung. GI consult was recommended by colorectal surgeon and consulted. MRI showed perforation of distal rectum and anus w/ collection of fluid/air.  12/5 TTE was ordered for elevated BNP - she was shown to have severe right sided ventricular strain. Started empirically on treatment for PE and CTA was ordered (delayed due to having received contrast with the CT abdomen on the same day). 12/9 CTA negative for PE. 12/11 Nuclear Stress Test-Pharmacologic  Normal study; no evidence for myocardial infarction or ischemia. 12/12 colorectal surgery performed. EUA, rectum with rectal mass biopsy. Posterior rectal wall abscess unroofed and draining mucoid material through cutaneous fistulas b/l . B/L seton placement transrectally. Patient refusing colostomy. S/P Code sepsis 12/20 for fever and hypotension, started IVF and IV Zosyn. Palliative care following, Hospice consulted at request of family.     Lung Nodules with rectal mass and abscess with cutaneous fistulas; possibly metastatic disease  - MRI of the abdomen with Perforation involving the posterior wall of the distal rectum and anus with a collection of fluid and air posteriorly measuring up to 6.5 cm.  - 12/12 Posterior rectal wall abscess unroofed and draining mucoid material through cutaneous fistulas b/l . B/L seton placement transrectally, rectal mass biopsy obtained.   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 cleared for discharge from colorectal standpoint with plans to follow up with surgery- Dr. Lee in 2 weeks and oncologist. patent and family decided for comfort care on 12/23.  after family meeting hospice,, patient and son wanted to pursue any surgery including APR and colostomy. seen by CRS today suggested patient is not a candidate for any surgical procedure including palliative colostomy. discussed with pt's son and pt, both report understanding  - CT a/p 12/16 results noted.   - Heme/onc signed off encouraged hospice  - c/w sitz bath  - patient was on IV zosyn now Augmentin PO. ID on board    Sepsis -likely secondary to rectal wall abscess  s/p zosyn. now on Augmentin  UA negative  ID f/u noted  12/20 blood cultures x2 neg   repeat blood cx ordered 12/27 with low grade temp. repeat labs today increased leukocytosis. will cont augmentin. suspect temps, leukocytosis related to ongoing malignancy. do not suspect new infection or worsening of abscess; leukocytosis increasing, will monitor    hypotension; BP maintained on midodrine     COPD exacerbation - hypoxia on admission has resolved  - completed IV azithromycin 500mg x3 days. Completed short course steroids  - patient on anoro-ellipta 62.5/25 dose at home BID; ventolin as needed at home  - continue with duoneb q6 PRN  -respiratory status stable    Anemia with Thrombocytosis-- likely GI source  - stable    Elevated BNP - no clinical signs of HF- significant RV strain on the echo, EF 35-40% from TTE 12/5. No signs of fluid overload s/p IVF resuscitation   - pt had CTA- negative for PE   - nuc med stress test nl    elevated INR - poss 2/2 poor diet/malignancy, resolved    protein calorie malnutrition  - supplement meals  - nutrition consulted     Depressed mood  - no suicidal ideation  - psych consulted, signed off.     DVT - lovenox subcut    Disposition : DNR/DNI, patient/family decided for comfort care/hospice on 12/23 with no lab draw. once daily VS. Now wanting to pursue all treatment including surgery, labs, regular VS. please see [facility] notes 12/27. comfort measures dc'ed. palliaitve and hospice on board. PT eval called for safe DC. Updated son about surgery final recommendation, wanting pt to go to [facility] at this time. dispo pending PT eval. possible [facility] as patient is very weak and debilitated

## 2019-12-29 NOTE — PROGRESS NOTE ADULT - SUBJECTIVE AND OBJECTIVE BOX
MARQUITA OTOOLE    [id number]    69y      Female    CC: Acute hypoxic respiratory failure, multiple lung nodules    INTERVAL HPI/OVERNIGHT EVENTS: Pt seen and examined. Family at bedside. Pt reports feeling ok. Denies pain, N/V. As per family, decreased appetite, depressed mood. Pt denies feelings of depression, sadness. Wanting to go home. Previous discussion with pt's son, wanting for pt to go to rehab prior to home, pt agreeable.     REVIEW OF SYSTEMS:  all other ros neg except as noted in HPI    Vital Signs Last 24 Hrs  T(C): 36.4 (29 Dec 2019 15:49), Max: 37.9 (29 Dec 2019 00:21)  T(F): 97.6 (29 Dec 2019 15:49), Max: 100.2 (29 Dec 2019 00:21)  HR: 69 (29 Dec 2019 20:18) (69 - 114)  BP: 89/55 (29 Dec 2019 20:52) (89/53 - 121/76)  BP(mean): --  RR: 18 (29 Dec 2019 15:49) (18 - 18)  SpO2: 95% (29 Dec 2019 20:18) (92% - 95%)    PHYSICAL EXAM:    GENERAL: NAD  HEENT: PERRL, +EOMI  NECK: soft, supple  CHEST/LUNG: Clear to auscultation bilaterally; No wheezing  HEART: S1S2+, Regular rate and rhythm; No murmurs, rubs, or gallops  ABDOMEN: Soft, Nontender, Nondistended; Bowel sounds present  SKIN: warm, dry  NEURO: awake and alert, no focal deficits    LABS:                        9.2    19.81 )-----------( 650      ( 29 Dec 2019 09:05 )             29.1     12-    134<L>  |  95<L>  |  13.0  ----------------------------<  94  4.2   |  23.0  |  0.67    Ca    9.6      29 Dec 2019 09:05  Mg     1.8     12-    TPro  7.6  /  Alb  2.8<L>  /  TBili  0.6  /  DBili  x   /  AST  11  /  ALT  6   /  AlkPhos  100        Urinalysis Basic - ( 28 Dec 2019 15:15 )    Color: Yellow / Appearance: Clear / S.010 / pH: x  Gluc: x / Ketone: Trace  / Bili: Negative / Urobili: 1 mg/dL   Blood: x / Protein: 15 mg/dL / Nitrite: Negative   Leuk Esterase: Trace / RBC: Negative /HPF / WBC 3-5   Sq Epi: x / Non Sq Epi: Occasional / Bacteria: Negative          MEDICATIONS  (STANDING):  ALBUTerol    90 MICROgram(s) HFA Inhaler 1 Puff(s) Inhalation every 4 hours  amoxicillin  875 milliGRAM(s)/clavulanate 1 Tablet(s) Oral every 12 hours  buDESOnide    Inhalation Suspension 0.5 milliGRAM(s) Inhalation two times a day  carvedilol 3.125 milliGRAM(s) Oral every 12 hours  enoxaparin Injectable 40 milliGRAM(s) SubCutaneous daily  lisinopril 2.5 milliGRAM(s) Oral daily  midodrine. 15 milliGRAM(s) Oral three times a day  multiple electrolytes Injection Type 1 1000 milliLiter(s) (75 mL/Hr) IV Continuous <Continuous>  nystatin Powder 1 Application(s) Topical three times a day    MEDICATIONS  (PRN):  ALBUTerol    0.083% 2.5 milliGRAM(s) Nebulizer every 6 hours PRN Shortness of Breath and/or Wheezing  oxyCODONE    IR 2.5 milliGRAM(s) Oral every 4 hours PRN Moderate Pain (4 - 6)  oxyCODONE    IR 5 milliGRAM(s) Oral every 6 hours PRN Severe Pain (7 - 10)  zinc oxide 20% Ointment 1 Application(s) Topical three times a day PRN bowel movement      RADIOLOGY & ADDITIONAL TESTS:

## 2019-12-30 LAB
ALBUMIN SERPL ELPH-MCNC: 2.4 G/DL — LOW (ref 3.3–5.2)
ALP SERPL-CCNC: 85 U/L — SIGNIFICANT CHANGE UP (ref 40–120)
ALT FLD-CCNC: 5 U/L — SIGNIFICANT CHANGE UP
ANION GAP SERPL CALC-SCNC: 13 MMOL/L — SIGNIFICANT CHANGE UP (ref 5–17)
AST SERPL-CCNC: 8 U/L — SIGNIFICANT CHANGE UP
BILIRUB SERPL-MCNC: 0.4 MG/DL — SIGNIFICANT CHANGE UP (ref 0.4–2)
BUN SERPL-MCNC: 13 MG/DL — SIGNIFICANT CHANGE UP (ref 8–20)
CALCIUM SERPL-MCNC: 9.1 MG/DL — SIGNIFICANT CHANGE UP (ref 8.6–10.2)
CHLORIDE SERPL-SCNC: 97 MMOL/L — LOW (ref 98–107)
CO2 SERPL-SCNC: 27 MMOL/L — SIGNIFICANT CHANGE UP (ref 22–29)
CREAT SERPL-MCNC: 0.47 MG/DL — LOW (ref 0.5–1.3)
GLUCOSE SERPL-MCNC: 86 MG/DL — SIGNIFICANT CHANGE UP (ref 70–115)
HCT VFR BLD CALC: 24.4 % — LOW (ref 34.5–45)
HGB BLD-MCNC: 7.9 G/DL — LOW (ref 11.5–15.5)
MCHC RBC-ENTMCNC: 29.3 PG — SIGNIFICANT CHANGE UP (ref 27–34)
MCHC RBC-ENTMCNC: 32.4 GM/DL — SIGNIFICANT CHANGE UP (ref 32–36)
MCV RBC AUTO: 90.4 FL — SIGNIFICANT CHANGE UP (ref 80–100)
PLATELET # BLD AUTO: 493 K/UL — HIGH (ref 150–400)
POTASSIUM SERPL-MCNC: 4.2 MMOL/L — SIGNIFICANT CHANGE UP (ref 3.5–5.3)
POTASSIUM SERPL-SCNC: 4.2 MMOL/L — SIGNIFICANT CHANGE UP (ref 3.5–5.3)
PROT SERPL-MCNC: 6.8 G/DL — SIGNIFICANT CHANGE UP (ref 6.6–8.7)
RBC # BLD: 2.7 M/UL — LOW (ref 3.8–5.2)
RBC # FLD: 15.2 % — HIGH (ref 10.3–14.5)
SODIUM SERPL-SCNC: 137 MMOL/L — SIGNIFICANT CHANGE UP (ref 135–145)
WBC # BLD: 14.72 K/UL — HIGH (ref 3.8–10.5)
WBC # FLD AUTO: 14.72 K/UL — HIGH (ref 3.8–10.5)

## 2019-12-30 PROCEDURE — 99232 SBSQ HOSP IP/OBS MODERATE 35: CPT | Mod: 25

## 2019-12-30 PROCEDURE — 99232 SBSQ HOSP IP/OBS MODERATE 35: CPT

## 2019-12-30 PROCEDURE — 99233 SBSQ HOSP IP/OBS HIGH 50: CPT

## 2019-12-30 RX ADMIN — NYSTATIN CREAM 1 APPLICATION(S): 100000 CREAM TOPICAL at 21:34

## 2019-12-30 RX ADMIN — Medication 1 TABLET(S): at 17:06

## 2019-12-30 RX ADMIN — NYSTATIN CREAM 1 APPLICATION(S): 100000 CREAM TOPICAL at 14:50

## 2019-12-30 RX ADMIN — NYSTATIN CREAM 1 APPLICATION(S): 100000 CREAM TOPICAL at 05:16

## 2019-12-30 RX ADMIN — MIDODRINE HYDROCHLORIDE 15 MILLIGRAM(S): 2.5 TABLET ORAL at 05:16

## 2019-12-30 RX ADMIN — Medication 0.5 MILLIGRAM(S): at 20:09

## 2019-12-30 RX ADMIN — ENOXAPARIN SODIUM 40 MILLIGRAM(S): 100 INJECTION SUBCUTANEOUS at 11:44

## 2019-12-30 RX ADMIN — MIDODRINE HYDROCHLORIDE 15 MILLIGRAM(S): 2.5 TABLET ORAL at 14:50

## 2019-12-30 RX ADMIN — MIDODRINE HYDROCHLORIDE 15 MILLIGRAM(S): 2.5 TABLET ORAL at 21:34

## 2019-12-30 RX ADMIN — Medication 0.5 MILLIGRAM(S): at 08:25

## 2019-12-30 RX ADMIN — Medication 1 TABLET(S): at 05:16

## 2019-12-30 NOTE — PROGRESS NOTE ADULT - SUBJECTIVE AND OBJECTIVE BOX
seen for metastatic ca, copD    no acute complaints  gen weakness, poor appetite.   ros otherwise negative     MEDICATIONS  (STANDING):  ALBUTerol    90 MICROgram(s) HFA Inhaler 1 Puff(s) Inhalation every 4 hours  amoxicillin  875 milliGRAM(s)/clavulanate 1 Tablet(s) Oral every 12 hours  buDESOnide    Inhalation Suspension 0.5 milliGRAM(s) Inhalation two times a day  enoxaparin Injectable 40 milliGRAM(s) SubCutaneous daily  midodrine. 15 milliGRAM(s) Oral three times a day  nystatin Powder 1 Application(s) Topical three times a day    MEDICATIONS  (PRN):  ALBUTerol    0.083% 2.5 milliGRAM(s) Nebulizer every 6 hours PRN Shortness of Breath and/or Wheezing  oxyCODONE    IR 2.5 milliGRAM(s) Oral every 4 hours PRN Moderate Pain (4 - 6)  oxyCODONE    IR 5 milliGRAM(s) Oral every 6 hours PRN Severe Pain (7 - 10)  zinc oxide 20% Ointment 1 Application(s) Topical three times a day PRN bowel movement      Allergies    No Known Allergies      Vital Signs Last 24 Hrs  T(C): 36.8 (30 Dec 2019 07:52), Max: 37.8 (30 Dec 2019 00:47)  T(F): 98.2 (30 Dec 2019 07:52), Max: 100 (30 Dec 2019 00:47)  HR: 71 (30 Dec 2019 08:25) (69 - 106)  BP: 97/56 (30 Dec 2019 07:52) (86/52 - 97/56)  BP(mean): --  RR: 17 (30 Dec 2019 07:52) (17 - 18)  SpO2: 95% (30 Dec 2019 08:25) (95% - 95%)    PHYSICAL EXAM:    GENERAL: NAD frail/cachectic   CHEST/LUNG: ctab  HEART: Regular rate and rhythm; S1 S  ABDOMEN: Softly distended. bowel sounds present  EXTREMITIES:  no edema   NERVOUS SYSTEM:  Alert & Oriented X3, debilitated     LABS:                        7.9    14.72 )-----------( 493      ( 30 Dec 2019 08:48 )             24.4     12-30    137  |  97<L>  |  13.0  ----------------------------<  86  4.2   |  27.0  |  0.47<L>    Ca    9.1      30 Dec 2019 08:48    TPro  6.8  /  Alb  2.4<L>  /  TBili  0.4  /  DBili  x   /  AST  8   /  ALT  5   /  AlkPhos  85  12-30          CAPILLARY BLOOD GLUCOSE            RADIOLOGY & ADDITIONAL TESTS:

## 2019-12-30 NOTE — PROGRESS NOTE ADULT - ASSESSMENT
70 yo female with history of COPD who presents with 1 month of shortness of breath worsened over last 2 weeks associated with >30 lb weight loss in the last 6 months, decreased appetite. Patient admitted for respiratory failure 2/2 COPD exacerbation with possible metastatic lung disease. Pulmonology consulted and CT abdomen was obtained - CT showed rectal wall thickening with adjacent air-pockets; colorectal surgery was consulted and MRI was obtained.  May have colon cancer with mets to the lung. GI consult was recommended by colorectal surgeon and consulted. MRI showed perforation of distal rectum and anus w/ collection of fluid/air.  12/5 TTE was ordered for elevated BNP - she was shown to have severe right sided ventricular strain. Started empirically on treatment for PE and CTA was ordered (delayed due to having received contrast with the CT abdomen on the same day). 12/9 CTA negative for PE. 12/11 Nuclear Stress Test-Pharmacologic  Normal study; no evidence for myocardial infarction or ischemia. 12/12 colorectal surgery performed. EUA, rectum with rectal mass biopsy. Posterior rectal wall abscess unroofed and draining mucoid material through cutaneous fistulas b/l . B/L seton placement transrectally. Patient refusing colostomy. S/P Code sepsis 12/20 for fever and hypotension, started IVF and IV Zosyn. Palliative care following, Hospice consulted at request of family.     Lung Nodules with rectal mass and abscess with cutaneous fistulas; possibly metastatic disease  - MRI of the abdomen with Perforation involving the posterior wall of the distal rectum and anus with a collection of fluid and air posteriorly measuring up to 6.5 cm.  - 12/12 Posterior rectal wall abscess unroofed and draining mucoid material through cutaneous fistulas b/l . B/L seton placement transrectally, rectal mass biopsy obtained.   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 cleared for discharge from colorectal standpoint with plans to follow up with surgery- Dr. Lee in 2 weeks and oncologist. patent and family decided for comfort care on 12/23.  after family meeting hospice,, patient and son wanted to pursue any surgery including APR and colostomy. seen by CRS today suggested patient is not a candidate for any surgical procedure including palliative colostomy. discussed with pt's son and pt, both report understanding  - CT a/p 12/16 results noted.   - Heme/onc signed off encouraged hospice  - c/w sitz bath  - patient was on IV zosyn now Augmentin PO. ID on board    Sepsis -likely secondary to rectal wall abscess  s/p zosyn. now on Augmentin  UA negative  ID f/u noted  12/20 blood cultures x2 neg   repeat blood cx ordered 12/27 with low grade temp. repeat labs today increased leukocytosis. will cont augmentin. suspect temps, leukocytosis related to ongoing malignancy. do not suspect new infection or worsening of abscess; leukocytosis increasing, will monitor    hypotension; BP maintained on midodrine     COPD exacerbation - hypoxia on admission has resolved  - completed IV azithromycin 500mg x3 days. Completed short course steroids  - patient on anoro-ellipta 62.5/25 dose at home BID; ventolin as needed at home  - continue with duoneb q6 PRN  -respiratory status stable    Anemia with Thrombocytosis-- likely GI source  - stable    Elevated BNP - no clinical signs of HF- significant RV strain on the echo, EF 35-40% from TTE 12/5. No signs of fluid overload s/p IVF resuscitation   - pt had CTA- negative for PE   - nuc med stress test nl    elevated INR - poss 2/2 poor diet/malignancy, resolved    protein calorie malnutrition  - supplement meals  - nutrition consulted     Depressed mood  - no suicidal ideation  - psych consulted, signed off.     DVT - lovenox subcut    Disposition : DNR/DNI, patient/family decided for comfort care/hospice on 12/23 with no lab draw. once daily VS. Now wanting to pursue all treatment including surgery, labs, regular VS. please see [facility] notes 12/27. comfort measures dc'ed. palliaitve and hospice on board. PT eval called for safe DC. Updated son about surgery final recommendation, wanting pt to go to [facility] at this time. dispo pending PT eval. possible [facility] as patient is very weak and debilitated    FAMILY MEETING TOMORROW AT 2PM--d/w son over the phone.  need to have updated discussion re medical treatment plan.

## 2019-12-30 NOTE — PROGRESS NOTE ADULT - SUBJECTIVE AND OBJECTIVE BOX
[facility]  INFECTIOUS DISEASES AND INTERNAL MEDICINE at [place]  =======================================================  Hussain Candelario MD  Diplomates American Board of Internal Medicine and Infectious Diseases  Tel: [phone]      Fax: [phone]  =======================================================    MARQUITA OTOOLE [id number]    Follow up: Leukocytosis    Low grade fever 100.2 yesterday  No diarrhea  No abdominal pain,  No rectal bleeding or constipation. No other complaint or overnight event.     Allergies:  No Known Allergies    Antibiotics:  Augmentin    REVIEW OF SYSTEMS:  CONSTITUTIONAL:  No Fever or chills  HEENT:  No diplopia or blurred vision.  No earache, sore throat or runny nose.  CARDIOVASCULAR:  No chest pain   RESPIRATORY:  No cough, shortness of breath  GASTROINTESTINAL:  No nausea, vomiting or diarrhea.  GENITOURINARY:  No dysuria, frequency or urgency.   MUSCULOSKELETAL:  no joint aches, no muscle pain  SKIN:  No change in skin, hair or nails.  NEUROLOGIC:  No Headaches, seizures  PSYCHIATRIC:  No disorder of thought or mood.  ENDOCRINE:  No heat or cold intolerance  HEMATOLOGICAL:  No easy bruising or bleeding.     Physical Exam:  Vital Signs Last 24 Hrs  T(C): 36.8 (30 Dec 2019 07:52), Max: 37.8 (30 Dec 2019 00:47)  T(F): 98.2 (30 Dec 2019 07:52), Max: 100 (30 Dec 2019 00:47)  HR: 71 (30 Dec 2019 08:25) (69 - 106)  BP: 97/56 (30 Dec 2019 07:52) (86/52 - 99/62)  RR: 17 (30 Dec 2019 07:52) (17 - 18)  SpO2: 95% (30 Dec 2019 08:25) (93% - 95%)  GEN: NAD, pleasant  HEENT: normocephalic and atraumatic. EOMI. PERRL.  Anicteric  NECK: Supple.   LUNGS: Clear to auscultation.  HEART: Regular rate and rhythm  ABDOMEN: Soft, nontender, and nondistended.  Positive bowel sounds.    : No CVA tenderness  EXTREMITIES: Without any edema.  MSK: No joint swelling  NEUROLOGIC: No Focal Deficits  PSYCHIATRIC: Appropriate affect .  SKIN: + Fungal groin rash     Labs:      137  |  97<L>  |  13.0  ----------------------------<  86  4.2   |  27.0  |  0.47<L>    Ca    9.1      30 Dec 2019 08:48    TPro  6.8  /  Alb  2.4<L>  /  TBili  0.4  /  DBili  x   /  AST  8   /  ALT  5   /  AlkPhos  85                          7.9    14.72 )-----------( 493      ( 30 Dec 2019 08:48 )             24.4     Urinalysis Basic - ( 28 Dec 2019 15:15 )    Color: Yellow / Appearance: Clear / S.010 / pH: x  Gluc: x / Ketone: Trace  / Bili: Negative / Urobili: 1 mg/dL   Blood: x / Protein: 15 mg/dL / Nitrite: Negative   Leuk Esterase: Trace / RBC: Negative /HPF / WBC 3-5   Sq Epi: x / Non Sq Epi: Occasional / Bacteria: Negative    LIVER FUNCTIONS - ( 30 Dec 2019 08:48 )  Alb: 2.4 g/dL / Pro: 6.8 g/dL / ALK PHOS: 85 U/L / ALT: 5 U/L / AST: 8 U/L / GGT: x           RECENT CULTURES:   @ 15:15 .Urine     No growth     @ 08:30 .Blood     No growth at 48 hours     @ 02:26 .Blood     No growth at 5 days.     @ 01:54 .Blood     No growth at 5 days.    Procalcitonin, Serum: 0.11 ng/mL (19 @ 01:52)  Procalcitonin, Serum: 0.11 ng/mL (19 @ 11:05)      Assessment and Plan:   68y/o  Female with h/o COPD. Patient initially presented 19 with SOB. Patient was admitted for COPD exacerbation and treated with steroids ( to ), nebs, azithromycin (  to ). Patient was also found to have Distal rectal mass with possible metastatic disease. Patient underwent rectal mass biopsy . Now noted to have leukocytosis.  Initially refused colostomy but now agreeable.     Leukocytosis  rectal mass s/p biopsy   ? rectal abscess   possible metastatic disease   COPD    - Negative blood cultures on   - Persistent leukocytosis, now going down from 19k -->14k  - Procalcitonin level 0.11 which is not suggestive of infection   - Low grade fever on and off   - B/L LE Duplex with no DVT  - CT A/P repeated and has mass with fluid collection, unclear if it is a neoplasm with necrosis or abscess.   - Was on zosyn, now on oral Augmentin that covers GI gisela(GNR and Anaerobes), will continue it.   - If gets high fever or unstable hemodynamically will switch to IV and broaden the spectrum.   - Surgery on board for possible colostomy  - Low grade fever and leukocytosis can happen with cancer.     Will follow.

## 2019-12-31 LAB
ANION GAP SERPL CALC-SCNC: 17 MMOL/L — SIGNIFICANT CHANGE UP (ref 5–17)
BUN SERPL-MCNC: 11 MG/DL — SIGNIFICANT CHANGE UP (ref 8–20)
CALCIUM SERPL-MCNC: 9.7 MG/DL — SIGNIFICANT CHANGE UP (ref 8.6–10.2)
CHLORIDE SERPL-SCNC: 95 MMOL/L — LOW (ref 98–107)
CO2 SERPL-SCNC: 25 MMOL/L — SIGNIFICANT CHANGE UP (ref 22–29)
CREAT SERPL-MCNC: 0.6 MG/DL — SIGNIFICANT CHANGE UP (ref 0.5–1.3)
GLUCOSE SERPL-MCNC: 94 MG/DL — SIGNIFICANT CHANGE UP (ref 70–115)
HCT VFR BLD CALC: 27.4 % — LOW (ref 34.5–45)
HGB BLD-MCNC: 8.7 G/DL — LOW (ref 11.5–15.5)
MCHC RBC-ENTMCNC: 29.5 PG — SIGNIFICANT CHANGE UP (ref 27–34)
MCHC RBC-ENTMCNC: 31.8 GM/DL — LOW (ref 32–36)
MCV RBC AUTO: 92.9 FL — SIGNIFICANT CHANGE UP (ref 80–100)
PLATELET # BLD AUTO: 629 K/UL — HIGH (ref 150–400)
POTASSIUM SERPL-MCNC: 3.9 MMOL/L — SIGNIFICANT CHANGE UP (ref 3.5–5.3)
POTASSIUM SERPL-SCNC: 3.9 MMOL/L — SIGNIFICANT CHANGE UP (ref 3.5–5.3)
RBC # BLD: 2.95 M/UL — LOW (ref 3.8–5.2)
RBC # FLD: 14.7 % — HIGH (ref 10.3–14.5)
SODIUM SERPL-SCNC: 137 MMOL/L — SIGNIFICANT CHANGE UP (ref 135–145)
WBC # BLD: 19.56 K/UL — HIGH (ref 3.8–10.5)
WBC # FLD AUTO: 19.56 K/UL — HIGH (ref 3.8–10.5)

## 2019-12-31 PROCEDURE — 99232 SBSQ HOSP IP/OBS MODERATE 35: CPT

## 2019-12-31 RX ORDER — ACETAMINOPHEN 500 MG
650 TABLET ORAL ONCE
Refills: 0 | Status: COMPLETED | OUTPATIENT
Start: 2019-12-31 | End: 2019-12-31

## 2019-12-31 RX ORDER — OXYCODONE HYDROCHLORIDE 5 MG/1
2.5 TABLET ORAL EVERY 4 HOURS
Refills: 0 | Status: DISCONTINUED | OUTPATIENT
Start: 2019-12-31 | End: 2020-01-02

## 2019-12-31 RX ORDER — OXYCODONE HYDROCHLORIDE 5 MG/1
5 TABLET ORAL EVERY 6 HOURS
Refills: 0 | Status: DISCONTINUED | OUTPATIENT
Start: 2019-12-31 | End: 2020-01-02

## 2019-12-31 RX ADMIN — NYSTATIN CREAM 1 APPLICATION(S): 100000 CREAM TOPICAL at 06:05

## 2019-12-31 RX ADMIN — NYSTATIN CREAM 1 APPLICATION(S): 100000 CREAM TOPICAL at 21:50

## 2019-12-31 RX ADMIN — Medication 1 TABLET(S): at 06:04

## 2019-12-31 RX ADMIN — Medication 650 MILLIGRAM(S): at 01:45

## 2019-12-31 RX ADMIN — MIDODRINE HYDROCHLORIDE 15 MILLIGRAM(S): 2.5 TABLET ORAL at 06:04

## 2019-12-31 RX ADMIN — Medication 1 TABLET(S): at 17:17

## 2019-12-31 RX ADMIN — Medication 650 MILLIGRAM(S): at 00:52

## 2019-12-31 RX ADMIN — MIDODRINE HYDROCHLORIDE 15 MILLIGRAM(S): 2.5 TABLET ORAL at 21:48

## 2019-12-31 RX ADMIN — NYSTATIN CREAM 1 APPLICATION(S): 100000 CREAM TOPICAL at 13:23

## 2019-12-31 RX ADMIN — Medication 0.5 MILLIGRAM(S): at 08:32

## 2019-12-31 RX ADMIN — Medication 0.5 MILLIGRAM(S): at 20:09

## 2019-12-31 RX ADMIN — OXYCODONE HYDROCHLORIDE 5 MILLIGRAM(S): 5 TABLET ORAL at 18:39

## 2019-12-31 RX ADMIN — ALBUTEROL 2.5 MILLIGRAM(S): 90 AEROSOL, METERED ORAL at 08:32

## 2019-12-31 RX ADMIN — ALBUTEROL 2.5 MILLIGRAM(S): 90 AEROSOL, METERED ORAL at 20:09

## 2019-12-31 RX ADMIN — ENOXAPARIN SODIUM 40 MILLIGRAM(S): 100 INJECTION SUBCUTANEOUS at 13:23

## 2019-12-31 RX ADMIN — MIDODRINE HYDROCHLORIDE 15 MILLIGRAM(S): 2.5 TABLET ORAL at 13:23

## 2019-12-31 NOTE — PROGRESS NOTE ADULT - SUBJECTIVE AND OBJECTIVE BOX
[facility]  INFECTIOUS DISEASES AND INTERNAL MEDICINE at [place]  =======================================================  Hussain Candelario MD  Diplomates American Board of Internal Medicine and Infectious Diseases  Tel: [phone]      Fax: [phone]  =======================================================    MARQUITA OTOOLE [id number]    Follow up: Leukocytosis    Low grade fever 100.9 last night. But clinically no changes.   No diarrhea  No abdominal pain. No rectal bleeding or constipation. No other complaint.    Allergies:  No Known Allergies    Antibiotics:  Augmentin    REVIEW OF SYSTEMS:  CONSTITUTIONAL:  No Fever or chills  HEENT:  No diplopia or blurred vision.  No earache, sore throat or runny nose.  CARDIOVASCULAR:  No chest pain   RESPIRATORY:  No cough, shortness of breath  GASTROINTESTINAL:  No nausea, vomiting or diarrhea.  GENITOURINARY:  No dysuria, frequency or urgency.   MUSCULOSKELETAL:  no joint aches, no muscle pain  SKIN:  No change in skin, hair or nails.  NEUROLOGIC:  No Headaches, seizures  PSYCHIATRIC:  No disorder of thought or mood.  ENDOCRINE:  No heat or cold intolerance  HEMATOLOGICAL:  No easy bruising or bleeding.     Physical Exam:  Vital Signs Last 24 Hrs  T(C): 36.7 (31 Dec 2019 08:42), Max: 38.3 (31 Dec 2019 00:13)  T(F): 98 (31 Dec 2019 08:42), Max: 100.9 (31 Dec 2019 00:13)  HR: 90 (31 Dec 2019 08:51) (74 - 96)  BP: 92/57 (31 Dec 2019 08:42) (88/54 - 98/67)  BP(mean): --  RR: 18 (31 Dec 2019 08:42) (18 - 18)  SpO2: 96% (30 Dec 2019 20:10) (91% - 96%)  GEN: NAD, pleasant  HEENT: normocephalic and atraumatic. EOMI. PERRL.  Anicteric  NECK: Supple.   LUNGS: Clear to auscultation.  HEART: Regular rate and rhythm  ABDOMEN: Soft, mild tenderness on LLQ, nondistended.  Positive bowel sounds.    : No CVA tenderness  EXTREMITIES: Without any edema.  MSK: No joint swelling  NEUROLOGIC: No Focal Deficits  PSYCHIATRIC: Appropriate affect .  SKIN: no rash    Labs:  12-30    137  |  97<L>  |  13.0  ----------------------------<  86  4.2   |  27.0  |  0.47<L>    Ca    9.1      30 Dec 2019 08:48    TPro  6.8  /  Alb  2.4<L>  /  TBili  0.4  /  DBili  x   /  AST  8   /  ALT  5   /  AlkPhos  85  12-30                        7.9    14.72 )-----------( 493      ( 30 Dec 2019 08:48 )             24.4     LIVER FUNCTIONS - ( 30 Dec 2019 08:48 )  Alb: 2.4 g/dL / Pro: 6.8 g/dL / ALK PHOS: 85 U/L / ALT: 5 U/L / AST: 8 U/L / GGT: x           RECENT CULTURES:  12-28 @ 15:15 .Urine     No growth    12-28 @ 08:30 .Blood     No growth at 48 hours    12-20 @ 02:26 .Blood     No growth at 5 days.    12-20 @ 01:54 .Blood     No growth at 5 days.    Procalcitonin, Serum: 0.11 ng/mL (12-20-19 @ 01:52)  Procalcitonin, Serum: 0.11 ng/mL (12-16-19 @ 11:05)      Assessment and Plan:   70y/o  Female with h/o COPD. Patient initially presented 12/5/19 with SOB. Patient was admitted for COPD exacerbation and treated with steroids (12/5 to 12/14), nebs, azithromycin ( 12/5 to 12/8). Patient was also found to have Distal rectal mass with possible metastatic disease. Patient underwent rectal mass biopsy 12/12. Now noted to have leukocytosis.  Most likely no surgical intervention will be done, awaiting family decision about placement.     Leukocytosis  rectal mass s/p biopsy 12/12 and possible Lung mets  ? rectal abscess   COPD    - Negative blood cultures on 12/20 and 12/28  - Persistent leukocytosis, now going down from 19k -->14k  - Procalcitonin level 0.11 which is not suggestive of infection   - Low grade fever on and off   - B/L LE Duplex with no DVT  - CT A/P repeated and has mass with fluid collection, unclear if it is a neoplasm with necrosis or abscess.   - Was on zosyn, now on oral Augmentin that covers GI gisela(GNR and Anaerobes), will continue it.   - If develops high fever or unstable hemodynamically will switch to IV and broaden the spectrum.   - Low grade fever and leukocytosis can happen with cancer (had fever 100.9 last night)  - Palliative care following, based on family decision will place the patient     Will follow if remains in house,.

## 2019-12-31 NOTE — PROGRESS NOTE ADULT - SUBJECTIVE AND OBJECTIVE BOX
MARQUITA SYDNIE    [id number]    69y      Female    CC:     INTERVAL HPI/OVERNIGHT EVENTS:    REVIEW OF SYSTEMS:    CONSTITUTIONAL: No fever, weight loss, or fatigue  RESPIRATORY: No cough, wheezing, hemoptysis; No shortness of breath  CARDIOVASCULAR: No chest pain, palpitations  GASTROINTESTINAL: No abdominal or epigastric pain. No nausea, vomiting  NEUROLOGICAL: No headaches, memory loss, loss of strength.    Vital Signs Last 24 Hrs  T(C): 37.6 (31 Dec 2019 16:11), Max: 38.3 (31 Dec 2019 00:13)  T(F): 99.6 (31 Dec 2019 16:11), Max: 100.9 (31 Dec 2019 00:13)  HR: 93 (31 Dec 2019 16:11) (74 - 94)  BP: 107/66 (31 Dec 2019 16:11) (88/54 - 107/66)  BP(mean): --  RR: 18 (31 Dec 2019 16:11) (18 - 18)  SpO2: 94% (31 Dec 2019 16:11) (94% - 96%)    PHYSICAL EXAM:    GENERAL: NAD  HEENT: PERRL, +EOMI  NECK: soft, supple  CHEST/LUNG: Clear to auscultation bilaterally; No wheezing  HEART: S1S2+, Regular rate and rhythm; No murmurs, rubs, or gallops  ABDOMEN: Soft, Nontender, Nondistended; Bowel sounds present  SKIN: No rashes or lesions  NEURO: AAOX3, no focal deficits, no motor or sensory loss  PSYCH: normal mood    LABS:                        8.7    19.56 )-----------( 629      ( 31 Dec 2019 10:26 )             27.4     12-31    137  |  95<L>  |  11.0  ----------------------------<  94  3.9   |  25.0  |  0.60    Ca    9.7      31 Dec 2019 10:26    TPro  6.8  /  Alb  2.4<L>  /  TBili  0.4  /  DBili  x   /  AST  8   /  ALT  5   /  AlkPhos  85  12-30            MEDICATIONS  (STANDING):  ALBUTerol    90 MICROgram(s) HFA Inhaler 1 Puff(s) Inhalation every 4 hours  amoxicillin  875 milliGRAM(s)/clavulanate 1 Tablet(s) Oral every 12 hours  buDESOnide    Inhalation Suspension 0.5 milliGRAM(s) Inhalation two times a day  enoxaparin Injectable 40 milliGRAM(s) SubCutaneous daily  midodrine. 15 milliGRAM(s) Oral three times a day  nystatin Powder 1 Application(s) Topical three times a day    MEDICATIONS  (PRN):  ALBUTerol    0.083% 2.5 milliGRAM(s) Nebulizer every 6 hours PRN Shortness of Breath and/or Wheezing  oxyCODONE    IR 2.5 milliGRAM(s) Oral every 4 hours PRN Moderate Pain (4 - 6)  oxyCODONE    IR 5 milliGRAM(s) Oral every 6 hours PRN Severe Pain (7 - 10)  zinc oxide 20% Ointment 1 Application(s) Topical three times a day PRN bowel movement      RADIOLOGY & ADDITIONAL TESTS: MARQUITA SYDNIE    [id number]    69y      Female    CC: acute hypoxic respiratory failure    INTERVAL HPI/OVERNIGHT EVENTS: Pt seen and examined. Low grade temp overnight. Pt denies sOB, Cp, abd pain, n/v/diarrhea    REVIEW OF SYSTEMS:  other ros neg except as in hpi    Vital Signs Last 24 Hrs  T(C): 37.6 (31 Dec 2019 16:11), Max: 38.3 (31 Dec 2019 00:13)  T(F): 99.6 (31 Dec 2019 16:11), Max: 100.9 (31 Dec 2019 00:13)  HR: 93 (31 Dec 2019 16:11) (74 - 94)  BP: 107/66 (31 Dec 2019 16:11) (88/54 - 107/66)  BP(mean): --  RR: 18 (31 Dec 2019 16:11) (18 - 18)  SpO2: 94% (31 Dec 2019 16:11) (94% - 96%)    PHYSICAL EXAM:    GENERAL: NAD  HEENT: PERRL, +EOMI  NECK: soft, supple  CHEST/LUNG: Clear to auscultation bilaterally; No wheezing  HEART: S1S2+, Regular rate and rhythm; No murmurs, rubs, or gallops  ABDOMEN: Soft, Nondistended; Bowel sounds present, mildly TTP LLQ  SKIN: warm, dry  NEURO: awake and alert    LABS:                        8.7    19.56 )-----------( 629      ( 31 Dec 2019 10:26 )             27.4     12-31    137  |  95<L>  |  11.0  ----------------------------<  94  3.9   |  25.0  |  0.60    Ca    9.7      31 Dec 2019 10:26    TPro  6.8  /  Alb  2.4<L>  /  TBili  0.4  /  DBili  x   /  AST  8   /  ALT  5   /  AlkPhos  85  12-30      MEDICATIONS  (STANDING):  ALBUTerol    90 MICROgram(s) HFA Inhaler 1 Puff(s) Inhalation every 4 hours  amoxicillin  875 milliGRAM(s)/clavulanate 1 Tablet(s) Oral every 12 hours  buDESOnide    Inhalation Suspension 0.5 milliGRAM(s) Inhalation two times a day  enoxaparin Injectable 40 milliGRAM(s) SubCutaneous daily  midodrine. 15 milliGRAM(s) Oral three times a day  nystatin Powder 1 Application(s) Topical three times a day    MEDICATIONS  (PRN):  ALBUTerol    0.083% 2.5 milliGRAM(s) Nebulizer every 6 hours PRN Shortness of Breath and/or Wheezing  oxyCODONE    IR 2.5 milliGRAM(s) Oral every 4 hours PRN Moderate Pain (4 - 6)  oxyCODONE    IR 5 milliGRAM(s) Oral every 6 hours PRN Severe Pain (7 - 10)  zinc oxide 20% Ointment 1 Application(s) Topical three times a day PRN bowel movement      RADIOLOGY & ADDITIONAL TESTS:

## 2019-12-31 NOTE — PROGRESS NOTE ADULT - ASSESSMENT
70 yo female with history of COPD who presents with 1 month of shortness of breath worsened over last 2 weeks associated with >30 lb weight loss in the last 6 months, decreased appetite. Patient admitted for respiratory failure 2/2 COPD exacerbation with possible metastatic lung disease. Pulmonology consulted and CT abdomen was obtained - CT showed rectal wall thickening with adjacent air-pockets; colorectal surgery was consulted and MRI was obtained.  May have colon cancer with mets to the lung. GI consult was recommended by colorectal surgeon and consulted. MRI showed perforation of distal rectum and anus w/ collection of fluid/air.  12/5 TTE was ordered for elevated BNP - she was shown to have severe right sided ventricular strain. Started empirically on treatment for PE and CTA was ordered (delayed due to having received contrast with the CT abdomen on the same day). 12/9 CTA negative for PE. 12/11 Nuclear Stress Test-Pharmacologic  Normal study; no evidence for myocardial infarction or ischemia. 12/12 colorectal surgery performed. EUA, rectum with rectal mass biopsy. Posterior rectal wall abscess unroofed and draining mucoid material through cutaneous fistulas b/l . B/L seton placement transrectally. Patient refusing colostomy. S/P Code sepsis 12/20 for fever and hypotension, started IVF and IV Zosyn. Palliative care following, Hospice consulted at request of family.     Lung Nodules with rectal mass and abscess with cutaneous fistulas; possibly metastatic disease  - MRI of the abdomen with Perforation involving the posterior wall of the distal rectum and anus with a collection of fluid and air posteriorly measuring up to 6.5 cm.  - 12/12 Posterior rectal wall abscess unroofed and draining mucoid material through cutaneous fistulas b/l . B/L seton placement transrectally, rectal mass biopsy obtained.   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 cleared for discharge from colorectal standpoint with plans to follow up with surgery- Dr. Lee in 2 weeks and oncologist. patent and family decided for comfort care on 12/23.  after family meeting hospice,, patient and son wanted to pursue any surgery including APR and colostomy. seen by CRS today suggested patient is not a candidate for any surgical procedure including palliative colostomy. discussed with pt's son and pt, both report understanding  - CT a/p 12/16 results noted.   - Heme/onc signed off encouraged hospice  - c/w sitz bath  - patient was on IV zosyn now Augmentin PO. ID on board    Sepsis -likely secondary to rectal wall abscess  s/p zosyn. now on Augmentin  UA negative  ID f/u noted  12/20 blood cultures x2 neg   repeat blood cx ordered 12/27 with low grade temp. repeat labs today increased leukocytosis. will cont augmentin. suspect temps, leukocytosis related to ongoing malignancy. do not suspect new infection or worsening of abscess; leukocytosis increasing, now again decreasing, will cont to monitor    hypotension; BP maintained on midodrine     COPD exacerbation - hypoxia on admission has resolved  - completed IV azithromycin 500mg x3 days. Completed short course steroids  - patient on anoro-ellipta 62.5/25 dose at home BID; ventolin as needed at home  - continue with duoneb q6 PRN  -respiratory status stable    Anemia with Thrombocytosis-- likely GI source  - stable    Elevated BNP - no clinical signs of HF- significant RV strain on the echo, EF 35-40% from TTE 12/5. No signs of fluid overload s/p IVF resuscitation   - pt had CTA- negative for PE   - nuc med stress test nl    elevated INR - poss 2/2 poor diet/malignancy, resolved    protein calorie malnutrition  - supplement meals  - nutrition consulted     Depressed mood  - no suicidal ideation  - psych consulted, signed off.     DVT - lovenox subcut    Disposition : DNR/DNI, patient/family decided for comfort care/hospice on 12/23 with no lab draw. once daily VS. Now wanting to pursue all treatment including surgery, labs, regular VS. please see [facility] notes 12/27. comfort measures dc'ed. palliaitve and hospice on board. PT shital called for safe DC. Updated son about surgery final recommendation, wanting pt to go to [facility] at this time. dispo: possible [facility] as patient is very weak and debilitated

## 2019-12-31 NOTE — CHART NOTE - NSCHARTNOTEFT_GEN_A_CORE
Called by RN to report pt T 100.9 F oral. PMH COPD. Admitted and receiving treatment for lung nodules, possible metastatic colon disease.  Pt with episodes of spiking low grade temps. Temps and leukocytosis suspected to be 2/2 malignancy. Pt was on IVPB Zosyn now on PO Augmentin. ID onboard, recommendations appreciated. Leukocytosis improving 19k-->14K. Monitoring temps and WBC. 12/28 preliminary BC result no growth, final report pending result. Final U/C negative. BP 88/85 (MAP 65mmHg), H/o hypotension pt on midodrine TID, continue. Pt asymptomatic and stable at this time, Tylenol 650mg PO x1 dose ordered for temp. RN to continue to monitor and escalate PRN. Repeat labs in AM.

## 2019-12-31 NOTE — CHART NOTE - NSCHARTNOTEFT_GEN_A_CORE
Source: Patient [ ]  Family [ ]   other [x] RN    Current Diet:   Diet, Regular:   Supplement Feeding Modality:  Oral  Ensure Enlive Cans or Servings Per Day:  1       Frequency:  Three Times a day (12-23-19 @ 12:52)    Patient reports [ ] nausea  [ ] vomiting [ ] diarrhea [ ] constipation  [ ]chewing problems [ ] swallowing issues  [ ] other:     PO intake:  < 50% [x]   50-75%  [ ]   %  [ ]  other :    Source for PO intake [ ] Patient [ ] family [x] chart [ ] staff [ ] other    Current Weight:   (12/23)   123.4 lbs  (12/22)   123 lbs  (12/21)   129.9 lbs  (12/12)   139.9 lbs  (12/5)    139.9 lbs    % Weight Change: No recent weight documented     Pertinent Medications: MEDICATIONS  (STANDING):  ALBUTerol    90 MICROgram(s) HFA Inhaler 1 Puff(s) Inhalation every 4 hours  amoxicillin  875 milliGRAM(s)/clavulanate 1 Tablet(s) Oral every 12 hours  buDESOnide    Inhalation Suspension 0.5 milliGRAM(s) Inhalation two times a day  enoxaparin Injectable 40 milliGRAM(s) SubCutaneous daily  midodrine. 15 milliGRAM(s) Oral three times a day  nystatin Powder 1 Application(s) Topical three times a day    MEDICATIONS  (PRN):  ALBUTerol    0.083% 2.5 milliGRAM(s) Nebulizer every 6 hours PRN Shortness of Breath and/or Wheezing  zinc oxide 20% Ointment 1 Application(s) Topical three times a day PRN bowel movement    Pertinent Labs: CBC Full  -  ( 31 Dec 2019 10:26 )  WBC Count : 19.56 K/uL  RBC Count : 2.95 M/uL  Hemoglobin : 8.7 g/dL  Hematocrit : 27.4 %  Platelet Count - Automated : 629 K/uL  Mean Cell Volume : 92.9 fl  Mean Cell Hemoglobin : 29.5 pg  Mean Cell Hemoglobin Concentration : 31.8 gm/dL  12-31 Na137 mmol/L Glu 94 mg/dL K+ 3.9 mmol/L Cr  0.60 mg/dL BUN 11.0 mg/dL Phos n/a   Alb n/a   PAB n/a       Skin: 1+ generalized edema     Nutrition focused physical exam previously conducted- found signs of malnutrition [ ]absent [ x]present    Subcutaneous fat loss: [ x] Orbital fat pads region, [x ]Buccal fat region, [x ]Triceps region,  [ ]Ribs region    Muscle wasting: [ x]Temples region, [ x]Clavicle region, [ x]Shoulder region, [ ]Scapula region, [ ]Interosseous region,  [ ]thigh region, [ ]Calf region    Estimated Needs:   [x] no change since previous assessment  [ ] recalculated:     Current Nutrition Diagnosis: Pt remains at high nutrition risk secondary to malnutrition (severe chronic) related to insufficient protein-energy intake in setting of presumed colon CA with mets, persistent lack of appetite as evidenced by unintentional 30lb (17.64%) wt loss x 6 month, severe muscle wasting anf fat loss, meeting <75% EER> 1mo. Pt s/p MRI with findings of distal rectal mass suggestive of rectal carcinoma as well as small fistulous tract extending to subcutaneous tissues of the left ischio rectal fossa. Pt continues with poor PO intake completing <50% of meals, continues to take Ensure well. Unwilling to provide additional food preferences. Comfort measures d/c'd, now seeking all tx options, plan to d/c to [facility] pending auth.     Recommendations:   Consider appetite stimulant   Continue with current nutrition plan     Monitoring and Evaluation:   [x] PO intake [x] Tolerance to diet prescription [X] Weights  [X] Follow up per protocol [X] Labs:

## 2020-01-01 LAB
ALBUMIN SERPL ELPH-MCNC: 2.4 G/DL — LOW (ref 3.3–5.2)
ALP SERPL-CCNC: 96 U/L — SIGNIFICANT CHANGE UP (ref 40–120)
ALT FLD-CCNC: 7 U/L — SIGNIFICANT CHANGE UP
ANION GAP SERPL CALC-SCNC: 17 MMOL/L — SIGNIFICANT CHANGE UP (ref 5–17)
AST SERPL-CCNC: 11 U/L — SIGNIFICANT CHANGE UP
BILIRUB SERPL-MCNC: 0.3 MG/DL — LOW (ref 0.4–2)
BUN SERPL-MCNC: 14 MG/DL — SIGNIFICANT CHANGE UP (ref 8–20)
CALCIUM SERPL-MCNC: 9.3 MG/DL — SIGNIFICANT CHANGE UP (ref 8.6–10.2)
CHLORIDE SERPL-SCNC: 93 MMOL/L — LOW (ref 98–107)
CO2 SERPL-SCNC: 24 MMOL/L — SIGNIFICANT CHANGE UP (ref 22–29)
CREAT SERPL-MCNC: 0.6 MG/DL — SIGNIFICANT CHANGE UP (ref 0.5–1.3)
GLUCOSE SERPL-MCNC: 99 MG/DL — SIGNIFICANT CHANGE UP (ref 70–115)
HCT VFR BLD CALC: 25.8 % — LOW (ref 34.5–45)
HGB BLD-MCNC: 8.4 G/DL — LOW (ref 11.5–15.5)
MCHC RBC-ENTMCNC: 29.4 PG — SIGNIFICANT CHANGE UP (ref 27–34)
MCHC RBC-ENTMCNC: 32.6 GM/DL — SIGNIFICANT CHANGE UP (ref 32–36)
MCV RBC AUTO: 90.2 FL — SIGNIFICANT CHANGE UP (ref 80–100)
PLATELET # BLD AUTO: 588 K/UL — HIGH (ref 150–400)
POTASSIUM SERPL-MCNC: 4.4 MMOL/L — SIGNIFICANT CHANGE UP (ref 3.5–5.3)
POTASSIUM SERPL-SCNC: 4.4 MMOL/L — SIGNIFICANT CHANGE UP (ref 3.5–5.3)
PROT SERPL-MCNC: 7.1 G/DL — SIGNIFICANT CHANGE UP (ref 6.6–8.7)
RBC # BLD: 2.86 M/UL — LOW (ref 3.8–5.2)
RBC # FLD: 14.5 % — SIGNIFICANT CHANGE UP (ref 10.3–14.5)
SODIUM SERPL-SCNC: 134 MMOL/L — LOW (ref 135–145)
WBC # BLD: 15.47 K/UL — HIGH (ref 3.8–10.5)
WBC # FLD AUTO: 15.47 K/UL — HIGH (ref 3.8–10.5)

## 2020-01-01 PROCEDURE — 99232 SBSQ HOSP IP/OBS MODERATE 35: CPT

## 2020-01-01 RX ADMIN — MIDODRINE HYDROCHLORIDE 15 MILLIGRAM(S): 2.5 TABLET ORAL at 20:46

## 2020-01-01 RX ADMIN — OXYCODONE HYDROCHLORIDE 5 MILLIGRAM(S): 5 TABLET ORAL at 11:05

## 2020-01-01 RX ADMIN — NYSTATIN CREAM 1 APPLICATION(S): 100000 CREAM TOPICAL at 05:28

## 2020-01-01 RX ADMIN — ENOXAPARIN SODIUM 40 MILLIGRAM(S): 100 INJECTION SUBCUTANEOUS at 12:52

## 2020-01-01 RX ADMIN — ALBUTEROL 2.5 MILLIGRAM(S): 90 AEROSOL, METERED ORAL at 20:09

## 2020-01-01 RX ADMIN — Medication 1 TABLET(S): at 17:01

## 2020-01-01 RX ADMIN — NYSTATIN CREAM 1 APPLICATION(S): 100000 CREAM TOPICAL at 12:52

## 2020-01-01 RX ADMIN — OXYCODONE HYDROCHLORIDE 5 MILLIGRAM(S): 5 TABLET ORAL at 10:10

## 2020-01-01 RX ADMIN — MIDODRINE HYDROCHLORIDE 15 MILLIGRAM(S): 2.5 TABLET ORAL at 12:52

## 2020-01-01 RX ADMIN — NYSTATIN CREAM 1 APPLICATION(S): 100000 CREAM TOPICAL at 20:47

## 2020-01-01 RX ADMIN — ALBUTEROL 2.5 MILLIGRAM(S): 90 AEROSOL, METERED ORAL at 08:33

## 2020-01-01 RX ADMIN — Medication 0.5 MILLIGRAM(S): at 08:33

## 2020-01-01 RX ADMIN — MIDODRINE HYDROCHLORIDE 15 MILLIGRAM(S): 2.5 TABLET ORAL at 05:25

## 2020-01-01 RX ADMIN — OXYCODONE HYDROCHLORIDE 5 MILLIGRAM(S): 5 TABLET ORAL at 21:13

## 2020-01-01 RX ADMIN — OXYCODONE HYDROCHLORIDE 5 MILLIGRAM(S): 5 TABLET ORAL at 22:00

## 2020-01-01 RX ADMIN — Medication 0.5 MILLIGRAM(S): at 20:08

## 2020-01-01 RX ADMIN — Medication 1 TABLET(S): at 05:26

## 2020-01-01 NOTE — PROGRESS NOTE ADULT - SUBJECTIVE AND OBJECTIVE BOX
[facility]  INFECTIOUS DISEASES AND INTERNAL MEDICINE at [place]  =======================================================  Hussain Candelario MD  Diplomates American Board of Internal Medicine and Infectious Diseases  Tel: [phone]      Fax: [phone]  =======================================================    MARQUITA OTOOLE [id number]    Follow up: Leukocytosis    No fever, WBC is higher again today. Clinically no changes.   No diarrhea  No abdominal pain. No rectal bleeding or constipation. No other complaint.    Allergies:  No Known Allergies    Antibiotics:  Augmentin    REVIEW OF SYSTEMS:  CONSTITUTIONAL:  No Fever or chills  HEENT:  No diplopia or blurred vision.  No earache, sore throat or runny nose.  CARDIOVASCULAR:  No chest pain   RESPIRATORY:  No cough, shortness of breath  GASTROINTESTINAL:  No nausea, vomiting or diarrhea.  GENITOURINARY:  No dysuria, frequency or urgency.   MUSCULOSKELETAL:  no joint aches, no muscle pain  SKIN:  No change in skin, hair or nails.  NEUROLOGIC:  No Headaches, seizures  PSYCHIATRIC:  No disorder of thought or mood.  ENDOCRINE:  No heat or cold intolerance  HEMATOLOGICAL:  No easy bruising or bleeding.     Physical Exam:  Vital Signs Last 24 Hrs  T(C): 36.8 (01 Jan 2020 08:00), Max: 37.6 (31 Dec 2019 16:11)  T(F): 98.2 (01 Jan 2020 08:00), Max: 99.6 (31 Dec 2019 16:11)  HR: 88 (01 Jan 2020 08:50) (80 - 100)  BP: 93/60 (01 Jan 2020 08:00) (87/51 - 107/66)  BP(mean): --  RR: 18 (01 Jan 2020 08:00) (18 - 18)  SpO2: 94% (31 Dec 2019 21:46) (94% - 94%)  GEN: NAD, pleasant  HEENT: normocephalic and atraumatic. EOMI. PERRL.  Anicteric  NECK: Supple.   LUNGS: Clear to auscultation.  HEART: Regular rate and rhythm  ABDOMEN: Soft, mild tenderness on LLQ, nondistended.  Positive bowel sounds.    : No CVA tenderness  EXTREMITIES: Without any edema.  MSK: No joint swelling  NEUROLOGIC: No Focal Deficits  PSYCHIATRIC: Appropriate affect .  SKIN: no rash    Labs:  12-31    137  |  95<L>  |  11.0  ----------------------------<  94  3.9   |  25.0  |  0.60    Ca    9.7      31 Dec 2019 10:26                        8.7    19.56 )-----------( 629      ( 31 Dec 2019 10:26 )             27.4     RECENT CULTURES:  12-28 @ 15:15 .Urine     No growth    12-28 @ 08:30 .Blood     No growth at 48 hours    12-20 @ 02:26 .Blood     No growth at 5 days.    12-20 @ 01:54 .Blood     No growth at 5 days.    Procalcitonin, Serum: 0.11 ng/mL (12-20-19 @ 01:52)  Procalcitonin, Serum: 0.11 ng/mL (12-16-19 @ 11:05)      Assessment and Plan:   70y/o  Female with h/o COPD. Patient initially presented 12/5/19 with SOB. Patient was admitted for COPD exacerbation and treated with steroids (12/5 to 12/14), nebs, azithromycin ( 12/5 to 12/8). Patient was also found to have Distal rectal mass with possible metastatic disease. Patient underwent rectal mass biopsy 12/12. Now noted to have leukocytosis.  Most likely no surgical intervention will be done, awaiting family decision about placement.     Leukocytosis  rectal mass s/p biopsy 12/12 and possible Lung mets  ? rectal abscess   COPD    - Negative blood cultures on 12/20 and 12/28  - Persistent leukocytosis, from 19k -->14k --> 19k   - Procalcitonin level 0.11 which is not suggestive of infection   - Low grade fever on and off, today afebrile   - B/L LE Duplex with no DVT  - CT A/P repeated and has mass with fluid collection, unclear if it is a neoplasm with necrosis or abscess.   - Was on zosyn, now on oral Augmentin that covers GI gisela(GNR and Anaerobes), will continue it.   - If develops high fever or unstable hemodynamically will switch to IV and broaden the spectrum. Currently stable and not septic  - Low grade fever and leukocytosis can happen with cancer   - Palliative care following, based on her/family and surgery decision, no surgical intervention.     Will follow if remains in house,.

## 2020-01-01 NOTE — PROGRESS NOTE ADULT - ASSESSMENT
70 yo female with history of COPD who presents with 1 month of shortness of breath worsened over last 2 weeks associated with >30 lb weight loss in the last 6 months, decreased appetite. Patient admitted for respiratory failure 2/2 COPD exacerbation with possible metastatic lung disease. Pulmonology consulted and CT abdomen was obtained - CT showed rectal wall thickening with adjacent air-pockets; colorectal surgery was consulted and MRI was obtained.  May have colon cancer with mets to the lung. GI consult was recommended by colorectal surgeon and consulted. MRI showed perforation of distal rectum and anus w/ collection of fluid/air.  12/5 TTE was ordered for elevated BNP - she was shown to have severe right sided ventricular strain. Started empirically on treatment for PE and CTA was ordered (delayed due to having received contrast with the CT abdomen on the same day). 12/9 CTA negative for PE. 12/11 Nuclear Stress Test-Pharmacologic  Normal study; no evidence for myocardial infarction or ischemia. 12/12 colorectal surgery performed. EUA, rectum with rectal mass biopsy. Posterior rectal wall abscess unroofed and draining mucoid material through cutaneous fistulas b/l . B/L seton placement transrectally. Patient refusing colostomy. S/P Code sepsis 12/20 for fever and hypotension, started IVF and IV Zosyn. Palliative care following, Hospice consulted at request of family.     Lung Nodules with rectal mass and abscess with cutaneous fistulas; possibly metastatic disease  - MRI of the abdomen with Perforation involving the posterior wall of the distal rectum and anus with a collection of fluid and air posteriorly measuring up to 6.5 cm.  - 12/12 Posterior rectal wall abscess unroofed and draining mucoid material through cutaneous fistulas b/l . B/L seton placement transrectally, rectal mass biopsy obtained.   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 cleared for discharge from colorectal standpoint with plans to follow up with surgery- Dr. Lee in 2 weeks and oncologist. patent and family decided for comfort care on 12/23.  after family meeting hospice,, patient and son wanted to pursue any surgery including APR and colostomy. seen by CRS today suggested patient is not a candidate for any surgical procedure including palliative colostomy. discussed with pt's son and pt, both report understanding  - CT a/p 12/16 results noted.   - Heme/onc signed off encouraged hospice  - c/w sitz bath  - patient was on IV zosyn now Augmentin PO. ID on board    Sepsis -likely secondary to rectal wall abscess  s/p zosyn. now on Augmentin  UA negative  ID f/u noted  12/20 blood cultures x2 neg   repeat blood cx ordered 12/27 with low grade temp. repeat labs today increased leukocytosis. will cont augmentin. suspect temps, leukocytosis related to ongoing malignancy. do not suspect new infection or worsening of abscess; leukocytosis fluctuating without clinical change, will cont to monitor    hypotension; BP maintained on midodrine     COPD exacerbation - hypoxia on admission has resolved  - completed IV azithromycin 500mg x3 days. Completed short course steroids  - patient on anoro-ellipta 62.5/25 dose at home BID; ventolin as needed at home  - continue with duoneb q6 PRN  -respiratory status stable    Anemia with Thrombocytosis-- likely GI source  - stable    Elevated BNP - no clinical signs of HF- significant RV strain on the echo, EF 35-40% from TTE 12/5. No signs of fluid overload s/p IVF resuscitation   - pt had CTA- negative for PE   - nuc med stress test nl    elevated INR - poss 2/2 poor diet/malignancy, resolved    protein calorie malnutrition  - supplement meals  - nutrition consulted     Depressed mood  - no suicidal ideation  - psych consulted, signed off.     DVT - lovenox subcut    Disposition : DNR/DNI, patient/family decided for comfort care/hospice on 12/23 with no lab draw. once daily VS. Now wanting to pursue all treatment including surgery, labs, regular VS. please see [facility] notes 12/27. comfort measures dc'ed. palliaitve and hospice on board. PT shital called for safe DC. Updated son about surgery final recommendation, wanting pt to go to [facility] at this time. dispo: [facility] as patient is very weak and debilitated  likely d/c to [facility] tomorrow

## 2020-01-01 NOTE — PROGRESS NOTE ADULT - SUBJECTIVE AND OBJECTIVE BOX
MARQUITA OTOOLE    [id number]    69y      Female    CC: Acute hypoxic respiratory failure, multiple lung nodules    INTERVAL HPI/OVERNIGHT EVENTS: Pt seen and examined. No acute events overnight. Seen with family at bedside. Denies diarrhea, abd pain, bleeding.     REVIEW OF SYSTEMS:    CONSTITUTIONAL: No fever, weight loss, or fatigue  RESPIRATORY: No cough, wheezing, hemoptysis; No shortness of breath  CARDIOVASCULAR: No chest pain, palpitations  GASTROINTESTINAL: No abdominal or epigastric pain. No nausea, vomiting  NEUROLOGICAL: No headaches, memory loss, loss of strength.    Vital Signs Last 24 Hrs  T(C): 37.3 (01 Jan 2020 15:50), Max: 37.3 (01 Jan 2020 15:50)  T(F): 99.2 (01 Jan 2020 15:50), Max: 99.2 (01 Jan 2020 15:50)  HR: 92 (01 Jan 2020 15:50) (80 - 100)  BP: 100/68 (01 Jan 2020 15:50) (87/51 - 100/68)  BP(mean): --  RR: 18 (01 Jan 2020 15:50) (18 - 18)  SpO2: 92% (01 Jan 2020 15:50) (92% - 94%)    PHYSICAL EXAM:    GENERAL: NAD  HEENT: PERRL, +EOMI  NECK: soft, supple  CHEST/LUNG: Clear to auscultation bilaterally; No wheezing  HEART: S1S2+, Regular rate and rhythm; No murmurs, rubs, or gallops  ABDOMEN: Soft, Nondistended; Bowel sounds present, mildly TTP LLQ  SKIN: warm, dry  NEURO: awake and alert    LABS:                        8.4    15.47 )-----------( 588      ( 01 Jan 2020 17:59 )             25.8     01-01    134<L>  |  93<L>  |  14.0  ----------------------------<  99  4.4   |  24.0  |  0.60    Ca    9.3      01 Jan 2020 17:59    TPro  7.1  /  Alb  2.4<L>  /  TBili  0.3<L>  /  DBili  x   /  AST  11  /  ALT  7   /  AlkPhos  96  01-01            MEDICATIONS  (STANDING):  ALBUTerol    90 MICROgram(s) HFA Inhaler 1 Puff(s) Inhalation every 4 hours  amoxicillin  875 milliGRAM(s)/clavulanate 1 Tablet(s) Oral every 12 hours  buDESOnide    Inhalation Suspension 0.5 milliGRAM(s) Inhalation two times a day  enoxaparin Injectable 40 milliGRAM(s) SubCutaneous daily  midodrine. 15 milliGRAM(s) Oral three times a day  nystatin Powder 1 Application(s) Topical three times a day    MEDICATIONS  (PRN):  ALBUTerol    0.083% 2.5 milliGRAM(s) Nebulizer every 6 hours PRN Shortness of Breath and/or Wheezing  oxyCODONE    IR 2.5 milliGRAM(s) Oral every 4 hours PRN Moderate Pain (4 - 6)  oxyCODONE    IR 5 milliGRAM(s) Oral every 6 hours PRN Severe Pain (7 - 10)  zinc oxide 20% Ointment 1 Application(s) Topical three times a day PRN bowel movement      RADIOLOGY & ADDITIONAL TESTS:

## 2020-01-02 VITALS
HEART RATE: 98 BPM | TEMPERATURE: 98 F | SYSTOLIC BLOOD PRESSURE: 98 MMHG | OXYGEN SATURATION: 97 % | RESPIRATION RATE: 18 BRPM | DIASTOLIC BLOOD PRESSURE: 63 MMHG

## 2020-01-02 LAB
ALBUMIN SERPL ELPH-MCNC: 2.5 G/DL — LOW (ref 3.3–5.2)
ALP SERPL-CCNC: 93 U/L — SIGNIFICANT CHANGE UP (ref 40–120)
ALT FLD-CCNC: 7 U/L — SIGNIFICANT CHANGE UP
ANION GAP SERPL CALC-SCNC: 17 MMOL/L — SIGNIFICANT CHANGE UP (ref 5–17)
AST SERPL-CCNC: 13 U/L — SIGNIFICANT CHANGE UP
BILIRUB SERPL-MCNC: 0.3 MG/DL — LOW (ref 0.4–2)
BUN SERPL-MCNC: 14 MG/DL — SIGNIFICANT CHANGE UP (ref 8–20)
CALCIUM SERPL-MCNC: 9.1 MG/DL — SIGNIFICANT CHANGE UP (ref 8.6–10.2)
CHLORIDE SERPL-SCNC: 94 MMOL/L — LOW (ref 98–107)
CO2 SERPL-SCNC: 25 MMOL/L — SIGNIFICANT CHANGE UP (ref 22–29)
CREAT SERPL-MCNC: 0.62 MG/DL — SIGNIFICANT CHANGE UP (ref 0.5–1.3)
CULTURE RESULTS: SIGNIFICANT CHANGE UP
CULTURE RESULTS: SIGNIFICANT CHANGE UP
GLUCOSE SERPL-MCNC: 132 MG/DL — HIGH (ref 70–115)
POTASSIUM SERPL-MCNC: 4 MMOL/L — SIGNIFICANT CHANGE UP (ref 3.5–5.3)
POTASSIUM SERPL-SCNC: 4 MMOL/L — SIGNIFICANT CHANGE UP (ref 3.5–5.3)
PROT SERPL-MCNC: 6.7 G/DL — SIGNIFICANT CHANGE UP (ref 6.6–8.7)
SODIUM SERPL-SCNC: 136 MMOL/L — SIGNIFICANT CHANGE UP (ref 135–145)
SPECIMEN SOURCE: SIGNIFICANT CHANGE UP
SPECIMEN SOURCE: SIGNIFICANT CHANGE UP

## 2020-01-02 PROCEDURE — 83605 ASSAY OF LACTIC ACID: CPT

## 2020-01-02 PROCEDURE — 87086 URINE CULTURE/COLONY COUNT: CPT

## 2020-01-02 PROCEDURE — 83735 ASSAY OF MAGNESIUM: CPT

## 2020-01-02 PROCEDURE — 85730 THROMBOPLASTIN TIME PARTIAL: CPT

## 2020-01-02 PROCEDURE — 83880 ASSAY OF NATRIURETIC PEPTIDE: CPT

## 2020-01-02 PROCEDURE — 99232 SBSQ HOSP IP/OBS MODERATE 35: CPT

## 2020-01-02 PROCEDURE — 97530 THERAPEUTIC ACTIVITIES: CPT

## 2020-01-02 PROCEDURE — 71046 X-RAY EXAM CHEST 2 VIEWS: CPT

## 2020-01-02 PROCEDURE — 85652 RBC SED RATE AUTOMATED: CPT

## 2020-01-02 PROCEDURE — 86900 BLOOD TYPING SEROLOGIC ABO: CPT

## 2020-01-02 PROCEDURE — 94760 N-INVAS EAR/PLS OXIMETRY 1: CPT

## 2020-01-02 PROCEDURE — 81001 URINALYSIS AUTO W/SCOPE: CPT

## 2020-01-02 PROCEDURE — 97116 GAIT TRAINING THERAPY: CPT

## 2020-01-02 PROCEDURE — 82550 ASSAY OF CK (CPK): CPT

## 2020-01-02 PROCEDURE — 74177 CT ABD & PELVIS W/CONTRAST: CPT

## 2020-01-02 PROCEDURE — 99285 EMERGENCY DEPT VISIT HI MDM: CPT | Mod: 25

## 2020-01-02 PROCEDURE — 93306 TTE W/DOPPLER COMPLETE: CPT

## 2020-01-02 PROCEDURE — 85610 PROTHROMBIN TIME: CPT

## 2020-01-02 PROCEDURE — 97110 THERAPEUTIC EXERCISES: CPT

## 2020-01-02 PROCEDURE — A9500: CPT

## 2020-01-02 PROCEDURE — 82962 GLUCOSE BLOOD TEST: CPT

## 2020-01-02 PROCEDURE — 72196 MRI PELVIS W/DYE: CPT

## 2020-01-02 PROCEDURE — 86803 HEPATITIS C AB TEST: CPT

## 2020-01-02 PROCEDURE — 82607 VITAMIN B-12: CPT

## 2020-01-02 PROCEDURE — 96374 THER/PROPH/DIAG INJ IV PUSH: CPT

## 2020-01-02 PROCEDURE — 93017 CV STRESS TEST TRACING ONLY: CPT

## 2020-01-02 PROCEDURE — 84484 ASSAY OF TROPONIN QUANT: CPT

## 2020-01-02 PROCEDURE — 71250 CT THORAX DX C-: CPT

## 2020-01-02 PROCEDURE — 82728 ASSAY OF FERRITIN: CPT

## 2020-01-02 PROCEDURE — 85027 COMPLETE CBC AUTOMATED: CPT

## 2020-01-02 PROCEDURE — 86140 C-REACTIVE PROTEIN: CPT

## 2020-01-02 PROCEDURE — 87040 BLOOD CULTURE FOR BACTERIA: CPT

## 2020-01-02 PROCEDURE — 80053 COMPREHEN METABOLIC PANEL: CPT

## 2020-01-02 PROCEDURE — 82378 CARCINOEMBRYONIC ANTIGEN: CPT

## 2020-01-02 PROCEDURE — 84145 PROCALCITONIN (PCT): CPT

## 2020-01-02 PROCEDURE — 93970 EXTREMITY STUDY: CPT

## 2020-01-02 PROCEDURE — 80048 BASIC METABOLIC PNL TOTAL CA: CPT

## 2020-01-02 PROCEDURE — 36415 COLL VENOUS BLD VENIPUNCTURE: CPT

## 2020-01-02 PROCEDURE — 71045 X-RAY EXAM CHEST 1 VIEW: CPT

## 2020-01-02 PROCEDURE — 86850 RBC ANTIBODY SCREEN: CPT

## 2020-01-02 PROCEDURE — 82803 BLOOD GASES ANY COMBINATION: CPT

## 2020-01-02 PROCEDURE — 71275 CT ANGIOGRAPHY CHEST: CPT

## 2020-01-02 PROCEDURE — 83540 ASSAY OF IRON: CPT

## 2020-01-02 PROCEDURE — 94640 AIRWAY INHALATION TREATMENT: CPT

## 2020-01-02 PROCEDURE — 86901 BLOOD TYPING SEROLOGIC RH(D): CPT

## 2020-01-02 PROCEDURE — 99239 HOSP IP/OBS DSCHRG MGMT >30: CPT

## 2020-01-02 PROCEDURE — 86923 COMPATIBILITY TEST ELECTRIC: CPT

## 2020-01-02 PROCEDURE — 88305 TISSUE EXAM BY PATHOLOGIST: CPT

## 2020-01-02 PROCEDURE — 78452 HT MUSCLE IMAGE SPECT MULT: CPT

## 2020-01-02 PROCEDURE — 93005 ELECTROCARDIOGRAM TRACING: CPT

## 2020-01-02 RX ORDER — OXYCODONE HYDROCHLORIDE 5 MG/1
1 TABLET ORAL
Qty: 0 | Refills: 0 | DISCHARGE
Start: 2020-01-02

## 2020-01-02 RX ORDER — MIDODRINE HYDROCHLORIDE 2.5 MG/1
3 TABLET ORAL
Qty: 0 | Refills: 0 | DISCHARGE
Start: 2020-01-02

## 2020-01-02 RX ORDER — PANTOPRAZOLE SODIUM 20 MG/1
40 TABLET, DELAYED RELEASE ORAL
Refills: 0 | Status: DISCONTINUED | OUTPATIENT
Start: 2020-01-02 | End: 2020-01-02

## 2020-01-02 RX ADMIN — MIDODRINE HYDROCHLORIDE 15 MILLIGRAM(S): 2.5 TABLET ORAL at 05:21

## 2020-01-02 RX ADMIN — NYSTATIN CREAM 1 APPLICATION(S): 100000 CREAM TOPICAL at 05:22

## 2020-01-02 RX ADMIN — ENOXAPARIN SODIUM 40 MILLIGRAM(S): 100 INJECTION SUBCUTANEOUS at 11:33

## 2020-01-02 RX ADMIN — Medication 1 TABLET(S): at 05:21

## 2020-01-02 NOTE — PROGRESS NOTE ADULT - SUBJECTIVE AND OBJECTIVE BOX
[facility]  INFECTIOUS DISEASES AND INTERNAL MEDICINE at [place]  =======================================================  Hussain Candelario MD  Diplomates American Board of Internal Medicine and Infectious Diseases  Tel: [phone]      Fax: [phone]  =======================================================    MARQUITA OTOOLE [id number]    Follow up: Leukocytosis    No fever, WBC is higher again today. Clinically no changes.   No diarrhea  No abdominal pain. No rectal bleeding or constipation. Feels pain in rectal area.     Allergies:  No Known Allergies    Antibiotics:  Augmentin    REVIEW OF SYSTEMS:  CONSTITUTIONAL:  No Fever or chills  HEENT:  No diplopia or blurred vision.  No earache, sore throat or runny nose.  CARDIOVASCULAR:  No chest pain   RESPIRATORY:  No cough, shortness of breath  GASTROINTESTINAL:  No nausea, vomiting or diarrhea.  GENITOURINARY:  No dysuria, frequency or urgency.   MUSCULOSKELETAL:  no joint aches, no muscle pain  SKIN:  No change in skin, hair or nails.  NEUROLOGIC:  No Headaches, seizures  PSYCHIATRIC:  No disorder of thought or mood.  ENDOCRINE:  No heat or cold intolerance  HEMATOLOGICAL:  No easy bruising or bleeding.     Physical Exam:  Vital Signs Last 24 Hrs  T(C): 36.8 (02 Jan 2020 08:13), Max: 37.3 (01 Jan 2020 15:50)  T(F): 98.3 (02 Jan 2020 08:13), Max: 99.2 (01 Jan 2020 15:50)  HR: 86 (02 Jan 2020 08:13) (78 - 92)  BP: 93/58 (02 Jan 2020 08:13) (92/50 - 100/68)  BP(mean): --  RR: 19 (02 Jan 2020 08:13) (18 - 19)  SpO2: 97% (01 Jan 2020 23:39) (92% - 97%)  GEN: NAD, pleasant  HEENT: normocephalic and atraumatic. EOMI. PERRL.  Anicteric  NECK: Supple.   LUNGS: Clear to auscultation.  HEART: Regular rate and rhythm  ABDOMEN: Soft, mild tenderness on LLQ, nondistended.  Positive bowel sounds.    : No CVA tenderness  EXTREMITIES: Without any edema.  MSK: No joint swelling  NEUROLOGIC: No Focal Deficits  PSYCHIATRIC: Appropriate affect .  SKIN: no rash    Labs:  01-02    136  |  94<L>  |  14.0  ----------------------------<  132<H>  4.0   |  25.0  |  0.62    Ca    9.1      02 Jan 2020 10:04    TPro  6.7  /  Alb  2.5<L>  /  TBili  0.3<L>  /  DBili  x   /  AST  13  /  ALT  7   /  AlkPhos  93  01-02                        8.4    15.47 )-----------( 588      ( 01 Jan 2020 17:59 )             25.8     LIVER FUNCTIONS - ( 02 Jan 2020 10:04 )  Alb: 2.5 g/dL / Pro: 6.7 g/dL / ALK PHOS: 93 U/L / ALT: 7 U/L / AST: 13 U/L / GGT: x           RECENT CULTURES:  12-28 @ 15:15 .Urine     No growth    12-28 @ 08:30 .Blood     No growth at 48 hours    12-20 @ 02:26 .Blood     No growth at 5 days.    12-20 @ 01:54 .Blood     No growth at 5 days.    Procalcitonin, Serum: 0.11 ng/mL (12-20-19 @ 01:52)  Procalcitonin, Serum: 0.11 ng/mL (12-16-19 @ 11:05)      Assessment and Plan:   70y/o  Female with h/o COPD. Patient initially presented 12/5/19 with SOB. Patient was admitted for COPD exacerbation and treated with steroids (12/5 to 12/14), nebs, azithromycin ( 12/5 to 12/8). Patient was also found to have Distal rectal mass with possible metastatic disease. Patient underwent rectal mass biopsy 12/12. Now noted to have leukocytosis.  Most likely no surgical intervention will be done, awaiting family decision about placement.     Leukocytosis  rectal mass s/p biopsy 12/12 and possible Lung mets  ? rectal abscess   COPD    - Negative blood cultures on 12/20 and 12/28  - Persistent leukocytosis, from 19k -->14k --> 19k -->15k  - Procalcitonin level 0.11 which is not suggestive of infection   - Low grade fever on and off, today afebrile   - B/L LE Duplex with no DVT  - CT A/P repeated and has mass with fluid collection, unclear if it is a neoplasm with necrosis or abscess.   - Was on zosyn for 5days 12/20 to 12/25/19, and later oral Augmentin that covers GI gisela(GNR and Anaerobes), since 12/25, completed 2 weeks.   - Low grade fever and leukocytosis can happen with cancer   - Palliative care following, based on her/family and surgery decision, no surgical intervention.   - Can stop antibiotics and watch her closely.   - If develops high fever or unstable hemodynamically then will start broad spectrum ABx.    Will sign off please call with any question.     Discussed with Dr. Keith.

## 2020-01-02 NOTE — PROGRESS NOTE ADULT - REASON FOR ADMISSION
acute hypoxic respiratory failure, multiple lung nodules

## 2020-01-10 PROBLEM — D64.9 ANEMIA, UNSPECIFIED: Chronic | Status: ACTIVE | Noted: 2019-12-05

## 2020-01-10 PROBLEM — J44.9 CHRONIC OBSTRUCTIVE PULMONARY DISEASE, UNSPECIFIED: Chronic | Status: ACTIVE | Noted: 2019-12-05

## 2020-01-14 ENCOUNTER — APPOINTMENT (OUTPATIENT)
Dept: COLORECTAL SURGERY | Facility: CLINIC | Age: 70
End: 2020-01-14

## 2020-01-17 ENCOUNTER — INPATIENT (INPATIENT)
Facility: HOSPITAL | Age: 70
LOS: 7 days | Discharge: HOME | DRG: 853 | End: 2020-01-25
Attending: INTERNAL MEDICINE | Admitting: INTERNAL MEDICINE
Payer: MEDICARE

## 2020-01-17 VITALS
TEMPERATURE: 98 F | WEIGHT: 149.91 LBS | HEIGHT: 65 IN | RESPIRATION RATE: 18 BRPM | DIASTOLIC BLOOD PRESSURE: 52 MMHG | HEART RATE: 92 BPM | OXYGEN SATURATION: 95 % | SYSTOLIC BLOOD PRESSURE: 75 MMHG

## 2020-01-17 DIAGNOSIS — D64.9 ANEMIA, UNSPECIFIED: ICD-10-CM

## 2020-01-17 DIAGNOSIS — Z96.643 PRESENCE OF ARTIFICIAL HIP JOINT, BILATERAL: Chronic | ICD-10-CM

## 2020-01-17 LAB
ALBUMIN SERPL ELPH-MCNC: 2.2 G/DL — LOW (ref 3.3–5.2)
ALP SERPL-CCNC: 67 U/L — SIGNIFICANT CHANGE UP (ref 40–120)
ALT FLD-CCNC: 18 U/L — SIGNIFICANT CHANGE UP
ANION GAP SERPL CALC-SCNC: 12 MMOL/L — SIGNIFICANT CHANGE UP (ref 5–17)
ANISOCYTOSIS BLD QL: SLIGHT — SIGNIFICANT CHANGE UP
APTT BLD: 32.4 SEC — SIGNIFICANT CHANGE UP (ref 27.5–36.3)
AST SERPL-CCNC: 59 U/L — HIGH
BASOPHILS # BLD AUTO: 0.12 K/UL — SIGNIFICANT CHANGE UP (ref 0–0.2)
BASOPHILS NFR BLD AUTO: 0.9 % — SIGNIFICANT CHANGE UP (ref 0–2)
BILIRUB SERPL-MCNC: 0.3 MG/DL — LOW (ref 0.4–2)
BLD GP AB SCN SERPL QL: SIGNIFICANT CHANGE UP
BUN SERPL-MCNC: 11 MG/DL — SIGNIFICANT CHANGE UP (ref 8–20)
CALCIUM SERPL-MCNC: 8.6 MG/DL — SIGNIFICANT CHANGE UP (ref 8.6–10.2)
CHLORIDE SERPL-SCNC: 97 MMOL/L — LOW (ref 98–107)
CO2 SERPL-SCNC: 25 MMOL/L — SIGNIFICANT CHANGE UP (ref 22–29)
CREAT SERPL-MCNC: 0.66 MG/DL — SIGNIFICANT CHANGE UP (ref 0.5–1.3)
EOSINOPHIL # BLD AUTO: 0 K/UL — SIGNIFICANT CHANGE UP (ref 0–0.5)
EOSINOPHIL NFR BLD AUTO: 0 % — SIGNIFICANT CHANGE UP (ref 0–6)
GIANT PLATELETS BLD QL SMEAR: PRESENT — SIGNIFICANT CHANGE UP
GLUCOSE SERPL-MCNC: 117 MG/DL — HIGH (ref 70–115)
HCT VFR BLD CALC: 21.9 % — LOW (ref 34.5–45)
HCT VFR BLD CALC: 31.2 % — LOW (ref 34.5–45)
HGB BLD-MCNC: 10.5 G/DL — LOW (ref 11.5–15.5)
HGB BLD-MCNC: 6.9 G/DL — CRITICAL LOW (ref 11.5–15.5)
HYPOCHROMIA BLD QL: SLIGHT — SIGNIFICANT CHANGE UP
INR BLD: 1.25 RATIO — HIGH (ref 0.88–1.16)
LACTATE BLDV-MCNC: 2 MMOL/L — SIGNIFICANT CHANGE UP (ref 0.5–2)
LYMPHOCYTES # BLD AUTO: 17.5 % — SIGNIFICANT CHANGE UP (ref 13–44)
LYMPHOCYTES # BLD AUTO: 2.34 K/UL — SIGNIFICANT CHANGE UP (ref 1–3.3)
MACROCYTES BLD QL: SLIGHT — SIGNIFICANT CHANGE UP
MANUAL SMEAR VERIFICATION: SIGNIFICANT CHANGE UP
MCHC RBC-ENTMCNC: 28.2 PG — SIGNIFICANT CHANGE UP (ref 27–34)
MCHC RBC-ENTMCNC: 29.2 PG — SIGNIFICANT CHANGE UP (ref 27–34)
MCHC RBC-ENTMCNC: 31.5 GM/DL — LOW (ref 32–36)
MCHC RBC-ENTMCNC: 33.7 GM/DL — SIGNIFICANT CHANGE UP (ref 32–36)
MCV RBC AUTO: 86.9 FL — SIGNIFICANT CHANGE UP (ref 80–100)
MCV RBC AUTO: 89.4 FL — SIGNIFICANT CHANGE UP (ref 80–100)
MICROCYTES BLD QL: SLIGHT — SIGNIFICANT CHANGE UP
MONOCYTES # BLD AUTO: 0.12 K/UL — SIGNIFICANT CHANGE UP (ref 0–0.9)
MONOCYTES NFR BLD AUTO: 0.9 % — LOW (ref 2–14)
NEUTROPHILS # BLD AUTO: 10.78 K/UL — HIGH (ref 1.8–7.4)
NEUTROPHILS NFR BLD AUTO: 78.1 % — HIGH (ref 43–77)
NEUTS BAND # BLD: 2.6 % — SIGNIFICANT CHANGE UP (ref 0–8)
OB PNL STL: POSITIVE
PLAT MORPH BLD: NORMAL — SIGNIFICANT CHANGE UP
PLATELET # BLD AUTO: 516 K/UL — HIGH (ref 150–400)
PLATELET # BLD AUTO: 593 K/UL — HIGH (ref 150–400)
POLYCHROMASIA BLD QL SMEAR: SLIGHT — SIGNIFICANT CHANGE UP
POTASSIUM SERPL-MCNC: 4.5 MMOL/L — SIGNIFICANT CHANGE UP (ref 3.5–5.3)
POTASSIUM SERPL-SCNC: 4.5 MMOL/L — SIGNIFICANT CHANGE UP (ref 3.5–5.3)
PROT SERPL-MCNC: 6.7 G/DL — SIGNIFICANT CHANGE UP (ref 6.6–8.7)
PROTHROM AB SERPL-ACNC: 14.5 SEC — HIGH (ref 10–12.9)
RBC # BLD: 2.45 M/UL — LOW (ref 3.8–5.2)
RBC # BLD: 3.59 M/UL — LOW (ref 3.8–5.2)
RBC # FLD: 14.6 % — HIGH (ref 10.3–14.5)
RBC # FLD: 15.2 % — HIGH (ref 10.3–14.5)
RBC BLD AUTO: ABNORMAL
SODIUM SERPL-SCNC: 134 MMOL/L — LOW (ref 135–145)
TROPONIN T SERPL-MCNC: 0.01 NG/ML — SIGNIFICANT CHANGE UP (ref 0–0.06)
WBC # BLD: 13.36 K/UL — HIGH (ref 3.8–10.5)
WBC # BLD: 16.54 K/UL — HIGH (ref 3.8–10.5)
WBC # FLD AUTO: 13.36 K/UL — HIGH (ref 3.8–10.5)
WBC # FLD AUTO: 16.54 K/UL — HIGH (ref 3.8–10.5)

## 2020-01-17 PROCEDURE — 71045 X-RAY EXAM CHEST 1 VIEW: CPT | Mod: 26

## 2020-01-17 PROCEDURE — 99291 CRITICAL CARE FIRST HOUR: CPT

## 2020-01-17 RX ORDER — SODIUM CHLORIDE 9 MG/ML
1000 INJECTION, SOLUTION INTRAVENOUS
Refills: 0 | Status: DISCONTINUED | OUTPATIENT
Start: 2020-01-17 | End: 2020-01-19

## 2020-01-17 RX ORDER — ACETAMINOPHEN 500 MG
650 TABLET ORAL EVERY 6 HOURS
Refills: 0 | Status: DISCONTINUED | OUTPATIENT
Start: 2020-01-17 | End: 2020-01-25

## 2020-01-17 RX ORDER — HYDROMORPHONE HYDROCHLORIDE 2 MG/ML
0.5 INJECTION INTRAMUSCULAR; INTRAVENOUS; SUBCUTANEOUS EVERY 6 HOURS
Refills: 0 | Status: DISCONTINUED | OUTPATIENT
Start: 2020-01-17 | End: 2020-01-24

## 2020-01-17 RX ORDER — MIDODRINE HYDROCHLORIDE 2.5 MG/1
5 TABLET ORAL ONCE
Refills: 0 | Status: COMPLETED | OUTPATIENT
Start: 2020-01-17 | End: 2020-01-17

## 2020-01-17 RX ORDER — FENTANYL CITRATE 50 UG/ML
50 INJECTION INTRAVENOUS ONCE
Refills: 0 | Status: DISCONTINUED | OUTPATIENT
Start: 2020-01-17 | End: 2020-01-17

## 2020-01-17 RX ORDER — OXYCODONE HYDROCHLORIDE 5 MG/1
5 TABLET ORAL EVERY 6 HOURS
Refills: 0 | Status: DISCONTINUED | OUTPATIENT
Start: 2020-01-17 | End: 2020-01-24

## 2020-01-17 RX ORDER — BUDESONIDE, MICRONIZED 100 %
0.5 POWDER (GRAM) MISCELLANEOUS
Refills: 0 | Status: DISCONTINUED | OUTPATIENT
Start: 2020-01-17 | End: 2020-01-24

## 2020-01-17 RX ORDER — MIDODRINE HYDROCHLORIDE 2.5 MG/1
10 TABLET ORAL THREE TIMES A DAY
Refills: 0 | Status: DISCONTINUED | OUTPATIENT
Start: 2020-01-17 | End: 2020-01-18

## 2020-01-17 RX ORDER — OXYCODONE HYDROCHLORIDE 5 MG/1
0.5 TABLET ORAL
Qty: 0 | Refills: 0 | DISCHARGE

## 2020-01-17 RX ORDER — MAGNESIUM SULFATE 500 MG/ML
2 VIAL (ML) INJECTION ONCE
Refills: 0 | Status: COMPLETED | OUTPATIENT
Start: 2020-01-17 | End: 2020-01-17

## 2020-01-17 RX ORDER — MEROPENEM 1 G/30ML
1000 INJECTION INTRAVENOUS EVERY 8 HOURS
Refills: 0 | Status: COMPLETED | OUTPATIENT
Start: 2020-01-17 | End: 2020-01-22

## 2020-01-17 RX ORDER — NOREPINEPHRINE BITARTRATE/D5W 8 MG/250ML
0.05 PLASTIC BAG, INJECTION (ML) INTRAVENOUS
Qty: 8 | Refills: 0 | Status: DISCONTINUED | OUTPATIENT
Start: 2020-01-17 | End: 2020-01-18

## 2020-01-17 RX ORDER — SODIUM CHLORIDE 9 MG/ML
2100 INJECTION INTRAMUSCULAR; INTRAVENOUS; SUBCUTANEOUS ONCE
Refills: 0 | Status: COMPLETED | OUTPATIENT
Start: 2020-01-17 | End: 2020-01-17

## 2020-01-17 RX ADMIN — SODIUM CHLORIDE 2100 MILLILITER(S): 9 INJECTION INTRAMUSCULAR; INTRAVENOUS; SUBCUTANEOUS at 16:46

## 2020-01-17 RX ADMIN — Medication 6.38 MICROGRAM(S)/KG/MIN: at 21:41

## 2020-01-17 RX ADMIN — MIDODRINE HYDROCHLORIDE 10 MILLIGRAM(S): 2.5 TABLET ORAL at 22:47

## 2020-01-17 RX ADMIN — FENTANYL CITRATE 50 MICROGRAM(S): 50 INJECTION INTRAVENOUS at 18:06

## 2020-01-17 RX ADMIN — Medication 50 GRAM(S): at 18:08

## 2020-01-17 RX ADMIN — MIDODRINE HYDROCHLORIDE 5 MILLIGRAM(S): 2.5 TABLET ORAL at 17:50

## 2020-01-17 RX ADMIN — SODIUM CHLORIDE 2100 MILLILITER(S): 9 INJECTION INTRAMUSCULAR; INTRAVENOUS; SUBCUTANEOUS at 17:24

## 2020-01-17 RX ADMIN — MEROPENEM 100 MILLIGRAM(S): 1 INJECTION INTRAVENOUS at 22:47

## 2020-01-17 RX ADMIN — Medication 6.38 MICROGRAM(S)/KG/MIN: at 20:59

## 2020-01-17 RX ADMIN — SODIUM CHLORIDE 60 MILLILITER(S): 9 INJECTION, SOLUTION INTRAVENOUS at 21:41

## 2020-01-17 NOTE — ED ADULT TRIAGE NOTE - CHIEF COMPLAINT QUOTE
Sent from [facility] for H+H 6.1/20.0.  Pt has hx of rectal mass with abcess.  Pt refused surgical removal/colostomy.  currently on IV abx.  DNR/DNI.  Arrives hypotensive 75/52. Sent from [facility] for H+H 6.1/20.0.  Pt has hx of rectal mass with abcess.  Pt refused surgical removal/colostomy.  currently on IV abx.  DNR/DNI.  Arrives hypotensive 75/52. Sent to critical care.

## 2020-01-17 NOTE — H&P ADULT - NSHPREVIEWOFSYSTEMS_GEN_ALL_CORE
CONSTITUTIONAL: No fever, chills, or fatigue  EYES: No eye pain, visual disturbances, or discharge  ENMT:  No difficulty hearing, tinnitus, vertigo; No sinus or throat pain  NECK: No pain or stiffness  RESPIRATORY: No cough, wheezing, chills or hemoptysis; No shortness of breath  CARDIOVASCULAR: No chest pain, palpitations, dizziness, or leg swelling  GASTROINTESTINAL: No abdominal or epigastric pain. No nausea, vomiting, or hematemesis; No diarrhea or constipation. No melena or hematochezia.  GENITOURINARY: No dysuria, frequency, hematuria, or incontinence  NEUROLOGICAL: No headaches, memory loss, loss of strength, numbness, or tremors  SKIN: No itching, burning, rashes, or lesions   MUSCULOSKELETAL: No joint pain or swelling; No muscle, back, or extremity pain  PSYCHIATRIC: No depression, anxiety, mood swings, or difficulty sleeping

## 2020-01-17 NOTE — ED PROVIDER NOTE - CLINICAL SUMMARY MEDICAL DECISION MAKING FREE TEXT BOX
70 yo F hx of rectal abscess from NH found hypotensive and anemic. Hb 6.9. Patient getting blood transfusion now. will assess for BP.

## 2020-01-17 NOTE — ED PROVIDER NOTE - PROGRESS NOTE DETAILS
patient consented for blood. I spoke to patient's son, health care proxy, agree with transfusion. agree that she is DNR/DNI. hb 6.9, 2 U pRBC given. patient still hypotension. per mediation list, patient is on midodrine 5 mg TID. patient's home does given. fentayl 50 mcg ordered for pain. ekg showed ischemic changes but no chest pain. Qtc 544, ordered magnesium 2 g IV. patient getting blood now. colorectal surgery consulted. patient will need palliative care. Spoke to colorectal surgery, who know the patient well, they will not see the patient bc patient had declined surgery. will need palliative.  spoke with the son, agree with pressor if needed. DNR/DNI. ICU consulted. Pt finishing 2nd unit PRBC's and still remains hypotensive.  Pt seen and eval by MICU/Dr. Mendiola and accepted pt to ICU

## 2020-01-17 NOTE — H&P ADULT - NSHPPHYSICALEXAM_GEN_ALL_CORE
General: No acute distress.      Neuro: AAO*3, No motor, sensory, or cranial nerve deficit    HEENT: Pupils equal, reactive to light, Moist oral mucosa    PULM: Clear to auscultation bilaterally, no significant adventitious breath sounds     CVS: Regular rhythm and controlled rate, no murmurs, rubs, or gallops    ABD: Soft, nondistended, nontender, normoactive bowel sounds    EXT: No b/l LE edema, nontender with pedal pulse palpable     SKIN: Warm and well perfused, no acute rashes

## 2020-01-17 NOTE — H&P ADULT - ASSESSMENT
Shock, hypovolemic vs septic in setting of chronic hypotension   Blood loss anemia  rectal CA w/ lung mets  ?rectal abscess  COPD    Neuro: No active issues  Cardiovascular: Aim for MAP target 65. Started on Levophed and titrate MAP ~ 65mmHg. Will increase Midodrine   Resp/Chest: Maintain SpO2 > 92%  GI/Nutrition: Will keep NPO for now  ID: Will start Meropnem 1g ITD. f/u Blood cultures. Abx:   Renal: Avoid nephrotoxic agents. Strict I&O  Endocrinology:   Check A1c and lipid panel  Maintain Blood sugar < 180. ISS as per protocol    Haem/Oncology:  DVT ppx w/ HSQ      Critical Care time: 35 minutes assessing presenting problems of acute illness that poses high probability of life threatening deterioration or end organ damage/dysfunction.  Medical decision making including Initiating plan of care, reviewing data, reviewing radiology, discussing with multidisciplinary team, non inclusive of procedures, discussing goals of care with patient/family Shock, hypovolemic vs septic in setting of chronic hypotension   Blood loss anemia  rectal CA w/ lung mets  ?rectal abscess  COPD    Neuro: No active issues  Cardiovascular: Aim for MAP target 65. Started on Levophed and titrate MAP ~ 65mmHg. Will increase Midodrine dose and start Abx   Resp/Chest: Maintain SpO2 > 92%  GI/Nutrition: Will keep NPO for now  ID: Will start Meropnem (pt was on Invanz prior to transfer) f/u Blood cultures and trend lactate  Renal: Avoid nephrotoxic agents. Strict I&O  Endocrinology: Maintain Blood sugar < 180. ISS as per protocol  Haem/Oncology:  DVT ppx w/ SCDs. Transfuse to keep Hb > 7.0    Pts son Clive wants to keep her DNR/DNI but requests other aggressive measures be taken to keep her alive. Will consult palliative care  Critical Care time: 35 minutes assessing presenting problems of acute illness that poses high probability of life threatening deterioration or end organ damage/dysfunction.  Medical decision making including Initiating plan of care, reviewing data, reviewing radiology, discussing with multidisciplinary team, non inclusive of procedures, discussing goals of care with patient/family

## 2020-01-17 NOTE — ED PROVIDER NOTE - OBJECTIVE STATEMENT
68 yo F hx of COPD and rectal mass with seton placed transrectally, complicated by sepsis, on oral abx, sent in from rehab for low Hb of 6.1 on outpatient blood work today. patient report rectal pain. no chest pain. no sob. no abdominal pain. Patient report DNR/DNI but no MOST form with transfer paper but it was indicated in the transfer paper. patient was found hypotensive and tachycardic in the ED. 70 yo F hx of COPD and rectal mass with seton placed transrectally, complicated by sepsis, on oral abx, sent in from rehab for low Hb of 6.1 on outpatient blood work today. patient report rectal pain. no chest pain. no sob. no abdominal pain. Patient report DNR/DNI but no MOST form with transfer paper but it was indicated in the transfer paper. patient was found hypotensive and tachycardic in the ED.    per [facility] records, nuclear stress in 12/11/19 that showed no ischemia, echo 12/5/19 showed EF 35-40%

## 2020-01-17 NOTE — ED ADULT NURSE NOTE - NSIMPLEMENTINTERV_GEN_ALL_ED
Implemented All Fall Risk Interventions:  [place] to call system. Call bell, personal items and telephone within reach. Instruct patient to call for assistance. Room bathroom lighting operational. Non-slip footwear when patient is off stretcher. Physically safe environment: no spills, clutter or unnecessary equipment. Stretcher in lowest position, wheels locked, appropriate side rails in place. Provide visual cue, wrist band, yellow gown, etc. Monitor gait and stability. Monitor for mental status changes and reorient to person, place, and time. Review medications for side effects contributing to fall risk. Reinforce activity limits and safety measures with patient and family.

## 2020-01-17 NOTE — ED PROVIDER NOTE - PHYSICAL EXAMINATION
VITAL SIGNS: I have reviewed nursing notes and confirm.  CONSTITUTIONAL: Well-developed; well-nourished; in no acute distress.  SKIN: Skin exam is warm and dry, no acute rash.  HEAD: Normocephalic; atraumatic.  EYES: PERRL, EOM intact; conjunctiva and sclera clear.  ENT: No nasal discharge; airway clear. Throat clear.  NECK: Supple; non tender.    CARD: S1, S2 normal; no murmurs, gallops, or rubs. (+) tachycardia   RESP: No wheezes,  no rales or rhonchi.   ABD:  soft; non-distended; non-tender;   EXT: Normal ROM. No clubbing, cyanosis or edema.  reactal: (+) green and brown mucous in react area, drain in rectum, no active bleed   NEURO: Alert, oriented. Grossly unremarkable. No focal deficits. no facial droop, moves all extremities,   PSYCH: Cooperative, appropriate.

## 2020-01-17 NOTE — ED ADULT NURSE NOTE - CHIEF COMPLAINT QUOTE
Sent from [facility] for H+H 6.1/20.0.  Pt has hx of rectal mass with abcess.  Pt refused surgical removal/colostomy.  currently on IV abx.  DNR/DNI.  Arrives hypotensive 75/52. Sent to critical care.

## 2020-01-17 NOTE — H&P ADULT - HISTORY OF PRESENT ILLNESS
69F w/ PMHx COPD, recently diagnosed rectal CA w/ lung met on medical management, ?rectal abscess was sent over form rehab after she was found to have Hb 6.1. She had a recent Hb 8.4 ( 01/01/2020). Pt was found to be hypotensive 77/55mmHg w/ a repeat Hb 6.9. No obvious bleeding documented. She has no obvious complaints. She is not a candidate for any surgical intervention as per colorectal surgery.   Discussed case w/ son who is the HCA and pt confirmed DNR/DNI status but wants to medically manage her for the time being. He is also considering Hospice care once she is stabilized.               will be done, awaiting family decision about placement.     68 yo female with history of COPD who presents with 1 month of shortness of breath worsened over last 2 weeks associated with >30 lb weight loss in the last 6 months, decreased appetite. Patient admitted for respiratory failure 2/2 COPD exacerbation with possible metastatic lung disease. Pulmonology consulted and CT abdomen was obtained - CT showed rectal wall thickening with adjacent air-pockets; colorectal surgery was consulted and MRI was obtained.  May have colon cancer with mets to the lung. GI consult was recommended by colorectal surgeon and consulted. MRI showed perforation of distal rectum and anus w/ collection of fluid/air.  12/5 TTE was ordered for elevated BNP - she was shown to have severe right sided ventricular strain. Started empirically on treatment for PE and CTA was ordered (delayed due to having received contrast with the CT abdomen on the same day). 12/9 CTA negative for PE. 12/11 Nuclear Stress Test-Pharmacologic  Normal study; no evidence for myocardial infarction or ischemia. 12/12 colorectal surgery performed. EUA, rectum with rectal mass biopsy. Posterior rectal wall abscess unroofed and draining mucoid material through cutaneous fistulas b/l . B/L seton placement transrectally. Patient refusing colostomy. S/P Code sepsis 12/20 for fever and hypotension, started IVF and IV Zosyn. Palliative care following, Hospice consulted at request of family. 69F w/ PMHx COPD, recently diagnosed rectal CA w/ lung met on medical management, ?rectal abscess, chronic hypotension was sent over form rehab after she was found to have Hb 6.1. She had a recent Hb 8.4 ( 01/01/2020). Pt was found to be hypotensive 77/55mmHg w/ a repeat Hb 6.9. No obvious bleeding documented. She has no obvious complaints. She is not a candidate for any surgical intervention as per colorectal surgery.   Discussed case w/ son who is the HCA and pt confirmed DNR/DNI status but wants to medically manage her for the time being. He is also considering Hospice care once she is stabilized.

## 2020-01-17 NOTE — ED ADULT NURSE NOTE - OBJECTIVE STATEMENT
pt alert and oriented x4 came in for low hGb and hypotension. assumed care of patient in critical care. pt denies pain. respirations even unlabored. pt educated on plan of care, pt able to successfully teach back plan of care to RN, RN will continue to reeducate pt during hospital stay.

## 2020-01-17 NOTE — ED ADULT NURSE REASSESSMENT NOTE - NS ED NURSE REASSESS COMMENT FT1
pt requesting MD speak with son prior to giving blood transfusion. MD Made aware, will contact son listed as emergency contact. pt informed

## 2020-01-18 LAB
ALBUMIN SERPL ELPH-MCNC: 1.9 G/DL — LOW (ref 3.3–5.2)
ALP SERPL-CCNC: 63 U/L — SIGNIFICANT CHANGE UP (ref 40–120)
ALT FLD-CCNC: 16 U/L — SIGNIFICANT CHANGE UP
ANION GAP SERPL CALC-SCNC: 12 MMOL/L — SIGNIFICANT CHANGE UP (ref 5–17)
AST SERPL-CCNC: 41 U/L — HIGH
BILIRUB SERPL-MCNC: 0.7 MG/DL — SIGNIFICANT CHANGE UP (ref 0.4–2)
BUN SERPL-MCNC: 9 MG/DL — SIGNIFICANT CHANGE UP (ref 8–20)
CALCIUM SERPL-MCNC: 8.5 MG/DL — LOW (ref 8.6–10.2)
CHLORIDE SERPL-SCNC: 100 MMOL/L — SIGNIFICANT CHANGE UP (ref 98–107)
CO2 SERPL-SCNC: 23 MMOL/L — SIGNIFICANT CHANGE UP (ref 22–29)
CREAT SERPL-MCNC: 0.45 MG/DL — LOW (ref 0.5–1.3)
GLUCOSE SERPL-MCNC: 116 MG/DL — HIGH (ref 70–115)
HCT VFR BLD CALC: 28 % — LOW (ref 34.5–45)
HGB BLD-MCNC: 9.3 G/DL — LOW (ref 11.5–15.5)
LACTATE SERPL-SCNC: 1.5 MMOL/L — SIGNIFICANT CHANGE UP (ref 0.5–2)
MCHC RBC-ENTMCNC: 28.5 PG — SIGNIFICANT CHANGE UP (ref 27–34)
MCHC RBC-ENTMCNC: 33.2 GM/DL — SIGNIFICANT CHANGE UP (ref 32–36)
MCV RBC AUTO: 85.9 FL — SIGNIFICANT CHANGE UP (ref 80–100)
PLATELET # BLD AUTO: 530 K/UL — HIGH (ref 150–400)
POTASSIUM SERPL-MCNC: 4.1 MMOL/L — SIGNIFICANT CHANGE UP (ref 3.5–5.3)
POTASSIUM SERPL-SCNC: 4.1 MMOL/L — SIGNIFICANT CHANGE UP (ref 3.5–5.3)
PROT SERPL-MCNC: 5.9 G/DL — LOW (ref 6.6–8.7)
RBC # BLD: 3.26 M/UL — LOW (ref 3.8–5.2)
RBC # FLD: 14.4 % — SIGNIFICANT CHANGE UP (ref 10.3–14.5)
SODIUM SERPL-SCNC: 135 MMOL/L — SIGNIFICANT CHANGE UP (ref 135–145)
WBC # BLD: 13.35 K/UL — HIGH (ref 3.8–10.5)
WBC # FLD AUTO: 13.35 K/UL — HIGH (ref 3.8–10.5)

## 2020-01-18 PROCEDURE — 99223 1ST HOSP IP/OBS HIGH 75: CPT

## 2020-01-18 PROCEDURE — 74177 CT ABD & PELVIS W/CONTRAST: CPT | Mod: 26

## 2020-01-18 RX ORDER — MIDODRINE HYDROCHLORIDE 2.5 MG/1
15 TABLET ORAL THREE TIMES A DAY
Refills: 0 | Status: DISCONTINUED | OUTPATIENT
Start: 2020-01-18 | End: 2020-01-23

## 2020-01-18 RX ADMIN — MEROPENEM 100 MILLIGRAM(S): 1 INJECTION INTRAVENOUS at 16:00

## 2020-01-18 RX ADMIN — MEROPENEM 100 MILLIGRAM(S): 1 INJECTION INTRAVENOUS at 21:14

## 2020-01-18 RX ADMIN — MIDODRINE HYDROCHLORIDE 15 MILLIGRAM(S): 2.5 TABLET ORAL at 05:35

## 2020-01-18 RX ADMIN — Medication 0.5 MILLIGRAM(S): at 20:52

## 2020-01-18 RX ADMIN — Medication 1 APPLICATION(S): at 05:35

## 2020-01-18 RX ADMIN — MIDODRINE HYDROCHLORIDE 15 MILLIGRAM(S): 2.5 TABLET ORAL at 21:18

## 2020-01-18 RX ADMIN — MEROPENEM 100 MILLIGRAM(S): 1 INJECTION INTRAVENOUS at 05:35

## 2020-01-18 RX ADMIN — MIDODRINE HYDROCHLORIDE 15 MILLIGRAM(S): 2.5 TABLET ORAL at 12:34

## 2020-01-18 RX ADMIN — Medication 1 APPLICATION(S): at 17:28

## 2020-01-18 RX ADMIN — Medication 0.5 MILLIGRAM(S): at 08:46

## 2020-01-18 NOTE — CONSULT NOTE ADULT - ASSESSMENT
69F w/ PMHx COPD, recently diagnosed rectal CA w/ lung met on medical management, ?rectal abscess, chronic hypotension was sent over form rehab    # Shock- hypovolemic and septic  s/p PRBC  s/p pressors  Now on midodrine  and IVF  bld c/s testing    # symptomatic anemia  s/p PRBC x 2  H/H improved    # Rectal abscess  poor candidate for palliative CRS procedure  IV antibx    # Rectal Ca with abd and lung mets in shock with no option of any kind of palliative intervention  DNR/ DNI  No invasive lines tests, procedures  Cont antibx, medications  She is a candidate for [facility]  Palliative and Hospice consults ordered. Not called  If pt becomes symptomatic with pain/ distress etc- to start palliative meds    ICDs 69F w/ PMHx COPD, recently diagnosed rectal CA w/ lung met on medical management, ?rectal abscess, chronic hypotension was sent over form rehab    # Shock- hypovolemic and septic  s/p PRBC  s/p pressors  Now on midodrine  and IVF  bld c/s testing    # symptomatic anemia  s/p PRBC x 2  H/H improved    # Rectal abscess  poor candidate for palliative CRS procedure  IV antibx    # Rectal Ca with abd and lung mets in shock with no option of any kind of palliative intervention  DNR/ DNI  No invasive lines tests, procedures  Cont antibx, medications  She is a candidate for [facility]  Palliative and Hospice consults ordered. Hospice network called  If pt becomes symptomatic with pain/ distress etc- to start palliative meds    ICDs

## 2020-01-18 NOTE — CONSULT NOTE ADULT - SUBJECTIVE AND OBJECTIVE BOX
MICU DOWNGRADE NOTE  PMD- Mario Tipton    HPI:  69F w/ PMHx COPD, recently diagnosed rectal CA w/ lung met on medical management, ?rectal abscess, chronic hypotension was sent over form rehab after she was found to have Hb 6.1. She had a recent Hb 8.4 ( 01/01/2020). Pt was found to be hypotensive 77/55mmHg w/ a repeat Hb 6.9. No obvious bleeding documented. She has no obvious complaints. She is not a candidate for any surgical intervention as per colorectal surgery.   Discussed case w/ son who is the HCA and pt confirmed DNR/DNI status but wants to medically manage her for the time being. He is also considering Hospice care once she is stabilized. (17 Jan 2020 20:33)    COURSE:  Pt admitted to MICU with shock for pressors. initially considered hypovolemic shock sec to anemia, later noted septic component sec to rectal abscess. CRS consulted- not a candidate for any intervention. Pt known DNR/ DNI sec to Rectal Ca with mets to omentum, lung. MICU d/w pt's son- No aggressive/ invasive testing or treatment. No pressors. However he wants antibx and such treatment to continue. So pressors d/gloria and midodrine started. Midline inserted for access. if pt becomes symptomatic in distress- son may consider palliation alone. Palliative and Hospice consult required.       PAST MEDICAL & SURGICAL HISTORY:  Anemia  COPD (chronic obstructive pulmonary disease)  H/O bilateral hip replacements      MEDICATIONS  (STANDING):  buDESOnide    Inhalation Suspension 0.5 milliGRAM(s) Inhalation two times a day  lactated ringers. 1000 milliLiter(s) (60 mL/Hr) IV Continuous <Continuous>  meropenem  IVPB 1000 milliGRAM(s) IV Intermittent every 8 hours  midodrine. 15 milliGRAM(s) Oral three times a day  silver sulfADIAZINE 1% Cream 1 Application(s) Topical two times a day    MEDICATIONS  (PRN):  acetaminophen   Tablet .. 650 milliGRAM(s) Oral every 6 hours PRN Moderate Pain (4 - 6)  HYDROmorphone  Injectable 0.5 milliGRAM(s) IV Push every 6 hours PRN Moderate Pain (4 - 6)  oxyCODONE    IR 5 milliGRAM(s) Oral every 6 hours PRN Severe Pain (7 - 10)      Allergies  No Known Allergies    SOCIAL HISTORY:  continues to smoke 1/2 PPD  no drinking  lives with   retired tay    FAMILY HISTORY:  No history of Cancer in the family members    Vital Signs Last 24 Hrs  T(C): 36.4 (18 Jan 2020 15:40), Max: 37 (17 Jan 2020 20:44)  T(F): 97.6 (18 Jan 2020 15:40), Max: 98.6 (17 Jan 2020 20:44)  HR: 102 (18 Jan 2020 16:00) (78 - 138)  BP: 87/58 (18 Jan 2020 16:00) (71/55 - 102/68)  BP(mean): 68 (18 Jan 2020 16:00) (55 - 83)  RR: 28 (18 Jan 2020 16:00) (18 - 29)  SpO2: 96% (18 Jan 2020 16:00) (90% - 99%)    REVIEW OF SYSTEMS:    No fever, distress  Pain controlled    PHYSICAL EXAM:    General: Alert, oriented, interactive, nonfocal  HEENT: Pupils equal, reactive to light.  Symmetric.  PULM: Clear to auscultation bilaterally, no significant sputum production  CVS: Regular rate and rhythm, no murmurs, rubs, or gallops  ABD: Soft, nondistended, nontender, normoactive bowel sounds, no masses  EXT: No edema, nontender  : Large fungating tumor to level of left glutenous and perianal region seen with large open cavity draining feculent material.  NEURO: A&Ox2, no focal deficits      LABS:                        9.3    13.35 )-----------( 530      ( 18 Jan 2020 05:24 )             28.0     01-18    135  |  100  |  9.0  ----------------------------<  116<H>  4.1   |  23.0  |  0.45<L>    Ca    8.5<L>      18 Jan 2020 05:24  Mg     1.5     01-17    TPro  5.9<L>  /  Alb  1.9<L>  /  TBili  0.7  /  DBili  x   /  AST  41<H>  /  ALT  16  /  AlkPhos  63  01-18    PT/INR - ( 17 Jan 2020 16:52 )   PT: 14.5 sec;   INR: 1.25 ratio         PTT - ( 17 Jan 2020 16:52 )  PTT:32.4 sec      RADIOLOGY & ADDITIONAL STUDIES:

## 2020-01-18 NOTE — DIETITIAN INITIAL EVALUATION ADULT. - CONTINUE CURRENT NUTRITION CARE PLAN
-- advance diet as medically feasible/tolerable (clears --> regular + Ensure Enlive TID to optimize po intake and provide an additional 350 kcal, 20g protein per serving)./yes

## 2020-01-18 NOTE — DIETITIAN INITIAL EVALUATION ADULT. - PHYSICAL APPEARANCE
BMI 19.9/other (specify) No edema documented  Noted with unstageable pressure injury to sacrum per documentation

## 2020-01-18 NOTE — CONSULT NOTE ADULT - SUBJECTIVE AND OBJECTIVE BOX
Colorectal Surgery Consult Note     Patient is a 69y old  Female who presents with a chief complaint of Hypotension (17 Jan 2020 20:33)      HPI:     69 year old female with metastatic distal rectal CA POD#14 s/p EUA, rectal mass biopsy and roz placement for drainage previously offered palliative colostomy by CRS team earlier this month. CRS team reconsulted given patient declined to sign hospice consent today and expressed that she would like to reconsider surgical options prior to signing. Patient seen and examined at bedside - noted to be an unreliable historian given confusion when seen this evening No family at bedside to provide corroborating information. Unclear response about decision regarding surgery versus hospice care upon interview.       Patient denies fevers/chills, denies lightheadedness/dizziness, denies SOB/chest pain, denies nausea/vomiting, denies constipation/diarrhea.  ***    ROS: 10-system review is otherwise negative except HPI above.      PAST MEDICAL & SURGICAL HISTORY:  Anemia  COPD (chronic obstructive pulmonary disease)  H/O bilateral hip replacements    FAMILY HISTORY:    [] Family history not pertinent as reviewed with the patient and family    SOCIAL HISTORY:  ***    ALLERGIES: No Known Allergies      HOME MEDICATIONS: ***    CURRENT MEDICATIONS  MEDICATIONS (STANDING): buDESOnide    Inhalation Suspension 0.5 milliGRAM(s) Inhalation two times a day  lactated ringers. 1000 milliLiter(s) IV Continuous <Continuous>  meropenem  IVPB 1000 milliGRAM(s) IV Intermittent every 8 hours  midodrine. 15 milliGRAM(s) Oral three times a day    MEDICATIONS (PRN):acetaminophen   Tablet .. 650 milliGRAM(s) Oral every 6 hours PRN Moderate Pain (4 - 6)  HYDROmorphone  Injectable 0.5 milliGRAM(s) IV Push every 6 hours PRN Moderate Pain (4 - 6)  oxyCODONE    IR 5 milliGRAM(s) Oral every 6 hours PRN Severe Pain (7 - 10)    --------------------------------------------------------------------------------------------    Vitals:   T(C): 36.9 (01-18-20 @ 12:00), Max: 37.1 (01-17-20 @ 16:41)  HR: 104 (01-18-20 @ 13:00) (78 - 138)  BP: 91/58 (01-18-20 @ 13:00) (71/55 - 102/68)  RR: 21 (01-18-20 @ 13:00) (18 - 29)  SpO2: 94% (01-18-20 @ 13:00) (90% - 99%)  CAPILLARY BLOOD GLUCOSE        CAPILLARY BLOOD GLUCOSE          01-17 @ 07:01  -  01-18 @ 07:00  --------------------------------------------------------  IN:    lactated ringers.: 660 mL    norepinephrine Infusion: 87.4 mL  Total IN: 747.4 mL    OUT:    Voided: 300 mL  Total OUT: 300 mL    Total NET: 447.4 mL      01-18 @ 07:01  -  01-18 @ 14:18  --------------------------------------------------------  IN:    lactated ringers.: 300 mL    norepinephrine Infusion: 32.5 mL  Total IN: 332.5 mL    OUT:    Voided: 150 mL  Total OUT: 150 mL    Total NET: 182.5 mL        Height (cm): 165.1 (01-17 @ 21:23)  Weight (kg): 54.1 (01-17 @ 21:23)  BMI (kg/m2): 19.8 (01-17 @ 21:23)  BSA (m2): 1.59 (01-17 @ 21:23)    PHYSICAL EXAM: ***  General: NAD, Lying in bed comfortably  Neuro: A+Ox3  HEENT: NC/AT, EOMI  Neck: Soft, supple  Cardio: RRR, nml S1/S2  Resp: Good effort, CTA b/l  Thorax: No chest wall tenderness  Breast: No lesions/masses, no drainage  GI/Abd: Soft, NT/ND, no rebound/guarding, no masses palpated  Vascular: All 4 extremities warm.  Skin: Intact, no breakdown  Lymphatic/Nodes: No palpable lymphadenopathy  Musculoskeletal: All 4 extremities moving spontaneously, no limitations  --------------------------------------------------------------------------------------------    LABS  CBC (01-18 @ 05:24)                              9.3<L>                         13.35<H>  )----------------(  530<H>     --    % Neutrophils, --    % Lymphocytes, ANC: --                                  28.0<L>  CBC (01-17 @ 23:02)                              10.5<L>                         16.54<H>  )----------------(  516<H>     --    % Neutrophils, --    % Lymphocytes, ANC: --                                  31.2<L>    BMP (01-18 @ 05:24)             135     |  100     |  9.0   		Ca++ --      Ca 8.5<L>             ---------------------------------( 116<H>		Mg --                 4.1     |  23.0    |  0.45<L>			Ph --      BMP (01-17 @ 16:52)             134<L>  |  97<L>   |  11.0  		Ca++ --      Ca 8.6                ---------------------------------( 117<H>		Mg 1.5<L>             4.5     |  25.0    |  0.66  			Ph --        LFTs (01-18 @ 05:24)      TPro 5.9<L> / Alb 1.9<L> / TBili 0.7 / DBili -- / AST 41<H> / ALT 16 / AlkPhos 63  LFTs (01-17 @ 16:52)      TPro 6.7 / Alb 2.2<L> / TBili 0.3<L> / DBili -- / AST 59<H> / ALT 18 / AlkPhos 67    Coags (01-17 @ 16:52)  aPTT 32.4 / INR 1.25<H> / PT 14.5<H>      ABG (01-18 @ 05:24)      /  /  /  /  / %     Lactate:  1.5    VBG (01-17 @ 17:53)     -- / -- / -- / -- / -- / --%     Lactate: 2.0    --------------------------------------------------------------------------------------------    MICROBIOLOGY      --------------------------------------------------------------------------------------------    IMAGING  ***    ASSESSMENT: Patient is a 69y old f with ****    PLAN:  ***  -   -   -   -   - Patient seen/examined with attending.  - Plan to be discussed with Attending,  Colorectal Surgery Consult Note     Patient is a 69y old  Female who presents with a chief complaint of Hypotension (17 Jan 2020 20:33)    HPI:     69 year old female with metastatic distal rectal CA s/p EUA, rectal mass biopsy and roz placement and unroofing of posterior rectal wall abscess on 12/12/19, seen on multiple occasions by Colorectal Service, during which she was determined to be a poor surgical candidate for palliative APR. Patient presented to [facility] in sepsis. Surgery consulted to evaluate due to large left perirectal abscess draining feculent material.        ROS: 10-system review is otherwise negative except HPI above.      PAST MEDICAL & SURGICAL HISTORY:  Anemia  COPD (chronic obstructive pulmonary disease)  H/O bilateral hip replacements    FAMILY HISTORY:    [] Family history not pertinent as reviewed with the patient and family      ALLERGIES: No Known Allergies      CURRENT MEDICATIONS  MEDICATIONS (STANDING): buDESOnide    Inhalation Suspension 0.5 milliGRAM(s) Inhalation two times a day  lactated ringers. 1000 milliLiter(s) IV Continuous <Continuous>  meropenem  IVPB 1000 milliGRAM(s) IV Intermittent every 8 hours  midodrine. 15 milliGRAM(s) Oral three times a day    MEDICATIONS (PRN):acetaminophen   Tablet .. 650 milliGRAM(s) Oral every 6 hours PRN Moderate Pain (4 - 6)  HYDROmorphone  Injectable 0.5 milliGRAM(s) IV Push every 6 hours PRN Moderate Pain (4 - 6)  oxyCODONE    IR 5 milliGRAM(s) Oral every 6 hours PRN Severe Pain (7 - 10)    --------------------------------------------------------------------------------------------    Vitals:   T(C): 36.9 (01-18-20 @ 12:00), Max: 37.1 (01-17-20 @ 16:41)  HR: 104 (01-18-20 @ 13:00) (78 - 138)  BP: 91/58 (01-18-20 @ 13:00) (71/55 - 102/68)  RR: 21 (01-18-20 @ 13:00) (18 - 29)  SpO2: 94% (01-18-20 @ 13:00) (90% - 99%)  CAPILLARY BLOOD GLUCOSE        CAPILLARY BLOOD GLUCOSE          01-17 @ 07:01  -  01-18 @ 07:00  --------------------------------------------------------  IN:    lactated ringers.: 660 mL    norepinephrine Infusion: 87.4 mL  Total IN: 747.4 mL    OUT:    Voided: 300 mL  Total OUT: 300 mL    Total NET: 447.4 mL      01-18 @ 07:01  -  01-18 @ 14:18  --------------------------------------------------------  IN:    lactated ringers.: 300 mL    norepinephrine Infusion: 32.5 mL  Total IN: 332.5 mL    OUT:    Voided: 150 mL  Total OUT: 150 mL    Total NET: 182.5 mL        Height (cm): 165.1 (01-17 @ 21:23)  Weight (kg): 54.1 (01-17 @ 21:23)  BMI (kg/m2): 19.8 (01-17 @ 21:23)  BSA (m2): 1.59 (01-17 @ 21:23)    PHYSICAL EXAM:  General: NAD  Neuro: A+Ox3  HEENT: NC/AT, EOMI  Neck: Soft, supple  Cardio: RRR, nml S1/S2  Resp: Good effort, CTA b/l  Thorax: No chest wall tenderness  Breast: No lesions/masses, no drainage  GI/Abd: Soft, NT/ND, no rebound/guarding, no masses palpated  : Large fungating tumor to level of left glutenous and perianal region seen with large open cavity draining feculent material. very painful    Vascular: All 4 extremities warm.  Skin: Intact, no breakdown  Lymphatic/Nodes: No palpable lymphadenopathy  Musculoskeletal: All 4 extremities moving spontaneously, no limitations  --------------------------------------------------------------------------------------------    LABS  CBC (01-18 @ 05:24)                              9.3<L>                         13.35<H>  )----------------(  530<H>     --    % Neutrophils, --    % Lymphocytes, ANC: --                                  28.0<L>  CBC (01-17 @ 23:02)                              10.5<L>                         16.54<H>  )----------------(  516<H>     --    % Neutrophils, --    % Lymphocytes, ANC: --                                  31.2<L>    BMP (01-18 @ 05:24)             135     |  100     |  9.0   		Ca++ --      Ca 8.5<L>             ---------------------------------( 116<H>		Mg --                 4.1     |  23.0    |  0.45<L>			Ph --      BMP (01-17 @ 16:52)             134<L>  |  97<L>   |  11.0  		Ca++ --      Ca 8.6                ---------------------------------( 117<H>		Mg 1.5<L>             4.5     |  25.0    |  0.66  			Ph --        LFTs (01-18 @ 05:24)      TPro 5.9<L> / Alb 1.9<L> / TBili 0.7 / DBili -- / AST 41<H> / ALT 16 / AlkPhos 63  LFTs (01-17 @ 16:52)      TPro 6.7 / Alb 2.2<L> / TBili 0.3<L> / DBili -- / AST 59<H> / ALT 18 / AlkPhos 67    Coags (01-17 @ 16:52)  aPTT 32.4 / INR 1.25<H> / PT 14.5<H>      ABG (01-18 @ 05:24)      /  /  /  /  / %     Lactate:  1.5    VBG (01-17 @ 17:53)     -- / -- / -- / -- / -- / --%     Lactate: 2.0    --------------------------------------------------------------------------------------------    MICROBIOLOGY      --------------------------------------------------------------------------------------------    IMAGING  < from: CT Abdomen and Pelvis w/ IV Cont (01.18.20 @ 09:37) >  FINDINGS:   There is a complex rectal  mixed attenuation mass/abscess displacing the air-filled rectum towards the RIGHT. The mass measures 5.2 x 5.2 cm similar to prior examination.    HOWEVER ANair-fluid filled ABSCESS NOW EXTENDS INTO THE LEFT RECTAL FOSSA SPACE MEASURING 9.5 X 3.6 CM EXTENDING LEFT LOWER MEDIAL BUTTOCKS SOFT TISSUES..  Bilateral pulmonary nodules lung unchanged consistent with metastatic lung disease.    < end of copied text > Colorectal Surgery Consult Note     Patient is a 69y old  Female who presents with a chief complaint of Hypotension (17 Jan 2020 20:33)    HPI:     69 year old female with metastatic distal rectal CA s/p EUA, rectal mass biopsy and roz placement and unroofing of posterior rectal wall abscess on 12/12/19, seen on multiple occasions by Colorectal Service, during which she was determined to be a poor surgical candidate for palliative APR. Patient presented to [facility] in sepsis. Surgery consulted to evaluate due to large left perirectal abscess draining feculent material.        ROS: 10-system review is otherwise negative except HPI above.      PAST MEDICAL & SURGICAL HISTORY:  Anemia  COPD (chronic obstructive pulmonary disease)  H/O bilateral hip replacements    FAMILY HISTORY:    [] Family history not pertinent as reviewed with the patient and family      ALLERGIES: No Known Allergies      CURRENT MEDICATIONS  MEDICATIONS (STANDING): buDESOnide    Inhalation Suspension 0.5 milliGRAM(s) Inhalation two times a day  lactated ringers. 1000 milliLiter(s) IV Continuous <Continuous>  meropenem  IVPB 1000 milliGRAM(s) IV Intermittent every 8 hours  midodrine. 15 milliGRAM(s) Oral three times a day    MEDICATIONS (PRN):acetaminophen   Tablet .. 650 milliGRAM(s) Oral every 6 hours PRN Moderate Pain (4 - 6)  HYDROmorphone  Injectable 0.5 milliGRAM(s) IV Push every 6 hours PRN Moderate Pain (4 - 6)  oxyCODONE    IR 5 milliGRAM(s) Oral every 6 hours PRN Severe Pain (7 - 10)    --------------------------------------------------------------------------------------------    Vitals:   T(C): 36.9 (01-18-20 @ 12:00), Max: 37.1 (01-17-20 @ 16:41)  HR: 104 (01-18-20 @ 13:00) (78 - 138)  BP: 91/58 (01-18-20 @ 13:00) (71/55 - 102/68)  RR: 21 (01-18-20 @ 13:00) (18 - 29)  SpO2: 94% (01-18-20 @ 13:00) (90% - 99%)  CAPILLARY BLOOD GLUCOSE        CAPILLARY BLOOD GLUCOSE          01-17 @ 07:01  -  01-18 @ 07:00  --------------------------------------------------------  IN:    lactated ringers.: 660 mL    norepinephrine Infusion: 87.4 mL  Total IN: 747.4 mL    OUT:    Voided: 300 mL  Total OUT: 300 mL    Total NET: 447.4 mL      01-18 @ 07:01  -  01-18 @ 14:18  --------------------------------------------------------  IN:    lactated ringers.: 300 mL    norepinephrine Infusion: 32.5 mL  Total IN: 332.5 mL    OUT:    Voided: 150 mL  Total OUT: 150 mL    Total NET: 182.5 mL        Height (cm): 165.1 (01-17 @ 21:23)  Weight (kg): 54.1 (01-17 @ 21:23)  BMI (kg/m2): 19.8 (01-17 @ 21:23)  BSA (m2): 1.59 (01-17 @ 21:23)    PHYSICAL EXAM:  General: NAD  Neuro: A+Ox3  HEENT: NC/AT, EOMI  Neck: Soft, supple  Cardio: RRR, nml S1/S2  Resp: Good effort, CTA b/l  Thorax: No chest wall tenderness  Breast: No lesions/masses, no drainage  GI/Abd: Soft, NT/ND, no rebound/guarding, no masses palpated  : Large fungating tumor to level of left glutenous and perianal region seen with large open cavity draining feculent material. very painful    Vascular: All 4 extremities warm.  Skin: Intact, no breakdown  Lymphatic/Nodes: No palpable lymphadenopathy  Musculoskeletal: All 4 extremities moving spontaneously, no limitations  --------------------------------------------------------------------------------------------    LABS  CBC (01-18 @ 05:24)                              9.3<L>                         13.35<H>  )----------------(  530<H>     --    % Neutrophils, --    % Lymphocytes, ANC: --                                  28.0<L>  CBC (01-17 @ 23:02)                              10.5<L>                         16.54<H>  )----------------(  516<H>     --    % Neutrophils, --    % Lymphocytes, ANC: --                                  31.2<L>    BMP (01-18 @ 05:24)             135     |  100     |  9.0   		Ca++ --      Ca 8.5<L>             ---------------------------------( 116<H>		Mg --                 4.1     |  23.0    |  0.45<L>			Ph --      BMP (01-17 @ 16:52)             134<L>  |  97<L>   |  11.0  		Ca++ --      Ca 8.6                ---------------------------------( 117<H>		Mg 1.5<L>             4.5     |  25.0    |  0.66  			Ph --        LFTs (01-18 @ 05:24)      TPro 5.9<L> / Alb 1.9<L> / TBili 0.7 / DBili -- / AST 41<H> / ALT 16 / AlkPhos 63  LFTs (01-17 @ 16:52)      TPro 6.7 / Alb 2.2<L> / TBili 0.3<L> / DBili -- / AST 59<H> / ALT 18 / AlkPhos 67    Coags (01-17 @ 16:52)  aPTT 32.4 / INR 1.25<H> / PT 14.5<H>      ABG (01-18 @ 05:24)      /  /  /  /  / %     Lactate:  1.5    VBG (01-17 @ 17:53)     -- / -- / -- / -- / -- / --%     Lactate: 2.0    --------------------------------------------------------------------------------------------    MICROBIOLOGY      --------------------------------------------------------------------------------------------    IMAGING  < from: CT Abdomen and Pelvis w/ IV Cont (01.18.20 @ 09:37) >  FINDINGS:   There is a complex rectal  mixed attenuation mass/abscess displacing the air-filled rectum towards the RIGHT. The mass measures 5.2 x 5.2 cm similar to prior examination.    HOWEVER ANair-fluid filled ABSCESS NOW EXTENDS INTO THE LEFT RECTAL FOSSA SPACE MEASURING 9.5 X 3.6 CM EXTENDING LEFT LOWER MEDIAL BUTTOCKS SOFT TISSUES..  Bilateral pulmonary nodules lung unchanged consistent with metastatic lung disease.    < end of copied text >

## 2020-01-18 NOTE — CONSULT NOTE ADULT - ASSESSMENT
ASSESSMENT: Patient is a 69y old f with Stage IV rectal cancer with sepsis requiring increasing pressor dosage, source is large perirectal abscess communicating with bowel producing feculent discharge.      PLAN:   - Patient continue to be poor surgical candidate for palliative debulking, currently MOLST with no aggressive intervention, DNR/DNI  - Patient with widespread peritoneal implants, nonobstructed as passing BM and left perirectal abscess previous unroofed, seen draining.     - Recommend ChemoRadiation prior to any surgical intervention is entertained   - Plan discussed with MICU DERIAN Rod   - Patient seen/examined with attending.  - Plan discussed with Attending, Dr. Encarnacion ASSESSMENT: Patient is a 69y old f with Stage IV rectal cancer with sepsis requiring increasing pressor dosage, source is large perirectal abscess communicating with bowel producing feculent discharge.      PLAN:   - Perirectal fistula widely open and draining stool. Patient continue to be poor surgical candidate for palliative debulking, currently MOLST with no aggressive intervention, DNR/DNI  - Patient with widespread peritoneal implants, nonobstructed as passing BM and left perirectal abscess previous unroofed, seen draining.     - Recommend ChemoRadiation prior to any surgical intervention is entertained   - Plan discussed with MICU DERIAN Rod   - Patient seen/examined with attending.  - Plan discussed with Attending, Dr. Encarnacion

## 2020-01-18 NOTE — CONSULT NOTE ADULT - ATTENDING COMMENTS
Patient examined with MICU attending at bedside. 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 Fistula was probed with mass in the anterior aspect contiguous with rectal mass. Patient would need stool diversion for palliative radiation if medically optimized and patient agreeable. Risk of surgery would be very high due to per extensive peritoneal disease and hemodynamic instability. Discussed with patient. Could not reach son on phone.

## 2020-01-18 NOTE — DIETITIAN INITIAL EVALUATION ADULT. - PERTINENT LABORATORY DATA
01-18 Na135 mmol/L Glu 116 mg/dL<H> K+ 4.1 mmol/L Cr  0.45 mg/dL<L> BUN 9.0 mg/dL Phos n/a   Alb 1.9 g/dL<L> PAB n/a

## 2020-01-18 NOTE — DIETITIAN INITIAL EVALUATION ADULT. - ETIOLOGY
related to inability to meet sufficient protein-energy in setting of rectal CA w/ lung mets and persistent lack of appetite related to inability to meet sufficient protein-energy in setting of rectal CA w/ lung mets, skin breakdown, and persistent lack of appetite

## 2020-01-18 NOTE — DIETITIAN INITIAL EVALUATION ADULT. - PERTINENT MEDS FT
MEDICATIONS  (STANDING):  buDESOnide    Inhalation Suspension 0.5 milliGRAM(s) Inhalation two times a day  lactated ringers. 1000 milliLiter(s) (60 mL/Hr) IV Continuous <Continuous>  meropenem  IVPB 1000 milliGRAM(s) IV Intermittent every 8 hours  midodrine. 15 milliGRAM(s) Oral three times a day  norepinephrine Infusion 0.05 MICROgram(s)/kG/Min (6.375 mL/Hr) IV Continuous <Continuous>  silver sulfADIAZINE 1% Cream 1 Application(s) Topical two times a day    MEDICATIONS  (PRN):  acetaminophen   Tablet .. 650 milliGRAM(s) Oral every 6 hours PRN Moderate Pain (4 - 6)  HYDROmorphone  Injectable 0.5 milliGRAM(s) IV Push every 6 hours PRN Moderate Pain (4 - 6)  oxyCODONE    IR 5 milliGRAM(s) Oral every 6 hours PRN Severe Pain (7 - 10)

## 2020-01-18 NOTE — PROGRESS NOTE ADULT - ASSESSMENT
69F w/ PMHx COPD, recently diagnosed rectal CA w/ lung met on medical management, ?rectal abscess, chronic hypotension was sent over form rehab after she was found to have Hb 6.1. She was given 2 units of PRBCs, BP remained low, started on pressor, admitted to MICU.   Patient remains on pressors, hemoglobin with mild downtrend. Seen by colorectal surgery regarding mass/abscess they are unable to offer her any surgical or bedside interventional treatment. Spoke to son Clive and updated him on patient's condition and prognosis. At this time patient is DNR/DNI, no aggressive measures, no invasive lines, no pressors. Pending hospice and palliative care evals.     Problem List:  1) Septic shock   2) Metastatic rectal Ca (mets to omentum and lung)  3) Acute blood loss anemia   4) COPD    -Meropenem for rectal abscess, most likely source of sepsis   - No surgical intervention at this time as patient is not a candidate per colorectal surgery   - Monitor hemoglobin daily, no melena, or BRBPR on exam, keep hemoglobin >7, blood loss most likely from rectal mass?  - Midodrine for hypotension, dose increase to 15mg TID, no pressors per goals of care   -Pain medication PRN  -Budesonide nebs for COPD  -Tolerating PO diet   -Please see goals of care note, family does not want aggressive ICU level of care, they are OK with continuing abx, and medicaitons, but do not want CPR, intubation, pressors, or invasive lines  -Will transfer patient to medical floors  - Plan discussed with ICU attending Dr. Collins

## 2020-01-18 NOTE — PROGRESS NOTE ADULT - SUBJECTIVE AND OBJECTIVE BOX
Patient is a 69y old  Female who presents with a chief complaint of Hypotension (17 Jan 2020 20:33)      BRIEF HOSPITAL COURSE: 69F w/ PMHx COPD, recently diagnosed rectal CA w/ lung met on medical management, ?rectal abscess, chronic hypotension was sent over form rehab after she was found to have Hb 6.1. She had a recent Hb 8.4 ( 01/01/2020). Pt was found to be hypotensive 77/55mmHg w/ a repeat Hb 6.9. No obvious bleeding documented. She has no obvious complaints. She is not a candidate for any surgical intervention as per colorectal surgery.   Discussed case w/ son who is the HCA and pt confirmed DNR/DNI status but wants to medically manage her for the time being. He is also considering Hospice care once she is stabilized.    Events last 24 hours: Patient underwent CT scan, showed Rectal masses/abscess which is unchanged from previous however abscess cavity has increased in size, she remains on IV levophed for BP support.     PAST MEDICAL & SURGICAL HISTORY:  Anemia  COPD (chronic obstructive pulmonary disease)  H/O bilateral hip replacements      Review of Systems:  CONSTITUTIONAL: No fever, chills, or fatigue  EYES: No eye pain, visual disturbances, or discharge  ENMT:  No difficulty hearing, tinnitus, vertigo; No sinus or throat pain  NECK: No pain or stiffness  RESPIRATORY: No cough, wheezing, chills or hemoptysis; No shortness of breath  CARDIOVASCULAR: No chest pain, palpitations, dizziness, or leg swelling  GASTROINTESTINAL: No abdominal or epigastric pain. No nausea, vomiting, or hematemesis; No diarrhea or constipation. No melena or hematochezia.  GENITOURINARY: No dysuria, frequency, hematuria, or incontinence  NEUROLOGICAL: No headaches, memory loss, loss of strength, numbness, or tremors  SKIN: No itching, burning, rashes, or lesions   MUSCULOSKELETAL: No joint pain or swelling; No muscle, back, or extremity pain  PSYCHIATRIC: No depression, anxiety, mood swings, or difficulty sleeping      Medications:  meropenem  IVPB 1000 milliGRAM(s) IV Intermittent every 8 hours  midodrine. 15 milliGRAM(s) Oral three times a day  norepinephrine Infusion 0.05 MICROgram(s)/kG/Min IV Continuous <Continuous>  buDESOnide    Inhalation Suspension 0.5 milliGRAM(s) Inhalation two times a day  acetaminophen   Tablet .. 650 milliGRAM(s) Oral every 6 hours PRN  HYDROmorphone  Injectable 0.5 milliGRAM(s) IV Push every 6 hours PRN  oxyCODONE    IR 5 milliGRAM(s) Oral every 6 hours PRN  lactated ringers. 1000 milliLiter(s) IV Continuous <Continuous>  silver sulfADIAZINE 1% Cream 1 Application(s) Topical two times a day      ICU Vital Signs Last 24 Hrs  T(C): 36.9 (18 Jan 2020 12:00), Max: 37.1 (17 Jan 2020 16:41)  T(F): 98.4 (18 Jan 2020 12:00), Max: 98.8 (17 Jan 2020 16:41)  HR: 104 (18 Jan 2020 13:00) (78 - 138)  BP: 91/58 (18 Jan 2020 13:00) (71/55 - 102/68)  BP(mean): 71 (18 Jan 2020 13:00) (55 - 83)  RR: 21 (18 Jan 2020 13:00) (18 - 29)  SpO2: 94% (18 Jan 2020 13:00) (90% - 99%)          I&O's Detail    17 Jan 2020 07:01  -  18 Jan 2020 07:00  --------------------------------------------------------  IN:    lactated ringers.: 660 mL    norepinephrine Infusion: 87.4 mL  Total IN: 747.4 mL    OUT:    Voided: 300 mL  Total OUT: 300 mL    Total NET: 447.4 mL      18 Jan 2020 07:01  -  18 Jan 2020 14:03  --------------------------------------------------------  IN:    lactated ringers.: 300 mL    norepinephrine Infusion: 32.5 mL  Total IN: 332.5 mL    OUT:    Voided: 150 mL  Total OUT: 150 mL    Total NET: 182.5 mL            LABS:                        9.3    13.35 )-----------( 530      ( 18 Jan 2020 05:24 )             28.0     01-18    135  |  100  |  9.0  ----------------------------<  116<H>  4.1   |  23.0  |  0.45<L>    Ca    8.5<L>      18 Jan 2020 05:24  Mg     1.5     01-17    TPro  5.9<L>  /  Alb  1.9<L>  /  TBili  0.7  /  DBili  x   /  AST  41<H>  /  ALT  16  /  AlkPhos  63  01-18      CARDIAC MARKERS ( 17 Jan 2020 16:52 )  x     / 0.01 ng/mL / x     / x     / x          CAPILLARY BLOOD GLUCOSE        PT/INR - ( 17 Jan 2020 16:52 )   PT: 14.5 sec;   INR: 1.25 ratio         PTT - ( 17 Jan 2020 16:52 )  PTT:32.4 sec    CULTURES:      Physical Examination:    General: Alert, oriented, interactive, nonfocal    HEENT: Pupils equal, reactive to light.  Symmetric.    PULM: Clear to auscultation bilaterally, no significant sputum production    CVS: Regular rate and rhythm, no murmurs, rubs, or gallops    ABD: Soft, nondistended, nontender, normoactive bowel sounds, no masses    EXT: No edema, nontender    SKIN: left gluteus with fluctuant mass, actively draining pus/stool, tender to palpation     NEURO: A&Ox2, no focal deficits      RADIOLOGY: EXAM:  CT ABDOMEN AND PELVIS IC                          PROCEDURE DATE:  01/18/2020          INTERPRETATION:  Contrast enhanced CT of the abdomen and pelvis .  COMPARISON: 12/16/2019.    CLINICAL HISTORY: Perirectal abscess. Cancer. Shock.  Technique: contiguous axial images were obtained with 2.5 mm slice thickness after intravenous and oral contrast administration.  Coronal and sagittal reformats were also submitted for interpretation.    100 ml of Omnipaque were injected intravenously, and0 ml were discarded, without complications noted.     FINDINGS:   There is a complex rectal  mixed attenuation mass/abscess displacing the air-filled rectum towards the RIGHT. The mass measures 5.2 x 5.2 cm similar to prior examination.    HOWEVER ANair-fluid filled ABSCESS NOW EXTENDS INTO THE LEFT RECTAL FOSSA SPACE MEASURING 9.5 X 3.6 CM EXTENDING LEFT LOWER MEDIAL BUTTOCKS SOFT TISSUES..  Bilateral pulmonary nodules lung unchanged consistent with metastatic lung disease.      There is no free intra-abdominal air or ascites.  Status post cholecystectomy.  The liver, spleen, pancreas, adrenal glands, are normal.    There is no intra or extrahepatic biliary ductal dilatation.  Moderate gastroesophageal hiatal hernia.  The stomach, duodenum,small and large bowel and appendix are within normal limits.    Both kidneys show normal uptake of contrast media without masses or hydronephrosis.      The urinary bladder shows normal morphology and contour.    The reproductive organs are within normal limits.       No gross adenopathy..  The retroperitoneal vessels are normal.      Chronic unchanged large a bone cystic erosion of the superior acetabulum LEFT hip unchanged. No adjacent soft tissue mass.    IMPRESSION:     Rectal mass/abscess size unchanged HOWEVER ABSCESS CAVITY MEASURING 9 CM IN LENGTH EXTENDS INTO THE LEFT PERIRECTAL FOSSA AND LEFT MEDIAL BUTTOCK SOFT TISSUES.      KEVIN VALLES M.D., ATTENDING RADIOLOGIST  This document has been electronically signed. Jan 182020  9:52AM      TIME SPENT: 45 min   Evaluating/treating patient, reviewing data/labs/imaging, discussing case with multidisciplinary team, discussing plan/goals of care with patient/family. Non-inclusive of procedure time.

## 2020-01-18 NOTE — DIETITIAN INITIAL EVALUATION ADULT. - MALNUTRITION
NFPE: severe muscle loss of temples, clavicles, shoulders, severe fat loss of orbitals, buccal pads, triceps severe, chronic

## 2020-01-18 NOTE — DIETITIAN INITIAL EVALUATION ADULT. - OTHER INFO
69 year old female PMHx COPD, recently diagnosed rectal CA w/ lung met on medical management, ?rectal abscess, chronic hypotension was sent over form rehab after she was found to have Hb 6.1, found to be hypotensive. Pt is not a candidate for any surgical intervention as per colorectal surgery. Pt reports prolonged poor appetite/po intake. States she has only been consuming minimal amounts of food PTA. Pts weight 7 months ago was 170 lbs, weight previous assessment 12/2019 was 140 lbs; current admission weight 119.2 lbs. Pt confirms her weight has been continuing to drop. Denies any chewing/swallowing difficulty. Pt is currently NPO- states she would like to receive Ensure Enlive supplement once diet advanced.

## 2020-01-19 PROCEDURE — 99233 SBSQ HOSP IP/OBS HIGH 50: CPT

## 2020-01-19 PROCEDURE — 99231 SBSQ HOSP IP/OBS SF/LOW 25: CPT

## 2020-01-19 RX ORDER — SENNA PLUS 8.6 MG/1
2 TABLET ORAL AT BEDTIME
Refills: 0 | Status: DISCONTINUED | OUTPATIENT
Start: 2020-01-19 | End: 2020-01-25

## 2020-01-19 RX ORDER — POLYETHYLENE GLYCOL 3350 17 G/17G
17 POWDER, FOR SOLUTION ORAL DAILY
Refills: 0 | Status: DISCONTINUED | OUTPATIENT
Start: 2020-01-19 | End: 2020-01-25

## 2020-01-19 RX ORDER — BUDESONIDE AND FORMOTEROL FUMARATE DIHYDRATE 160; 4.5 UG/1; UG/1
2 AEROSOL RESPIRATORY (INHALATION)
Refills: 0 | Status: DISCONTINUED | OUTPATIENT
Start: 2020-01-19 | End: 2020-01-25

## 2020-01-19 RX ORDER — SODIUM CHLORIDE 9 MG/ML
1000 INJECTION, SOLUTION INTRAVENOUS
Refills: 0 | Status: DISCONTINUED | OUTPATIENT
Start: 2020-01-19 | End: 2020-01-19

## 2020-01-19 RX ORDER — IPRATROPIUM/ALBUTEROL SULFATE 18-103MCG
3 AEROSOL WITH ADAPTER (GRAM) INHALATION EVERY 6 HOURS
Refills: 0 | Status: DISCONTINUED | OUTPATIENT
Start: 2020-01-19 | End: 2020-01-25

## 2020-01-19 RX ORDER — SODIUM CHLORIDE 9 MG/ML
1000 INJECTION, SOLUTION INTRAVENOUS
Refills: 0 | Status: DISCONTINUED | OUTPATIENT
Start: 2020-01-19 | End: 2020-01-20

## 2020-01-19 RX ADMIN — Medication 1 APPLICATION(S): at 21:39

## 2020-01-19 RX ADMIN — MEROPENEM 100 MILLIGRAM(S): 1 INJECTION INTRAVENOUS at 06:58

## 2020-01-19 RX ADMIN — POLYETHYLENE GLYCOL 3350 17 GRAM(S): 17 POWDER, FOR SOLUTION ORAL at 15:06

## 2020-01-19 RX ADMIN — OXYCODONE HYDROCHLORIDE 5 MILLIGRAM(S): 5 TABLET ORAL at 15:03

## 2020-01-19 RX ADMIN — SODIUM CHLORIDE 75 MILLILITER(S): 9 INJECTION, SOLUTION INTRAVENOUS at 15:06

## 2020-01-19 RX ADMIN — MEROPENEM 100 MILLIGRAM(S): 1 INJECTION INTRAVENOUS at 15:06

## 2020-01-19 RX ADMIN — MIDODRINE HYDROCHLORIDE 15 MILLIGRAM(S): 2.5 TABLET ORAL at 06:59

## 2020-01-19 RX ADMIN — MEROPENEM 100 MILLIGRAM(S): 1 INJECTION INTRAVENOUS at 21:39

## 2020-01-19 RX ADMIN — OXYCODONE HYDROCHLORIDE 5 MILLIGRAM(S): 5 TABLET ORAL at 08:58

## 2020-01-19 RX ADMIN — Medication 0.5 MILLIGRAM(S): at 20:40

## 2020-01-19 RX ADMIN — Medication 1 APPLICATION(S): at 06:59

## 2020-01-19 RX ADMIN — OXYCODONE HYDROCHLORIDE 5 MILLIGRAM(S): 5 TABLET ORAL at 15:57

## 2020-01-19 RX ADMIN — MIDODRINE HYDROCHLORIDE 15 MILLIGRAM(S): 2.5 TABLET ORAL at 18:39

## 2020-01-19 RX ADMIN — Medication 0.5 MILLIGRAM(S): at 08:32

## 2020-01-19 RX ADMIN — OXYCODONE HYDROCHLORIDE 5 MILLIGRAM(S): 5 TABLET ORAL at 09:38

## 2020-01-19 RX ADMIN — MIDODRINE HYDROCHLORIDE 15 MILLIGRAM(S): 2.5 TABLET ORAL at 15:05

## 2020-01-19 NOTE — PROGRESS NOTE ADULT - ASSESSMENT
69F w/ PMHx COPD, recently diagnosed rectal CA w/ lung met on medical management, ?rectal abscess, chronic hypotension was sent over form rehab after she was found to have Hb 6.1. She had a recent Hb 8.4 ( 01/01/2020). Pt was found to be hypotensive 77/55mmHg w/ a repeat Hb 6.9. No obvious bleeding documented. She has no obvious complaints. She is not a candidate for any surgical intervention as per colorectal surgery.   Discussed case w/ son who is the HCA and pt confirmed DNR/DNI status but wants to medically manage her for the time being. He is also considering Hospice care once she is stabilized.   Above noted and appreciated .      # Shock- hypovolemic and septic  s/p PRBC X2   s/p pressors  Now on midodrine  and IVF  bld c/s testing    # symptomatic anemia  s/p PRBC x 2  H/H improved    # Rectal abscess  poor candidate for palliative CRS procedure  IV antibx , blood culture/ urine culture pending , rectal abscess cultures ordered , follow up     # Rectal Ca with abd and lung mets in shock with no option of any kind of palliative intervention  DNR/ DNI  No invasive lines tests, procedures  Cont antibx, medications  [facility] is a [facility] for [facility]  Palliative and Hospice consults ordered. Hospice network called  If pt becomes symptomatic with pain/ distress etc- to start palliative meds    ICDs  Patient's son Mr Janette Duffy called , on his away to the [facility] , will meet when he gets here .     Constipation prophylaxis , Miralax added , Senna PRN .   Pain mgmt - Tylenol , Oxycontin , iv Dilaudid 69F w/ PMHx COPD, recently diagnosed rectal CA w/ lung met on medical management, ?rectal abscess, chronic hypotension was sent over form rehab after she was found to have Hb 6.1. She had a recent Hb 8.4 ( 01/01/2020). Pt was found to be hypotensive 77/55mmHg w/ a repeat Hb 6.9. No obvious bleeding documented. She has no obvious complaints. She is not a candidate for any surgical intervention as per colorectal surgery.   Discussed case w/ son who is the HCA and pt confirmed DNR/DNI status but wants to medically manage her for the time being. He is also considering Hospice care once she is stabilized.   Above noted and appreciated .      # Shock- hypovolemic and septic  s/p PRBC X2   s/p pressors  Now on midodrine  and IVF  bld c/s testing    # symptomatic anemia due to blood lost due to rectal cancer   s/p PRBC x 2  H/H improved    # Rectal abscess  poor candidate for palliative CRS procedure  IV antibx , blood culture/ urine culture pending , rectal abscess cultures ordered , follow up     # Rectal Ca with abd and lung mets in shock with no option of any kind of palliative intervention  DNR/ DNI  No invasive lines tests, procedures  Cont antibx, medications  She is a candidate for [facility]  Palliative and Hospice consults ordered. Hospice network called  If pt becomes symptomatic with pain/ distress etc- to start palliative meds    # Hx of COPD - on neb treatment / Brio Elipta at home - will order Symbicort , duo neb PRN .   ICDs  Constipation prophylaxis , Miralax added , Senna PRN .   Pain mgmt - Tylenol , Oxycontin , iv Dilaudid   Continue Reynolds  cath - due to high output of abscess drainage   Patient's son Mr Janette Duffy called ,   meet with him ,   wants only comfort care , OK with pain mgmt and abx .

## 2020-01-19 NOTE — PROGRESS NOTE ADULT - SUBJECTIVE AND OBJECTIVE BOX
Patient c/o rectal pain. 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     Vital Signs Last 24 Hrs  T(C): 36.6 (19 Jan 2020 08:21), Max: 36.9 (18 Jan 2020 12:00)  T(F): 97.8 (19 Jan 2020 08:21), Max: 98.5 (18 Jan 2020 19:10)  HR: 104 (19 Jan 2020 08:34) (100 - 127)  BP: 95/62 (19 Jan 2020 08:21) (84/61 - 98/61)  BP(mean): 76 (19 Jan 2020 01:00) (63 - 76)  RR: 18 (19 Jan 2020 08:21) (18 - 29)  SpO2: 93% (19 Jan 2020 08:34) (92% - 100%)    abd soft, non distended                          9.3    13.35 )-----------( 530      ( 18 Jan 2020 05:24 )             28.0     01-18    135  |  100  |  9.0  ----------------------------<  116<H>  4.1   |  23.0  |  0.45<L>    Ca    8.5<L>      18 Jan 2020 05:24  Mg     1.5     01-17    TPro  5.9<L>  /  Alb  1.9<L>  /  TBili  0.7  /  DBili  x   /  AST  41<H>  /  ALT  16  /  AlkPhos  63  01-18

## 2020-01-19 NOTE — PROGRESS NOTE ADULT - ASSESSMENT
69F w/ PMHx COPD, recently diagnosed rectal CA w/ lung met on medical management.  - 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 Draining stool from external opening. Rec local wound care.

## 2020-01-19 NOTE — PROGRESS NOTE ADULT - SUBJECTIVE AND OBJECTIVE BOX
Patient seen and examined . NAD ,     CC :     HPI:  69F w/ PMHx COPD, recently diagnosed rectal CA w/ lung met on medical management, ?rectal abscess, chronic hypotension was sent over form rehab after she was found to have Hb 6.1. She had a recent Hb 8.4 ( 01/01/2020). Pt was found to be hypotensive 77/55mmHg w/ a repeat Hb 6.9. No obvious bleeding documented. She has no obvious complaints. She is not a candidate for any surgical intervention as per colorectal surgery.   Discussed case w/ son who is the HCA and pt confirmed DNR/DNI status but wants to medically manage her for the time being. He is also considering Hospice care once she is stabilized.   Above noted and apreciatted     PAST MEDICAL & SURGICAL HISTORY:  Anemia  COPD (chronic obstructive pulmonary disease)  H/O bilateral hip replacements      MEDICATIONS  (STANDING):  buDESOnide    Inhalation Suspension 0.5 milliGRAM(s) Inhalation two times a day  lactated ringers. 1000 milliLiter(s) (60 mL/Hr) IV Continuous <Continuous>  meropenem  IVPB 1000 milliGRAM(s) IV Intermittent every 8 hours  midodrine. 15 milliGRAM(s) Oral three times a day  silver sulfADIAZINE 1% Cream 1 Application(s) Topical two times a day    MEDICATIONS  (PRN):  acetaminophen   Tablet .. 650 milliGRAM(s) Oral every 6 hours PRN Moderate Pain (4 - 6)  HYDROmorphone  Injectable 0.5 milliGRAM(s) IV Push every 6 hours PRN Moderate Pain (4 - 6)  oxyCODONE    IR 5 milliGRAM(s) Oral every 6 hours PRN Severe Pain (7 - 10)      LABS:                          9.3    13.35 )-----------( 530      ( 18 Jan 2020 05:24 )             28.0     01-18    135  |  100  |  9.0  ----------------------------<  116<H>  4.1   |  23.0  |  0.45<L>    Ca    8.5<L>      18 Jan 2020 05:24  Mg     1.5     01-17    TPro  5.9<L>  /  Alb  1.9<L>  /  TBili  0.7  /  DBili  x   /  AST  41<H>  /  ALT  16  /  AlkPhos  63  01-18    PT/INR - ( 17 Jan 2020 16:52 )   PT: 14.5 sec;   INR: 1.25 ratio         PTT - ( 17 Jan 2020 16:52 )  PTT:32.4 sec      RADIOLOGY & ADDITIONAL TESTS:    < from: CT Abdomen and Pelvis w/ IV Cont (01.18.20 @ 09:37) >   EXAM:  CT ABDOMEN AND PELVIS IC                          PROCEDURE DATE:  01/18/2020          INTERPRETATION:  Contrast enhanced CT of the abdomen and pelvis .  COMPARISON: 12/16/2019.    CLINICAL HISTORY: Perirectal abscess. Cancer. Shock.  Technique: contiguous axial images were obtained with 2.5 mm slice thickness after intravenous and oral contrast administration.  Coronal and sagittal reformats were also submitted for interpretation.    100 ml of Omnipaque were injected intravenously, and0 ml were discarded, without complications noted.     FINDINGS:   There is a complex rectal  mixed attenuation mass/abscess displacing the air-filled rectum towards the RIGHT. The mass measures 5.2 x 5.2 cm similar to prior examination.    HOWEVER ANair-fluid filled ABSCESS NOW EXTENDS INTO THE LEFT RECTAL FOSSA SPACE MEASURING 9.5 X 3.6 CM EXTENDING LEFT LOWER MEDIAL BUTTOCKS SOFT TISSUES..  Bilateral pulmonary nodules lung unchanged consistent with metastatic lung disease.      There is no free intra-abdominal air or ascites.  Status post cholecystectomy.  The liver, spleen, pancreas, adrenal glands, are normal.    There is no intra or extrahepatic biliary ductal dilatation.  Moderate gastroesophageal hiatal hernia.  The stomach, duodenum,small and large bowel and appendix are within normal limits.    Both kidneys show normal uptake of contrast media without masses or hydronephrosis.      The urinary bladder shows normal morphology and contour.    The reproductive organs are within normal limits.       No gross adenopathy..  The retroperitoneal vessels are normal.      Chronic unchanged large a bone cystic erosion of the superior acetabulum LEFT hip unchanged. No adjacent soft tissue mass.    IMPRESSION:     Rectal mass/abscess size unchanged HOWEVER ABSCESS CAVITY MEASURING 9 CM IN LENGTH EXTENDS INTO THE LEFT PERIRECTAL FOSSA AND LEFT MEDIAL BUTTOCK SOFT TISSUES.        KEVIN VALLES M.D., ATTENDING RADIOLOGIST  This document has been electronically signed. Jan 182020  9:52AM        < end of copied text >    < from: Xray Chest 1 View-PORTABLE IMMEDIATE (01.17.20 @ 17:29) >   EXAM:  XR CHEST PORTABLE IMMED 1V                          PROCEDURE DATE:  01/17/2020          INTERPRETATION:  Portable chest radiograph        CLINICAL INFORMATION:   Sepsis    TECHNIQUE:  Portable  AP view of the chest was obtained.    COMPARISON: 12/20/2019 available for review.    FINDINGS:   The lungs  are clear.  No pleural abnormality is seen.    Heart size within normal limits allowing for magnification effect of AP projection. .   Visualized osseous structures are intact.        IMPRESSION:   No evidence of active chest disease.        KEVIN VALLES M.D., ATTENDING RADIOLOGIST  This document has been electronically signed. Jan 18 2020  9:00AM        < end of copied text >          REVIEW OF SYSTEMS:    Rectal pain better controlled with pain medications , drainage from rectum+ , all other systems reviewed and are negative .     Vital Signs Last 24 Hrs  T(C): 36.6 (19 Jan 2020 08:21), Max: 36.9 (18 Jan 2020 19:10)  T(F): 97.8 (19 Jan 2020 08:21), Max: 98.5 (18 Jan 2020 19:10)  HR: 104 (19 Jan 2020 08:34) (100 - 127)  BP: 95/62 (19 Jan 2020 08:21) (84/61 - 98/61)  BP(mean): 76 (19 Jan 2020 01:00) (63 - 76)  RR: 18 (19 Jan 2020 08:21) (18 - 29)  SpO2: 93% (19 Jan 2020 08:34) (92% - 100%)  PHYSICAL EXAM:    GENERAL: NAD, well-groomed, well-developed  HEAD:  Atraumatic, Normocephalic  EYES: EOMI, PERRLA, conjunctiva and sclera clear  NECK: Supple, No JVD, Normal thyroid  NERVOUS SYSTEM:  Alert & Oriented X3, no focal deficit  CHEST/LUNG: CTA b/l ,  no  rales, rhonchi + , no  wheezing, or rubs  HEART: Regular rate and rhythm; No murmurs, rubs, or gallops  ABDOMEN: Soft, Nontender, Nondistended; Bowel sounds present  EXTREMITIES:  2+ Peripheral Pulses, No clubbing, cyanosis, or edema  LYMPH: No lymphadenopathy noted  SKIN: No rashes or lesions  Large fungating tumor to level of left glutenous and perianal region seen with large open   cavity draining feculent material   Reynolds cath + Patient seen and examined . NAD , rectal pain well controlled at this time , rectal drainage + , AAOX 3 , no other complaints .     CC : sent from rehab for decreased H/H , transfused , rectal pain well controlled , no other complaints     HPI:  69F w/ PMHx COPD, recently diagnosed rectal CA w/ lung met on medical management, ?rectal abscess, chronic hypotension was sent over form rehab after she was found to have Hb 6.1. She had a recent Hb 8.4 ( 01/01/2020). Pt was found to be hypotensive 77/55mmHg w/ a repeat Hb 6.9. No obvious bleeding documented. She has no obvious complaints. She is not a candidate for any surgical intervention as per colorectal surgery.   Discussed case w/ son who is the HCA and pt confirmed DNR/DNI status but wants to medically manage her for the time being. He is also considering Hospice care once she is stabilized.   Above noted and appreciated     PAST MEDICAL & SURGICAL HISTORY:  Anemia  COPD (chronic obstructive pulmonary disease)  H/O bilateral hip replacements      MEDICATIONS  (STANDING):  buDESOnide    Inhalation Suspension 0.5 milliGRAM(s) Inhalation two times a day  lactated ringers. 1000 milliLiter(s) (60 mL/Hr) IV Continuous <Continuous>  meropenem  IVPB 1000 milliGRAM(s) IV Intermittent every 8 hours  midodrine. 15 milliGRAM(s) Oral three times a day  silver sulfADIAZINE 1% Cream 1 Application(s) Topical two times a day    MEDICATIONS  (PRN):  acetaminophen   Tablet .. 650 milliGRAM(s) Oral every 6 hours PRN Moderate Pain (4 - 6)  HYDROmorphone  Injectable 0.5 milliGRAM(s) IV Push every 6 hours PRN Moderate Pain (4 - 6)  oxyCODONE    IR 5 milliGRAM(s) Oral every 6 hours PRN Severe Pain (7 - 10)      LABS:                          9.3    13.35 )-----------( 530      ( 18 Jan 2020 05:24 )             28.0     01-18    135  |  100  |  9.0  ----------------------------<  116<H>  4.1   |  23.0  |  0.45<L>    Ca    8.5<L>      18 Jan 2020 05:24  Mg     1.5     01-17    TPro  5.9<L>  /  Alb  1.9<L>  /  TBili  0.7  /  DBili  x   /  AST  41<H>  /  ALT  16  /  AlkPhos  63  01-18    PT/INR - ( 17 Jan 2020 16:52 )   PT: 14.5 sec;   INR: 1.25 ratio         PTT - ( 17 Jan 2020 16:52 )  PTT:32.4 sec      RADIOLOGY & ADDITIONAL TESTS:    < from: CT Abdomen and Pelvis w/ IV Cont (01.18.20 @ 09:37) >   EXAM:  CT ABDOMEN AND PELVIS IC                          PROCEDURE DATE:  01/18/2020          INTERPRETATION:  Contrast enhanced CT of the abdomen and pelvis .  COMPARISON: 12/16/2019.    CLINICAL HISTORY: Perirectal abscess. Cancer. Shock.  Technique: contiguous axial images were obtained with 2.5 mm slice thickness after intravenous and oral contrast administration.  Coronal and sagittal reformats were also submitted for interpretation.    100 ml of Omnipaque were injected intravenously, and0 ml were discarded, without complications noted.     FINDINGS:   There is a complex rectal  mixed attenuation mass/abscess displacing the air-filled rectum towards the RIGHT. The mass measures 5.2 x 5.2 cm similar to prior examination.    HOWEVER ANair-fluid filled ABSCESS NOW EXTENDS INTO THE LEFT RECTAL FOSSA SPACE MEASURING 9.5 X 3.6 CM EXTENDING LEFT LOWER MEDIAL BUTTOCKS SOFT TISSUES..  Bilateral pulmonary nodules lung unchanged consistent with metastatic lung disease.      There is no free intra-abdominal air or ascites.  Status post cholecystectomy.  The liver, spleen, pancreas, adrenal glands, are normal.    There is no intra or extrahepatic biliary ductal dilatation.  Moderate gastroesophageal hiatal hernia.  The stomach, duodenum,small and large bowel and appendix are within normal limits.    Both kidneys show normal uptake of contrast media without masses or hydronephrosis.      The urinary bladder shows normal morphology and contour.    The reproductive organs are within normal limits.       No gross adenopathy..  The retroperitoneal vessels are normal.      Chronic unchanged large a bone cystic erosion of the superior acetabulum LEFT hip unchanged. No adjacent soft tissue mass.    IMPRESSION:     Rectal mass/abscess size unchanged HOWEVER ABSCESS CAVITY MEASURING 9 CM IN LENGTH EXTENDS INTO THE LEFT PERIRECTAL FOSSA AND LEFT MEDIAL BUTTOCK SOFT TISSUES.        KEVIN VALLES M.D., ATTENDING RADIOLOGIST  This document has been electronically signed. Jan 182020  9:52AM        < end of copied text >    < from: Xray Chest 1 View-PORTABLE IMMEDIATE (01.17.20 @ 17:29) >   EXAM:  XR CHEST PORTABLE IMMED 1V                          PROCEDURE DATE:  01/17/2020          INTERPRETATION:  Portable chest radiograph        CLINICAL INFORMATION:   Sepsis    TECHNIQUE:  Portable  AP view of the chest was obtained.    COMPARISON: 12/20/2019 available for review.    FINDINGS:   The lungs  are clear.  No pleural abnormality is seen.    Heart size within normal limits allowing for magnification effect of AP projection. .   Visualized osseous structures are intact.        IMPRESSION:   No evidence of active chest disease.        KEVIN VALLES M.D., ATTENDING RADIOLOGIST  This document has been electronically signed. Jan 18 2020  9:00AM        < end of copied text >          REVIEW OF SYSTEMS:    Rectal pain better controlled with pain medications , drainage from rectum+ , all other systems reviewed and are negative .     Vital Signs Last 24 Hrs  T(C): 36.6 (19 Jan 2020 08:21), Max: 36.9 (18 Jan 2020 19:10)  T(F): 97.8 (19 Jan 2020 08:21), Max: 98.5 (18 Jan 2020 19:10)  HR: 104 (19 Jan 2020 08:34) (100 - 127)  BP: 95/62 (19 Jan 2020 08:21) (84/61 - 98/61)  BP(mean): 76 (19 Jan 2020 01:00) (63 - 76)  RR: 18 (19 Jan 2020 08:21) (18 - 29)  SpO2: 93% (19 Jan 2020 08:34) (92% - 100%)  PHYSICAL EXAM:    GENERAL: NAD, well-groomed, well-developed  HEAD:  Atraumatic, Normocephalic  EYES: EOMI, PERRLA, conjunctiva and sclera clear  NECK: Supple, No JVD, Normal thyroid  NERVOUS SYSTEM:  Alert & Oriented X3, no focal deficit  CHEST/LUNG: CTA b/l ,  no  rales, rhonchi + , no  wheezing, or rubs  HEART: Regular rate and rhythm; No murmurs, rubs, or gallops  ABDOMEN: Soft, Nontender, Nondistended; Bowel sounds present  EXTREMITIES:  2+ Peripheral Pulses, No clubbing, cyanosis, or edema  LYMPH: No lymphadenopathy noted  SKIN: No rashes or lesions  Large fungating tumor to level of left glutenous and perianal region seen with large open   cavity draining feculent material   Reynolds cath +

## 2020-01-20 PROCEDURE — 99233 SBSQ HOSP IP/OBS HIGH 50: CPT

## 2020-01-20 RX ORDER — SODIUM CHLORIDE 9 MG/ML
1000 INJECTION, SOLUTION INTRAVENOUS
Refills: 0 | Status: DISCONTINUED | OUTPATIENT
Start: 2020-01-20 | End: 2020-01-23

## 2020-01-20 RX ADMIN — Medication 1 APPLICATION(S): at 05:10

## 2020-01-20 RX ADMIN — MIDODRINE HYDROCHLORIDE 15 MILLIGRAM(S): 2.5 TABLET ORAL at 14:24

## 2020-01-20 RX ADMIN — SODIUM CHLORIDE 100 MILLILITER(S): 9 INJECTION, SOLUTION INTRAVENOUS at 17:17

## 2020-01-20 RX ADMIN — MIDODRINE HYDROCHLORIDE 15 MILLIGRAM(S): 2.5 TABLET ORAL at 21:04

## 2020-01-20 RX ADMIN — Medication 0.5 MILLIGRAM(S): at 08:28

## 2020-01-20 RX ADMIN — MIDODRINE HYDROCHLORIDE 15 MILLIGRAM(S): 2.5 TABLET ORAL at 05:08

## 2020-01-20 RX ADMIN — MEROPENEM 100 MILLIGRAM(S): 1 INJECTION INTRAVENOUS at 05:08

## 2020-01-20 RX ADMIN — Medication 0.5 MILLIGRAM(S): at 21:33

## 2020-01-20 RX ADMIN — MEROPENEM 100 MILLIGRAM(S): 1 INJECTION INTRAVENOUS at 21:04

## 2020-01-20 RX ADMIN — MEROPENEM 100 MILLIGRAM(S): 1 INJECTION INTRAVENOUS at 14:23

## 2020-01-20 NOTE — PROGRESS NOTE ADULT - SUBJECTIVE AND OBJECTIVE BOX
Patient seen and examined . Comfortable , NAD , AAOX 3 , perianal discomfort +  , poor oral intake ,  no other complaints .  Mr Mejia at bed side     CC : perianal discomfort +  , poor oral intake       MEDICATIONS  (STANDING):  buDESOnide    Inhalation Suspension 0.5 milliGRAM(s) Inhalation two times a day  budesonide 160 MICROgram(s)/formoterol 4.5 MICROgram(s) Inhaler 2 Puff(s) Inhalation two times a day  lactated ringers. 1000 milliLiter(s) (75 mL/Hr) IV Continuous <Continuous>  meropenem  IVPB 1000 milliGRAM(s) IV Intermittent every 8 hours  midodrine. 15 milliGRAM(s) Oral three times a day  polyethylene glycol 3350 17 Gram(s) Oral daily  silver sulfADIAZINE 1% Cream 1 Application(s) Topical two times a day    MEDICATIONS  (PRN):  acetaminophen   Tablet .. 650 milliGRAM(s) Oral every 6 hours PRN Moderate Pain (4 - 6)  albuterol/ipratropium for Nebulization 3 milliLiter(s) Nebulizer every 6 hours PRN Shortness of Breath and/or Wheezing  HYDROmorphone  Injectable 0.5 milliGRAM(s) IV Push every 6 hours PRN Moderate Pain (4 - 6)  oxyCODONE    IR 5 milliGRAM(s) Oral every 6 hours PRN Severe Pain (7 - 10)  senna 2 Tablet(s) Oral at bedtime PRN IF NO bm x 2 days      LABS:    Comprehensive Metabolic Panel in AM (01.18.20 @ 05:24)    Sodium, Serum: 135 mmol/L    Potassium, Serum: 4.1 mmol/L    Chloride, Serum: 100 mmol/L    Carbon Dioxide, Serum: 23.0 mmol/L    Anion Gap, Serum: 12 mmol/L    Blood Urea Nitrogen, Serum: 9.0 mg/dL    Creatinine, Serum: 0.45 mg/dL    Glucose, Serum: 116 mg/dL    Calcium, Total Serum: 8.5 mg/dL    Protein Total, Serum: 5.9 g/dL    Albumin, Serum: 1.9 g/dL    Bilirubin Total, Serum: 0.7 mg/dL    Alkaline Phosphatase, Serum: 63 U/L    Aspartate Aminotransferase (AST/SGOT): 41 U/L    Alanine Aminotransferase (ALT/SGPT): 16 U/L    eGFR if Non : 103: Interpretative comment  Complete Blood Count in AM (01.18.20 @ 05:24)    WBC Count: 13.35 K/uL  Culture - Blood (01.17.20 @ 18:22)    Specimen Source: .Blood    Culture Results:   No growth at 48 hours      RBC Count: 3.26 M/uL    Hemoglobin: 9.3 g/dL    Hematocrit: 28.0 %    Mean Cell Volume: 85.9 fl    Mean Cell Hemoglobin: 28.5 pg    Mean Cell Hemoglobin Conc: 33.2 gm/dL    Red Cell Distrib Width: 14.4 %    Platelet Count - Automated: 530 K/uL    Culture - Blood (01.17.20 @ 18:23)    Specimen Source: .Blood    Culture Results:   No growth at 48 hours    RADIOLOGY & ADDITIONAL TESTS:  < from: Xray Chest 1 View-PORTABLE IMMEDIATE (01.17.20 @ 17:29) >     EXAM:  XR CHEST PORTABLE IMMED 1V                          PROCEDURE DATE:  01/17/2020          INTERPRETATION:  Portable chest radiograph        CLINICAL INFORMATION:   Sepsis    TECHNIQUE:  Portable  AP view of the chest was obtained.    COMPARISON: 12/20/2019 available for review.    FINDINGS:   The lungs  are clear.  No pleural abnormality is seen.    Heart size within normal limits allowing for magnification effect of AP projection. .   Visualized osseous structures are intact.        IMPRESSION:   No evidence of active chest disease.      KEVIN VALLES M.D., ATTENDING RADIOLOGIST  This document has been electronically signed. Jan 18 2020  9:00AM    < end of copied text >    < from: CT Abdomen and Pelvis w/ IV Cont (01.18.20 @ 09:37) >     EXAM:  CT ABDOMEN AND PELVIS IC                          PROCEDURE DATE:  01/18/2020          INTERPRETATION:  Contrast enhanced CT of the abdomen and pelvis .  COMPARISON: 12/16/2019.    CLINICAL HISTORY: Perirectal abscess. Cancer. Shock.  Technique: contiguous axial images were obtained with 2.5 mm slice thickness after intravenous and oral contrast administration.  Coronal and sagittal reformats were also submitted for interpretation.    100 ml of Omnipaque were injected intravenously, and0 ml were discarded, without complications noted.     FINDINGS:   There is a complex rectal  mixed attenuation mass/abscess displacing the air-filled rectum towards the RIGHT. The mass measures 5.2 x 5.2 cm similar to prior examination.    HOWEVER ANair-fluid filled ABSCESS NOW EXTENDS INTO THE LEFT RECTAL FOSSA SPACE MEASURING 9.5 X 3.6 CM EXTENDING LEFT LOWER MEDIAL BUTTOCKS SOFT TISSUES..  Bilateral pulmonary nodules lung unchanged consistent with metastatic lung disease.      There is no free intra-abdominal air or ascites.  Status post cholecystectomy.  The liver, spleen, pancreas, adrenal glands, are normal.    There is no intra or extrahepatic biliary ductal dilatation.  Moderate gastroesophageal hiatal hernia.  The stomach, duodenum,small and large bowel and appendix are within normal limits.    Both kidneys show normal uptake of contrast media without masses or hydronephrosis.      The urinary bladder shows normal morphology and contour.    The reproductive organs are within normal limits.       No gross adenopathy..  The retroperitoneal vessels are normal.      Chronic unchanged large a bone cystic erosion of the superior acetabulum LEFT hip unchanged. No adjacent soft tissue mass.    IMPRESSION:     Rectal mass/abscess size unchanged HOWEVER ABSCESS CAVITY MEASURING 9 CM IN LENGTH EXTENDS INTO THE LEFT PERIRECTAL FOSSA AND LEFT MEDIAL BUTTOCK SOFT TISSUES.      KEVIN VALLES M.D., ATTENDING RADIOLOGIST  This document has been electronically signed. Jan 182020  9:52AM    < end of copied text >              REVIEW OF SYSTEMS:    As above , all other systems reviewed and are negative .   Vital Signs Last 24 Hrs  T(C): 36.6 (20 Jan 2020 07:48), Max: 36.8 (19 Jan 2020 15:33)  T(F): 97.8 (20 Jan 2020 07:48), Max: 98.2 (19 Jan 2020 15:33)  HR: 90 (20 Jan 2020 08:56) (90 - 102)  BP: 91/61 (20 Jan 2020 07:48) (88/62 - 98/70)  BP(mean): --  RR: 19 (20 Jan 2020 07:48) (18 - 19)  SpO2: 99% (19 Jan 2020 23:07) (94% - 99%)    PHYSICAL EXAM:    GENERAL: NAD, well-groomed, well-developed  HEAD:  Atraumatic, Normocephalic  EYES: EOMI, PERRLA, conjunctiva and sclera clear  NECK: Supple, No JVD, Normal thyroid  NERVOUS SYSTEM:  Alert & Oriented X3, no focal deficit  CHEST/LUNG: CTA b/l ,  no  rales, rhonchi, wheezing, or rubs  HEART: Regular rate and rhythm; No murmurs, rubs, or gallops  ABDOMEN: Soft, Nontender, Nondistended; Bowel sounds present  EXTREMITIES:  2+ Peripheral Pulses, No clubbing, cyanosis, or edema  LYMPH: No lymphadenopathy noted  SKIN: No rashes or lesions  - 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 Draining stool from external opening.

## 2020-01-20 NOTE — PROGRESS NOTE ADULT - ASSESSMENT
69F w/ PMHx COPD, recently diagnosed rectal CA w/ lung met on medical management,  chronic hypotension was sent over form rehab after she was found to have Hb 6.1. She had a recent Hb 8.4 ( 01/01/2020). Pt was found to be hypotensive 77/55mmHg w/ a repeat Hb 6.9. No obvious bleeding documented. She has no obvious complaints. She is not a candidate for any surgical intervention as per colorectal surgery.   Discussed case w/ son who is the HCA and pt confirmed DNR/DNI status but wants to medically manage her for the time being. He is also considering Hospice care once she is stabilized.   Above noted and appreciated .      # Shock- hypovolemic and septic  s/p PRBC X2   s/p pressors  Now on midodrine  and IVF  bld c/s negative     # symptomatic anemia due to blood lost due to rectal cancer   s/p PRBC x 2  H/H improved    # Likely  Rectal abscess  poor candidate for palliative CRS procedure  IV antibx ,rectal abscess cultures ordered , follow up     # Rectal Ca with abd and lung mets in shock with no option of any kind of palliative intervention  DNR/ DNI  No invasive lines tests, procedures  Cont antibx, medications  She is a candidate for [facility]  Palliative and Hospice consults ordered. Hospice network called  Hospice care liason will be available to speak with Family on Tuesday .   Patient not sure if she wants to go to [facility] , will leave to her son to decide .   If pt becomes symptomatic with pain/ distress etc- to start palliative meds    # Hx of COPD - on neb treatment / Brio Elipta at home - will order Symbicort , duo neb PRN .   ICDs  Constipation prophylaxis , Miralax added , Senna PRN .   Pain mgmt - Tylenol , Oxycontin , iv Dilaudid   Continue Reynolds  cath - due to high output of abscess drainage   Patient's son Mr Janette Duffy met yesterday   wants only comfort care , OK with pain mgmt and abx .

## 2020-01-21 DIAGNOSIS — Z51.5 ENCOUNTER FOR PALLIATIVE CARE: ICD-10-CM

## 2020-01-21 DIAGNOSIS — K62.89 OTHER SPECIFIED DISEASES OF ANUS AND RECTUM: ICD-10-CM

## 2020-01-21 DIAGNOSIS — Z71.89 OTHER SPECIFIED COUNSELING: ICD-10-CM

## 2020-01-21 DIAGNOSIS — C20 MALIGNANT NEOPLASM OF RECTUM: ICD-10-CM

## 2020-01-21 PROCEDURE — 99223 1ST HOSP IP/OBS HIGH 75: CPT

## 2020-01-21 PROCEDURE — 99497 ADVNCD CARE PLAN 30 MIN: CPT | Mod: 25

## 2020-01-21 PROCEDURE — 99232 SBSQ HOSP IP/OBS MODERATE 35: CPT

## 2020-01-21 RX ORDER — SODIUM CHLORIDE 9 MG/ML
500 INJECTION INTRAMUSCULAR; INTRAVENOUS; SUBCUTANEOUS ONCE
Refills: 0 | Status: COMPLETED | OUTPATIENT
Start: 2020-01-21 | End: 2020-01-21

## 2020-01-21 RX ADMIN — MIDODRINE HYDROCHLORIDE 15 MILLIGRAM(S): 2.5 TABLET ORAL at 16:48

## 2020-01-21 RX ADMIN — OXYCODONE HYDROCHLORIDE 5 MILLIGRAM(S): 5 TABLET ORAL at 19:37

## 2020-01-21 RX ADMIN — Medication 0.5 MILLIGRAM(S): at 08:22

## 2020-01-21 RX ADMIN — MIDODRINE HYDROCHLORIDE 15 MILLIGRAM(S): 2.5 TABLET ORAL at 05:37

## 2020-01-21 RX ADMIN — Medication 0.5 MILLIGRAM(S): at 20:55

## 2020-01-21 RX ADMIN — MEROPENEM 100 MILLIGRAM(S): 1 INJECTION INTRAVENOUS at 12:14

## 2020-01-21 RX ADMIN — MEROPENEM 100 MILLIGRAM(S): 1 INJECTION INTRAVENOUS at 05:37

## 2020-01-21 RX ADMIN — OXYCODONE HYDROCHLORIDE 5 MILLIGRAM(S): 5 TABLET ORAL at 20:35

## 2020-01-21 RX ADMIN — OXYCODONE HYDROCHLORIDE 5 MILLIGRAM(S): 5 TABLET ORAL at 05:37

## 2020-01-21 RX ADMIN — Medication 1 APPLICATION(S): at 16:48

## 2020-01-21 RX ADMIN — SODIUM CHLORIDE 1000 MILLILITER(S): 9 INJECTION INTRAMUSCULAR; INTRAVENOUS; SUBCUTANEOUS at 09:21

## 2020-01-21 RX ADMIN — MIDODRINE HYDROCHLORIDE 15 MILLIGRAM(S): 2.5 TABLET ORAL at 12:14

## 2020-01-21 RX ADMIN — POLYETHYLENE GLYCOL 3350 17 GRAM(S): 17 POWDER, FOR SOLUTION ORAL at 12:14

## 2020-01-21 NOTE — CHART NOTE - NSCHARTNOTEFT_GEN_A_CORE
Indwelling catheter renewed for urinary retention .Pt stable  Supplemental O2 via NC 2L ordered due to ongoing hypoxia on RA. Afebrile, stable.

## 2020-01-21 NOTE — CONSULT NOTE ADULT - ASSESSMENT
69F w/ PMHx COPD, recently diagnosed rectal CA w/ lung met, chronic hypotension and anemia.  Palliative care called for [facility].

## 2020-01-21 NOTE — CONSULT NOTE ADULT - PROBLEM SELECTOR RECOMMENDATION 4
Called son Jean Carlos  - v/m not set up to leave message.  Called pts  Mr. Mejia. Greater then 20 mins spent discussing ACP.  He confirms that the goal is the pts comfort.  Plan for hospice.  He states that Pall care should speak with Jean Carlos (pts son) to confirm decisions as he is HCP and defers decisions to him. I gave Mr. Mejia the  for pall care phone number to have Jean Carlos call back to confirm plan and discuss.

## 2020-01-21 NOTE — PROGRESS NOTE ADULT - SUBJECTIVE AND OBJECTIVE BOX
Patient seen and examined . C/O perianal discomfort  and drainage , poor appetite , no other complaints     CC : perianal pain / drainage , poor oral intake       MEDICATIONS  (STANDING):  buDESOnide    Inhalation Suspension 0.5 milliGRAM(s) Inhalation two times a day  budesonide 160 MICROgram(s)/formoterol 4.5 MICROgram(s) Inhaler 2 Puff(s) Inhalation two times a day  lactated ringers. 1000 milliLiter(s) (100 mL/Hr) IV Continuous <Continuous>  meropenem  IVPB 1000 milliGRAM(s) IV Intermittent every 8 hours  midodrine. 15 milliGRAM(s) Oral three times a day  polyethylene glycol 3350 17 Gram(s) Oral daily  silver sulfADIAZINE 1% Cream 1 Application(s) Topical two times a day    MEDICATIONS  (PRN):  acetaminophen   Tablet .. 650 milliGRAM(s) Oral every 6 hours PRN Moderate Pain (4 - 6)  albuterol/ipratropium for Nebulization 3 milliLiter(s) Nebulizer every 6 hours PRN Shortness of Breath and/or Wheezing  HYDROmorphone  Injectable 0.5 milliGRAM(s) IV Push every 6 hours PRN Moderate Pain (4 - 6)  oxyCODONE    IR 5 milliGRAM(s) Oral every 6 hours PRN Severe Pain (7 - 10)  senna 2 Tablet(s) Oral at bedtime PRN IF NO bm x 2 days      LABS:  Occult Blood, Feces (01.17.20 @ 16:53)    Occult Blood, Feces: Positive        RADIOLOGY & ADDITIONAL TESTS:    < from: CT Abdomen and Pelvis w/ IV Cont (01.18.20 @ 09:37) >     EXAM:  CT ABDOMEN AND PELVIS IC                          PROCEDURE DATE:  01/18/2020          INTERPRETATION:  Contrast enhanced CT of the abdomen and pelvis .  COMPARISON: 12/16/2019.    CLINICAL HISTORY: Perirectal abscess. Cancer. Shock.  Technique: contiguous axial images were obtained with 2.5 mm slice thickness after intravenous and oral contrast administration.  Coronal and sagittal reformats were also submitted for interpretation.    100 ml of Omnipaque were injected intravenously, and0 ml were discarded, without complications noted.     FINDINGS:   There is a complex rectal  mixed attenuation mass/abscess displacing the air-filled rectum towards the RIGHT. The mass measures 5.2 x 5.2 cm similar to prior examination.    HOWEVER ANair-fluid filled ABSCESS NOW EXTENDS INTO THE LEFT RECTAL FOSSA SPACE MEASURING 9.5 X 3.6 CM EXTENDING LEFT LOWER MEDIAL BUTTOCKS SOFT TISSUES..  Bilateral pulmonary nodules lung unchanged consistent with metastatic lung disease.      There is no free intra-abdominal air or ascites.  Status post cholecystectomy.  The liver, spleen, pancreas, adrenal glands, are normal.    There is no intra or extrahepatic biliary ductal dilatation.  Moderate gastroesophageal hiatal hernia.  The stomach, duodenum,small and large bowel and appendix are within normal limits.    Both kidneys show normal uptake of contrast media without masses or hydronephrosis.      The urinary bladder shows normal morphology and contour.    The reproductive organs are within normal limits.       No gross adenopathy..  The retroperitoneal vessels are normal.      Chronic unchanged large a bone cystic erosion of the superior acetabulum LEFT hip unchanged. No adjacent soft tissue mass.    IMPRESSION:     Rectal mass/abscess size unchanged HOWEVER ABSCESS CAVITY MEASURING 9 CM IN LENGTH EXTENDS INTO THE LEFT PERIRECTAL FOSSA AND LEFT MEDIAL BUTTOCK SOFT TISSUES.    KEVIN VALLES M.D., ATTENDING RADIOLOGIST  This document has been electronically signed. Jan 182020  9:52AM        < end of copied text >    < from: Xray Chest 1 View-PORTABLE IMMEDIATE (01.17.20 @ 17:29) >     EXAM:  XR CHEST PORTABLE IMMED 1V                          PROCEDURE DATE:  01/17/2020          INTERPRETATION:  Portable chest radiograph        CLINICAL INFORMATION:   Sepsis    TECHNIQUE:  Portable  AP view of the chest was obtained.    COMPARISON: 12/20/2019 available for review.    FINDINGS:   The lungs  are clear.  No pleural abnormality is seen.    Heart size within normal limits allowing for magnification effect of AP projection. .   Visualized osseous structures are intact.        IMPRESSION:   No evidence of active chest disease.      < end of copied text >          REVIEW OF SYSTEMS:    As above , all other systems reviewed and are negative .     Vital Signs Last 24 Hrs  T(C): 36.4 (21 Jan 2020 08:02), Max: 36.5 (21 Jan 2020 00:30)  T(F): 97.5 (21 Jan 2020 08:02), Max: 97.7 (21 Jan 2020 00:30)  HR: 81 (21 Jan 2020 08:23) (55 - 101)  BP: 83/61 (21 Jan 2020 08:02) (80/51 - 102/72)  BP(mean): --  RR: 18 (21 Jan 2020 08:02) (18 - 18)  SpO2: 97% (21 Jan 2020 08:23) (91% - 97%)    PHYSICAL EXAM:    GENERAL: NAD, well-groomed, frail   HEAD:  Atraumatic, Normocephalic  EYES: EOMI, PERRLA, conjunctiva and sclera clear  NECK: Supple, No JVD, Normal thyroid  NERVOUS SYSTEM:  Alert & Oriented X3, no focal deficit  CHEST/LUNG: CTA b/l ,  no  rales, rhonchi, wheezing, or rubs  HEART: Regular rate and rhythm; No murmurs, rubs, or gallops  ABDOMEN: Soft, Nontender, Nondistended; Bowel sounds present  EXTREMITIES:  2+ Peripheral Pulses, No clubbing, cyanosis, or edema , LUE mid line   LYMPH: No lymphadenopathy noted  SKIN: No rashes or lesions

## 2020-01-21 NOTE — CONSULT NOTE ADULT - SUBJECTIVE AND OBJECTIVE BOX
HPI:  69F w/ PMHx COPD, recently diagnosed rectal CA w/ lung met on medical management, ?rectal abscess, chronic hypotension was sent over form rehab after she was found to have Hb 6.1. She had a recent Hb 8.4 ( 01/01/2020). Pt was found to be hypotensive 77/55mmHg w/ a repeat Hb 6.9. No obvious bleeding documented. She has no obvious complaints. She is not a candidate for any surgical intervention as per colorectal surgery.   Discussed case w/ son who is the HCA and pt confirmed DNR/DNI status but wants to medically manage her for the time being. He is also considering Hospice care once she is stabilized. (17 Jan 2020 20:33)    PERTINENT PM/SXH:   Anemia  COPD (chronic obstructive pulmonary disease)    H/O bilateral hip replacements    FAMILY HISTORY:    ITEMS NOT CHECKED ARE NOT PRESENT    SOCIAL HISTORY:   Significant other/partner Sukhjinder Mejia [x ]  Children   Clive [ ]  Roman Catholic/Spirituality:  Substance hx:  [ ]   Tobacco hx:  [ ]   Alcohol hx: [ ]   Home Opioid hx:  [ ] I-Stop Reference No:  Living Situation: [ x]Home  [ ]Long term care  [ ]Rehab [ ]Other    ADVANCE DIRECTIVES:    DNR  Yes  MOLST  [x ]    Living Will  [ ]     DECISION MAKER(s):  [ ] Health Care Proxy(s)  [ ] Surrogate(s)  [ ] Guardian           Name(s): Phone Number(s): Clive Savage [phone]    BASELINE (I)ADL(s) (prior to admission):  [place]: [ ]Total  [x ] Moderate [ ]Dependent    Allergies    No Known Allergies    Intolerances    MEDICATIONS  (STANDING):  buDESOnide    Inhalation Suspension 0.5 milliGRAM(s) Inhalation two times a day  budesonide 160 MICROgram(s)/formoterol 4.5 MICROgram(s) Inhaler 2 Puff(s) Inhalation two times a day  lactated ringers. 1000 milliLiter(s) (100 mL/Hr) IV Continuous <Continuous>  meropenem  IVPB 1000 milliGRAM(s) IV Intermittent every 8 hours  midodrine. 15 milliGRAM(s) Oral three times a day  polyethylene glycol 3350 17 Gram(s) Oral daily  silver sulfADIAZINE 1% Cream 1 Application(s) Topical two times a day    MEDICATIONS  (PRN):  acetaminophen   Tablet .. 650 milliGRAM(s) Oral every 6 hours PRN Moderate Pain (4 - 6)  albuterol/ipratropium for Nebulization 3 milliLiter(s) Nebulizer every 6 hours PRN Shortness of Breath and/or Wheezing  HYDROmorphone  Injectable 0.5 milliGRAM(s) IV Push every 6 hours PRN Moderate Pain (4 - 6)  oxyCODONE    IR 5 milliGRAM(s) Oral every 6 hours PRN Severe Pain (7 - 10)  senna 2 Tablet(s) Oral at bedtime PRN IF NO bm x 2 days    PRESENT SYMPTOMS: [ ]Unable to obtain due to poor mentation   Source if other than patient:  [ ]Family   [ ]Team     Pain: [x ]yes [ ]no  QOL impact -   Location -      perianal              Aggravating factors -   Quality -  Radiation -  Timing-  Severity (0-10 scale):  Minimal acceptable level (0-10 scale):     PAIN AD Score:     [email or web address] (press ctrl +  left click to view)    Dyspnea:                           [ ]Mild [ ]Moderate [ ]Severe  Anxiety:                             [ ]Mild [ ]Moderate [ ]Severe  Fatigue:                             [ ]Mild [x ]Moderate [ ]Severe  Nausea:                            [ ]Mild [ ]Moderate [ ]Severe  Loss of appetite:               [x ]Mild [ ]Moderate [ ]Severe  Constipation:                    [ ]Mild [ ]Moderate [ ]Severe    Other Symptoms:  [ ]All other review of systems negative     Palliative Performance Status Version 2:    50     %    [email or web address].org/files/news/[email or web address]_performance_scale_[email or web address].pdf  PHYSICAL EXAM:  Vital Signs Last 24 Hrs  T(C): 36.4 (21 Jan 2020 08:02), Max: 36.5 (21 Jan 2020 00:30)  T(F): 97.5 (21 Jan 2020 08:02), Max: 97.7 (21 Jan 2020 00:30)  HR: 81 (21 Jan 2020 08:23) (55 - 101)  BP: 83/61 (21 Jan 2020 08:02) (80/51 - 102/72)  BP(mean): --  RR: 18 (21 Jan 2020 08:02) (18 - 18)  SpO2: 97% (21 Jan 2020 08:23) (91% - 97%) I&O's Summary    20 Jan 2020 07:01 - 21 Jan 2020 07:00  --------------------------------------------------------  IN: 1495 mL / OUT: 450 mL / NET: 1045 mL    21 Jan 2020 07:01  -  21 Jan 2020 10:03  --------------------------------------------------------  IN: 0 mL / OUT: 600 mL / NET: -600 mL      GENERAL:  [x ]Alert  [x]Oriented x   [ ]Lethargic  [ ]Cachexia  [ ]Unarousable  [ ]Verbal  [ ]Non-Verbal    Behavioral:   [ ] Anxiety  [ ] Delirium [ ] Agitation [ ] Other    HEENT:  [ ]Normal   [x]Dry mouth   [ ]ET Tube/Trach  [ ]Oral lesions    PULMONARY:   [x ]Clear [ ]Tachypnea  [ ]Audible excessive secretions   [ ]Rhonchi        [ ]Right [ ]Left [ ]Bilateral  [ ]Crackles        [ ]Right [ ]Left [ ]Bilateral  [ ]Wheezing     [ ]Right [ ]Left [ ]Bilatera  [ ]Diminished breath sounds [ ]right [ ]left [ ]bilateral    CARDIOVASCULAR:    [xRegular [ ]Irregular [ ]Tachy  [ ][place] [ ]Murmur [ ]Other    GASTROINTESTINAL:  [x ]Soft  [x ]Distended   [x ]+BS  [x ]Non tender [ ]Tender  [ ]PEG [ ]OGT/ NGT  Last BM:       GENITOURINARY:  [x ]Normal [ ] Incontinent   [ ]Oliguria/Anuria   [ ]Reynolds    [ ]External cath    MUSCULOSKELETAL:   [x ]Normal   [ ]Weakness  [ ]Bed/Wheelchair bound [ ]Edema    NEUROLOGIC:   [x ]No focal deficits  [ ]Cognitive impairment  [ ]Dysphagia [ ]Dysarthria [ ]Paresis [ ]Other     SKIN:   x[ ]Normal    [ ]Rash  [ ]Pressure ulcer(s)       Present on admission [ ]y [ ]n    CRITICAL CARE:  [ ]Shock Present  [ ]Septic [ ]Cardiogenic [ ]Neurologic [ ]Hypovolemic  [ ]  Vasopressors [ ]  Inotropes   [ ]Respiratory failure present [ ]Mechanical ventilation [ ]Non-invasive ventilatory support [ ]High flow  [ ]Acute  [ ]Chronic [ ]Hypoxic  [ ]Hypercarbic [ ]Other  [ ]Other organ failure     LABS:        RADIOLOGY & ADDITIONAL STUDIES:    PROTEIN CALORIE MALNUTRITION PRESENT: [ ]mild [ ]moderate [ ]severe [ ]underweight [ ]morbid obesity  https://www.andeal.org/vault/2440/web/files/ONC/Table_Clinical%20Characteristics%20to%20Document%20Malnutrition-White%20JV%20et%20al%145996.pdf    Height (cm): 165.1 (01-19-20 @ 11:23), 165.1 (12-22-19 @ 20:00)  Weight (kg): 54.1 (01-19-20 @ 11:23), 55.6 (12-22-19 @ 20:00)  BMI (kg/m2): 19.8 (01-19-20 @ 11:23), 20.4 (12-22-19 @ 20:00)    [ ]PPSV2 < or = to 30% [ ]significant weight loss  [ ]poor nutritional intake  [ ]anasarca     Albumin, Serum: 1.9 g/dL (01-18-20 @ 05:24)  [ ]Artificial Nutrition      REFERRALS:   [ ]Chaplaincy  [x ]Hospice  [ ]Child Life  [ ]Social Work  [ ]Case management [ ]Holistic Therapy     Goals of Care Document:

## 2020-01-21 NOTE — PROGRESS NOTE ADULT - ASSESSMENT
69F w/ PMHx COPD, recently diagnosed rectal CA w/ lung met on medical management,  chronic hypotension was sent over form rehab after she was found to have Hb 6.1. She had a recent Hb 8.4 ( 01/01/2020). Pt was found to be hypotensive 77/55mmHg w/ a repeat Hb 6.9. No obvious bleeding documented. She has no obvious complaints. She is not a candidate for any surgical intervention as per colorectal surgery.   Discussed case w/ son who is the HCA and pt confirmed DNR/DNI status but wants to medically manage her for the time being. He is also considering Hospice care once she is stabilized.   Above noted and appreciated .      Today patient seen and examined , c/o some perianal discomfort     # Shock- hypovolemic and septic  s/p PRBC X2   s/p pressors  Now on midodrine  and IVF  bld c/s negative     # symptomatic anemia due to blood lost due to rectal cancer   s/p PRBC x 2  H/H improved    # Likely  Rectal abscess  poor candidate for palliative CRS procedure  IV antibx ,rectal abscess cultures ordered , follow up     # Rectal Ca with abd and lung mets in shock with no option of any kind of palliative intervention  - Perirectal fistula widely open and draining stool. Patient continue to be poor surgical candidate for palliative debulking, currently MOLST with no aggressive intervention, DNR/DNI  - Patient with widespread peritoneal implants, nonobstructed as passing BM and left perirectal abscess previous unroofed, seen draining.    No invasive lines tests, procedures as per son   Cont antibx, medications  She is a candidate for [facility]  Palliative and Hospice consults ordered. Hospice network called  Hospice care liason will be available to speak with Family on Tuesday .   Patient not sure if she wants to go to [facility] , will leave to her son to decide .   If pt becomes symptomatic with pain/ distress etc- to start palliative meds    # Hx of COPD - on neb treatment / Brio Elipta at home - will order Symbicort , duo neb PRN .   ICDs  Constipation prophylaxis , Miralax added , Senna PRN .   Pain mgmt - Tylenol , Oxycontin , iv Dilaudid   Continue Reynolds  cath - due to high output of abscess drainage   Patient's son Mr Janette Duffy met yesterday   wants only comfort care , OK with pain mgmt and abx . 69F w/ PMHx COPD, recently diagnosed rectal CA w/ lung met on medical management,  chronic hypotension was sent over form rehab after she was found to have Hb 6.1. She had a recent Hb 8.4 ( 01/01/2020). Pt was found to be hypotensive 77/55mmHg w/ a repeat Hb 6.9. No obvious bleeding documented. She has no obvious complaints. She is not a candidate for any surgical intervention as per colorectal surgery.   Discussed case w/ son who is the HCA and pt confirmed DNR/DNI status but wants to medically manage her for the time being. He is also considering Hospice care once she is stabilized.   Above noted and appreciated .      Today patient seen and examined , c/o some perianal discomfort     # Shock- hypovolemic and septic  s/p PRBC X2   s/p pressors  Now on midodrine  and IVF  bld c/s negative   BP still low , bolus given , ivf increased     # symptomatic anemia due to blood lost due to rectal cancer   s/p PRBC x 2  H/H improved    # Likely  Rectal abscess  poor candidate for palliative CRS procedure  IV antibx ,rectal abscess cultures ordered , follow up , draining     # Rectal Ca with abd and lung mets in shock with no option of any kind of palliative intervention  - Perirectal fistula widely open and draining stool. Patient continue to be poor surgical candidate for palliative debulking, currently MOLST with no aggressive intervention, DNR/DNI  - Patient with widespread peritoneal implants, nonobstructed as passing BM and left perirectal abscess previous unroofed, seen draining.    No invasive lines tests, procedures as per son   Cont antibx  Palliative and Hospice consults ordered. Hospice network called  Patient not sure if she wants to go to [facility] , will leave to her son to decide .   Patient pain controlled with po pain meds , likely not candidate for Hospice in   If pt becomes symptomatic with pain/ distress etc- to start palliative meds    # Hx of COPD - on neb treatment / Brio Elipta at home - will order Symbicort , duo neb PRN .   ICDs  Constipation prophylaxis , Miralax added , Senna PRN .   Pain mgmt - Tylenol , Oxycontin , iv Dilaudid   Continue Reynolds  cath - due to high output of perirectal fistula drain and  abscess drainage   Patient's son Mr Janette Duffy called today . Meeting with Palliative care pending   wants only comfort care , OK with pain mgmt and abx  , OK with blood braw and blood transfusion if needed .   Labs ordered for am , follow up , update son in am .

## 2020-01-21 NOTE — CHART NOTE - NSCHARTNOTEFT_GEN_A_CORE
Source: Patient [ ]  Family [ ]   other [ x] EMR    Current Diet: Diet, Regular (01-18-20 @ 11:10)    PO intake:  Poor po intake per EMR, 25% of meals documented    Current Weight:   (1/18) 120.8#  -Obtain current wt, continue to monitor.     Pertinent Medications: MEDICATIONS  (STANDING):  buDESOnide    Inhalation Suspension 0.5 milliGRAM(s) Inhalation two times a day  budesonide 160 MICROgram(s)/formoterol 4.5 MICROgram(s) Inhaler 2 Puff(s) Inhalation two times a day  lactated ringers. 1000 milliLiter(s) (100 mL/Hr) IV Continuous <Continuous>  meropenem  IVPB 1000 milliGRAM(s) IV Intermittent every 8 hours  midodrine. 15 milliGRAM(s) Oral three times a day  polyethylene glycol 3350 17 Gram(s) Oral daily  silver sulfADIAZINE 1% Cream 1 Application(s) Topical two times a day    MEDICATIONS  (PRN):  acetaminophen   Tablet .. 650 milliGRAM(s) Oral every 6 hours PRN Moderate Pain (4 - 6)  albuterol/ipratropium for Nebulization 3 milliLiter(s) Nebulizer every 6 hours PRN Shortness of Breath and/or Wheezing  HYDROmorphone  Injectable 0.5 milliGRAM(s) IV Push every 6 hours PRN Moderate Pain (4 - 6)  oxyCODONE    IR 5 milliGRAM(s) Oral every 6 hours PRN Severe Pain (7 - 10)  senna 2 Tablet(s) Oral at bedtime PRN IF NO bm x 2 days    Pertinent Labs: no recent labs    Skin: IAD, perirectal abscess     Nutrition focused physical exam previously conducted - found signs of malnutrition [ ]absent [ x]present    Subcutaneous fat loss: [x ] Orbital fat pads region, [ x]Buccal fat region, [x ]Triceps region,  [ ]Ribs region    Muscle wasting: [x ]Temples region, [x ]Clavicle region, [x ]Shoulder region, [ ]Scapula region, [ ]Interosseous region,  [ ]thigh region, [ ]Calf region    Estimated Needs:   [ x] no change since previous assessment  [ ] recalculated:     Current Nutrition Diagnosis: Pt remains at high nutrition risk and meets criteria for severe, chronic malnutrition related to inability to meet sufficient protein-energy in setting of rectal CA w/ lung mets, skin breakdown, and persistent lack of appetite as evidenced by meeting <75% nutrient needs >1 month, severe muscle wasting and fat loss, 30% wt loss x 7 months. Pt was sleeping soundly during assessment. Last BM 1/20 noted per EMR. RD to remain available.     Recommendations:   1) Add Ensure Enlive TID to optimize po intake and provide an additional 350kcal, 20g protein per serving   2) Encourage po intake and HBV protein  3) Monitor wts and labs    Monitoring and Evaluation:   [x ] PO intake [x ] Tolerance to diet prescription [X] Weights  [X] Follow up per protocol [X] Labs:

## 2020-01-22 DIAGNOSIS — G89.3 NEOPLASM RELATED PAIN (ACUTE) (CHRONIC): ICD-10-CM

## 2020-01-22 DIAGNOSIS — L89.159 PRESSURE ULCER OF SACRAL REGION, UNSPECIFIED STAGE: ICD-10-CM

## 2020-01-22 DIAGNOSIS — R53.2 FUNCTIONAL QUADRIPLEGIA: ICD-10-CM

## 2020-01-22 DIAGNOSIS — I95.9 HYPOTENSION, UNSPECIFIED: ICD-10-CM

## 2020-01-22 DIAGNOSIS — R57.9 SHOCK, UNSPECIFIED: ICD-10-CM

## 2020-01-22 LAB
ANION GAP SERPL CALC-SCNC: 12 MMOL/L — SIGNIFICANT CHANGE UP (ref 5–17)
BUN SERPL-MCNC: 5 MG/DL — LOW (ref 8–20)
CALCIUM SERPL-MCNC: 8.1 MG/DL — LOW (ref 8.6–10.2)
CHLORIDE SERPL-SCNC: 103 MMOL/L — SIGNIFICANT CHANGE UP (ref 98–107)
CO2 SERPL-SCNC: 24 MMOL/L — SIGNIFICANT CHANGE UP (ref 22–29)
CREAT SERPL-MCNC: 0.37 MG/DL — LOW (ref 0.5–1.3)
CULTURE RESULTS: SIGNIFICANT CHANGE UP
GLUCOSE SERPL-MCNC: 107 MG/DL — HIGH (ref 70–99)
HCT VFR BLD CALC: 31.7 % — LOW (ref 34.5–45)
HGB BLD-MCNC: 9.7 G/DL — LOW (ref 11.5–15.5)
MAGNESIUM SERPL-MCNC: 1.3 MG/DL — LOW (ref 1.8–2.6)
MCHC RBC-ENTMCNC: 28.4 PG — SIGNIFICANT CHANGE UP (ref 27–34)
MCHC RBC-ENTMCNC: 30.6 GM/DL — LOW (ref 32–36)
MCV RBC AUTO: 93 FL — SIGNIFICANT CHANGE UP (ref 80–100)
PHOSPHATE SERPL-MCNC: 2.5 MG/DL — SIGNIFICANT CHANGE UP (ref 2.4–4.7)
PLATELET # BLD AUTO: 304 K/UL — SIGNIFICANT CHANGE UP (ref 150–400)
POTASSIUM SERPL-MCNC: 4.3 MMOL/L — SIGNIFICANT CHANGE UP (ref 3.5–5.3)
POTASSIUM SERPL-SCNC: 4.3 MMOL/L — SIGNIFICANT CHANGE UP (ref 3.5–5.3)
RBC # BLD: 3.41 M/UL — LOW (ref 3.8–5.2)
RBC # FLD: 15.9 % — HIGH (ref 10.3–14.5)
SODIUM SERPL-SCNC: 139 MMOL/L — SIGNIFICANT CHANGE UP (ref 135–145)
SPECIMEN SOURCE: SIGNIFICANT CHANGE UP
WBC # BLD: 10.42 K/UL — SIGNIFICANT CHANGE UP (ref 3.8–10.5)
WBC # FLD AUTO: 10.42 K/UL — SIGNIFICANT CHANGE UP (ref 3.8–10.5)

## 2020-01-22 PROCEDURE — 99233 SBSQ HOSP IP/OBS HIGH 50: CPT

## 2020-01-22 PROCEDURE — 99232 SBSQ HOSP IP/OBS MODERATE 35: CPT

## 2020-01-22 RX ORDER — COLLAGENASE CLOSTRIDIUM HIST. 250 UNIT/G
1 OINTMENT (GRAM) TOPICAL DAILY
Refills: 0 | Status: DISCONTINUED | OUTPATIENT
Start: 2020-01-22 | End: 2020-01-25

## 2020-01-22 RX ORDER — MAGNESIUM OXIDE 400 MG ORAL TABLET 241.3 MG
400 TABLET ORAL
Refills: 0 | Status: DISCONTINUED | OUTPATIENT
Start: 2020-01-22 | End: 2020-01-25

## 2020-01-22 RX ORDER — SODIUM CHLORIDE 9 MG/ML
500 INJECTION INTRAMUSCULAR; INTRAVENOUS; SUBCUTANEOUS ONCE
Refills: 0 | Status: COMPLETED | OUTPATIENT
Start: 2020-01-22 | End: 2020-01-22

## 2020-01-22 RX ORDER — MAGNESIUM SULFATE 500 MG/ML
2 VIAL (ML) INJECTION ONCE
Refills: 0 | Status: COMPLETED | OUTPATIENT
Start: 2020-01-22 | End: 2020-01-22

## 2020-01-22 RX ADMIN — MIDODRINE HYDROCHLORIDE 15 MILLIGRAM(S): 2.5 TABLET ORAL at 18:16

## 2020-01-22 RX ADMIN — SODIUM CHLORIDE 1000 MILLILITER(S): 9 INJECTION INTRAMUSCULAR; INTRAVENOUS; SUBCUTANEOUS at 10:37

## 2020-01-22 RX ADMIN — POLYETHYLENE GLYCOL 3350 17 GRAM(S): 17 POWDER, FOR SOLUTION ORAL at 11:48

## 2020-01-22 RX ADMIN — OXYCODONE HYDROCHLORIDE 5 MILLIGRAM(S): 5 TABLET ORAL at 06:36

## 2020-01-22 RX ADMIN — MEROPENEM 100 MILLIGRAM(S): 1 INJECTION INTRAVENOUS at 14:53

## 2020-01-22 RX ADMIN — MIDODRINE HYDROCHLORIDE 15 MILLIGRAM(S): 2.5 TABLET ORAL at 06:37

## 2020-01-22 RX ADMIN — Medication 50 GRAM(S): at 18:17

## 2020-01-22 RX ADMIN — Medication 1 APPLICATION(S): at 06:37

## 2020-01-22 RX ADMIN — MIDODRINE HYDROCHLORIDE 15 MILLIGRAM(S): 2.5 TABLET ORAL at 11:47

## 2020-01-22 RX ADMIN — SODIUM CHLORIDE 100 MILLILITER(S): 9 INJECTION, SOLUTION INTRAVENOUS at 11:14

## 2020-01-22 RX ADMIN — MAGNESIUM OXIDE 400 MG ORAL TABLET 400 MILLIGRAM(S): 241.3 TABLET ORAL at 18:16

## 2020-01-22 RX ADMIN — SODIUM CHLORIDE 100 MILLILITER(S): 9 INJECTION, SOLUTION INTRAVENOUS at 21:38

## 2020-01-22 RX ADMIN — OXYCODONE HYDROCHLORIDE 5 MILLIGRAM(S): 5 TABLET ORAL at 07:30

## 2020-01-22 NOTE — PROGRESS NOTE ADULT - ASSESSMENT
69 year old female with metastatic rectal cancer with sacral decubitus ulcer requiring bedside debridement

## 2020-01-22 NOTE — PROGRESS NOTE ADULT - PROBLEM SELECTOR PLAN 6
Patient known to me from previous admission.   Fransico Evangelista is the HCP.    Meeting held with hospice today.  Currently patient is not hospice inpatient appropriate.  Informed by hospice nurse, Luisana Buchanan RN, fransico Evangelista has decided on [facility] as it would not be feasible to do home hospice.  Noted Medicine note- patient's son "wants only comfort care , OK with pain mgmt and abx  , OK with blood braw and blood transfusion if needed "  Continue with medical management.  Poor prognosis

## 2020-01-22 NOTE — PROGRESS NOTE ADULT - SUBJECTIVE AND OBJECTIVE BOX
CC:  follow up symptoms GOC  INTERVAL HPI/OVERNIGHT EVENTS:  family meeting today with hospice  PRESENT SYMPTOMS: SOURCE:  Patient/Family/Team    PAIN SCALE:  0 = none  1 = mild   2 = moderate  3 = severe    Pain: denies    Dyspnea:  [ ] YES [x ] NO  Anxiety:  [ ] YES [ x] NO  Fatigue: [ ]x YES [ ] NO  Nausea: [ ] YES [x ] NO  Loss of Appetite: [ x] YES [ ] NO  Other symptoms: __________    MEDICATIONS  (STANDING):  buDESOnide    Inhalation Suspension 0.5 milliGRAM(s) Inhalation two times a day  budesonide 160 MICROgram(s)/formoterol 4.5 MICROgram(s) Inhaler 2 Puff(s) Inhalation two times a day  lactated ringers. 1000 milliLiter(s) (100 mL/Hr) IV Continuous <Continuous>  meropenem  IVPB 1000 milliGRAM(s) IV Intermittent every 8 hours  midodrine. 15 milliGRAM(s) Oral three times a day  polyethylene glycol 3350 17 Gram(s) Oral daily  silver sulfADIAZINE 1% Cream 1 Application(s) Topical two times a day    MEDICATIONS  (PRN):  acetaminophen   Tablet .. 650 milliGRAM(s) Oral every 6 hours PRN Moderate Pain (4 - 6)  albuterol/ipratropium for Nebulization 3 milliLiter(s) Nebulizer every 6 hours PRN Shortness of Breath and/or Wheezing  HYDROmorphone  Injectable 0.5 milliGRAM(s) IV Push every 6 hours PRN Moderate Pain (4 - 6)  oxyCODONE    IR 5 milliGRAM(s) Oral every 6 hours PRN Severe Pain (7 - 10)  senna 2 Tablet(s) Oral at bedtime PRN IF NO bm x 2 days      Allergies    No Known Allergies    Intolerances      Karnofsky Performance Score/Palliative Performance Status Version 2:   30  %    Vital Signs Last 24 Hrs  T(C): 36.8 (22 Jan 2020 08:45), Max: 36.9 (21 Jan 2020 23:20)  T(F): 98.3 (22 Jan 2020 08:45), Max: 98.5 (21 Jan 2020 23:20)  HR: 102 (22 Jan 2020 11:07) (78 - 120)  BP: 82/54 (22 Jan 2020 11:07) (73/48 - 103/68)  BP(mean): --  RR: 18 (22 Jan 2020 11:07) (18 - 18)  SpO2: 94% (22 Jan 2020 11:07) (94% - 98%)    PHYSICAL EXAM:    General: Frail appearing NAD  HEENT: [ x] normal  [ ] dry mouth  [ ] ET tube/trach    Lungs: [x ] comfortable [ ] tachypnea/labored breathing  [ ] excessive secretions    CV: [x ] normal  [ ] tachycardia    GI: soft NTND    : [x ] normal  [ ] incontinent  [ ] oliguria/anuria  [ ] azar    MSK: [ ] normal  [xweakness  [ ] edema             [ ] ambulatory  [x ] bedbound/wheelchair bound    Skin: [ ] normal  [ ] pressure ulcers- Stage_____  [ ] no rash    LABS:                        9.7    10.42 )-----------( 304      ( 22 Jan 2020 08:28 )             31.7     01-22    139  |  103  |  5.0<L>  ----------------------------<  107<H>  4.3   |  24.0  |  0.37<L>    Ca    8.1<L>      22 Jan 2020 08:28  Phos  2.5     01-22  Mg     1.3     01-22          I&O's Summary    21 Jan 2020 07:01  -  22 Jan 2020 07:00  --------------------------------------------------------  IN: 0 mL / OUT: 950 mL / NET: -950 mL    22 Jan 2020 07:01  -  22 Jan 2020 12:54  --------------------------------------------------------  IN: 500 mL / OUT: 0 mL / NET: 500 mL        RADIOLOGY & ADDITIONAL STUDIES:    < from: CT Abdomen and Pelvis w/ IV Cont (01.18.20 @ 09:37) >  There is a complex rectal  mixed attenuation mass/abscess displacing the air-filled rectum towards the RIGHT. The mass measures 5.2 x 5.2 cm similar to prior examination.    HOWEVER ANair-fluid filled ABSCESS NOW EXTENDS INTO THE LEFT RECTAL FOSSA SPACE MEASURING 9.5 X 3.6 CM EXTENDING LEFT LOWER MEDIAL BUTTOCKS SOFT TISSUES..  Bilateral pulmonary nodules lung unchanged consistent with metastatic lung disease.      There is no free intra-abdominal air or ascites.  Status post cholecystectomy.  The liver, spleen, pancreas, adrenal glands, are normal.    There is no intra or extrahepatic biliary ductal dilatation.  Moderate gastroesophageal hiatal hernia.  The stomach, duodenum,small and large bowel and appendix are within normal limits.    Both kidneys show normal uptake of contrast media without masses or hydronephrosis.      The urinary bladder shows normal morphology and contour.    The reproductive organs are within normal limits.       No gross adenopathy..  The retroperitoneal vessels are normal.      Chronic unchanged large a bone cystic erosion of the superior acetabulum LEFT hip unchanged. No adjacent soft tissue mass.    IMPRESSION:     Rectal mass/abscess size unchanged HOWEVER ABSCESS CAVITY MEASURING 9 CM IN LENGTH EXTENDS INTO THE LEFT PERIRECTAL FOSSA AND LEFT MEDIAL BUTTOCK SOFT TISSUES.      < end of copied text >         Thank you for the opportunity to assist with the care of this patient.   [facility] [phone].

## 2020-01-22 NOTE — PROGRESS NOTE ADULT - ASSESSMENT
69yr woman hx  COPD, recently diagnosed with metastatic rectal  cancer with abscess extending to the left perirectal fossa and medial buttock soft tissues admitted in shock 2/2 to  hypovolemia anemia

## 2020-01-22 NOTE — PROGRESS NOTE ADULT - SUBJECTIVE AND OBJECTIVE BOX
Patient seen and examined . Noted to be hypotensive,  hx of hypotension, currently on midodrine- pt denying any sxs, no dizzyness, no lightheadness, no headache.  NS bolus 500 cc given.  Repeat BP 82/54 up from 72/54.  Scheduled dose of Midodrine given and RN instructed to repeat in one hour.  Pt with no new complaints.      CC : perianal pain / drainage , poor oral intake       MEDICATIONS  (STANDING):  buDESOnide    Inhalation Suspension 0.5 milliGRAM(s) Inhalation two times a day  budesonide 160 MICROgram(s)/formoterol 4.5 MICROgram(s) Inhaler 2 Puff(s) Inhalation two times a day  lactated ringers. 1000 milliLiter(s) (100 mL/Hr) IV Continuous <Continuous>  meropenem  IVPB 1000 milliGRAM(s) IV Intermittent every 8 hours  midodrine. 15 milliGRAM(s) Oral three times a day  polyethylene glycol 3350 17 Gram(s) Oral daily  silver sulfADIAZINE 1% Cream 1 Application(s) Topical two times a day    MEDICATIONS  (PRN):  acetaminophen   Tablet .. 650 milliGRAM(s) Oral every 6 hours PRN Moderate Pain (4 - 6)  albuterol/ipratropium for Nebulization 3 milliLiter(s) Nebulizer every 6 hours PRN Shortness of Breath and/or Wheezing  HYDROmorphone  Injectable 0.5 milliGRAM(s) IV Push every 6 hours PRN Moderate Pain (4 - 6)  oxyCODONE    IR 5 milliGRAM(s) Oral every 6 hours PRN Severe Pain (7 - 10)  senna 2 Tablet(s) Oral at bedtime PRN IF NO bm x 2 days      LABS:                        9.7    10.42 )-----------( 304      ( 22 Jan 2020 08:28 )             31.7     01-22    139  |  103  |  5.0<L>  ----------------------------<  107<H>  4.3   |  24.0  |  0.37<L>    Ca    8.1<L>      22 Jan 2020 08:28  Phos  2.5     01-22  Mg     1.3     01-22          REVIEW OF SYSTEMS:    As above , all other systems reviewed and are negative .     Vital Signs Last 24 Hrs  T(C): 36.8 (22 Jan 2020 08:45), Max: 36.9 (21 Jan 2020 23:20)  T(F): 98.3 (22 Jan 2020 08:45), Max: 98.5 (21 Jan 2020 23:20)  HR: 102 (22 Jan 2020 11:07) (78 - 120)  BP: 82/54 (22 Jan 2020 11:07) (73/48 - 103/68)  BP(mean): --  RR: 18 (22 Jan 2020 11:07) (18 - 18)  SpO2: 94% (22 Jan 2020 11:07) (94% - 98%)    PHYSICAL EXAM:    GENERAL: NAD, well-groomed, frail   HEAD:  Atraumatic, Normocephalic  EYES: EOMI, PERRLA, conjunctiva and sclera clear  NECK: Supple, No JVD, Normal thyroid  NERVOUS SYSTEM:  Alert & Oriented X3, no focal deficit  CHEST/LUNG: CTA b/l ,  no  rales, rhonchi, wheezing, or rubs  HEART: Regular rate and rhythm; No murmurs, rubs, or gallops  ABDOMEN: Soft, Nontender, Nondistended; Bowel sounds present  EXTREMITIES:  2+ Peripheral Pulses, No clubbing, cyanosis, or edema , LUE mid line   LYMPH: No lymphadenopathy noted  SKIN: No rashes or lesions

## 2020-01-22 NOTE — PROGRESS NOTE ADULT - SUBJECTIVE AND OBJECTIVE BOX
69 year old female with metastatic distal rectal CA s/p EUA, rectal mass biopsy and roz placement and unroofing of posterior rectal wall abscess on 12/12/19, seen on multiple occasions by Colorectal Service, during which she was determined to be a poor surgical candidate for palliative APR. Colorectal surgery asked to re-evaluate for sacral ulcer that developed within past week. patient does not report pain to her lower back          MEDICATIONS  (STANDING):  buDESOnide    Inhalation Suspension 0.5 milliGRAM(s) Inhalation two times a day  budesonide 160 MICROgram(s)/formoterol 4.5 MICROgram(s) Inhaler 2 Puff(s) Inhalation two times a day  lactated ringers. 1000 milliLiter(s) (100 mL/Hr) IV Continuous <Continuous>  magnesium oxide 400 milliGRAM(s) Oral three times a day with meals  midodrine. 15 milliGRAM(s) Oral three times a day  polyethylene glycol 3350 17 Gram(s) Oral daily  silver sulfADIAZINE 1% Cream 1 Application(s) Topical two times a day    MEDICATIONS  (PRN):  acetaminophen   Tablet .. 650 milliGRAM(s) Oral every 6 hours PRN Moderate Pain (4 - 6)  albuterol/ipratropium for Nebulization 3 milliLiter(s) Nebulizer every 6 hours PRN Shortness of Breath and/or Wheezing  HYDROmorphone  Injectable 0.5 milliGRAM(s) IV Push every 6 hours PRN Moderate Pain (4 - 6)  oxyCODONE    IR 5 milliGRAM(s) Oral every 6 hours PRN Severe Pain (7 - 10)  senna 2 Tablet(s) Oral at bedtime PRN IF NO bm x 2 days      Vital Signs Last 24 Hrs  T(C): 36.3 (22 Jan 2020 16:42), Max: 36.9 (21 Jan 2020 23:20)  T(F): 97.4 (22 Jan 2020 16:42), Max: 98.5 (21 Jan 2020 23:20)  HR: 83 (22 Jan 2020 21:06) (78 - 120)  BP: 85/64 (22 Jan 2020 21:06) (73/48 - 103/68)  BP(mean): --  RR: 18 (22 Jan 2020 18:40) (18 - 20)  SpO2: 95% (22 Jan 2020 18:40) (87% - 98%)    Physical Exam:    general: no acute distress, AOx3  Respiratory: Breath Sounds equal & clear to auscultation, no accessory muscle use  Cardiovascular: Regular rate & rhythm, normal S1, S2; no murmurs, gallops or rubs  Gastrointestinal: Soft, non-tender, normal bowel sounds  Extremities: No peripheral edema, No cyanosis, clubbing     Vascular: Equal and normal pulses: 2+ peripheral pulses throughout    Musculoskeletal: No joint pain, swelling or deformity; no limitation of movement    Skin: 2x3cm sacral decubitus ulcer unstageable with extensive fibrinous exudate. 1cm depth with undermining. no purulence or feculent material       I&O's Detail    21 Jan 2020 07:01  -  22 Jan 2020 07:00  --------------------------------------------------------  IN:  Total IN: 0 mL    OUT:    Indwelling Catheter - Urethral: 950 mL  Total OUT: 950 mL    Total NET: -950 mL      22 Jan 2020 07:01  -  22 Jan 2020 21:13  --------------------------------------------------------  IN:    lactated ringers.: 750 mL    Sodium Chloride 0.9% IV Bolus: 500 mL    Solution: 50 mL    Solution: 100 mL  Total IN: 1400 mL    OUT:    Indwelling Catheter - Urethral: 450 mL  Total OUT: 450 mL    Total NET: 950 mL          LABS:                        9.7    10.42 )-----------( 304      ( 22 Jan 2020 08:28 )             31.7     01-22    139  |  103  |  5.0<L>  ----------------------------<  107<H>  4.3   |  24.0  |  0.37<L>    Ca    8.1<L>      22 Jan 2020 08:28  Phos  2.5     01-22  Mg     1.3     01-22            RADIOLOGY & ADDITIONAL STUDIES:

## 2020-01-22 NOTE — PROGRESS NOTE ADULT - ASSESSMENT
69F w/ PMHx COPD, recently diagnosed rectal CA w/ lung met on medical management,  chronic hypotension was sent over form rehab after she was found to have Hb 6.1. She had a recent Hb 8.4 ( 01/01/2020). Pt was found to be hypotensive 77/55mmHg w/ a repeat Hb 6.9. No obvious bleeding documented. She has no obvious complaints. She is not a candidate for any surgical intervention as per colorectal surgery.   Discussed case w/ son who is the HCA and pt confirmed DNR/DNI status but wants to medically manage her for the time being. He is also considering Hospice care once she is stabilized.   Above noted and appreciated .        # Shock- hypovolemic and septic  s/p PRBC X2   s/p pressors  Now on midodrine  and IVF  bld c/s negative   BP still low , bolus given , ivf increased     # symptomatic anemia due to blood lost due to rectal cancer   s/p PRBC x 2  H/H improved- stable    # Likely  Rectal abscess  poor candidate for palliative CRS procedure  IV antibx ,rectal abscess cultures ordered , follow up , draining     # Rectal Ca with abd and lung mets in shock with no option of any kind of palliative intervention  - Perirectal fistula widely open and draining stool. Patient continue to be poor surgical candidate for palliative debulking, currently MOLST with no aggressive intervention, DNR/DNI  - Patient with widespread peritoneal implants, nonobstructed as passing BM and left perirectal abscess previous unroofed, seen draining.    No invasive lines tests, procedures as per son   Cont antibx  Palliative and Hospice consults ordered. Hospice network called  Patient not sure if she wants to go to [facility] , will leave to her son to decide .   Patient pain controlled with po pain meds , likely not candidate for Hospice in   If pt becomes symptomatic with pain/ distress etc- to start palliative meds    # Hx of COPD - on neb treatment / Brio Elipta at home - will order Symbicort , duo neb PRN .   ICDs  Constipation prophylaxis , Miralax added , Senna PRN .   Pain mgmt - Tylenol , Oxycontin , iv Dilaudid   Continue Reynolds  cath - due to high output of perirectal fistula drain and  abscess drainage   Patient's son Mr Janette Duffy called today . Meeting with Palliative care pending   wants only comfort care , OK with pain mgmt and abx  , OK with blood braw and blood transfusion if needed .   Labs ordered for am , follow up , update son in am . 69F w/ PMHx COPD, recently diagnosed rectal CA w/ lung met on medical management,  chronic hypotension was sent over form rehab after she was found to have Hb 6.1. She had a recent Hb 8.4 ( 01/01/2020). Pt was found to be hypotensive 77/55mmHg w/ a repeat Hb 6.9. No obvious bleeding documented. She has no obvious complaints. She is not a candidate for any surgical intervention as per colorectal surgery.   Discussed case w/ son who is the HCA and pt confirmed DNR/DNI status but wants to medically manage her for the time being. He is also considering Hospice care once she is stabilized.   Above noted and appreciated .        # Shock- hypovolemic and septic  s/p PRBC X2   s/p pressors  Now on midodrine  and IVF  bld c/s negative   BP still low , bolus given , ivf increased     # symptomatic anemia due to blood lost due to rectal cancer   s/p PRBC x 2  H/H improved- stable    # Likely  Rectal abscess  poor candidate for palliative CRS procedure  IV antibx ,rectal abscess cultures ordered , follow up , draining     # Rectal Ca with abd and lung mets in shock with no option of any kind of palliative intervention  - Perirectal fistula widely open and draining stool. Patient continue to be poor surgical candidate for palliative debulking, currently MOLST with no aggressive intervention, DNR/DNI  - Patient with widespread peritoneal implants, nonobstructed as passing BM and left perirectal abscess previous unroofed, seen draining.    No invasive lines tests, procedures as per son   Cont antibx  Palliative and Hospice consults ordered. -Family meeting today- pt is not a [facility] candidate, son can not care for pt at home- will plan for [facility] for d/c  If pt becomes symptomatic with pain/ distress etc- to start palliative meds    # Hx of COPD - on neb treatment / Brio Elipta at home - will order Symbicort , duo neb PRN .   ICDs  Constipation prophylaxis , Miralax added , Senna PRN .   Pain mgmt - Tylenol , Oxycontin , iv Dilaudid   Continue Reynolds  cath - due to high output of perirectal fistula drain and  abscess drainage 69F w/ PMHx COPD, recently diagnosed rectal CA w/ lung met on medical management,  chronic hypotension was sent over form rehab after she was found to have Hb 6.1. She had a recent Hb 8.4 ( 01/01/2020). Pt was found to be hypotensive 77/55mmHg w/ a repeat Hb 6.9. No obvious bleeding documented. She has no obvious complaints. She is not a candidate for any surgical intervention as per colorectal surgery.   Discussed case w/ son who is the HCA and pt confirmed DNR/DNI status but wants to medically manage her for the time being. He is also considering Hospice care once she is stabilized.   Above noted and appreciated .        # Shock- hypovolemic and septic  s/p PRBC X2   s/p pressors  Now on midodrine  and IVF  bld c/s negative   BP still low , bolus given , ivf increased     # symptomatic anemia due to blood lost due to rectal cancer   s/p PRBC x 2  H/H improved- stable    # sepsis with Rectal abscess  sepsis recsolved  completed IV meropenem on 1/22  poor candidate for palliative CRS procedure  f/u rectal abscess cultures ordered , follow up , draining     # Rectal Ca with abd and lung mets in shock with no option of any kind of palliative intervention  - Perirectal fistula widely open and draining stool. Patient continue to be poor surgical candidate for palliative debulking, currently MOLST with no aggressive intervention, DNR/DNI  - Patient with widespread peritoneal implants, nonobstructed as passing BM and left perirectal abscess previous unroofed, seen draining.    No invasive lines tests, procedures as per son   Cont antibx  Palliative and Hospice consults ordered. -Family meeting today- pt is not a [facility] candidate, son can not care for pt at home- will plan for [facility] for d/c  If pt becomes symptomatic with pain/ distress etc- to start palliative meds    # Hx of COPD - on neb treatment / Brio Elipta at home - will order Symbicort , duo neb PRN .   ICDs  Constipation prophylaxis , Miralax added , Senna PRN .   Pain mgmt - Tylenol , Oxycontin , iv Dilaudid   Continue Reynolds  cath - due to high output of perirectal fistula drain and  abscess drainage

## 2020-01-23 LAB — MAGNESIUM SERPL-MCNC: 1.7 MG/DL — SIGNIFICANT CHANGE UP (ref 1.6–2.6)

## 2020-01-23 PROCEDURE — 99233 SBSQ HOSP IP/OBS HIGH 50: CPT

## 2020-01-23 PROCEDURE — 99232 SBSQ HOSP IP/OBS MODERATE 35: CPT

## 2020-01-23 RX ORDER — MIDODRINE HYDROCHLORIDE 2.5 MG/1
20 TABLET ORAL THREE TIMES A DAY
Refills: 0 | Status: DISCONTINUED | OUTPATIENT
Start: 2020-01-23 | End: 2020-01-25

## 2020-01-23 RX ORDER — HYDROMORPHONE HYDROCHLORIDE 2 MG/ML
0.5 INJECTION INTRAMUSCULAR; INTRAVENOUS; SUBCUTANEOUS ONCE
Refills: 0 | Status: DISCONTINUED | OUTPATIENT
Start: 2020-01-23 | End: 2020-01-23

## 2020-01-23 RX ORDER — ACETAMINOPHEN 500 MG
1000 TABLET ORAL ONCE
Refills: 0 | Status: COMPLETED | OUTPATIENT
Start: 2020-01-23 | End: 2020-01-23

## 2020-01-23 RX ORDER — SODIUM CHLORIDE 9 MG/ML
1000 INJECTION INTRAMUSCULAR; INTRAVENOUS; SUBCUTANEOUS
Refills: 0 | Status: DISCONTINUED | OUTPATIENT
Start: 2020-01-23 | End: 2020-01-24

## 2020-01-23 RX ORDER — KETOROLAC TROMETHAMINE 30 MG/ML
15 SYRINGE (ML) INJECTION EVERY 8 HOURS
Refills: 0 | Status: DISCONTINUED | OUTPATIENT
Start: 2020-01-23 | End: 2020-01-24

## 2020-01-23 RX ADMIN — SODIUM CHLORIDE 100 MILLILITER(S): 9 INJECTION INTRAMUSCULAR; INTRAVENOUS; SUBCUTANEOUS at 13:37

## 2020-01-23 RX ADMIN — OXYCODONE HYDROCHLORIDE 5 MILLIGRAM(S): 5 TABLET ORAL at 06:30

## 2020-01-23 RX ADMIN — MIDODRINE HYDROCHLORIDE 20 MILLIGRAM(S): 2.5 TABLET ORAL at 12:50

## 2020-01-23 RX ADMIN — OXYCODONE HYDROCHLORIDE 5 MILLIGRAM(S): 5 TABLET ORAL at 21:46

## 2020-01-23 RX ADMIN — Medication 0.5 MILLIGRAM(S): at 20:57

## 2020-01-23 RX ADMIN — MIDODRINE HYDROCHLORIDE 15 MILLIGRAM(S): 2.5 TABLET ORAL at 06:29

## 2020-01-23 RX ADMIN — Medication 1 APPLICATION(S): at 11:40

## 2020-01-23 RX ADMIN — OXYCODONE HYDROCHLORIDE 5 MILLIGRAM(S): 5 TABLET ORAL at 06:29

## 2020-01-23 RX ADMIN — MIDODRINE HYDROCHLORIDE 20 MILLIGRAM(S): 2.5 TABLET ORAL at 18:25

## 2020-01-23 RX ADMIN — MAGNESIUM OXIDE 400 MG ORAL TABLET 400 MILLIGRAM(S): 241.3 TABLET ORAL at 08:29

## 2020-01-23 RX ADMIN — OXYCODONE HYDROCHLORIDE 5 MILLIGRAM(S): 5 TABLET ORAL at 22:00

## 2020-01-23 RX ADMIN — MAGNESIUM OXIDE 400 MG ORAL TABLET 400 MILLIGRAM(S): 241.3 TABLET ORAL at 12:50

## 2020-01-23 RX ADMIN — MAGNESIUM OXIDE 400 MG ORAL TABLET 400 MILLIGRAM(S): 241.3 TABLET ORAL at 18:25

## 2020-01-23 RX ADMIN — Medication 400 MILLIGRAM(S): at 12:50

## 2020-01-23 NOTE — PROCEDURE NOTE - NSPROCDETAILS_GEN_ALL_CORE
ultrasound assessment/location identified, draped/prepped, sterile technique used/supine position/ultrasound guidance/sterile dressing applied/sterile technique, catheter placed
incision left open, packing placed

## 2020-01-23 NOTE — PROGRESS NOTE ADULT - SUBJECTIVE AND OBJECTIVE BOX
Patient seen and examined . Pt is s/p a bedside sacral wound debridement by Surgery.  Pallative note appreciated.  Pt with no complaints.    CC : perianal pain / drainage , poor oral intake     MEDICATIONS  (STANDING):  buDESOnide    Inhalation Suspension 0.5 milliGRAM(s) Inhalation two times a day  budesonide 160 MICROgram(s)/formoterol 4.5 MICROgram(s) Inhaler 2 Puff(s) Inhalation two times a day  collagenase Ointment 1 Application(s) Topical daily  magnesium oxide 400 milliGRAM(s) Oral three times a day with meals  midodrine. 20 milliGRAM(s) Oral three times a day  polyethylene glycol 3350 17 Gram(s) Oral daily  sodium chloride 0.9%. 1000 milliLiter(s) (100 mL/Hr) IV Continuous <Continuous>    MEDICATIONS  (PRN):  acetaminophen   Tablet .. 650 milliGRAM(s) Oral every 6 hours PRN Moderate Pain (4 - 6)  albuterol/ipratropium for Nebulization 3 milliLiter(s) Nebulizer every 6 hours PRN Shortness of Breath and/or Wheezing  HYDROmorphone  Injectable 0.5 milliGRAM(s) IV Push every 6 hours PRN Moderate Pain (4 - 6)  ketorolac   Injectable 15 milliGRAM(s) IV Push every 8 hours PRN Severe Pain (7 - 10)  oxyCODONE    IR 5 milliGRAM(s) Oral every 6 hours PRN Severe Pain (7 - 10)  senna 2 Tablet(s) Oral at bedtime PRN IF NO bm x 2 days          LABS:                                          9.7    10.42 )-----------( 304      ( 22 Jan 2020 08:28 )             31.7     01-22    139  |  103  |  5.0<L>  ----------------------------<  107<H>  4.3   |  24.0  |  0.37<L>    Ca    8.1<L>      22 Jan 2020 08:28  Phos  2.5     01-22  Mg     1.7     01-23        REVIEW OF SYSTEMS:    As above , all other systems reviewed and are negative .     Vital Signs Last 24 Hrs  T(C): 36.3 (23 Jan 2020 08:08), Max: 36.3 (22 Jan 2020 16:42)  T(F): 97.4 (23 Jan 2020 08:08), Max: 97.4 (22 Jan 2020 16:42)  HR: 72 (23 Jan 2020 11:41) (72 - 101)  BP: 85/56 (23 Jan 2020 08:27) (70/46 - 97/57)  BP(mean): 7 (23 Jan 2020 08:27) (7 - 7)  RR: 18 (23 Jan 2020 08:27) (18 - 20)  SpO2: 94% (23 Jan 2020 11:41) (87% - 95%)    PHYSICAL EXAM:    GENERAL: NAD, well-groomed, frail   HEAD:  Atraumatic, Normocephalic  EYES: EOMI, PERRLA, conjunctiva and sclera clear  NECK: Supple, No JVD, Normal thyroid  NERVOUS SYSTEM:  Alert & Oriented X3, no focal deficit  CHEST/LUNG: CTA b/l ,  no  rales, rhonchi, wheezing, or rubs  HEART: Regular rate and rhythm; No murmurs, rubs, or gallops  ABDOMEN: Soft, Nontender, Nondistended; Bowel sounds present  EXTREMITIES:  2+ Peripheral Pulses, No clubbing, cyanosis, or edema , LUE mid line   LYMPH: No lymphadenopathy noted  SKIN: No rashes or lesions

## 2020-01-23 NOTE — PROGRESS NOTE ADULT - SUBJECTIVE AND OBJECTIVE BOX
CC: follow symptoms GOC  INTERVAL HPI/OVERNIGHT EVENTS:  hypotensive  PRESENT SYMPTOMS: SOURCE:  Patient/Family/Team    PAIN SCALE:  0 = none  1 = mild   2 = moderate  3 = severe    Pain:     Dyspnea:  [ ] YES [x ] NO  Anxiety:  [ ] YES [ ]x NO  Fatigue: [x ] YES [ ] NO  Nausea: [ ] YES x[ ] NO  Loss of Appetite: [ x] YES [ ] NO  Other symptoms: __________    MEDICATIONS  (STANDING):  acetaminophen  IVPB .. 1000 milliGRAM(s) IV Intermittent once  buDESOnide    Inhalation Suspension 0.5 milliGRAM(s) Inhalation two times a day  budesonide 160 MICROgram(s)/formoterol 4.5 MICROgram(s) Inhaler 2 Puff(s) Inhalation two times a day  collagenase Ointment 1 Application(s) Topical daily  magnesium oxide 400 milliGRAM(s) Oral three times a day with meals  midodrine. 20 milliGRAM(s) Oral three times a day  polyethylene glycol 3350 17 Gram(s) Oral daily  sodium chloride 0.9%. 1000 milliLiter(s) (100 mL/Hr) IV Continuous <Continuous>    MEDICATIONS  (PRN):  acetaminophen   Tablet .. 650 milliGRAM(s) Oral every 6 hours PRN Moderate Pain (4 - 6)  albuterol/ipratropium for Nebulization 3 milliLiter(s) Nebulizer every 6 hours PRN Shortness of Breath and/or Wheezing  HYDROmorphone  Injectable 0.5 milliGRAM(s) IV Push every 6 hours PRN Moderate Pain (4 - 6)  ketorolac   Injectable 15 milliGRAM(s) IV Push every 8 hours PRN Severe Pain (7 - 10)  oxyCODONE    IR 5 milliGRAM(s) Oral every 6 hours PRN Severe Pain (7 - 10)  senna 2 Tablet(s) Oral at bedtime PRN IF NO bm x 2 days      Allergies    No Known Allergies    Intolerances    Karnofsky Performance Score/Palliative Performance Status Version 2:30       %    Vital Signs Last 24 Hrs  T(C): 36.3 (23 Jan 2020 08:08), Max: 36.3 (22 Jan 2020 16:42)  T(F): 97.4 (23 Jan 2020 08:08), Max: 97.4 (22 Jan 2020 16:42)  HR: 72 (23 Jan 2020 11:41) (72 - 106)  BP: 85/56 (23 Jan 2020 08:27) (70/46 - 98/56)  BP(mean): 7 (23 Jan 2020 08:27) (7 - 7)  RR: 18 (23 Jan 2020 08:27) (18 - 20)  SpO2: 94% (23 Jan 2020 11:41) (87% - 95%)    PHYSICAL EXAM:    General:  alert, frail NAD  HEENT: [ x] normal  [ ] dry mouth  [ ] ET tube/trach    Lungs: [ x] comfortable [ ] tachypnea/labored breathing  [ ] excessive secretions    CV: [x] normal  [ ] tachycardia    GI: [ x] normal  [ ] distended  [ ] tender  [ ] no BS               [ ] PEG/NG/OG tube    : [ x] normal  [ ] incontinent  [ ] oliguria/anuria  [ ] azar    MSK: [ ] normal  [x ] weakness  [ ] edema             [ ] ambulatory  [ ] bedbound/wheelchair bound    Skin: warm /dry   LABS:                        9.7    10.42 )-----------( 304      ( 22 Jan 2020 08:28 )             31.7     01-22    139  |  103  |  5.0<L>  ----------------------------<  107<H>  4.3   |  24.0  |  0.37<L>    Ca    8.1<L>      22 Jan 2020 08:28  Phos  2.5     01-22  Mg     1.7     01-23    I&O's Summary    22 Jan 2020 07:01  -  23 Jan 2020 07:00  --------------------------------------------------------  IN: 1400 mL / OUT: 1100 mL / NET: 300 mL    23 Jan 2020 07:01  -  23 Jan 2020 12:30  --------------------------------------------------------  IN: 100 mL / OUT: 0 mL / NET: 100 mL        Thank you for the opportunity to assist with the care of this patient.   [facility] [phone].

## 2020-01-23 NOTE — PROGRESS NOTE ADULT - ASSESSMENT
69F w/ PMHx COPD, recently diagnosed rectal CA w/ lung met on medical management,  chronic hypotension was sent over form rehab after she was found to have Hb 6.1. She had a recent Hb 8.4 ( 01/01/2020). Pt was found to be hypotensive 77/55mmHg w/ a repeat Hb 6.9. No obvious bleeding documented. She has no obvious complaints. She is not a candidate for any surgical intervention as per colorectal surgery.   Discussed case w/ son who is the HCA and pt confirmed DNR/DNI status but wants to medically manage her for the time being. Pallative Care team working with Pt's son and pt's cousin to develop plan for discharge.      # Shock- hypovolemic and septic  s/p PRBC X2   s/p pressors  Now on midodrine  and IVF  bld c/s negative   BP still low , bolus given , ivf increased     # symptomatic anemia due to blood lost due to rectal cancer   s/p PRBC x 2  H/H improved- stable    # sepsis with Rectal abscess  sepsis recsolved  completed IV meropenem on 1/22  poor candidate for palliative CRS procedure  f/u rectal abscess cultures ordered , follow up , draining   s/p debridement of sacral ulcer by Surgery on 1/23, wound care    # Rectal Ca with abd and lung mets in shock with no option of any kind of palliative intervention  - Perirectal fistula widely open and draining stool. Patient continue to be poor surgical candidate for palliative debulking, currently MOLST with no aggressive intervention, DNR/DNI  - Patient with widespread peritoneal implants, nonobstructed as passing BM and left perirectal abscess previous unroofed, seen draining.    No invasive lines tests, procedures as per son   Cont antibx  Palliative and Hospice consults ordered. -Family meeting- pt is not a [facility] candidate, son can not care for pt at home- possibly plan for [facility] vs. home with hospice with cousin    # Hx of COPD - on neb treatment / Brio Elipta at home - will order Symbicort , duo neb PRN .   ICDs  Constipation prophylaxis , Miralax added , Senna PRN .   Pain mgmt - Tylenol , Oxycontin , iv Dilaudid   Continue Reynolds  cath - due to high output of perirectal fistula drain and  abscess drainage

## 2020-01-23 NOTE — PROGRESS NOTE ADULT - ATTENDING COMMENTS
70 yo female with rectal cancer, perirectal abscess, COPD, presented to the hospital with anemia, developed septic shock likely due to the gluteal abscess (which is draining pus).  Family declines aggressive interventions, surgery, intubation, or CPR.  Would like conservative therapy only, if no improvement then transition to comfort measures.
seen examined at bedside with palliative MD Dr. Duque and Caio Waldron at bedside. patient had I and D sacral decubitus today. currently denied any pain. persistent hypotensive since early morning. increased midodrine and changed LR to NS. spoke to Vanita at bedside and son Clive over phone and explained the situation. family meeting is arranged later today. possible home with home hospice. patient wants to go home and become comfortable. Vanita agreed with the same. rest as per PA note
seen and examined at bedside. denied complaints. all ROS neg. BP hypotension. on midodrin. s/p NS bolus. exam none acute.  palliative hospice note noted. for [facility] upon DC. rest as per PA note

## 2020-01-23 NOTE — GOALS OF CARE CONVERSATION - ADVANCED CARE PLANNING - NS PRO AD PATIENT TYPE ON CHART
Health Care Proxy (HCP)/Do Not Resuscitate (DNR)/Medical Orders for Life-Sustaining Treatment (MOLST)
Health Care Proxy (HCP)/Do Not Resuscitate (DNR)/Medical Orders for Life-Sustaining Treatment (MOLST)
Health Care Proxy (HCP)/Medical Orders for Life-Sustaining Treatment (MOLST)/Do Not Resuscitate (DNR)
Medical Orders for Life-Sustaining Treatment (MOLST)/Health Care Proxy (HCP)/Do Not Resuscitate (DNR)
Do Not Resuscitate (DNR)/Medical Orders for Life-Sustaining Treatment (MOLST)/Health Care Proxy (HCP)
Medical Orders for Life-Sustaining Treatment (MOLST)/Health Care Proxy (HCP)/Do Not Resuscitate (DNR)

## 2020-01-23 NOTE — PROGRESS NOTE ADULT - PROBLEM SELECTOR PLAN 2
on Midodrine.   s/p fluids  Given overall outlook, doubtful patient will have much overall improvement

## 2020-01-23 NOTE — GOALS OF CARE CONVERSATION - ADVANCED CARE PLANNING - CONVERSATION DETAILS
Extensive family meet held, family is requesting [facility] upon discharge from [facility]. CM made aware.
RN spoke to son Clive via telephone at length regarding dispo plan. RN educated Clive on home hospice and in-pt hospice care with return understanding. Per Clive the plan is for the pt to go to his home with private hire HA and [facility] services. Scheduled meeting with Clive and pt on 1/22 @ 11am.
Re-referral received; patient is known to hospice from last [facility] admission. Writer/[facility] RN reviewed patient chart with Efrem Rehman NP (in collaboration with Dr. Valerie Webb) of the [facility]. Patient is able to take po meds and has not required any IVP pain medication. She has only taken one dose of po oxycontin IR for pain in the past 24+ hours. Therefore, patient does not qualify for inpatient hospice at the [facility] at this time. This writer will telephone patient's son Clive (not at bedside) to schedule meeting to discuss home hospice services. Will continue to follow.     Mariya Albert RN  [phone]
Writer/[facility] RN met with patient's son, Clive. Clive agreed to take his mother home with hospice services. Clive requested the following DME: hospital bed with air mattress and oxygen. These items will be delivered tomorrow (Friday) afternoon between 2 - 5 p.m. Clive requesting discharge on Saturday - this writer will notify [facility] main office to coordinate Saturday discharge. Will continue to follow.     Mariya Albert RN  [phone]
Spoke to trinity's son and HCP Clive over the phone. Patient is here with septic shock from rectal mass and adjacent abscess that is getting larger in size. Patient seen by colorectal surgery and images reviewed. There is nothing the surgery can offer this patient, no diverting colostomy, no I+D, no debridement. Patient has been going through this since fall of 2019. She was brought to ICU for hypotension and vasopressor support. Patient has poor IV access and will now require central line if pressor will continue. I explained this all to Clive and explained that her prognosis is not a good one as the source of her infection cannot be controlled. He expresses understanding and gets that life support will not improve her overall condition. He requests that a central line not be placed and that IV life support to not continue. He wishes his mother to be above all else comfortable. He would like her out of the ICU to a medical floor. He is ok with IV abx and Po medications to continue however does not want aggressive ICU level of care. New MOLST form filled out and placed in the chart.
Discussed with Clive current condition and issues of pain and hypotension.   Discussed consideration for comfort measures given mother's poor condition and unlikely any significant improvement despite current efforts.   Clive consented to comfort measures - no further labs, vitals, imaging.  Discussed use of opioid for pain, side effects.   Clive overall wants his mother to be comfortable and not suffer in her limited time  He is agreeable to home hospice services.

## 2020-01-23 NOTE — PROCEDURE NOTE - ADDITIONAL PROCEDURE DETAILS
opening of sacral ulcer dead tissue resected to area of 3cm diameter. Tissue necrosis below tunneling below level of skin extending laterally 12cm x 7cm. Area of ulceration with deepest point 3cm below skin surface.     wound dressed with santyl and packed with wet to dry dressing at procedure end

## 2020-01-23 NOTE — GOALS OF CARE CONVERSATION - ADVANCED CARE PLANNING - CONVERSATION/DISCUSSION
Hospice Referral
Prognosis/Treatment Options/Hospice Referral/Palliative Care Referral/MOLST Discussed/Diagnosis
Treatment Options/Prognosis/Diagnosis

## 2020-01-23 NOTE — GOALS OF CARE CONVERSATION - ADVANCED CARE PLANNING - NS PRO AD PATIENT TYPE
Do Not Resuscitate (DNR)/Health Care Proxy (HCP)/Medical Orders for Life-Sustaining Treatment (MOLST)
Do Not Resuscitate (DNR)/Health Care Proxy (HCP)/Medical Orders for Life-Sustaining Treatment (MOLST)
Medical Orders for Life-Sustaining Treatment (MOLST)/Health Care Proxy (HCP)/Do Not Resuscitate (DNR)
Medical Orders for Life-Sustaining Treatment (MOLST)/Health Care Proxy (HCP)/Do Not Resuscitate (DNR)
Health Care Proxy (HCP)/Do Not Resuscitate (DNR)/Medical Orders for Life-Sustaining Treatment (MOLST)
Medical Orders for Life-Sustaining Treatment (MOLST)/Do Not Resuscitate (DNR)/Health Care Proxy (HCP)

## 2020-01-24 VITALS — OXYGEN SATURATION: 94 %

## 2020-01-24 PROCEDURE — 99232 SBSQ HOSP IP/OBS MODERATE 35: CPT

## 2020-01-24 PROCEDURE — 99233 SBSQ HOSP IP/OBS HIGH 50: CPT

## 2020-01-24 RX ORDER — ALBUTEROL 90 UG/1
2 AEROSOL, METERED ORAL
Qty: 0 | Refills: 0 | DISCHARGE

## 2020-01-24 RX ORDER — FENTANYL CITRATE 50 UG/ML
1 INJECTION INTRAVENOUS
Refills: 0 | Status: DISCONTINUED | OUTPATIENT
Start: 2020-01-24 | End: 2020-01-24

## 2020-01-24 RX ORDER — FENTANYL CITRATE 50 UG/ML
1 INJECTION INTRAVENOUS
Refills: 0 | Status: DISCONTINUED | OUTPATIENT
Start: 2020-01-24 | End: 2020-01-25

## 2020-01-24 RX ORDER — SODIUM CHLORIDE 9 MG/ML
1000 INJECTION INTRAMUSCULAR; INTRAVENOUS; SUBCUTANEOUS
Refills: 0 | Status: DISCONTINUED | OUTPATIENT
Start: 2020-01-24 | End: 2020-01-25

## 2020-01-24 RX ORDER — HYDROMORPHONE HYDROCHLORIDE 2 MG/ML
0.5 INJECTION INTRAMUSCULAR; INTRAVENOUS; SUBCUTANEOUS EVERY 6 HOURS
Refills: 0 | Status: DISCONTINUED | OUTPATIENT
Start: 2020-01-24 | End: 2020-01-25

## 2020-01-24 RX ORDER — UMECLIDINIUM BROMIDE AND VILANTEROL TRIFENATATE 62.5; 25 UG/1; UG/1
1 POWDER RESPIRATORY (INHALATION)
Qty: 0 | Refills: 0 | DISCHARGE

## 2020-01-24 RX ORDER — BUDESONIDE, MICRONIZED 100 %
2 POWDER (GRAM) MISCELLANEOUS
Qty: 0 | Refills: 0 | DISCHARGE

## 2020-01-24 RX ORDER — OXYCODONE HYDROCHLORIDE 5 MG/1
5 TABLET ORAL EVERY 4 HOURS
Refills: 0 | Status: DISCONTINUED | OUTPATIENT
Start: 2020-01-24 | End: 2020-01-25

## 2020-01-24 RX ADMIN — OXYCODONE HYDROCHLORIDE 5 MILLIGRAM(S): 5 TABLET ORAL at 09:21

## 2020-01-24 RX ADMIN — MIDODRINE HYDROCHLORIDE 20 MILLIGRAM(S): 2.5 TABLET ORAL at 13:27

## 2020-01-24 RX ADMIN — OXYCODONE HYDROCHLORIDE 5 MILLIGRAM(S): 5 TABLET ORAL at 14:21

## 2020-01-24 RX ADMIN — FENTANYL CITRATE 1 PATCH: 50 INJECTION INTRAVENOUS at 18:22

## 2020-01-24 RX ADMIN — Medication 1 APPLICATION(S): at 13:27

## 2020-01-24 RX ADMIN — POLYETHYLENE GLYCOL 3350 17 GRAM(S): 17 POWDER, FOR SOLUTION ORAL at 13:26

## 2020-01-24 RX ADMIN — OXYCODONE HYDROCHLORIDE 5 MILLIGRAM(S): 5 TABLET ORAL at 22:15

## 2020-01-24 RX ADMIN — Medication 3 MILLILITER(S): at 09:17

## 2020-01-24 RX ADMIN — FENTANYL CITRATE 1 PATCH: 50 INJECTION INTRAVENOUS at 19:00

## 2020-01-24 RX ADMIN — OXYCODONE HYDROCHLORIDE 5 MILLIGRAM(S): 5 TABLET ORAL at 21:35

## 2020-01-24 RX ADMIN — MIDODRINE HYDROCHLORIDE 20 MILLIGRAM(S): 2.5 TABLET ORAL at 18:22

## 2020-01-24 RX ADMIN — OXYCODONE HYDROCHLORIDE 5 MILLIGRAM(S): 5 TABLET ORAL at 13:27

## 2020-01-24 RX ADMIN — MAGNESIUM OXIDE 400 MG ORAL TABLET 400 MILLIGRAM(S): 241.3 TABLET ORAL at 13:26

## 2020-01-24 RX ADMIN — Medication 0.5 MILLIGRAM(S): at 09:17

## 2020-01-24 RX ADMIN — MAGNESIUM OXIDE 400 MG ORAL TABLET 400 MILLIGRAM(S): 241.3 TABLET ORAL at 18:22

## 2020-01-24 RX ADMIN — OXYCODONE HYDROCHLORIDE 5 MILLIGRAM(S): 5 TABLET ORAL at 08:05

## 2020-01-24 NOTE — PROGRESS NOTE ADULT - ASSESSMENT
69F w/ PMHx COPD, recently diagnosed rectal CA w/ lung met on medical management,  chronic hypotension was sent over form rehab after she was found to have Hb 6.1. She had a recent Hb 8.4 ( 01/01/2020). Pt was found to be hypotensive 77/55mmHg w/ a repeat Hb 6.9. No obvious bleeding documented. She has no obvious complaints. She is not a candidate for any surgical intervention as per colorectal surgery.   Discussed case w/ son who is the HCA and pt confirmed DNR/DNI status but wants to medically manage her for the time being. Pallative Care team working with Pt's son and pt's cousin to develop plan for discharge.      # Shock- hypovolemic and septic  s/p PRBC X2   s/p pressors  Now on midodrine  and IVF  bld c/s negative   no Vitals as per family  patient is on comfort care    # symptomatic anemia due to blood lost due to rectal cancer   s/p PRBC x 2  H/H improved- stable  no labs as per family. on comfort    # sepsis with Rectal abscess  sepsis recsolved  completed IV meropenem on 1/22  poor candidate for palliative CRS procedure  f/u rectal abscess cultures ordered , follow up , draining   s/p debridement of sacral ulcer by Surgery on 1/23, wound care    # Rectal Ca with abd and lung mets in shock with no option of any kind of palliative intervention  - Perirectal fistula widely open and draining stool. Patient continue to be poor surgical candidate for palliative debulking, currently MOLST with no aggressive intervention, DNR/DNI  - Patient with widespread peritoneal implants, nonobstructed as passing BM and left perirectal abscess previous unroofed, seen draining.    No invasive lines tests, procedures as per son   Cont antibx  Palliative and Hospice consulted. -Family meeting yesterday--> home with home hospice tomorrow. on comfort care    # Hx of COPD - on neb treatment / Brio Elipta at home - will order Symbicort , duo neb PRN .   ICDs  Constipation prophylaxis , Miralax added , Senna PRN .   Pain mgmt - Tylenol , Oxycontin , iv Dilaudid   Continue Reynolds  cath - due to high output of perirectal fistula drain and  abscess drainage     SACRAL DECUBITUS SEEN BY SURGERY. S/P i AND d. DRESSING AS PER SURGERY. POSITION CHANGE q1-2 HRS    dispo: home with home hospice tomorrow as per request of son Clive. equipments will be delivered today

## 2020-01-24 NOTE — PROGRESS NOTE ADULT - SUBJECTIVE AND OBJECTIVE BOX
INTERVAL HPI/OVERNIGHT EVENTS: none    SUBJECTIVE:  Patient evaluated bedside and found in no acute distress. Patient dressing changed this morning. Wound appears stable post debridement. Patient has sacral pain with movement but denies pain at rest. Patient continues to have poor oral intake with malnutrition status. Denies sob, chest pain, n/v, fever/chills. Patient without bm for last few days.     MEDICATIONS  (STANDING):  buDESOnide    Inhalation Suspension 0.5 milliGRAM(s) Inhalation two times a day  budesonide 160 MICROgram(s)/formoterol 4.5 MICROgram(s) Inhaler 2 Puff(s) Inhalation two times a day  collagenase Ointment 1 Application(s) Topical daily  magnesium oxide 400 milliGRAM(s) Oral three times a day with meals  midodrine. 20 milliGRAM(s) Oral three times a day  polyethylene glycol 3350 17 Gram(s) Oral daily  sodium chloride 0.9%. 1000 milliLiter(s) (100 mL/Hr) IV Continuous <Continuous>    MEDICATIONS  (PRN):  acetaminophen   Tablet .. 650 milliGRAM(s) Oral every 6 hours PRN Moderate Pain (4 - 6)  albuterol/ipratropium for Nebulization 3 milliLiter(s) Nebulizer every 6 hours PRN Shortness of Breath and/or Wheezing  HYDROmorphone  Injectable 0.5 milliGRAM(s) IV Push every 6 hours PRN Moderate Pain (4 - 6)  ketorolac   Injectable 15 milliGRAM(s) IV Push every 8 hours PRN Severe Pain (7 - 10)  oxyCODONE    IR 5 milliGRAM(s) Oral every 6 hours PRN Severe Pain (7 - 10)  senna 2 Tablet(s) Oral at bedtime PRN IF NO bm x 2 days      Vital Signs Last 24 Hrs  T(C): 36.7 (24 Jan 2020 00:05), Max: 36.8 (23 Jan 2020 15:34)  T(F): 98 (24 Jan 2020 00:05), Max: 98.3 (23 Jan 2020 15:34)  HR: 80 (24 Jan 2020 00:05) (70 - 85)  BP: 110/74 (24 Jan 2020 00:05) (70/46 - 115/72)  BP(mean): 7 (23 Jan 2020 08:27) (7 - 7)  RR: 18 (24 Jan 2020 00:05) (18 - 19)  SpO2: 98% (24 Jan 2020 00:05) (93% - 98%)    PE  Gen: resting comfortably in bed, no acute distress  Pulm: no increased wob, no conversational dyspnea  : azar in place, no hematuria  Wound:  sacral ulcer with 3cm skin opening. Wound tracks 12cm laterally below skin and 3cm deep to skin surface. Tissue post debridement with healthy bleeding and minimal fibrinous tissue treated with santyl. Wound repacked lightly with santyl and wet to dry dressing covered by allevyn.     I&O's Detail    23 Jan 2020 07:01  -  24 Jan 2020 07:00  --------------------------------------------------------  IN:    lactated ringers.: 400 mL    sodium chloride 0.9%.: 800 mL  Total IN: 1200 mL    OUT:    Indwelling Catheter - Urethral: 775 mL  Total OUT: 775 mL    Total NET: 425 mL          LABS:                        9.7    10.42 )-----------( 304      ( 22 Jan 2020 08:28 )             31.7     01-22    139  |  103  |  5.0<L>  ----------------------------<  107<H>  4.3   |  24.0  |  0.37<L>    Ca    8.1<L>      22 Jan 2020 08:28  Phos  2.5     01-22  Mg     1.7     01-23

## 2020-01-24 NOTE — PROGRESS NOTE ADULT - SUBJECTIVE AND OBJECTIVE BOX
Dr. Rowland Hospitalist Progress Note  MARQUITA OTOOLE [id number]    Patient is a 69y old  Female who presents with a chief complaint of Hypotension (24 Jan 2020 13:51)    inerval: seen and examined at bedside. pain controlled with meds. no other complaints. wants to go home. for DC with home hospice tomorrow. on comfort care      ROS:  CONSTITUTIONAL:  No distress.no fever/chills  HEENT:  Eyes:  No diplopia or blurred vision.   CARDIOVASCULAR:  No pressure, squeezing, tightness, heaviness or aching about the chest; no palpitations.no leg swelling, no orthopnea or PND  RESPIRATORY:  no SOB. no wheezing.no cough or sputum.  No hemoptysis  GI: lower abd pain, no nausea, no vomiting, no diarrhea, no constipation. No hematochezia or melena  EXT:No joint pain or joint swelling or redness. no leg or calf pain  skin: no rash  CNS: No headaches. No weakness.no numbness. No depression or anxiety. No SI    T(C): 36.7 (01-24-20 @ 00:05), Max: 36.7 (01-24-20 @ 00:05)  HR: 80 (01-24-20 @ 00:05) (73 - 80)  BP: 110/74 (01-24-20 @ 00:05) (110/74 - 110/74)  RR: 18 (01-24-20 @ 00:05) (18 - 18)  SpO2: 98% (01-24-20 @ 00:05) (93% - 98%)    01-23-20 @ 07:01  -  01-24-20 @ 07:00  --------------------------------------------------------  IN: 1200 mL / OUT: 775 mL / NET: 425 mL    01-24-20 @ 07:01  -  01-24-20 @ 15:53  --------------------------------------------------------  IN: 0 mL / OUT: 800 mL / NET: -800 mL    CAPILLARY BLOOD GLUCOSE          Physical Exam:  GENERAL: Not in distress. Alert    HEENT:  Normocephalic and atraumatic.   NECK: Supple.   CARDIOVASCULAR: RRR S1, S2. No murmur/rubs/gallop  LUNGS: BLAE+, no rales, no wheezing, no rhonchi.    ABDOMEN: ND. Soft,  NT, no guarding / rebound / rigidity. BS normoactive.    EXTREMITIES:  no edema.   SKIN: no rash.   NEUROLOGIC: AAO*2. grossly intact  PSYCHIATRIC: Calm.  No agitation.    Labs      Mg     1.7     01-23       MEDICATIONS  (STANDING):  buDESOnide    Inhalation Suspension 0.5 milliGRAM(s) Inhalation two times a day  budesonide 160 MICROgram(s)/formoterol 4.5 MICROgram(s) Inhaler 2 Puff(s) Inhalation two times a day  collagenase Ointment 1 Application(s) Topical daily  fentaNYL   Patch  12 MICROgram(s)/Hr 1 Patch Transdermal every 72 hours  magnesium oxide 400 milliGRAM(s) Oral three times a day with meals  midodrine. 20 milliGRAM(s) Oral three times a day  polyethylene glycol 3350 17 Gram(s) Oral daily  sodium chloride 0.9%. 1000 milliLiter(s) (75 mL/Hr) IV Continuous <Continuous>    MEDICATIONS  (PRN):  acetaminophen   Tablet .. 650 milliGRAM(s) Oral every 6 hours PRN Moderate Pain (4 - 6)  albuterol/ipratropium for Nebulization 3 milliLiter(s) Nebulizer every 6 hours PRN Shortness of Breath and/or Wheezing  HYDROmorphone  Injectable 0.5 milliGRAM(s) IV Push every 6 hours PRN Severe Pain (7 - 10)  ketorolac   Injectable 15 milliGRAM(s) IV Push every 8 hours PRN Severe Pain (7 - 10)  oxyCODONE    IR 5 milliGRAM(s) Oral every 4 hours PRN Moderate Pain (4 - 6)  senna 2 Tablet(s) Oral at bedtime PRN IF NO bm x 2 days

## 2020-01-24 NOTE — PROGRESS NOTE ADULT - NSHPATTENDINGPLANDISCUSS_GEN_ALL_CORE
patient, RN, PA, Palliative
patient, RN, PA, Palliative team, family members
patient, RN, SW,  at bedside, PA

## 2020-01-24 NOTE — PROGRESS NOTE ADULT - SUBJECTIVE AND OBJECTIVE BOX
CC:  follow up symptoms  INTERVAL HPI/OVERNIGHT EVENTS:    PRESENT SYMPTOMS: SOURCE:  Patient/Family/Team    PAIN SCALE:  0 = none  1 = mild   2 = moderate  3 = severe    Pain: 3    Dyspnea:  [ ] YES [ ]x NO  Anxiety:  [ ] YES [x ] NO  Fatigue: [x ] YES [ ] NO  Nausea: [ ] YES [x ] NO  Loss of Appetite: [ x] YES [ ] NO  Other symptoms: __________    MEDICATIONS  (STANDING):  buDESOnide    Inhalation Suspension 0.5 milliGRAM(s) Inhalation two times a day  budesonide 160 MICROgram(s)/formoterol 4.5 MICROgram(s) Inhaler 2 Puff(s) Inhalation two times a day  collagenase Ointment 1 Application(s) Topical daily  magnesium oxide 400 milliGRAM(s) Oral three times a day with meals  midodrine. 20 milliGRAM(s) Oral three times a day  polyethylene glycol 3350 17 Gram(s) Oral daily  sodium chloride 0.9%. 1000 milliLiter(s) (100 mL/Hr) IV Continuous <Continuous>    MEDICATIONS  (PRN):  acetaminophen   Tablet .. 650 milliGRAM(s) Oral every 6 hours PRN Moderate Pain (4 - 6)  albuterol/ipratropium for Nebulization 3 milliLiter(s) Nebulizer every 6 hours PRN Shortness of Breath and/or Wheezing  HYDROmorphone  Injectable 0.5 milliGRAM(s) IV Push every 6 hours PRN Moderate Pain (4 - 6)  ketorolac   Injectable 15 milliGRAM(s) IV Push every 8 hours PRN Severe Pain (7 - 10)  oxyCODONE    IR 5 milliGRAM(s) Oral every 6 hours PRN Severe Pain (7 - 10)  senna 2 Tablet(s) Oral at bedtime PRN IF NO bm x 2 days      Allergies    No Known Allergies    Intolerances    Karnofsky Performance Score/Palliative Performance Status Version 2:    30 %    Vital Signs Last 24 Hrs  T(C): 36.7 (24 Jan 2020 00:05), Max: 36.8 (23 Jan 2020 15:34)  T(F): 98 (24 Jan 2020 00:05), Max: 98.3 (23 Jan 2020 15:34)  HR: 80 (24 Jan 2020 00:05) (70 - 80)  BP: 110/74 (24 Jan 2020 00:05) (110/74 - 115/72)  BP(mean): --  RR: 18 (24 Jan 2020 00:05) (18 - 18)  SpO2: 98% (24 Jan 2020 00:05) (93% - 98%)    PHYSICAL EXAM:    General: Frail awake alert NAD    HEENT: [x ] normal  [ ] dry mouth  [ ] ET tube/trach    Lungs: [x ] comfortable [ ] tachypnea/labored breathing  [ ] excessive secretions    CV: [x] normal  [ ] tachycardia    GI: [x ] normal  [ ] distended  [ ] tender  [ ] no BS               [ ] PEG/NG/OG tube    : [x ] normal  [ ] incontinent  [ ] oliguria/anuria  [ ] azar    MSK: [ ] normal  [x ] weakness  [ ] edema             [ ] ambulatory  [ x] bedbound/wheelchair bound    Skin: warm dry    LABS:      Mg     1.7     01-23          I&O's Summary    23 Jan 2020 07:01  -  24 Jan 2020 07:00  --------------------------------------------------------  IN: 1200 mL / OUT: 775 mL / NET: 425 mL        Thank you for the opportunity to assist with the care of this patient.   [facility] [phone].

## 2020-01-24 NOTE — PROGRESS NOTE ADULT - ASSESSMENT
69 year old female with metastatic rectal cancer with sacral decubitus ulcer now s/p bedside debridement 1/23.    - daily sacral ulcer wound care per nursing staff with santyl, wet to dry light packing, and allevyn  - patient requires q15hr pressure offloading  - poor nutritional status with poor oral intake, patient requires increased nutritional support for optimization of wound healing  - recommend PT, wound likely 2/2 shearing forces and pressure of patient immobility. Recommend OOB.

## 2020-01-24 NOTE — PROGRESS NOTE ADULT - PROBLEM SELECTOR PLAN 3
Taking multiple Oxycodone PRN   Start Fentanyl 12mcg for seamless pain relief  Cont Oxycodone 5mg q4 PRN

## 2020-01-24 NOTE — PROGRESS NOTE ADULT - REASON FOR ADMISSION
Hypotension

## 2020-01-25 ENCOUNTER — TRANSCRIPTION ENCOUNTER (OUTPATIENT)
Age: 70
End: 2020-01-25

## 2020-01-25 PROCEDURE — 87070 CULTURE OTHR SPECIMN AEROBIC: CPT

## 2020-01-25 PROCEDURE — 83605 ASSAY OF LACTIC ACID: CPT

## 2020-01-25 PROCEDURE — 36415 COLL VENOUS BLD VENIPUNCTURE: CPT

## 2020-01-25 PROCEDURE — 36430 TRANSFUSION BLD/BLD COMPNT: CPT

## 2020-01-25 PROCEDURE — 83735 ASSAY OF MAGNESIUM: CPT

## 2020-01-25 PROCEDURE — 85730 THROMBOPLASTIN TIME PARTIAL: CPT

## 2020-01-25 PROCEDURE — 85027 COMPLETE CBC AUTOMATED: CPT

## 2020-01-25 PROCEDURE — 82272 OCCULT BLD FECES 1-3 TESTS: CPT

## 2020-01-25 PROCEDURE — 84484 ASSAY OF TROPONIN QUANT: CPT

## 2020-01-25 PROCEDURE — 93005 ELECTROCARDIOGRAM TRACING: CPT

## 2020-01-25 PROCEDURE — 85610 PROTHROMBIN TIME: CPT

## 2020-01-25 PROCEDURE — 96361 HYDRATE IV INFUSION ADD-ON: CPT

## 2020-01-25 PROCEDURE — 86901 BLOOD TYPING SEROLOGIC RH(D): CPT

## 2020-01-25 PROCEDURE — 87040 BLOOD CULTURE FOR BACTERIA: CPT

## 2020-01-25 PROCEDURE — 99291 CRITICAL CARE FIRST HOUR: CPT | Mod: 25

## 2020-01-25 PROCEDURE — 99239 HOSP IP/OBS DSCHRG MGMT >30: CPT

## 2020-01-25 PROCEDURE — 86850 RBC ANTIBODY SCREEN: CPT

## 2020-01-25 PROCEDURE — 80048 BASIC METABOLIC PNL TOTAL CA: CPT

## 2020-01-25 PROCEDURE — 71045 X-RAY EXAM CHEST 1 VIEW: CPT

## 2020-01-25 PROCEDURE — P9016: CPT

## 2020-01-25 PROCEDURE — 96374 THER/PROPH/DIAG INJ IV PUSH: CPT

## 2020-01-25 PROCEDURE — 84100 ASSAY OF PHOSPHORUS: CPT

## 2020-01-25 PROCEDURE — 94640 AIRWAY INHALATION TREATMENT: CPT

## 2020-01-25 PROCEDURE — 86900 BLOOD TYPING SEROLOGIC ABO: CPT

## 2020-01-25 PROCEDURE — 94760 N-INVAS EAR/PLS OXIMETRY 1: CPT

## 2020-01-25 PROCEDURE — 74177 CT ABD & PELVIS W/CONTRAST: CPT

## 2020-01-25 PROCEDURE — 86923 COMPATIBILITY TEST ELECTRIC: CPT

## 2020-01-25 PROCEDURE — 80053 COMPREHEN METABOLIC PANEL: CPT

## 2020-01-25 PROCEDURE — 96375 TX/PRO/DX INJ NEW DRUG ADDON: CPT

## 2020-01-25 RX ORDER — ACETAMINOPHEN 500 MG
2 TABLET ORAL
Qty: 0 | Refills: 0 | DISCHARGE

## 2020-01-25 RX ORDER — OXYCODONE HYDROCHLORIDE 5 MG/1
1 TABLET ORAL
Qty: 30 | Refills: 0
Start: 2020-01-25

## 2020-01-25 RX ORDER — ERTAPENEM SODIUM 1 G/1
1 INJECTION, POWDER, LYOPHILIZED, FOR SOLUTION INTRAMUSCULAR; INTRAVENOUS
Qty: 0 | Refills: 0 | DISCHARGE

## 2020-01-25 RX ORDER — DRONABINOL 2.5 MG
1 CAPSULE ORAL
Qty: 14 | Refills: 0
Start: 2020-01-25 | End: 2020-01-31

## 2020-01-25 RX ORDER — IPRATROPIUM/ALBUTEROL SULFATE 18-103MCG
3 AEROSOL WITH ADAPTER (GRAM) INHALATION
Qty: 150 | Refills: 0
Start: 2020-01-25

## 2020-01-25 RX ORDER — MIDODRINE HYDROCHLORIDE 2.5 MG/1
2 TABLET ORAL
Qty: 42 | Refills: 0
Start: 2020-01-25 | End: 2020-01-31

## 2020-01-25 RX ORDER — COLLAGENASE CLOSTRIDIUM HIST. 250 UNIT/G
1 OINTMENT (GRAM) TOPICAL
Qty: 30 | Refills: 0
Start: 2020-01-25

## 2020-01-25 RX ORDER — ACETAMINOPHEN 500 MG
2 TABLET ORAL
Qty: 0 | Refills: 0 | DISCHARGE
Start: 2020-01-25

## 2020-01-25 RX ORDER — POLYETHYLENE GLYCOL 3350 17 G/17G
17 POWDER, FOR SOLUTION ORAL
Qty: 238 | Refills: 0
Start: 2020-01-25

## 2020-01-25 RX ORDER — FENTANYL CITRATE 50 UG/ML
1 INJECTION INTRAVENOUS
Qty: 3 | Refills: 0
Start: 2020-01-25

## 2020-01-25 RX ORDER — FENTANYL CITRATE 50 UG/ML
1 INJECTION INTRAVENOUS
Refills: 0 | Status: DISCONTINUED | OUTPATIENT
Start: 2020-01-25 | End: 2020-01-25

## 2020-01-25 RX ORDER — SENNA PLUS 8.6 MG/1
2 TABLET ORAL
Qty: 14 | Refills: 0
Start: 2020-01-25 | End: 2020-01-31

## 2020-01-25 RX ORDER — MAGNESIUM OXIDE 400 MG ORAL TABLET 241.3 MG
1 TABLET ORAL
Qty: 15 | Refills: 0
Start: 2020-01-25 | End: 2020-01-29

## 2020-01-25 RX ORDER — COLLAGENASE CLOSTRIDIUM HIST. 250 UNIT/G
1 OINTMENT (GRAM) TOPICAL
Qty: 90 | Refills: 0
Start: 2020-01-25

## 2020-01-25 RX ADMIN — MAGNESIUM OXIDE 400 MG ORAL TABLET 400 MILLIGRAM(S): 241.3 TABLET ORAL at 11:03

## 2020-01-25 RX ADMIN — MAGNESIUM OXIDE 400 MG ORAL TABLET 400 MILLIGRAM(S): 241.3 TABLET ORAL at 07:52

## 2020-01-25 RX ADMIN — OXYCODONE HYDROCHLORIDE 5 MILLIGRAM(S): 5 TABLET ORAL at 05:37

## 2020-01-25 RX ADMIN — MIDODRINE HYDROCHLORIDE 20 MILLIGRAM(S): 2.5 TABLET ORAL at 05:34

## 2020-01-25 RX ADMIN — Medication 1 APPLICATION(S): at 11:01

## 2020-01-25 RX ADMIN — MIDODRINE HYDROCHLORIDE 20 MILLIGRAM(S): 2.5 TABLET ORAL at 11:02

## 2020-01-25 RX ADMIN — FENTANYL CITRATE 1 PATCH: 50 INJECTION INTRAVENOUS at 07:23

## 2020-01-25 RX ADMIN — FENTANYL CITRATE 1 PATCH: 50 INJECTION INTRAVENOUS at 12:22

## 2020-01-25 RX ADMIN — POLYETHYLENE GLYCOL 3350 17 GRAM(S): 17 POWDER, FOR SOLUTION ORAL at 11:02

## 2020-01-25 NOTE — DISCHARGE NOTE PROVIDER - NSDCMRMEDTOKEN_GEN_ALL_CORE_FT
I have personally seen and examined this patient.  I have fully participated in the care of this patient. I have reviewed all pertinent clinical information, including history, physical exam, plan and the Resident’s note and agree except as noted. I have personally seen and examined this patient.  I have fully participated in the care of this patient. I have reviewed all pertinent clinical information, including history, physical exam, plan and the Resident’s note and agree except as noted. acetaminophen 325 mg oral tablet: 2 tab(s) orally every 6 hours, As needed, Mild Pain (1-3)  Anoro Ellipta 62.5 mcg-25 mcg/inh inhalation powder: 1 puff(s) inhaled once a day  budesonide 0.5 mg/2 mL inhalation suspension: 2 milliliter(s) inhaled 2 times a day  collagenase 250 units/g topical ointment: 1 application topically once a day  fentaNYL 12 mcg/hr transdermal film, extended release: 1 patch transdermal every 72 hours MDD:1 patch every 72 hrs  ipratropium-albuterol 0.5 mg-2.5 mg/3 mLinhalation solution: 3 milliliter(s) inhaled every 6 hours, As needed, Shortness of Breath and/or Wheezing  magnesium oxide 400 mg (241.3 mg elemental magnesium) oral tablet: 1 tab(s) orally 3 times a day (with meals)  midodrine 10 mg oral tablet: 2 tab(s) orally 3 times a day  nystatin 100,000 units/g topical powder: 1 application topically 3 times a day  oxyCODONE 5 mg oral tablet: 1-2  tab(s) orally every 4 hours, As needed, Moderate Pain (4 - 6) MDD:12 tab  polyethylene glycol 3350 oral powder for reconstitution: 17 gram(s) orally once a day as needed for constipation  senna oral tablet: 2 tab(s) orally once a day (at bedtime), As needed, IF NO bm x 2 days  Silvadene 1% topical cream: Cleanse L ischium with NS, pat dry, apply BID  Ventolin HFA 90 mcg/inh inhalation aerosol: 2 puff(s) inhaled 4 times a day acetaminophen 325 mg oral tablet: 2 tab(s) orally every 6 hours, As needed, Mild Pain (1-3)  Anoro Ellipta 62.5 mcg-25 mcg/inh inhalation powder: 1 puff(s) inhaled once a day  budesonide 0.5 mg/2 mL inhalation suspension: 2 milliliter(s) inhaled 2 times a day  collagenase 250 units/g topical ointment: 1 application topically once a day  fentaNYL 12 mcg/hr transdermal film, extended release: 1 patch transdermal every 72 hours MDD:1 patch every 72 hrs  ipratropium-albuterol 0.5 mg-2.5 mg/3 mLinhalation solution: 3 milliliter(s) inhaled every 6 hours, As needed, Shortness of Breath and/or Wheezing  magnesium oxide 400 mg (241.3 mg elemental magnesium) oral tablet: 1 tab(s) orally 3 times a day (with meals)  midodrine 10 mg oral tablet: 2 tab(s) orally 3 times a day  nystatin 100,000 units/g topical powder: 1 application topically 3 times a day  oxyCODONE 5 mg oral tablet: 1-2  tab(s) orally every 4 hours, As needed, Moderate Pain (4 - 6) MDD:12 tab  polyethylene glycol 3350 oral powder for reconstitution: 17 gram(s) orally once a day as needed for constipation  Santyl 250 units/g topical ointment: Apply topically to affected area once a day   senna oral tablet: 2 tab(s) orally once a day (at bedtime), As needed, IF NO bm x 2 days  Silvadene 1% topical cream: Cleanse L ischium with NS, pat dry, apply BID  Ventolin HFA 90 mcg/inh inhalation aerosol: 2 puff(s) inhaled 4 times a day acetaminophen 325 mg oral tablet: 2 tab(s) orally every 6 hours, As needed, Mild Pain (1-3)  Anoro Ellipta 62.5 mcg-25 mcg/inh inhalation powder: 1 puff(s) inhaled once a day  budesonide 0.5 mg/2 mL inhalation suspension: 2 milliliter(s) inhaled 2 times a day  collagenase 250 units/g topical ointment: 1 application topically once a day  fentaNYL 12 mcg/hr transdermal film, extended release: 1 patch transdermal every 72 hours MDD:1 patch every 72 hrs  ipratropium-albuterol 0.5 mg-2.5 mg/3 mLinhalation solution: 3 milliliter(s) inhaled every 6 hours, As needed, Shortness of Breath and/or Wheezing  magnesium oxide 400 mg (241.3 mg elemental magnesium) oral tablet: 1 tab(s) orally 3 times a day (with meals)  Marinol 2.5 mg oral capsule: 1 cap(s) orally 2 times a day MDD:2 tab  midodrine 10 mg oral tablet: 2 tab(s) orally 3 times a day  nystatin 100,000 units/g topical powder: 1 application topically 3 times a day  oxyCODONE 5 mg oral tablet: 1-2  tab(s) orally every 4 hours, As needed, Moderate Pain (4 - 6) MDD:12 tab  polyethylene glycol 3350 oral powder for reconstitution: 17 gram(s) orally once a day as needed for constipation  Santyl 250 units/g topical ointment: Apply topically to affected area once a day   senna oral tablet: 2 tab(s) orally once a day (at bedtime), As needed, IF NO bm x 2 days  Silvadene 1% topical cream: Cleanse L ischium with NS, pat dry, apply BID  Ventolin HFA 90 mcg/inh inhalation aerosol: 2 puff(s) inhaled 4 times a day acetaminophen 325 mg oral tablet: 2 tab(s) orally every 6 hours, As needed, Mild Pain (1-3)  Anoro Ellipta 62.5 mcg-25 mcg/inh inhalation powder: 1 puff(s) inhaled once a day  budesonide 0.5 mg/2 mL inhalation suspension: 2 milliliter(s) inhaled 2 times a day  collagenase 250 units/g topical ointment: 1 application topically once a day  fentaNYL 12 mcg/hr transdermal film, extended release: 1 patch transdermal every 72 hours MDD:1 patch every 72 hrs  ipratropium-albuterol 0.5 mg-2.5 mg/3 mLinhalation solution: 3 milliliter(s) inhaled every 6 hours, As needed, Shortness of Breath and/or Wheezing  magnesium oxide 400 mg (241.3 mg elemental magnesium) oral tablet: 1 tab(s) orally 3 times a day (with meals)  Marinol 2.5 mg oral capsule: 1 cap(s) orally 2 times a day MDD:2 tab   midodrine 10 mg oral tablet: 2 tab(s) orally 3 times a day  nystatin 100,000 units/g topical powder: 1 application topically 3 times a day  oxyCODONE 5 mg oral tablet: 1-2  tab(s) orally every 4 hours, As needed, Moderate Pain (4 - 6) MDD:12 tab  polyethylene glycol 3350 oral powder for reconstitution: 17 gram(s) orally once a day as needed for constipation  senna oral tablet: 2 tab(s) orally once a day (at bedtime), As needed, IF NO bm x 2 days  Silvadene 1% topical cream: Cleanse L ischium with NS, pat dry, apply BID  Ventolin HFA 90 mcg/inh inhalation aerosol: 2 puff(s) inhaled 4 times a day

## 2020-01-25 NOTE — DISCHARGE NOTE NURSING/CASE MANAGEMENT/SOCIAL WORK - PATIENT PORTAL LINK FT
You can access the FollowMyHealth Patient Portal offered by [facility] by registering at the following website: [email or web address]. By joining [facility]’s FollowMyHealth portal, you will also be able to view your health information using other applications (apps) compatible with our system.

## 2020-01-25 NOTE — DISCHARGE NOTE NURSING/CASE MANAGEMENT/SOCIAL WORK - NSDCPEEMAIL_GEN_ALL_CORE
[facility] email [email or web address]

## 2020-01-25 NOTE — DISCHARGE NOTE NURSING/CASE MANAGEMENT/SOCIAL WORK - NSDCPEWEB_GEN_ALL_CORE
[place] website --- [email or web address] for Tobacco [email or web address] website --- [email or web address]

## 2020-01-25 NOTE — DISCHARGE NOTE PROVIDER - NSDCCPCAREPLAN_GEN_ALL_CORE_FT
PRINCIPAL DISCHARGE DIAGNOSIS  Diagnosis: Anemia  Assessment and Plan of Treatment: continue meds.      SECONDARY DISCHARGE DIAGNOSES  Diagnosis: Rectal cancer  Assessment and Plan of Treatment: comfort care    Diagnosis: Rectal abscess  Assessment and Plan of Treatment: comfort care    Diagnosis: Hypotension  Assessment and Plan of Treatment: continue meds. drink plenty    Diagnosis: Sacral decubitus ulcer  Assessment and Plan of Treatment: dressing as directed    Diagnosis: Shock  Assessment and Plan of Treatment: resolved

## 2020-01-25 NOTE — DISCHARGE NOTE PROVIDER - HOSPITAL COURSE
69F w/ PMHx COPD, recently diagnosed rectal CA w/ lung met on medical management,  chronic hypotension was sent over form rehab after she was found to have Hb 6.1. She had a recent Hb 8.4 ( 01/01/2020). Pt was found to be hypotensive 77/55mmHg w/ a repeat Hb 6.9. No obvious bleeding documented. She has no obvious complaints. She is not a candidate for any surgical intervention as per colorectal surgery.     Discussed case w/ son who is the HCA and pt confirmed DNR/DNI status but wants to medically manage her for the time being. Pallative Care team working with Pt's son and pt's cousin to develop plan for discharge.            # Shock- hypovolemic and septic    s/p PRBC X2     s/p pressors    Now on midodrine    and IVF    bld c/s negative     no Vitals as per family    patient is on comfort care        # symptomatic anemia due to blood lost due to rectal cancer     s/p PRBC x 2    H/H improved- stable    no labs as per family. on comfort        # sepsis with Rectal abscess    sepsis recsolved    completed IV meropenem on 1/22    poor candidate for palliative CRS procedure    f/u rectal abscess cultures ordered , follow up , draining     s/p debridement of sacral ulcer by Surgery on 1/23, wound care        # Rectal Ca with abd and lung mets in shock with no option of any kind of palliative intervention    - Perirectal fistula widely open and draining stool. Patient continue to be poor surgical candidate for palliative debulking, currently MOLST with no aggressive intervention, DNR/DNI    - Patient with widespread peritoneal implants, nonobstructed as passing BM and left perirectal abscess previous unroofed, seen draining.      No invasive lines tests, procedures as per son     Cont antibx    Palliative and Hospice consulted. -Family meeting yesterday--> home with home hospice tomorrow. on comfort care        # Hx of COPD - on neb treatment / Brio Elipta at home - will order Symbicort , duo neb PRN .     ICDs    Constipation prophylaxis , Miralax added , Senna PRN .     Pain mgmt - Tylenol , Oxycontin , iv Dilaudid     Continue Reynolds  cath - due to high output of perirectal fistula drain and  abscess drainage         SACRAL DECUBITUS SEEN BY SURGERY. S/P i AND d. DRESSING AS PER SURGERY. POSITION CHANGE q1-2 HRS        dispo: home with home hospice today as per patient/family request.         Time spent 35 min 69F w/ PMHx COPD, recently diagnosed rectal CA w/ lung met on medical management,  chronic hypotension was sent over form rehab after she was found to have Hb 6.1. She had a recent Hb 8.4 ( 01/01/2020). Pt was found to be hypotensive 77/55mmHg w/ a repeat Hb 6.9. No obvious bleeding documented. She has no obvious complaints. She is not a candidate for any surgical intervention as per colorectal surgery.     Discussed case w/ son who is the HCA and pt confirmed DNR/DNI status but wants to medically manage her for the time being. Pallative Care team working with Pt's son and pt's cousin to develop plan for discharge.            # Shock- hypovolemic and septic    s/p PRBC X2     s/p pressors    Now on midodrine    and IVF    bld c/s negative     no Vitals as per family    patient is on comfort care        # symptomatic anemia due to blood lost due to rectal cancer     s/p PRBC x 2    H/H improved- stable    no labs as per family. on comfort        # sepsis with Rectal abscess    sepsis recsolved    completed IV meropenem on 1/22    poor candidate for palliative CRS procedure    f/u rectal abscess cultures ordered , follow up , draining     s/p debridement of sacral ulcer by Surgery on 1/23, wound care        # Rectal Ca with abd and lung mets in shock with no option of any kind of palliative intervention    - Perirectal fistula widely open and draining stool. Patient continue to be poor surgical candidate for palliative debulking, currently MOLST with no aggressive intervention, DNR/DNI    - Patient with widespread peritoneal implants, nonobstructed as passing BM and left perirectal abscess previous unroofed, seen draining.      No invasive lines tests, procedures as per son     Cont antibx    Palliative and Hospice consulted. -Family meeting yesterday--> home with home hospice tomorrow. on comfort care        # Hx of COPD - on neb treatment / Brio Elipta at home - will order Symbicort , duo neb PRN .     ICDs    Constipation prophylaxis , Miralax added , Senna PRN .     Pain mgmt - Tylenol , Oxycontin , iv Dilaudid     Continue Reynolds  cath - due to high output of perirectal fistula drain and  abscess drainage         SACRAL DECUBITUS SEEN BY SURGERY. S/P I AND D DRESSING AS PER SURGERY. POSITION CHANGE q1-2 HRS        dispo: home with home hospice today as per patient/family request.         seen and examined at bedside. denied complaints. ROS loss of appetite. otherwise neg. exam AAO*2. none acute. resting comfortably.  will add marinol for appetite    plan of care discussed with the patient. call Clive, could not leave VM.     As per RN patient has been scheduled to leave at 11 am today        Time spent 35 min 69F w/ PMHx COPD, recently diagnosed rectal CA w/ lung met on medical management,  chronic hypotension was sent over form rehab after she was found to have Hb 6.1. She had a recent Hb 8.4 ( 01/01/2020). Pt was found to be hypotensive 77/55mmHg w/ a repeat Hb 6.9. No obvious bleeding documented. She has no obvious complaints. She is not a candidate for any surgical intervention as per colorectal surgery.     Discussed case w/ son who is the HCA and pt confirmed DNR/DNI status but wants to medically manage her for the time being. Pallative Care team working with Pt's son and pt's cousin to develop plan for discharge.        # Shock- hypovolemic and septic    s/p PRBC X2     s/p pressors    Now on midodrine    and IVF    bld c/s negative     no Vitals as per family    patient is on comfort care        # symptomatic anemia due to blood lost due to rectal cancer     s/p PRBC x 2    H/H improved- stable    no labs as per family. on comfort        # sepsis with Rectal abscess    sepsis recsolved    completed IV meropenem on 1/22    poor candidate for palliative CRS procedure    f/u rectal abscess cultures ordered , follow up , draining     s/p debridement of sacral ulcer by Surgery on 1/23, wound care        # Rectal Ca with abd and lung mets in shock with no option of any kind of palliative intervention    - Perirectal fistula widely open and draining stool. Patient continue to be poor surgical candidate for palliative debulking, currently MOLST with no aggressive intervention, DNR/DNI    - Patient with widespread peritoneal implants, nonobstructed as passing BM and left perirectal abscess previous unroofed, seen draining.      No invasive lines tests, procedures as per son     Cont antibx    Palliative and Hospice consulted. -Family meeting yesterday--> home with home hospice. on comfort care        # Hx of COPD - on neb treatment / Brio Elipta at home - will order Symbicort , duo neb PRN .     ICDs    Constipation prophylaxis , Miralax added , Senna PRN .     Pain mgmt - Tylenol , Oxycontin , iv Dilaudid     Continue Reynolds  cath - due to high output of perirectal fistula drain and  abscess drainage         SACRAL DECUBITUS SEEN BY SURGERY. S/P I AND D DRESSING AS PER SURGERY. POSITION CHANGE q1-2 HRS        dispo: home with home hospice today as per patient/family request.         seen and examined at bedside. denied complaints. ROS loss of appetite. otherwise neg. exam AAO*2. none acute. resting comfortably.  will add marinol for appetite    plan of care discussed with the patient. call Clive, could not leave VM.     As per RN patient has been scheduled to leave at 11 am today        Time spent 35 min

## 2020-01-25 NOTE — DISCHARGE NOTE PROVIDER - NSDCFUADDINST_GEN_ALL_CORE_FT
- daily sacral ulcer wound care with santyl, wet to dry light packing, and allevyn  - patient requires q15hr pressure offloading  - poor nutritional status with poor oral intake, patient requires increased nutritional support for optimization of wound healing  - mobility as tolerated with assistance.

## 2020-07-21 NOTE — BEHAVIORAL HEALTH ASSESSMENT NOTE - HYGIENE
no lesions,  no deformities,  no traumatic injuries,  no significant scars are present,  chest wall non-tender,  no masses present,  tactile fremitus is normal, breathing is unlabored without accessory muscle use,normal breath sounds Fair

## 2020-10-14 NOTE — PRE-OP CHECKLIST - MEDICAL/PEDIATRIC CLEARANCE ON MEDICAL RECORD
cardiac Alert-The patient is alert, awake and responds to voice. The patient is oriented to time, place, and person. The triage nurse is able to obtain subjective information.

## 2020-10-15 NOTE — PATIENT PROFILE ADULT - PRO INTERPRETER NEED 2
CM spoke with wife at bedside. She is aware that Dr. Jose Silva has signed the order for the hospital bed. It may take a couple of days prior to arrival.  She was agreeable to send a referral to [facility] for the bed. Choice letter signed. She is aware that [address] sent [facility] has declined patient due to Speech. Additional referrals sent out, [facility] called to see about speech therapy. They are 3 weeks out before the patient can be seen. So [facility] Her[facility] declined the patient. Additional referrals sent to [facility] agencies. Will continue to monitor for discharge needs. English

## 2020-10-29 NOTE — PROGRESS NOTE ADULT - PROBLEM SELECTOR PROBLEM 5
Encounter for palliative care
Advance care planning
Chronic obstructive pulmonary disease, unspecified COPD type
Chronic obstructive pulmonary disease, unspecified COPD type
Encounter for palliative care
gradual onset

## 2021-10-12 NOTE — SEPSIS NOTE - SKIN
What Type Of Note Output Would You Prefer (Optional)?: Standard Output Is The Patient Presenting As Previously Scheduled?: Yes How Severe Is Your Rash?: mild Is This A New Presentation, Or A Follow-Up?: Rash detailed exam warm and dry

## 2023-03-04 NOTE — CONSULT NOTE ADULT - ASSESSMENT
Monitor closely   3/1 CT abd/pelvis: Splenomegaly with a small age indeterminant splenic infarct. No abdominal or pelvic lymphadenopathy. Trace left pleural effusion 69 year old female with history noted in [facility] admitted with resp failure sec to COPD exacerbation but also found to have a perforated rectal wall and likely metastatic disease.      1) Rectal mass - likely malignant and there is likely metastatic disease in the lungs and bones. There appears to be an abscess right now and she is going to the OR tomorrow.  Will follow path and treatment options will depend on the final result.  CEA is 18.      2) Normocytic anemia - likely multifactorial, abscess/malignancy. Iron stores appear to be adequate.  Transfuse if hgb<7.0.

## 2023-10-15 NOTE — CHART NOTE - NSCHARTNOTEFT_GEN_A_CORE
Upon Nutritional Assessment by the Registered Dietitian your patient was determined to meet criteria / has evidence of the following diagnosis/diagnoses:          [ ]  Mild Protein Calorie Malnutrition        [ ]  Moderate Protein Calorie Malnutrition        [ x] Severe Protein Calorie Malnutrition        [ ] Unspecified Protein Calorie Malnutrition        [ ] Underweight / BMI <19        [ ] Morbid Obesity / BMI > 40    Pt presents at high nutrition risk secondary to malnutrition (severe, chronic) related to inability to meet sufficient protein-energy in setting of rectal CA w/ lung mets, skin breakdown, and persistent lack of appetite as evidenced by meeting <75% nutrient needs >1 month, severe muscle loss of temples, clavicles, shoulders, severe fat loss of orbitals, buccal pads, triceps, 30% wt loss x 7 months.    Findings as based on:  •  Comprehensive nutrition assessment and consultation  •  Calorie counts (nutrient intake analysis)  •  Food acceptance and intake status from observations by staff  •  Follow up  •  Patient education  •  Intervention secondary to interdisciplinary rounds  •   concerns      Treatment:    The following has been recommended:    1) Advance diet as medically feasible/tolerable (clears --> regular + Ensure Enlive TID to optimize po intake and provide an additional 350 kcal, 20g protein per serving).  2) Obtain daily weights to monitor trends.    PROVIDER Section:     By signing this assessment you are acknowledging and agree with the diagnosis/diagnoses assigned by the Registered Dietitian    Comments: No

## 2024-03-01 NOTE — BEHAVIORAL HEALTH ASSESSMENT NOTE - NSBHLEGAL_PSY_A_CORE
results found for: \"HIV\", \"HEPCAB\"    COVID-19 Screening (If Applicable): No results found for: \"COVID19\"        Anesthesia Evaluation  Patient summary reviewed and Nursing notes reviewed  Airway: Mallampati: II  TM distance: >3 FB   Neck ROM: full  Mouth opening: > = 3 FB   Dental: normal exam         Pulmonary:                              Cardiovascular:  Exercise tolerance: good (>4 METS)  (+) dysrhythmias: atrial fibrillation            Echocardiogram reviewed                  Neuro/Psych:   (+) neuromuscular disease:, headaches:            GI/Hepatic/Renal:   (+) hiatal hernia, GERD:          Endo/Other:    (+) hypothyroidism::..                 Abdominal: normal exam            Vascular:          Other Findings:       Anesthesia Plan      general     ASA 3       Induction: intravenous.      Anesthetic plan and risks discussed with patient and child/children.      Plan discussed with CRNA.    Attending anesthesiologist reviewed and agrees with Preprocedure content            Soumya Aguilar MD   3/1/2024            
No

## 2024-08-30 NOTE — PROCEDURE NOTE - PRACTITIONER PERFORMING THE TIME OUT
Hi, this is to let you know that your recent results showed: Normal electrolytes blood sugar kidney function liver function tests.  Normal cholesterol.
Betty
Yassine Rod PA-C